# Patient Record
Sex: FEMALE | Race: BLACK OR AFRICAN AMERICAN | NOT HISPANIC OR LATINO | Employment: OTHER | URBAN - METROPOLITAN AREA
[De-identification: names, ages, dates, MRNs, and addresses within clinical notes are randomized per-mention and may not be internally consistent; named-entity substitution may affect disease eponyms.]

---

## 2017-06-19 ENCOUNTER — TELEPHONE (OUTPATIENT)
Dept: RHEUMATOLOGY | Facility: CLINIC | Age: 72
End: 2017-06-19

## 2017-06-19 NOTE — TELEPHONE ENCOUNTER
Left message for pt. To call clinic back to reschedule appointment because there was a scheduling error and the provider will be out of the office.

## 2017-06-20 ENCOUNTER — TELEPHONE (OUTPATIENT)
Dept: RHEUMATOLOGY | Facility: CLINIC | Age: 72
End: 2017-06-20

## 2017-06-20 NOTE — TELEPHONE ENCOUNTER
Left message with patient's daughter that the patient's appointment for next 6/29 needed to be rescheduled because the provider will no be in. New appointment 7/24 @ 2 pm given to daughter and I verbalized that I was sending an appointment reminder in the mail. The daughter verbalized understanding and reported that she would give her mother the message.

## 2017-11-05 ENCOUNTER — HOSPITAL ENCOUNTER (INPATIENT)
Facility: HOSPITAL | Age: 72
LOS: 11 days | Discharge: NURSING, REHAB OR LONG-TERM CARE | DRG: 854 | End: 2017-11-17
Attending: EMERGENCY MEDICINE | Admitting: SURGERY
Payer: MEDICARE

## 2017-11-05 DIAGNOSIS — I10 ESSENTIAL HYPERTENSION: ICD-10-CM

## 2017-11-05 DIAGNOSIS — M79.89 OTHER SPECIFIED SOFT TISSUE DISORDERS (CODE): ICD-10-CM

## 2017-11-05 DIAGNOSIS — K81.0 ACUTE CHOLECYSTITIS: ICD-10-CM

## 2017-11-05 DIAGNOSIS — E83.42 HYPOMAGNESEMIA: ICD-10-CM

## 2017-11-05 DIAGNOSIS — M19.90 ARTHRITIS: ICD-10-CM

## 2017-11-05 DIAGNOSIS — E66.01 MORBID OBESITY WITH BMI OF 45.0-49.9, ADULT: ICD-10-CM

## 2017-11-05 DIAGNOSIS — E87.6 HYPOKALEMIA: ICD-10-CM

## 2017-11-05 DIAGNOSIS — Z90.49 S/P CHOLECYSTECTOMY: Primary | ICD-10-CM

## 2017-11-05 LAB
ALBUMIN SERPL BCP-MCNC: 3 G/DL
ALP SERPL-CCNC: 101 U/L
ALT SERPL W/O P-5'-P-CCNC: 12 U/L
ANION GAP SERPL CALC-SCNC: 10 MMOL/L
AST SERPL-CCNC: 20 U/L
BASOPHILS # BLD AUTO: 0.06 K/UL
BASOPHILS NFR BLD: 0.3 %
BILIRUB SERPL-MCNC: 1.1 MG/DL
BUN SERPL-MCNC: 21 MG/DL
CALCIUM SERPL-MCNC: 10 MG/DL
CHLORIDE SERPL-SCNC: 101 MMOL/L
CO2 SERPL-SCNC: 28 MMOL/L
CREAT SERPL-MCNC: 0.8 MG/DL
DIFFERENTIAL METHOD: ABNORMAL
EOSINOPHIL # BLD AUTO: 0 K/UL
EOSINOPHIL NFR BLD: 0.1 %
ERYTHROCYTE [DISTWIDTH] IN BLOOD BY AUTOMATED COUNT: 13.7 %
EST. GFR  (AFRICAN AMERICAN): >60 ML/MIN/1.73 M^2
EST. GFR  (NON AFRICAN AMERICAN): >60 ML/MIN/1.73 M^2
GLUCOSE SERPL-MCNC: 104 MG/DL
HCT VFR BLD AUTO: 43.6 %
HGB BLD-MCNC: 14.1 G/DL
IMM GRANULOCYTES # BLD AUTO: 0.16 K/UL
IMM GRANULOCYTES NFR BLD AUTO: 0.8 %
LACTATE SERPL-SCNC: 1 MMOL/L
LIPASE SERPL-CCNC: 7 U/L
LYMPHOCYTES # BLD AUTO: 1.4 K/UL
LYMPHOCYTES NFR BLD: 7.1 %
MCH RBC QN AUTO: 28.8 PG
MCHC RBC AUTO-ENTMCNC: 32.3 G/DL
MCV RBC AUTO: 89 FL
MONOCYTES # BLD AUTO: 2.1 K/UL
MONOCYTES NFR BLD: 10.5 %
NEUTROPHILS # BLD AUTO: 15.9 K/UL
NEUTROPHILS NFR BLD: 81.2 %
NRBC BLD-RTO: 0 /100 WBC
PLATELET # BLD AUTO: 197 K/UL
PMV BLD AUTO: 11.1 FL
POTASSIUM SERPL-SCNC: 3.6 MMOL/L
PROT SERPL-MCNC: 7.2 G/DL
RBC # BLD AUTO: 4.89 M/UL
SODIUM SERPL-SCNC: 139 MMOL/L
WBC # BLD AUTO: 19.54 K/UL

## 2017-11-05 PROCEDURE — 85025 COMPLETE CBC W/AUTO DIFF WBC: CPT

## 2017-11-05 PROCEDURE — 83690 ASSAY OF LIPASE: CPT

## 2017-11-05 PROCEDURE — 25000003 PHARM REV CODE 250: Performed by: ANESTHESIOLOGY

## 2017-11-05 PROCEDURE — 80053 COMPREHEN METABOLIC PANEL: CPT

## 2017-11-05 PROCEDURE — 96361 HYDRATE IV INFUSION ADD-ON: CPT

## 2017-11-05 PROCEDURE — 83605 ASSAY OF LACTIC ACID: CPT

## 2017-11-05 PROCEDURE — 99285 EMERGENCY DEPT VISIT HI MDM: CPT | Mod: 25

## 2017-11-05 PROCEDURE — 99285 EMERGENCY DEPT VISIT HI MDM: CPT | Mod: ,,, | Performed by: EMERGENCY MEDICINE

## 2017-11-05 PROCEDURE — 20600001 HC STEP DOWN PRIVATE ROOM

## 2017-11-05 PROCEDURE — 25500020 PHARM REV CODE 255: Performed by: EMERGENCY MEDICINE

## 2017-11-05 RX ADMIN — SODIUM CHLORIDE 1000 ML: 0.9 INJECTION, SOLUTION INTRAVENOUS at 10:11

## 2017-11-05 RX ADMIN — IOHEXOL 100 ML: 350 INJECTION, SOLUTION INTRAVENOUS at 11:11

## 2017-11-06 ENCOUNTER — SURGERY (OUTPATIENT)
Age: 72
End: 2017-11-06

## 2017-11-06 ENCOUNTER — ANESTHESIA (OUTPATIENT)
Dept: SURGERY | Facility: HOSPITAL | Age: 72
DRG: 854 | End: 2017-11-06
Payer: MEDICARE

## 2017-11-06 ENCOUNTER — ANESTHESIA EVENT (OUTPATIENT)
Dept: SURGERY | Facility: HOSPITAL | Age: 72
DRG: 854 | End: 2017-11-06
Payer: MEDICARE

## 2017-11-06 PROBLEM — K81.0 ACUTE CHOLECYSTITIS: Status: ACTIVE | Noted: 2017-11-06

## 2017-11-06 LAB
ALBUMIN SERPL BCP-MCNC: 2.8 G/DL
ALP SERPL-CCNC: 110 U/L
ALT SERPL W/O P-5'-P-CCNC: 11 U/L
ANION GAP SERPL CALC-SCNC: 13 MMOL/L
AST SERPL-CCNC: 16 U/L
BASOPHILS # BLD AUTO: 0.06 K/UL
BASOPHILS NFR BLD: 0.3 %
BILIRUB SERPL-MCNC: 1 MG/DL
BUN SERPL-MCNC: 16 MG/DL
CALCIUM SERPL-MCNC: 9.6 MG/DL
CHLORIDE SERPL-SCNC: 104 MMOL/L
CO2 SERPL-SCNC: 23 MMOL/L
CREAT SERPL-MCNC: 0.6 MG/DL
DIFFERENTIAL METHOD: ABNORMAL
EOSINOPHIL # BLD AUTO: 0 K/UL
EOSINOPHIL NFR BLD: 0 %
ERYTHROCYTE [DISTWIDTH] IN BLOOD BY AUTOMATED COUNT: 13.9 %
EST. GFR  (AFRICAN AMERICAN): >60 ML/MIN/1.73 M^2
EST. GFR  (NON AFRICAN AMERICAN): >60 ML/MIN/1.73 M^2
GLUCOSE SERPL-MCNC: 105 MG/DL
HCT VFR BLD AUTO: 45.3 %
HGB BLD-MCNC: 14.4 G/DL
IMM GRANULOCYTES # BLD AUTO: 0.18 K/UL
IMM GRANULOCYTES NFR BLD AUTO: 0.9 %
LYMPHOCYTES # BLD AUTO: 1.1 K/UL
LYMPHOCYTES NFR BLD: 5.6 %
MAGNESIUM SERPL-MCNC: 1.5 MG/DL
MCH RBC QN AUTO: 29.3 PG
MCHC RBC AUTO-ENTMCNC: 31.8 G/DL
MCV RBC AUTO: 92 FL
MONOCYTES # BLD AUTO: 1.9 K/UL
MONOCYTES NFR BLD: 9.3 %
NEUTROPHILS # BLD AUTO: 17.2 K/UL
NEUTROPHILS NFR BLD: 83.9 %
NRBC BLD-RTO: 0 /100 WBC
PHOSPHATE SERPL-MCNC: 1.5 MG/DL
PLATELET # BLD AUTO: 187 K/UL
PMV BLD AUTO: 10.7 FL
POTASSIUM SERPL-SCNC: 3 MMOL/L
PROT SERPL-MCNC: 6.7 G/DL
RBC # BLD AUTO: 4.91 M/UL
SODIUM SERPL-SCNC: 140 MMOL/L
WBC # BLD AUTO: 20.45 K/UL

## 2017-11-06 PROCEDURE — 71000039 HC RECOVERY, EACH ADD'L HOUR: Performed by: SURGERY

## 2017-11-06 PROCEDURE — 94761 N-INVAS EAR/PLS OXIMETRY MLT: CPT

## 2017-11-06 PROCEDURE — 37000009 HC ANESTHESIA EA ADD 15 MINS: Performed by: SURGERY

## 2017-11-06 PROCEDURE — 47600 CHOLECYSTECTOMY: CPT | Mod: GC,,, | Performed by: SURGERY

## 2017-11-06 PROCEDURE — 63600175 PHARM REV CODE 636 W HCPCS: Performed by: ANESTHESIOLOGY

## 2017-11-06 PROCEDURE — 71000033 HC RECOVERY, INTIAL HOUR: Performed by: SURGERY

## 2017-11-06 PROCEDURE — 96375 TX/PRO/DX INJ NEW DRUG ADDON: CPT

## 2017-11-06 PROCEDURE — 99223 1ST HOSP IP/OBS HIGH 75: CPT | Mod: 57,AI,, | Performed by: SURGERY

## 2017-11-06 PROCEDURE — D9220A PRA ANESTHESIA: Mod: ,,, | Performed by: ANESTHESIOLOGY

## 2017-11-06 PROCEDURE — 25000003 PHARM REV CODE 250: Performed by: SURGERY

## 2017-11-06 PROCEDURE — 83735 ASSAY OF MAGNESIUM: CPT

## 2017-11-06 PROCEDURE — C9399 UNCLASSIFIED DRUGS OR BIOLOG: HCPCS | Performed by: ANESTHESIOLOGY

## 2017-11-06 PROCEDURE — S0030 INJECTION, METRONIDAZOLE: HCPCS | Performed by: ANESTHESIOLOGY

## 2017-11-06 PROCEDURE — 27000221 HC OXYGEN, UP TO 24 HOURS

## 2017-11-06 PROCEDURE — S0030 INJECTION, METRONIDAZOLE: HCPCS | Performed by: SURGERY

## 2017-11-06 PROCEDURE — 88304 TISSUE EXAM BY PATHOLOGIST: CPT | Mod: 26,,, | Performed by: PATHOLOGY

## 2017-11-06 PROCEDURE — 88304 TISSUE EXAM BY PATHOLOGIST: CPT | Performed by: PATHOLOGY

## 2017-11-06 PROCEDURE — 63600175 PHARM REV CODE 636 W HCPCS

## 2017-11-06 PROCEDURE — S0028 INJECTION, FAMOTIDINE, 20 MG: HCPCS | Performed by: SURGERY

## 2017-11-06 PROCEDURE — 84100 ASSAY OF PHOSPHORUS: CPT

## 2017-11-06 PROCEDURE — 25000003 PHARM REV CODE 250: Performed by: ANESTHESIOLOGY

## 2017-11-06 PROCEDURE — 37000008 HC ANESTHESIA 1ST 15 MINUTES: Performed by: SURGERY

## 2017-11-06 PROCEDURE — S0020 INJECTION, BUPIVICAINE HYDRO: HCPCS | Performed by: SURGERY

## 2017-11-06 PROCEDURE — 0FT40ZZ RESECTION OF GALLBLADDER, OPEN APPROACH: ICD-10-PCS | Performed by: SURGERY

## 2017-11-06 PROCEDURE — 36000707: Performed by: SURGERY

## 2017-11-06 PROCEDURE — 85025 COMPLETE CBC W/AUTO DIFF WBC: CPT

## 2017-11-06 PROCEDURE — 27201423 OPTIME MED/SURG SUP & DEVICES STERILE SUPPLY: Performed by: SURGERY

## 2017-11-06 PROCEDURE — 0FJ44ZZ INSPECTION OF GALLBLADDER, PERCUTANEOUS ENDOSCOPIC APPROACH: ICD-10-PCS | Performed by: SURGERY

## 2017-11-06 PROCEDURE — 20600001 HC STEP DOWN PRIVATE ROOM

## 2017-11-06 PROCEDURE — 80053 COMPREHEN METABOLIC PANEL: CPT

## 2017-11-06 PROCEDURE — 96361 HYDRATE IV INFUSION ADD-ON: CPT

## 2017-11-06 PROCEDURE — 96365 THER/PROPH/DIAG IV INF INIT: CPT | Mod: XS

## 2017-11-06 PROCEDURE — 96376 TX/PRO/DX INJ SAME DRUG ADON: CPT

## 2017-11-06 PROCEDURE — 63600175 PHARM REV CODE 636 W HCPCS: Performed by: SURGERY

## 2017-11-06 PROCEDURE — 36000706: Performed by: SURGERY

## 2017-11-06 PROCEDURE — 96367 TX/PROPH/DG ADDL SEQ IV INF: CPT

## 2017-11-06 PROCEDURE — 63600175 PHARM REV CODE 636 W HCPCS: Performed by: EMERGENCY MEDICINE

## 2017-11-06 RX ORDER — SODIUM CHLORIDE, SODIUM LACTATE, POTASSIUM CHLORIDE, CALCIUM CHLORIDE 600; 310; 30; 20 MG/100ML; MG/100ML; MG/100ML; MG/100ML
INJECTION, SOLUTION INTRAVENOUS CONTINUOUS
Status: DISCONTINUED | OUTPATIENT
Start: 2017-11-06 | End: 2017-11-08

## 2017-11-06 RX ORDER — AMLODIPINE BESYLATE 10 MG/1
10 TABLET ORAL EVERY MORNING
Status: DISCONTINUED | OUTPATIENT
Start: 2017-11-07 | End: 2017-11-10

## 2017-11-06 RX ORDER — AMLODIPINE BESYLATE 10 MG/1
10 TABLET ORAL EVERY MORNING
Status: DISCONTINUED | OUTPATIENT
Start: 2017-11-06 | End: 2017-11-06

## 2017-11-06 RX ORDER — ONDANSETRON 8 MG/1
8 TABLET, ORALLY DISINTEGRATING ORAL EVERY 8 HOURS PRN
Status: DISCONTINUED | OUTPATIENT
Start: 2017-11-06 | End: 2017-11-17

## 2017-11-06 RX ORDER — METRONIDAZOLE 500 MG/100ML
500 INJECTION, SOLUTION INTRAVENOUS
Status: DISCONTINUED | OUTPATIENT
Start: 2017-11-06 | End: 2017-11-07

## 2017-11-06 RX ORDER — CLONIDINE 0.3 MG/24H
1 PATCH, EXTENDED RELEASE TRANSDERMAL WEEKLY
Status: DISCONTINUED | OUTPATIENT
Start: 2017-11-06 | End: 2017-11-10

## 2017-11-06 RX ORDER — PHENYLEPHRINE HYDROCHLORIDE 10 MG/ML
INJECTION INTRAVENOUS
Status: DISCONTINUED | OUTPATIENT
Start: 2017-11-06 | End: 2017-11-06

## 2017-11-06 RX ORDER — LABETALOL HYDROCHLORIDE 5 MG/ML
20 INJECTION, SOLUTION INTRAVENOUS ONCE
Status: DISCONTINUED | OUTPATIENT
Start: 2017-11-06 | End: 2017-11-10

## 2017-11-06 RX ORDER — SODIUM CHLORIDE 9 MG/ML
INJECTION, SOLUTION INTRAVENOUS CONTINUOUS PRN
Status: DISCONTINUED | OUTPATIENT
Start: 2017-11-06 | End: 2017-11-06

## 2017-11-06 RX ORDER — HYDRALAZINE HYDROCHLORIDE 20 MG/ML
10 INJECTION INTRAMUSCULAR; INTRAVENOUS EVERY 6 HOURS PRN
Status: DISCONTINUED | OUTPATIENT
Start: 2017-11-06 | End: 2017-11-10

## 2017-11-06 RX ORDER — LOSARTAN POTASSIUM AND HYDROCHLOROTHIAZIDE 25; 100 MG/1; MG/1
1 TABLET ORAL EVERY MORNING
Status: DISCONTINUED | OUTPATIENT
Start: 2017-11-06 | End: 2017-11-06

## 2017-11-06 RX ORDER — BUPIVACAINE HYDROCHLORIDE 5 MG/ML
INJECTION, SOLUTION EPIDURAL; INTRACAUDAL
Status: DISCONTINUED | OUTPATIENT
Start: 2017-11-06 | End: 2017-11-06 | Stop reason: HOSPADM

## 2017-11-06 RX ORDER — CARVEDILOL 25 MG/1
25 TABLET ORAL 2 TIMES DAILY
Status: DISCONTINUED | OUTPATIENT
Start: 2017-11-06 | End: 2017-11-10

## 2017-11-06 RX ORDER — SODIUM CHLORIDE 0.9 % (FLUSH) 0.9 %
3 SYRINGE (ML) INJECTION
Status: DISCONTINUED | OUTPATIENT
Start: 2017-11-06 | End: 2017-11-10

## 2017-11-06 RX ORDER — LOSARTAN POTASSIUM AND HYDROCHLOROTHIAZIDE 25; 100 MG/1; MG/1
1 TABLET ORAL EVERY MORNING
Status: DISCONTINUED | OUTPATIENT
Start: 2017-11-08 | End: 2017-11-09

## 2017-11-06 RX ORDER — ROCURONIUM BROMIDE 10 MG/ML
INJECTION, SOLUTION INTRAVENOUS
Status: DISCONTINUED | OUTPATIENT
Start: 2017-11-06 | End: 2017-11-06

## 2017-11-06 RX ORDER — LIDOCAINE HCL/PF 100 MG/5ML
SYRINGE (ML) INTRAVENOUS
Status: DISCONTINUED | OUTPATIENT
Start: 2017-11-06 | End: 2017-11-06

## 2017-11-06 RX ORDER — HYDROMORPHONE HCL IN 0.9% NACL 6 MG/30 ML
PATIENT CONTROLLED ANALGESIA SYRINGE INTRAVENOUS CONTINUOUS
Status: DISCONTINUED | OUTPATIENT
Start: 2017-11-06 | End: 2017-11-08

## 2017-11-06 RX ORDER — HYDRALAZINE HYDROCHLORIDE 20 MG/ML
10 INJECTION INTRAMUSCULAR; INTRAVENOUS
Status: COMPLETED | OUTPATIENT
Start: 2017-11-06 | End: 2017-11-06

## 2017-11-06 RX ORDER — LABETALOL HYDROCHLORIDE 5 MG/ML
20 INJECTION, SOLUTION INTRAVENOUS EVERY 6 HOURS PRN
Status: DISCONTINUED | OUTPATIENT
Start: 2017-11-06 | End: 2017-11-10

## 2017-11-06 RX ORDER — HYDROMORPHONE HCL IN 0.9% NACL 6 MG/30 ML
PATIENT CONTROLLED ANALGESIA SYRINGE INTRAVENOUS
Status: COMPLETED
Start: 2017-11-06 | End: 2017-11-06

## 2017-11-06 RX ORDER — CARVEDILOL 12.5 MG/1
25 TABLET ORAL 2 TIMES DAILY
Status: DISCONTINUED | OUTPATIENT
Start: 2017-11-06 | End: 2017-11-06

## 2017-11-06 RX ORDER — PROPOFOL 10 MG/ML
VIAL (ML) INTRAVENOUS
Status: DISCONTINUED | OUTPATIENT
Start: 2017-11-06 | End: 2017-11-06

## 2017-11-06 RX ORDER — CLONIDINE HYDROCHLORIDE 0.1 MG/1
0.1 TABLET ORAL ONCE
Status: DISCONTINUED | OUTPATIENT
Start: 2017-11-06 | End: 2017-11-10

## 2017-11-06 RX ORDER — FENTANYL CITRATE 50 UG/ML
25 INJECTION, SOLUTION INTRAMUSCULAR; INTRAVENOUS ONCE
Status: COMPLETED | OUTPATIENT
Start: 2017-11-06 | End: 2017-11-06

## 2017-11-06 RX ORDER — FAMOTIDINE 10 MG/ML
20 INJECTION INTRAVENOUS 2 TIMES DAILY
Status: DISCONTINUED | OUTPATIENT
Start: 2017-11-06 | End: 2017-11-09

## 2017-11-06 RX ORDER — FENTANYL CITRATE 50 UG/ML
INJECTION, SOLUTION INTRAMUSCULAR; INTRAVENOUS
Status: DISCONTINUED | OUTPATIENT
Start: 2017-11-06 | End: 2017-11-06

## 2017-11-06 RX ORDER — HYDRALAZINE HYDROCHLORIDE 25 MG/1
100 TABLET, FILM COATED ORAL 3 TIMES DAILY
Status: DISCONTINUED | OUTPATIENT
Start: 2017-11-06 | End: 2017-11-06

## 2017-11-06 RX ORDER — ACETAMINOPHEN 10 MG/ML
INJECTION, SOLUTION INTRAVENOUS
Status: DISCONTINUED | OUTPATIENT
Start: 2017-11-06 | End: 2017-11-06

## 2017-11-06 RX ORDER — DIPHENHYDRAMINE HYDROCHLORIDE 50 MG/ML
25 INJECTION INTRAMUSCULAR; INTRAVENOUS EVERY 4 HOURS PRN
Status: DISCONTINUED | OUTPATIENT
Start: 2017-11-06 | End: 2017-11-10

## 2017-11-06 RX ORDER — HYDROMORPHONE HYDROCHLORIDE 1 MG/ML
1 INJECTION, SOLUTION INTRAMUSCULAR; INTRAVENOUS; SUBCUTANEOUS EVERY 4 HOURS PRN
Status: DISCONTINUED | OUTPATIENT
Start: 2017-11-06 | End: 2017-11-06

## 2017-11-06 RX ORDER — HYDRALAZINE HYDROCHLORIDE 50 MG/1
100 TABLET, FILM COATED ORAL 3 TIMES DAILY
Status: DISCONTINUED | OUTPATIENT
Start: 2017-11-06 | End: 2017-11-10

## 2017-11-06 RX ORDER — FENTANYL CITRATE 50 UG/ML
INJECTION, SOLUTION INTRAMUSCULAR; INTRAVENOUS
Status: DISPENSED
Start: 2017-11-06 | End: 2017-11-07

## 2017-11-06 RX ORDER — HYDROMORPHONE HYDROCHLORIDE 1 MG/ML
0.5 INJECTION, SOLUTION INTRAMUSCULAR; INTRAVENOUS; SUBCUTANEOUS EVERY 4 HOURS PRN
Status: DISCONTINUED | OUTPATIENT
Start: 2017-11-06 | End: 2017-11-06

## 2017-11-06 RX ORDER — SUCCINYLCHOLINE CHLORIDE 20 MG/ML
INJECTION INTRAMUSCULAR; INTRAVENOUS
Status: DISCONTINUED | OUTPATIENT
Start: 2017-11-06 | End: 2017-11-06

## 2017-11-06 RX ORDER — METRONIDAZOLE 500 MG/100ML
500 INJECTION, SOLUTION INTRAVENOUS
Status: COMPLETED | OUTPATIENT
Start: 2017-11-06 | End: 2017-11-06

## 2017-11-06 RX ORDER — CEFAZOLIN SODIUM 1 G/3ML
INJECTION, POWDER, FOR SOLUTION INTRAMUSCULAR; INTRAVENOUS
Status: DISCONTINUED | OUTPATIENT
Start: 2017-11-06 | End: 2017-11-06

## 2017-11-06 RX ORDER — SODIUM CHLORIDE 9 MG/ML
INJECTION, SOLUTION INTRAVENOUS CONTINUOUS
Status: DISCONTINUED | OUTPATIENT
Start: 2017-11-06 | End: 2017-11-06

## 2017-11-06 RX ORDER — IPRATROPIUM BROMIDE AND ALBUTEROL SULFATE 2.5; .5 MG/3ML; MG/3ML
3 SOLUTION RESPIRATORY (INHALATION) EVERY 4 HOURS PRN
Status: DISCONTINUED | OUTPATIENT
Start: 2017-11-06 | End: 2017-11-10

## 2017-11-06 RX ORDER — NALOXONE HCL 0.4 MG/ML
0.02 VIAL (ML) INJECTION
Status: DISCONTINUED | OUTPATIENT
Start: 2017-11-06 | End: 2017-11-08

## 2017-11-06 RX ADMIN — FAMOTIDINE 20 MG: 10 INJECTION, SOLUTION INTRAVENOUS at 09:11

## 2017-11-06 RX ADMIN — METRONIDAZOLE 500 MG: 500 INJECTION, SOLUTION INTRAVENOUS at 12:11

## 2017-11-06 RX ADMIN — CEFTRIAXONE 1 G: 1 INJECTION, SOLUTION INTRAVENOUS at 05:11

## 2017-11-06 RX ADMIN — SODIUM CHLORIDE: 0.9 INJECTION, SOLUTION INTRAVENOUS at 01:11

## 2017-11-06 RX ADMIN — EPHEDRINE SULFATE 5 MG: 50 INJECTION, SOLUTION INTRAMUSCULAR; INTRAVENOUS; SUBCUTANEOUS at 11:11

## 2017-11-06 RX ADMIN — HYDROMORPHONE HYDROCHLORIDE 0.5 MG: 1 INJECTION, SOLUTION INTRAMUSCULAR; INTRAVENOUS; SUBCUTANEOUS at 09:11

## 2017-11-06 RX ADMIN — SODIUM CHLORIDE, SODIUM LACTATE, POTASSIUM CHLORIDE, AND CALCIUM CHLORIDE: .6; .31; .03; .02 INJECTION, SOLUTION INTRAVENOUS at 01:11

## 2017-11-06 RX ADMIN — ACETAMINOPHEN 1000 MG: 10 INJECTION, SOLUTION INTRAVENOUS at 12:11

## 2017-11-06 RX ADMIN — PHENYLEPHRINE HYDROCHLORIDE 200 MCG: 10 INJECTION INTRAVENOUS at 11:11

## 2017-11-06 RX ADMIN — HYDRALAZINE HYDROCHLORIDE 100 MG: 50 TABLET ORAL at 09:11

## 2017-11-06 RX ADMIN — SODIUM CHLORIDE: 0.9 INJECTION, SOLUTION INTRAVENOUS at 10:11

## 2017-11-06 RX ADMIN — FENTANYL CITRATE 25 MCG: 50 INJECTION, SOLUTION INTRAMUSCULAR; INTRAVENOUS at 12:11

## 2017-11-06 RX ADMIN — LIDOCAINE HYDROCHLORIDE 100 MG: 20 INJECTION, SOLUTION INTRAVENOUS at 10:11

## 2017-11-06 RX ADMIN — Medication: at 01:11

## 2017-11-06 RX ADMIN — HYDRALAZINE HYDROCHLORIDE 10 MG: 20 INJECTION INTRAMUSCULAR; INTRAVENOUS at 02:11

## 2017-11-06 RX ADMIN — SUCCINYLCHOLINE CHLORIDE 140 MG: 20 INJECTION, SOLUTION INTRAMUSCULAR; INTRAVENOUS at 10:11

## 2017-11-06 RX ADMIN — FENTANYL CITRATE 50 MCG: 50 INJECTION, SOLUTION INTRAMUSCULAR; INTRAVENOUS at 11:11

## 2017-11-06 RX ADMIN — ROCURONIUM BROMIDE 5 MG: 10 INJECTION, SOLUTION INTRAVENOUS at 10:11

## 2017-11-06 RX ADMIN — SUGAMMADEX 500 MG: 100 INJECTION, SOLUTION INTRAVENOUS at 12:11

## 2017-11-06 RX ADMIN — POTASSIUM PHOSPHATE, MONOBASIC AND POTASSIUM PHOSPHATE, DIBASIC 40 MMOL: 224; 236 INJECTION, SOLUTION, CONCENTRATE INTRAVENOUS at 01:11

## 2017-11-06 RX ADMIN — HYDRALAZINE HYDROCHLORIDE 10 MG: 20 INJECTION INTRAMUSCULAR; INTRAVENOUS at 12:11

## 2017-11-06 RX ADMIN — PROPOFOL 200 MG: 10 INJECTION, EMULSION INTRAVENOUS at 10:11

## 2017-11-06 RX ADMIN — BUPIVACAINE HYDROCHLORIDE 20 ML: 5 INJECTION, SOLUTION EPIDURAL; INTRACAUDAL; PERINEURAL at 12:11

## 2017-11-06 RX ADMIN — PHENYLEPHRINE HYDROCHLORIDE 100 MCG: 10 INJECTION INTRAVENOUS at 11:11

## 2017-11-06 RX ADMIN — PHENYLEPHRINE HYDROCHLORIDE 0.2 MCG/KG/MIN: 10 INJECTION INTRAVENOUS at 11:11

## 2017-11-06 RX ADMIN — SODIUM CHLORIDE, SODIUM GLUCONATE, SODIUM ACETATE, POTASSIUM CHLORIDE, MAGNESIUM CHLORIDE, SODIUM PHOSPHATE, DIBASIC, AND POTASSIUM PHOSPHATE: .53; .5; .37; .037; .03; .012; .00082 INJECTION, SOLUTION INTRAVENOUS at 12:11

## 2017-11-06 RX ADMIN — FENTANYL CITRATE 150 MCG: 50 INJECTION, SOLUTION INTRAMUSCULAR; INTRAVENOUS at 10:11

## 2017-11-06 RX ADMIN — CEFAZOLIN 2 G: 1 INJECTION, POWDER, FOR SOLUTION INTRAVENOUS at 11:11

## 2017-11-06 RX ADMIN — METRONIDAZOLE 500 MG: 500 INJECTION, SOLUTION INTRAVENOUS at 08:11

## 2017-11-06 RX ADMIN — PHENYLEPHRINE HYDROCHLORIDE 100 MCG: 10 INJECTION INTRAVENOUS at 10:11

## 2017-11-06 RX ADMIN — ROCURONIUM BROMIDE 80 MG: 10 INJECTION, SOLUTION INTRAVENOUS at 11:11

## 2017-11-06 RX ADMIN — CARVEDILOL 25 MG: 12.5 TABLET, FILM COATED ORAL at 09:11

## 2017-11-06 RX ADMIN — SODIUM CHLORIDE 1000 ML: 0.9 INJECTION, SOLUTION INTRAVENOUS at 08:11

## 2017-11-06 RX ADMIN — CARVEDILOL 25 MG: 25 TABLET, FILM COATED ORAL at 09:11

## 2017-11-06 RX ADMIN — MIDAZOLAM HYDROCHLORIDE 1 MG: 1 INJECTION, SOLUTION INTRAMUSCULAR; INTRAVENOUS at 10:11

## 2017-11-06 RX ADMIN — MAGNESIUM SULFATE HEPTAHYDRATE 3 G: 500 INJECTION, SOLUTION INTRAMUSCULAR; INTRAVENOUS at 09:11

## 2017-11-06 RX ADMIN — FENTANYL CITRATE 25 MCG: 50 INJECTION INTRAMUSCULAR; INTRAVENOUS at 06:11

## 2017-11-06 RX ADMIN — LABETALOL HYDROCHLORIDE 20 MG: 5 INJECTION, SOLUTION INTRAVENOUS at 04:11

## 2017-11-06 RX ADMIN — AMLODIPINE BESYLATE 10 MG: 10 TABLET ORAL at 09:11

## 2017-11-06 RX ADMIN — METRONIDAZOLE 500 MG: 500 INJECTION, SOLUTION INTRAVENOUS at 11:11

## 2017-11-06 NOTE — ED NOTES
Received report from LISETTE Camacho RN - stated Dr's are aware of pt's SBP. Surg consent is w/ MD, not at bedside.  Pt remains NPO for possible OR today. Pt AAOx4, skin w/d, respirations even and unlabored. Offers no c/o's at this time. Pt placed on cardiac monitor, continuous pulse ox, cycling blood pressures. Side rails up x2, call bell in reach, bed in low position with brake engaged.

## 2017-11-06 NOTE — HPI
Rosetta Whyte is a 72 y.o. female with morbid obesity (s/p sleeve gastrectomy), HTN, and OA [place] [facility] with 5 days history of abdominal pain.  She states that the pain started Tuesday.  The pain was located on the right side of her abdomen.  The pain for the first day was tolerable, but for the last 4 days, she states the pain has been 10/10.  She has associated nausea and vomiting, fever/chills.  She has had no appetite and has not eaten a meal for 4 days.  She has never had anything like this happen before, even a mild episode.  She has maintained normal bowel function.  No pale stools, jaundice, itching, or dark urine.  Functionally, she can walk about 1 block before stopping, but she states that this is due to leg swelling and arthritis and not chest pain/shortness of breath.  She has lost 80 pounds since a sleeve gastrectomy 4 months ago.  She also has history of open appendectomy.

## 2017-11-06 NOTE — ED NOTES
Patient identifiers verified and correct for Rosetta Whyte.    LOC: The patient is awake, alert and aware of environment with an appropriate affect, the patient is oriented x 3 and speaking appropriately.  APPEARANCE: Patient appears obese and grimacing, patient is clean and well groomed, patient's clothing is properly fastened.  SKIN: The skin is warm and dry, color consistent with ethnicity, patient has normal skin turgor and moist mucus membranes, BLE edema  MUSCULOSKELETAL: Patient with limited ROM to right leg, no obvious deformities noted.  RESPIRATORY: Airway is open and patent, respirations are spontaneous, patient has a normal effort and rate, no accessory muscle use noted, bilateral breath sounds clear  CARDIAC: Patient has a tachycardic rate and regular rhythm, BLE edema noted, capillary refill < 3 seconds.  ABDOMEN: Soft and non tender to palpation, no distention noted, normoactive bowel sounds present in all four quadrants, scar to right lower quadrant  NEUROLOGIC: PERRL, 3mm bilaterally, eyes open spontaneously, behavior appropriate to situation, follows commands, facial expression symmetrical, bilateral hand grasp equal and even, purposeful motor response noted, normal sensation in all extremities when touched with a finger.

## 2017-11-06 NOTE — SUBJECTIVE & OBJECTIVE
No current facility-administered medications on file prior to encounter.      Current Outpatient Prescriptions on File Prior to Encounter   Medication Sig    amlodipine (NORVASC) 10 MG tablet Take 10 mg by mouth every morning.     hydrALAZINE (APRESOLINE) 100 MG tablet Take 100 mg by mouth 3 (three) times daily.     losartan-hydrochlorothiazide 100-25 mg (HYZAAR) 100-25 mg per tablet Take 1 tablet by mouth every morning.     potassium chloride 10% (KAYCIEL) 20 mEq/15 mL solution     potassium chloride SA (K-DUR,KLOR-CON) 20 MEQ tablet Take 20 mEq by mouth 2 (two) times daily.     bumetanide (BUMEX) 2 MG tablet Take 2 mg by mouth 2 (two) times daily.     CALCIUM CITRATE/VITAMIN D3 (CALCIUM CITRATE + D ORAL) Take 30 mLs by mouth 3 (three) times daily.    carvedilol (COREG) 25 MG tablet Take 25 mg by mouth 2 (two) times daily.     clobetasol (TEMOVATE) 0.05 % cream Apply topically 2 (two) times daily. Apply to legs    cloNIDine 0.3 mg/24 hr td ptwk (CATAPRES) 0.3 mg/24 hr     cyanocobalamin, vitamin B-12, 1,000 mcg Subl Place 1 drop under the tongue every other day.    ergocalciferol (ERGOCALCIFEROL) 50,000 unit Cap Take 1 capsule (50,000 Units total) by mouth twice a week.    esomeprazole (NEXIUM PACKET) 40 mg GrPS Take 40 mg by mouth before breakfast.    ondansetron (ZOFRAN-ODT) 4 MG TbDL Take 2 tablets (8 mg total) by mouth every 8 (eight) hours as needed (nausea).    pediatric multivit-iron-min (FLINTSTONES COMPLETE, IRON,) Chew Take 1 tablet by mouth 2 (two) times daily.    polyethylene glycol (GLYCOLAX) 17 gram PwPk Take 17 g by mouth once daily.    promethazine (PHENERGAN) 25 MG suppository Place 1 suppository (25 mg total) rectally every 6 (six) hours as needed for Nausea.    ursodiol (ALEK FORTE) 500 MG tablet Take 1 tablet (500 mg total) by mouth once daily. Crush tablet and take daily.  Okay to compound into liquid at patient's request.    VITAMIN B COMPLEX (B COMPLEX ORAL) Take 1 drop by  mouth 2 (two) times daily.       Review of patient's allergies indicates:  No Known Allergies    Past Medical History:   Diagnosis Date    Arthritis     BMI 50.0-59.9, adult     Hypertension     Morbid obesity     EMMANUEL (obstructive sleep apnea)      Past Surgical History:   Procedure Laterality Date    APPENDECTOMY  1960s    GASTRECTOMY      HIP FRACTURE SURGERY  1949     Family History     Problem Relation (Age of Onset)    Cancer Mother    Hypertension Mother, Sister, Brother, Brother        Social History Main Topics    Smoking status: Never Smoker    Smokeless tobacco: Never Used    Alcohol use No    Drug use: No    Sexual activity: Not on file     Review of Systems   Constitutional: Positive for activity change, appetite change, chills and fever. Negative for unexpected weight change.   HENT: Negative.    Respiratory: Negative for cough and shortness of breath.    Cardiovascular: Negative for chest pain.   Gastrointestinal: Positive for abdominal pain, nausea and vomiting. Negative for abdominal distention, constipation and diarrhea.   Genitourinary: Negative for difficulty urinating and dysuria.   Musculoskeletal: Positive for arthralgias.   Skin: Negative.    Neurological: Negative.    Hematological: Does not bruise/bleed easily.     Objective:     Vital Signs (Most Recent):  Temp: 98.8 °F (37.1 °C) (11/05/17 2201)  Pulse: (!) 119 (11/06/17 0124)  Resp: 18 (11/05/17 1921)  BP: (!) 224/101 (11/06/17 0124)  SpO2: 96 % (11/06/17 0124) Vital Signs (24h Range):  Temp:  [98.8 °F (37.1 °C)-100 °F (37.8 °C)] 98.8 °F (37.1 °C)  Pulse:  [100-119] 119  Resp:  [18] 18  SpO2:  [94 %-96 %] 96 %  BP: (189-237)/(100-112) 224/101     Weight: (!) 158.8 kg (350 lb)  Body mass index is 58.24 kg/m².    Physical Exam   Constitutional: She appears well-developed and well-nourished. She appears distressed (mild).   HENT:   Head: Normocephalic and atraumatic.   Eyes: EOM are normal. No scleral icterus.   Neck: Normal  range of motion.   Cardiovascular: Regular rhythm.  Tachycardia present.    Pulmonary/Chest: Effort normal. No respiratory distress.   Abdominal: Soft. She exhibits no distension. There is tenderness (RUQ moreso than epigastric and RLQ; +Fregoso's sign). There is guarding.   Scarring consistent with history of laparoscopic sleeve gastrectomy and open appendectomy   Musculoskeletal: Normal range of motion.   Neurological: She is alert.   Skin: She is not diaphoretic.   Nursing note and vitals reviewed.      Significant Labs:  CBC:   Recent Labs  Lab 11/05/17 2234   WBC 19.54*   RBC 4.89   HGB 14.1   HCT 43.6      MCV 89   MCH 28.8   MCHC 32.3     CMP:   Recent Labs  Lab 11/05/17 2234      CALCIUM 10.0   ALBUMIN 3.0*   PROT 7.2      K 3.6   CO2 28      BUN 21   CREATININE 0.8   ALKPHOS 101   ALT 12   AST 20   BILITOT 1.1*       Significant Diagnostics:  CT abd/pelv:   The liver is enlarged.  No focal hepatic abnormality is seen. The gallbladder is distended with surrounding inflammatory change, fat stranding and small amount of pericholecystic fluid. The gallbladder wall is thickened. Moderate amount of fat stranding is present inferior to the liver in the mesentery. No intrahepatic or extrahepatic biliary ductal dilatation is identified. The spleen is unremarkable.      The stomach, pancreas, and adrenal glands are unremarkable. There are postoperative changes of sleeve gastrectomy.    The kidneys are normal in size and location. No hydronephrosis is seen.  The ureters appear normal in course and caliber. The urinary bladder is unremarkable. There is a metallic density within the uterus.    The visualized loops of small and large bowel show no evidence of obstruction or inflammation.    No ascites, free fluid, or intraperitoneal free air is identified.    There is no evidence of lymph node enlargement in the abdomen or pelvis.    The abdominal aorta is normal in course and caliber with  advanced calcific atherosclerosis.    The osseous structures demonstrate levoscoliosis of the lumbar spine and age-appropriate degenerative changes. There are postoperative changes in the left hip including fusion of the proximal femur and acetabulum.      The extraperitoneal soft tissues are unremarkable.

## 2017-11-06 NOTE — ASSESSMENT & PLAN NOTE
-Admit to general surgery  -NPO, IVFs (receiving a bolus now; HR improved with first liter of fluid)  -Rocephin/Flagyl  -Patient's symptoms have been present and severe for at least 4 days, this makes her a more risky surgical patient.  Will treat with IV antibiotics for now; if any clinical deterioration, then could consider perc cholecystostomy tube.  -DVT and GI ppx  -Ambulate

## 2017-11-06 NOTE — OP NOTE
[facility]  Surgery Department  Operative Note    SUMMARY     Date of Procedure: 11/6/2017     Procedure: Procedure(s) (LRB):  CHOLECYSTECTOMY (N/A)  INCOMPLETE-PROCEDURE/ATTEMPTED Laparscopic cholecystectomy (N/A)     Surgeon(s) and Role:     * Sanjeev Smith MD - Primary     * Carlos Lopez MD - Resident - Assisting        Pre-Operative Diagnosis: Acute cholecystitis [K81.0]    Post-Operative Diagnosis: Post-Op Diagnosis Codes:     * Acute cholecystitis [K81.0]    Anesthesia: General    Technical Procedures Used: Open cholecystectomy    Description of the Findings of the Procedure:   After informed consent, the pt was brought to the OR placed supine on the table where GETA was induced.  The abdomen was prepped and draped in the usual sterile fashion.  A timeout was completed.  A supraumbilical incision was made, dissected down to the abdominal fascia which was grasped and elevated, and then cut with cautery.  A 10mm Mays trochar was inserted into the abdomen and insufflation to 15mmHG was achieved.  There was no injury noted during entry.  An inspection of the RUQ revealed a grossly inflamed and necrotic gallbladder.  A single 5mm port site was placed in the RUQ to facilitate mobilization of the gallbladder.  A decompression needle was used to laparoscopically drain the gallbladder.  Despite these measures, the gallbladder could not be mobilized safely to facilitate a laparoscopic procedure.  We elected to open at this point.      A right subcostal incision was made sharply.  Deep tissues of the abdominal wall were divided with electrocautery.  A Bookwalter retractor was used to facilitate exposure of the liver and gallbladder fossa.  The gallbladder was gradually freed from the bed in a top down fashion using electrocautery until the cystic artery was identified and ligated with silk ties.  The neck/ cystic duct was exposed, clamped with a right angle and the gallbladder was divided  with scissors noting a single opening into the gallbladder.  The stump was ligated with a silk tie.  The field was irrigated and hemostasis was achieved.  A 19F Neftaly noam was placed in the fossa and brought out through the RUQ. The abdomen as closed in layers with PDS for anterior and posterior fascia and staples for the skin.  Dressings were applied.  Pt tolerated the procedure without apparent complication and was transported to PACU in stable condition.      Significant Surgical Tasks Conducted by the Assistant(s), if Applicable: Assist    Complications: No    Estimated Blood Loss (EBL): 50cc           Implants: * No implants in log *    Specimens:   Specimen (12h ago through future)    Start     Ordered    11/06/17 1208  Specimen to Pathology - Surgery  Once     Comments:  1. Gallbladder - PERM      11/06/17 1207                  Condition: Good    Disposition: PACU - hemodynamically stable.    Attestation: I was present and scrubbed for the entire procedure.

## 2017-11-06 NOTE — ED PROVIDER NOTES
Encounter Date: 11/5/2017    SCRIBE #1 NOTE: I, Ryan Rasmussen, am scribing for, and in the presence of,  Dr. Hinds. I have scribed the following portions of the note - the Resident attestation.       History     Chief Complaint   Patient presents with    Fever     vomiting x 3 days. Weakness began today.     Patient is a 72-year-old female with a past medical history of hypertension obesity, sleep apnea, previous gastric sleeve surgery presents with right-sided abdominal pain nausea and vomiting for the last 3-4 days.  She describes vomiting several times per day and being unable to tolerate anything by mouth including medications.  She describes the vomitus green.  She reports pain on the right side of her abdomen which has been gradually worsening over time.  She reports having normal bowel movements.  She does report having some fevers at home subjectively.  She has not felt pain like this before. No sick contacts. She reports weakness now to the point that she can't get up out of bed.           Review of patient's allergies indicates:  No Known Allergies  Past Medical History:   Diagnosis Date    Arthritis     BMI 50.0-59.9, adult     Difficult intubation 11/06/2017    Poor neck extension, Grade 4 view with DL    Hypertension     Morbid obesity     EMMANUEL (obstructive sleep apnea)      Past Surgical History:   Procedure Laterality Date    APPENDECTOMY  1960s    GASTRECTOMY      HIP FRACTURE SURGERY  1949     Family History   Problem Relation Age of Onset    Hypertension Mother     Cancer Mother     Hypertension Sister     Hypertension Brother     Hypertension Brother      Social History   Substance Use Topics    Smoking status: Never Smoker    Smokeless tobacco: Never Used    Alcohol use No     Review of Systems   Constitutional: Positive for fever.   HENT: Negative for sore throat.    Respiratory: Negative for shortness of breath.    Cardiovascular: Negative for chest pain.    Gastrointestinal: Positive for abdominal pain, nausea and vomiting.   Genitourinary: Negative for dysuria.   Musculoskeletal: Negative for back pain.   Skin: Negative for rash.   Neurological: Negative for weakness.   Hematological: Does not bruise/bleed easily.       Physical Exam     Initial Vitals [11/05/17 1921]   BP Pulse Resp Temp SpO2   (!) 189/100 (!) 118 18 100 °F (37.8 °C) 96 %      MAP       129.67         Physical Exam    Constitutional: She appears well-developed and well-nourished. She is not diaphoretic. No distress.   HENT:   Head: Normocephalic and atraumatic.   Eyes: EOM are normal. Pupils are equal, round, and reactive to light.   Neck: Normal range of motion. Neck supple. No thyromegaly present. No tracheal deviation present.   Cardiovascular: Normal rate, regular rhythm, normal heart sounds and intact distal pulses. Exam reveals no gallop and no friction rub.    No murmur heard.  Pulmonary/Chest: Breath sounds normal. No stridor. No respiratory distress. She has no wheezes. She has no rales.   Abdominal: Soft. Bowel sounds are normal. She exhibits no distension. There is tenderness. There is guarding. There is no rebound.   Musculoskeletal: Normal range of motion. She exhibits no edema.   Neurological: She is alert and oriented to person, place, and time. No cranial nerve deficit.   Skin: Skin is warm and dry. No erythema.   Psychiatric: She has a normal mood and affect. Her behavior is normal. Thought content normal.         ED Course   Procedures  Labs Reviewed   CBC W/ AUTO DIFFERENTIAL - Abnormal; Notable for the following:        Result Value    WBC 19.54 (*)     Immature Granulocytes 0.8 (*)     Gran # 15.9 (*)     Immature Grans (Abs) 0.16 (*)     Mono # 2.1 (*)     Gran% 81.2 (*)     Lymph% 7.1 (*)     All other components within normal limits   COMPREHENSIVE METABOLIC PANEL - Abnormal; Notable for the following:     Albumin 3.0 (*)     Total Bilirubin 1.1 (*)     All other components  within normal limits   COMPREHENSIVE METABOLIC PANEL - Abnormal; Notable for the following:     Potassium 3.0 (*)     Albumin 2.8 (*)     All other components within normal limits   PHOSPHORUS - Abnormal; Notable for the following:     Phosphorus 1.5 (*)     All other components within normal limits   MAGNESIUM - Abnormal; Notable for the following:     Magnesium 1.5 (*)     All other components within normal limits   CBC W/ AUTO DIFFERENTIAL - Abnormal; Notable for the following:     WBC 20.45 (*)     MCHC 31.8 (*)     Immature Granulocytes 0.9 (*)     Gran # 17.2 (*)     Immature Grans (Abs) 0.18 (*)     Mono # 1.9 (*)     Gran% 83.9 (*)     Lymph% 5.6 (*)     All other components within normal limits   LIPASE   LACTIC ACID, PLASMA          X-Rays:   Independently Interpreted Readings:   Other Readings:  CT abdomen/pelvis: findings concerning for acute cholecystitis.      Medical Decision Making:   History:   Old Medical Records: I decided to obtain old medical records.  Initial Assessment:   71 yo F with PMH HTN, obesity, previous gastric sleeve who presents with abdominal pain, nausea, vomiting, fevers for 4 days  Differential Diagnosis:   Gastroenteritis, SBO, post surgical complication  Clinical Tests:   Lab Tests: Ordered  Radiological Study: Ordered       APC / Resident Notes:   10:28 PM: Labs and imaging    12:43 AM: Imaging shows acute cholecystitis. Consulting gen surg.        Scribe Attestation:   Elenaibe #1: I performed the above scribed service and the documentation accurately describes the services I performed. I attest to the accuracy of the note.    Attending Attestation:   Physician Attestation Statement for Resident:  As the supervising MD   Physician Attestation Statement: I have personally seen and examined this patient.   I agree with the above history. -: 71 yo female with hx of gastric sleeve now presenting with N/V, fever for the past 3-4 days. She is unable to take oral medication or oral  antihypertensives. She is diffusely tender on exam, febrile and tachycardic. Pt with WBC of 19,000. Pt treated with IV fluids. Will obtain CT abdomen/pelvis. Will continue to monitor.      As the supervising MD I agree with the above PE.   -: General: (+) painful distress, obese  Cards: tachycardia  Lungs: CTA  Abd: diffuse tenderness (> in RUQ) with guarding.    Ext: no edema   As the supervising MD I agree with the above treatment, course, plan, and disposition.   -: CT abd/pelvis significant for acute cholecystitis.  Pt treated with rocephin and flagyl.  Gen surgery consulted.  Pt will be admitted for further treatment and evaluation.     I have reviewed and agree with the residents interpretation of the following: lab data, CT scans and x-rays.          Physician Attestation for Scribe:      Comments: I, Dr. Sravani Hinds, personally performed the services described in this documentation. All medical record entries made by the scribe were at my direction and in my presence.  I have reviewed the chart and agree that the record reflects my personal performance and is accurate and complete. Sravani Hinds MD.  6:29 PM 11/06/2017            ED Course      Clinical Impression:   The encounter diagnosis was Acute cholecystitis.    Disposition:   Disposition: Admitted  Condition: Fair                        Sravani Hinds MD  11/06/17 8621

## 2017-11-06 NOTE — TRANSFER OF CARE
"Anesthesia Transfer of Care Note    Patient: Rosetta Whyte    Procedure(s) Performed: Procedure(s) (LRB):  CHOLECYSTECTOMY (N/A)  INCOMPLETE-PROCEDURE/ATTEMPTED Laparscopic cholecystectomy (N/A)    Patient location: PACU    Anesthesia Type: general    Transport from OR: Transported from OR on 100% O2 by closed face mask with adequate spontaneous ventilation    Post pain: adequate analgesia    Post assessment: no apparent anesthetic complications    Post vital signs: stable    Level of consciousness: awake and alert    Nausea/Vomiting: no nausea/vomiting    Complications: none    Transfer of care protocol was followed      Last vitals:   Visit Vitals  /60 (BP Location: Left arm, Patient Position: Lying)   Pulse 81   Temp 37.1 °C (98.7 °F) (Oral)   Resp 20   Ht 5' 5" (1.651 m)   Wt 127 kg (280 lb)   SpO2 (!) 94%   BMI 46.59 kg/m²     "

## 2017-11-06 NOTE — CONSULTS
[facility]  General Surgery  Consult Note    Patient Name: Rosetta Whyte  MRN: [id number]  Code Status: Full Code  Admission Date: 11/5/2017  Hospital Length of Stay: 0 days  Attending Physician: Sravani Hinds MD  Primary Care Provider: Georgie Ortega MD    Patient information was obtained from patient, past medical records and ER records.     Inpatient consult to General surgery  Consult performed by: KAYLI ARROYO JR.  Consult ordered by: KAYLI ARROYO JR.  Reason for consult: abdominal pain        Subjective:     Principal Problem: <principal problem not specified>    History of Present Illness: Rosetta Whyte is a 72 y.o. female with morbid obesity (s/p sleeve gastrectomy), HTN, and OA presents to [facility] ED with 5 days history of abdominal pain.  She states that the pain started Tuesday.  The pain was located on the right side of her abdomen.  The pain for the first day was tolerable, but for the last 4 days, she states the pain has been 10/10.  She has associated nausea and vomiting, fever/chills.  She has had no appetite and has not eaten a meal for 4 days.  She has never had anything like this happen before, even a mild episode.  She has maintained normal bowel function.  No pale stools, jaundice, itching, or dark urine.  Functionally, she can walk about 1 block before stopping, but she states that this is due to leg swelling and arthritis and not chest pain/shortness of breath.  She has lost 80 pounds since a sleeve gastrectomy 4 months ago.  She also has history of open appendectomy.    No current facility-administered medications on file prior to encounter.      Current Outpatient Prescriptions on File Prior to Encounter   Medication Sig    amlodipine (NORVASC) 10 MG tablet Take 10 mg by mouth every morning.     hydrALAZINE (APRESOLINE) 100 MG tablet Take 100 mg by mouth 3 (three) times daily.     losartan-hydrochlorothiazide 100-25 mg (HYZAAR) 100-25 mg per tablet Take 1 tablet  by mouth every morning.     potassium chloride 10% (KAYCIEL) 20 mEq/15 mL solution     potassium chloride SA (K-DUR,KLOR-CON) 20 MEQ tablet Take 20 mEq by mouth 2 (two) times daily.     bumetanide (BUMEX) 2 MG tablet Take 2 mg by mouth 2 (two) times daily.     CALCIUM CITRATE/VITAMIN D3 (CALCIUM CITRATE + D ORAL) Take 30 mLs by mouth 3 (three) times daily.    carvedilol (COREG) 25 MG tablet Take 25 mg by mouth 2 (two) times daily.     clobetasol (TEMOVATE) 0.05 % cream Apply topically 2 (two) times daily. Apply to legs    cloNIDine 0.3 mg/24 hr td ptwk (CATAPRES) 0.3 mg/24 hr     cyanocobalamin, vitamin B-12, 1,000 mcg Subl Place 1 drop under the tongue every other day.    ergocalciferol (ERGOCALCIFEROL) 50,000 unit Cap Take 1 capsule (50,000 Units total) by mouth twice a week.    esomeprazole (NEXIUM PACKET) 40 mg GrPS Take 40 mg by mouth before breakfast.    ondansetron (ZOFRAN-ODT) 4 MG TbDL Take 2 tablets (8 mg total) by mouth every 8 (eight) hours as needed (nausea).    pediatric multivit-iron-min (FLINTSTONES COMPLETE, IRON,) Chew Take 1 tablet by mouth 2 (two) times daily.    polyethylene glycol (GLYCOLAX) 17 gram PwPk Take 17 g by mouth once daily.    promethazine (PHENERGAN) 25 MG suppository Place 1 suppository (25 mg total) rectally every 6 (six) hours as needed for Nausea.    ursodiol (ALEK FORTE) 500 MG tablet Take 1 tablet (500 mg total) by mouth once daily. Crush tablet and take daily.  Okay to compound into liquid at patient's request.    VITAMIN B COMPLEX (B COMPLEX ORAL) Take 1 drop by mouth 2 (two) times daily.       Review of patient's allergies indicates:  No Known Allergies    Past Medical History:   Diagnosis Date    Arthritis     BMI 50.0-59.9, adult     Hypertension     Morbid obesity     EMMANUEL (obstructive sleep apnea)      Past Surgical History:   Procedure Laterality Date    APPENDECTOMY  1960s    GASTRECTOMY      HIP FRACTURE SURGERY  1949     Family History      Problem Relation (Age of Onset)    Cancer Mother    Hypertension Mother, Sister, Brother, Brother        Social History Main Topics    Smoking status: Never Smoker    Smokeless tobacco: Never Used    Alcohol use No    Drug use: No    Sexual activity: Not on file     Review of Systems   Constitutional: Positive for activity change, appetite change, chills and fever. Negative for unexpected weight change.   HENT: Negative.    Respiratory: Negative for cough and shortness of breath.    Cardiovascular: Negative for chest pain.   Gastrointestinal: Positive for abdominal pain, nausea and vomiting. Negative for abdominal distention, constipation and diarrhea.   Genitourinary: Negative for difficulty urinating and dysuria.   Musculoskeletal: Positive for arthralgias.   Skin: Negative.    Neurological: Negative.    Hematological: Does not bruise/bleed easily.     Objective:     Vital Signs (Most Recent):  Temp: 98.8 °F (37.1 °C) (11/05/17 2201)  Pulse: (!) 119 (11/06/17 0124)  Resp: 18 (11/05/17 1921)  BP: (!) 224/101 (11/06/17 0124)  SpO2: 96 % (11/06/17 0124) Vital Signs (24h Range):  Temp:  [98.8 °F (37.1 °C)-100 °F (37.8 °C)] 98.8 °F (37.1 °C)  Pulse:  [100-119] 119  Resp:  [18] 18  SpO2:  [94 %-96 %] 96 %  BP: (189-237)/(100-112) 224/101     Weight: (!) 158.8 kg (350 lb)  Body mass index is 58.24 kg/m².    Physical Exam   Constitutional: She appears well-developed and well-nourished. She appears distressed (mild).   HENT:   Head: Normocephalic and atraumatic.   Eyes: EOM are normal. No scleral icterus.   Neck: Normal range of motion.   Cardiovascular: Regular rhythm.  Tachycardia present.    Pulmonary/Chest: Effort normal. No respiratory distress.   Abdominal: Soft. She exhibits no distension. There is tenderness (RUQ moreso than epigastric and RLQ; +Fregoso's sign). There is guarding.   Scarring consistent with history of laparoscopic sleeve gastrectomy and open appendectomy   Musculoskeletal: Normal range of  motion.   Neurological: She is alert.   Skin: She is not diaphoretic.   Nursing note and vitals reviewed.      Significant Labs:  CBC:   Recent Labs  Lab 11/05/17 2234   WBC 19.54*   RBC 4.89   HGB 14.1   HCT 43.6      MCV 89   MCH 28.8   MCHC 32.3     CMP:   Recent Labs  Lab 11/05/17 2234      CALCIUM 10.0   ALBUMIN 3.0*   PROT 7.2      K 3.6   CO2 28      BUN 21   CREATININE 0.8   ALKPHOS 101   ALT 12   AST 20   BILITOT 1.1*       Significant Diagnostics:  CT abd/pelv:   The liver is enlarged.  No focal hepatic abnormality is seen. The gallbladder is distended with surrounding inflammatory change, fat stranding and small amount of pericholecystic fluid. The gallbladder wall is thickened. Moderate amount of fat stranding is present inferior to the liver in the mesentery. No intrahepatic or extrahepatic biliary ductal dilatation is identified. The spleen is unremarkable.      The stomach, pancreas, and adrenal glands are unremarkable. There are postoperative changes of sleeve gastrectomy.    The kidneys are normal in size and location. No hydronephrosis is seen.  The ureters appear normal in course and caliber. The urinary bladder is unremarkable. There is a metallic density within the uterus.    The visualized loops of small and large bowel show no evidence of obstruction or inflammation.    No ascites, free fluid, or intraperitoneal free air is identified.    There is no evidence of lymph node enlargement in the abdomen or pelvis.    The abdominal aorta is normal in course and caliber with advanced calcific atherosclerosis.    The osseous structures demonstrate levoscoliosis of the lumbar spine and age-appropriate degenerative changes. There are postoperative changes in the left hip including fusion of the proximal femur and acetabulum.      The extraperitoneal soft tissues are unremarkable.    Assessment/Plan:     * Acute cholecystitis    -Admit to general surgery  -NPO, IVFs  (receiving a bolus now; HR improved with first liter of fluid)  -Rocephin/Flagyl  -Patient's symptoms have been present and severe for at least 4 days, this makes her a more risky surgical patient.    -Discussed risks and benefits with patient and family member in detail, and have obtained written consent to proceed with laparoscopic vs open cholecystectomy today.   -DVT and GI ppx  -Ambulate        Hypertension    -Significantly elevated BP; patient on 4 blood pressure medications at home; however, she has been unable to tolerate oral intake with her nausea/vomiting.  -IV hydralazine and IV labetalol for BP control until able to take PO.          VTE Risk Mitigation         Ordered     Medium Risk of VTE  Once      11/06/17 0126     Place sequential compression device  Until discontinued      11/06/17 0126     Place LILA hose  Until discontinued      11/06/17 0126          Thank you for your consult. I will follow-up with patient. Please contact us if you have any additional questions.    Isrrael Henriquez Jr., MD  General Surgery  [facility]

## 2017-11-06 NOTE — TREATMENT PLAN
Reviewed pts chart to find multiple BPs taken while on the floor with systolic pressures in the 200s. Rechecked pts BP on her forearms with a large cuff to find 139/73 on the left foream  and 138/60 on the right forearm. Will need to recheck cuff size once patient returns back to her room.    Sanjeev Gomez MD  CA-3

## 2017-11-06 NOTE — ED NOTES
Patient reports feeling feverish at home and having right sided abdominal pain and vomiting for the past 4 days. Patient denies nausea currently, but reports 3 episodes of emesis today. Patient reports having gastric sleeve last year. Patient reports 2 BMs yesterday which was her normal soft stool.

## 2017-11-06 NOTE — BRIEF OP NOTE
[facility]  Brief Operative Note    SUMMARY     Surgery Date: 11/6/2017     Surgeon(s) and Role:     * Sanjeev Smith MD - Primary     * Carlos Lopez MD - Resident - Assisting        Pre-op Diagnosis:  Acute cholecystitis [K81.0]    Post-op Diagnosis:  Post-Op Diagnosis Codes:     * Acute cholecystitis [K81.0]    Procedure(s) (LRB):  CHOLECYSTECTOMY (N/A)  INCOMPLETE-PROCEDURE/ATTEMPTED Laparscopic cholecystectomy (N/A)    Anesthesia: General    Description of Procedure: Inflamed and necrotic gallbladder     Description of the findings of the procedure: As above    Estimated Blood Loss: 50cc         Specimens:   Specimen (12h ago through future)    Start     Ordered    11/06/17 1208  Specimen to Pathology - Surgery  Once     Comments:  1. Gallbladder - PERM      11/06/17 1207

## 2017-11-07 LAB
ALBUMIN SERPL BCP-MCNC: 2.3 G/DL
ALP SERPL-CCNC: 110 U/L
ALT SERPL W/O P-5'-P-CCNC: 16 U/L
ANION GAP SERPL CALC-SCNC: 11 MMOL/L
ANION GAP SERPL CALC-SCNC: 6 MMOL/L
AST SERPL-CCNC: 22 U/L
BASOPHILS # BLD AUTO: 0.02 K/UL
BASOPHILS NFR BLD: 0.2 %
BILIRUB SERPL-MCNC: 1 MG/DL
BUN SERPL-MCNC: 21 MG/DL
BUN SERPL-MCNC: 24 MG/DL
CALCIUM SERPL-MCNC: 8.8 MG/DL
CALCIUM SERPL-MCNC: 8.8 MG/DL
CHLORIDE SERPL-SCNC: 105 MMOL/L
CHLORIDE SERPL-SCNC: 107 MMOL/L
CO2 SERPL-SCNC: 23 MMOL/L
CO2 SERPL-SCNC: 27 MMOL/L
CREAT SERPL-MCNC: 0.8 MG/DL
CREAT SERPL-MCNC: 0.9 MG/DL
DIFFERENTIAL METHOD: ABNORMAL
EOSINOPHIL # BLD AUTO: 0 K/UL
EOSINOPHIL NFR BLD: 0.2 %
ERYTHROCYTE [DISTWIDTH] IN BLOOD BY AUTOMATED COUNT: 14.1 %
EST. GFR  (AFRICAN AMERICAN): >60 ML/MIN/1.73 M^2
EST. GFR  (AFRICAN AMERICAN): >60 ML/MIN/1.73 M^2
EST. GFR  (NON AFRICAN AMERICAN): >60 ML/MIN/1.73 M^2
EST. GFR  (NON AFRICAN AMERICAN): >60 ML/MIN/1.73 M^2
GLUCOSE SERPL-MCNC: 121 MG/DL
GLUCOSE SERPL-MCNC: 133 MG/DL
HCT VFR BLD AUTO: 38.6 %
HGB BLD-MCNC: 12.6 G/DL
IMM GRANULOCYTES # BLD AUTO: 0.11 K/UL
IMM GRANULOCYTES NFR BLD AUTO: 0.8 %
LYMPHOCYTES # BLD AUTO: 0.7 K/UL
LYMPHOCYTES NFR BLD: 5.5 %
MAGNESIUM SERPL-MCNC: 1.4 MG/DL
MCH RBC QN AUTO: 29.5 PG
MCHC RBC AUTO-ENTMCNC: 32.6 G/DL
MCV RBC AUTO: 90 FL
MONOCYTES # BLD AUTO: 1.4 K/UL
MONOCYTES NFR BLD: 10.6 %
NEUTROPHILS # BLD AUTO: 10.9 K/UL
NEUTROPHILS NFR BLD: 82.7 %
NRBC BLD-RTO: 0 /100 WBC
PHOSPHATE SERPL-MCNC: 4.1 MG/DL
PLATELET # BLD AUTO: 183 K/UL
PMV BLD AUTO: 10.5 FL
POTASSIUM SERPL-SCNC: 3.8 MMOL/L
POTASSIUM SERPL-SCNC: 3.8 MMOL/L
PROT SERPL-MCNC: 5.9 G/DL
RBC # BLD AUTO: 4.27 M/UL
SODIUM SERPL-SCNC: 138 MMOL/L
SODIUM SERPL-SCNC: 141 MMOL/L
WBC # BLD AUTO: 13.21 K/UL

## 2017-11-07 PROCEDURE — 27000221 HC OXYGEN, UP TO 24 HOURS

## 2017-11-07 PROCEDURE — S0030 INJECTION, METRONIDAZOLE: HCPCS | Performed by: SURGERY

## 2017-11-07 PROCEDURE — 85025 COMPLETE CBC W/AUTO DIFF WBC: CPT

## 2017-11-07 PROCEDURE — 97166 OT EVAL MOD COMPLEX 45 MIN: CPT

## 2017-11-07 PROCEDURE — 63600175 PHARM REV CODE 636 W HCPCS: Performed by: SURGERY

## 2017-11-07 PROCEDURE — 94640 AIRWAY INHALATION TREATMENT: CPT

## 2017-11-07 PROCEDURE — 97535 SELF CARE MNGMENT TRAINING: CPT

## 2017-11-07 PROCEDURE — 80053 COMPREHEN METABOLIC PANEL: CPT

## 2017-11-07 PROCEDURE — 63600175 PHARM REV CODE 636 W HCPCS: Performed by: STUDENT IN AN ORGANIZED HEALTH CARE EDUCATION/TRAINING PROGRAM

## 2017-11-07 PROCEDURE — 84100 ASSAY OF PHOSPHORUS: CPT

## 2017-11-07 PROCEDURE — 36415 COLL VENOUS BLD VENIPUNCTURE: CPT

## 2017-11-07 PROCEDURE — 80048 BASIC METABOLIC PNL TOTAL CA: CPT

## 2017-11-07 PROCEDURE — 20600001 HC STEP DOWN PRIVATE ROOM

## 2017-11-07 PROCEDURE — 83735 ASSAY OF MAGNESIUM: CPT

## 2017-11-07 PROCEDURE — 25000003 PHARM REV CODE 250: Performed by: SURGERY

## 2017-11-07 PROCEDURE — S0028 INJECTION, FAMOTIDINE, 20 MG: HCPCS | Performed by: SURGERY

## 2017-11-07 PROCEDURE — 25000242 PHARM REV CODE 250 ALT 637 W/ HCPCS: Performed by: SURGERY

## 2017-11-07 RX ORDER — MIDAZOLAM HYDROCHLORIDE 1 MG/ML
INJECTION, SOLUTION INTRAMUSCULAR; INTRAVENOUS
Status: DISCONTINUED | OUTPATIENT
Start: 2017-11-06 | End: 2017-11-07

## 2017-11-07 RX ADMIN — Medication: at 12:11

## 2017-11-07 RX ADMIN — IPRATROPIUM BROMIDE AND ALBUTEROL SULFATE 3 ML: .5; 3 SOLUTION RESPIRATORY (INHALATION) at 11:11

## 2017-11-07 RX ADMIN — FAMOTIDINE 20 MG: 10 INJECTION, SOLUTION INTRAVENOUS at 08:11

## 2017-11-07 RX ADMIN — CARVEDILOL 25 MG: 25 TABLET, FILM COATED ORAL at 08:11

## 2017-11-07 RX ADMIN — CEFTRIAXONE 1 G: 1 INJECTION, SOLUTION INTRAVENOUS at 05:11

## 2017-11-07 RX ADMIN — METRONIDAZOLE 500 MG: 500 INJECTION, SOLUTION INTRAVENOUS at 06:11

## 2017-11-07 RX ADMIN — HYDRALAZINE HYDROCHLORIDE 100 MG: 50 TABLET ORAL at 09:11

## 2017-11-07 RX ADMIN — AMLODIPINE BESYLATE 10 MG: 10 TABLET ORAL at 06:11

## 2017-11-07 RX ADMIN — HYDRALAZINE HYDROCHLORIDE 100 MG: 50 TABLET ORAL at 03:11

## 2017-11-07 RX ADMIN — HYDRALAZINE HYDROCHLORIDE 100 MG: 50 TABLET ORAL at 06:11

## 2017-11-07 RX ADMIN — SODIUM CHLORIDE, SODIUM LACTATE, POTASSIUM CHLORIDE, AND CALCIUM CHLORIDE: .6; .31; .03; .02 INJECTION, SOLUTION INTRAVENOUS at 01:11

## 2017-11-07 NOTE — SUBJECTIVE & OBJECTIVE
Interval History: No acute events. Tolerating sips of clears, no N/V. Pain control with PCA. Pt claims she is voiding without issue, but none recorded on I/Os.     Medications:  Continuous Infusions:   hydromorphone in 0.9 % NaCl 6 mg/30 ml      lactated ringers 125 mL/hr at 11/06/17 1331     Scheduled Meds:   amLODIPine  10 mg Oral QAM    carvedilol  25 mg Oral BID    cefTRIAXone (ROCEPHIN) IVPB  1 g Intravenous Q12H    cloNIDine 0.3 mg/24 hr td ptwk  1 patch Transdermal Weekly    cloNIDine  0.1 mg Oral Once    famotidine (PF)  20 mg Intravenous BID    hydrALAZINE  100 mg Oral TID    labetalol  20 mg Intravenous Once    lactated ringers  1,000 mL Intravenous Once    [START ON 11/8/2017] losartan-hydrochlorothiazide 100-25 mg  1 tablet Oral QAM    metronidazole  500 mg Intravenous Q8H     PRN Meds:albuterol-ipratropium 2.5mg-0.5mg/3mL, diphenhydrAMINE, hydrALAZINE, labetalol, naloxone, ondansetron, promethazine (PHENERGAN) IVPB, sodium chloride 0.9%     Review of patient's allergies indicates:  No Known Allergies  Objective:     Vital Signs (Most Recent):  Temp: 99.1 °F (37.3 °C) (11/07/17 0417)  Pulse: 84 (11/07/17 0417)  Resp: 20 (11/07/17 0417)  BP: 132/63 (11/07/17 0417)  SpO2: 95 % (11/07/17 0417) Vital Signs (24h Range):  Temp:  [97.2 °F (36.2 °C)-99.7 °F (37.6 °C)] 99.1 °F (37.3 °C)  Pulse:  [] 84  Resp:  [10-31] 20  SpO2:  [90 %-100 %] 95 %  BP: (128-216)/() 132/63     Weight: 127 kg (280 lb)  Body mass index is 46.59 kg/m².    Intake/Output - Last 3 Shifts       11/05 0700 - 11/06 0659 11/06 0700 - 11/07 0659    I.V. (mL/kg)  1600 (12.6)    IV Piggyback 1000     Total Intake(mL/kg) 1000 (6.3) 1600 (12.6)    Drains  30    Stool  0    Total Output   30    Net +1000 +1570          Urine Occurrence  2 x    Stool Occurrence  0 x          Physical Exam  AAO, NAD  RRR  Breathing unlabored  Abdomen soft, ATTP, ND  Incision with sterile dressing in place, c/d/i  XIOMY with minimal SS output.      Significant Labs:  CBC:   Recent Labs  Lab 11/07/17  0536   WBC 13.21*   RBC 4.27   HGB 12.6   HCT 38.6      MCV 90   MCH 29.5   MCHC 32.6     CMP:   Recent Labs  Lab 11/07/17  0536   *   CALCIUM 8.8   ALBUMIN 2.3*   PROT 5.9*      K 3.8   CO2 23      BUN 24*   CREATININE 0.9   ALKPHOS 110   ALT 16   AST 22   BILITOT 1.0       Significant Diagnostics:  No further.

## 2017-11-07 NOTE — PROGRESS NOTES
[facility]  General Surgery  Progress Note    Subjective:     History of Present Illness:  Rosetta Whyte is a 72 y.o. female with morbid obesity (s/p sleeve gastrectomy), HTN, and OA presents to [facility] ED with 5 days history of abdominal pain.  She states that the pain started Tuesday.  The pain was located on the right side of her abdomen.  The pain for the first day was tolerable, but for the last 4 days, she states the pain has been 10/10.  She has associated nausea and vomiting, fever/chills.  She has had no appetite and has not eaten a meal for 4 days.  She has never had anything like this happen before, even a mild episode.  She has maintained normal bowel function.  No pale stools, jaundice, itching, or dark urine.  Functionally, she can walk about 1 block before stopping, but she states that this is due to leg swelling and arthritis and not chest pain/shortness of breath.  She has lost 80 pounds since a sleeve gastrectomy 4 months ago.  She also has history of open appendectomy.    Post-Op Info:  Procedure(s) (LRB):  CHOLECYSTECTOMY (N/A)  INCOMPLETE-PROCEDURE/ATTEMPTED Laparscopic cholecystectomy (N/A)   1 Day Post-Op     Interval History: No events overnight.  Pain adequately controlled.  Minimal drainage     Medications:  Continuous Infusions:   hydromorphone in 0.9 % NaCl 6 mg/30 ml      lactated ringers 125 mL/hr at 11/06/17 1331     Scheduled Meds:   amLODIPine  10 mg Oral QAM    carvedilol  25 mg Oral BID    cefTRIAXone (ROCEPHIN) IVPB  1 g Intravenous Q12H    cloNIDine 0.3 mg/24 hr td ptwk  1 patch Transdermal Weekly    cloNIDine  0.1 mg Oral Once    famotidine (PF)  20 mg Intravenous BID    hydrALAZINE  100 mg Oral TID    labetalol  20 mg Intravenous Once    lactated ringers  1,000 mL Intravenous Once    [START ON 11/8/2017] losartan-hydrochlorothiazide 100-25 mg  1 tablet Oral QAM    metronidazole  500 mg Intravenous Q8H     PRN Meds:albuterol-ipratropium  2.5mg-0.5mg/3mL, diphenhydrAMINE, hydrALAZINE, labetalol, naloxone, ondansetron, promethazine (PHENERGAN) IVPB, sodium chloride 0.9%     Review of patient's allergies indicates:  No Known Allergies  Objective:     Vital Signs (Most Recent):  Temp: 97.1 °F (36.2 °C) (11/07/17 0726)  Pulse: 79 (11/07/17 0726)  Resp: 20 (11/07/17 0726)  BP: (!) 117/57 (11/07/17 0726)  SpO2: 95 % (11/07/17 0726) Vital Signs (24h Range):  Temp:  [97.1 °F (36.2 °C)-99.7 °F (37.6 °C)] 97.1 °F (36.2 °C)  Pulse:  [] 79  Resp:  [10-31] 20  SpO2:  [90 %-100 %] 95 %  BP: (117-216)/() 117/57     Weight: 127 kg (280 lb)  Body mass index is 46.59 kg/m².    Intake/Output - Last 3 Shifts       11/05 0700 - 11/06 0659 11/06 0700 - 11/07 0659 11/07 0700 - 11/08 0659    I.V. (mL/kg)  1600 (12.6)     IV Piggyback 1000 1800     Total Intake(mL/kg) 1000 (6.3) 3400 (26.8)     Urine (mL/kg/hr)  0 (0)     Drains  80 (0)     Stool  0 (0)     Total Output   80      Net +1000 +3320             Urine Occurrence  2 x     Stool Occurrence  0 x           Physical Exam   Gen: NAD  CV: RRR  Pulm: Diminished  Abd: Soft, ATTP.  Minimal s/s XIOMY output.  Dressing intact    Significant Labs:  CBC:   Recent Labs  Lab 11/07/17 0536   WBC 13.21*   RBC 4.27   HGB 12.6   HCT 38.6      MCV 90   MCH 29.5   MCHC 32.6     CMP:   Recent Labs  Lab 11/07/17 0536   *   CALCIUM 8.8   ALBUMIN 2.3*   PROT 5.9*      K 3.8   CO2 23      BUN 24*   CREATININE 0.9   ALKPHOS 110   ALT 16   AST 22   BILITOT 1.0       Significant Diagnostics:  n/a    Assessment/Plan:     * Acute cholecystitis    S/P lap converted to open edison, POD1  -CLD  -Continue PCA  -IS/aggressive pulm toilet  -PT/OT  -Ambulate, OOBTC  -DVT ppx        Hypertension    - Improved on home regimen   - IV hydralazine and labetalol PRN            Carlos Lopez MD  General Surgery  [facility]

## 2017-11-07 NOTE — PT/OT/SLP EVAL
Occupational Therapy  Evaluation    Rosetta Whyte   MRN: [id number]   Admitting Diagnosis: Acute cholecystitis    OT Date of Treatment: 11/07/17   OT Start Time: 1050  OT Stop Time: 1155  OT Total Time (min): 65 min    Billable Minutes:  Evaluation 10  Self Care/Home Management 55    Diagnosis: Acute cholecystitis   Pt is s/p cholecystectomy on 11/6/2017    Past Medical History:   Diagnosis Date    Arthritis     BMI 50.0-59.9, adult     Difficult intubation 11/06/2017    Poor neck extension, Grade 4 view with DL    Hypertension     Morbid obesity     EMMANUEL (obstructive sleep apnea)       Past Surgical History:   Procedure Laterality Date    APPENDECTOMY  1960s    GASTRECTOMY      HIP FRACTURE SURGERY  1949       Referring physician: Neil Cohen MD  Date referred to OT: 11/7/2017    General Precautions: Standard, fall  Orthopedic Precautions: N/A  Braces: N/A    Do you have any cultural, spiritual, Roman Catholic conflicts, given your current situation?: none     Patient History:  Living Environment  Lives With: grandchild(greg), spouse  Living Arrangements: house  Transportation Available: family or friend will provide  Living Environment Comment:  (Pt lives at home with  and grandson. They live in a [facility] with 0 LAYTON, WIS, standard height commode. DME: BSC, rollator (is broken and insurance did not pay for it))  Equipment Currently Used at Home:  (rollator (broken), CPAP )    Prior level of function:   Bed Mobility/Transfers: needs assist  Grooming: independent  Bathing: independent (stands)  Upper Body Dressing: independent  Lower Body Dressing: independent  Toileting: needs device (rollator, otherwise independent)  Driving License: No     Subjective:  Pt is a questionable historian at this time. Family present towards end of session to clarify history and PLOF.  Still inconsistent reports.  Grandson said we have to lift her together all the time, but  says she was independent even with standing to  shower.      Communicated with RN prior to session.  Pt agreeable to evaluation.  Chief Complaint: pain  Patient/Family stated goals: Pt agreeable to progressing towards independence with ADLs and functional mobility.     Pain/Comfort  Pain Rating 1: 5/10  Location 1: abdomen    Objective:  Patient found with: telemetry, PCA    Cognitive Exam:  Oriented to: Person and Situation  Follows Commands/attention: Follows one-step commands  Communication: hoarse  Memory:  Pt is on PCA and is confused at times. Seems distant and distracted  Safety awareness/insight to disability: needs supervision due to confusion  Coping skills/emotional control: Appropriate to situation    Visual/perceptual:  Intact    Physical Exam:  Postural examination/scapula alignment: Rounded shoulder and Head forward  Skin integrity: Visible skin intact/ Abdominal incision  Edema: Pitting B LE    Sensation:   Intact    Upper Extremity Range of Motion:  Right Upper Extremity: WFL  Left Upper Extremity: WFL    Upper Extremity Strength:  Right Upper Extremity: WFL  Left Upper Extremity: WFL   Strength: WFL    Fine motor coordination:   Intact    Gross motor coordination: impaired secondary to weakness    Functional Mobility:  Bed Mobility:  Rolling/Turning to Left: With assist of 2, Total assistance  Rolling/Turning Right: Total assistance, With assist of 2  Scooting/Bridging: Total Assistance, With assist of 2  Supine to Sit: Total Assistance, With assist of 2  Sit to Supine: Total Assistance, With assist of 2    Transfers:  Sit <> Stand Assistance: Total Assistance (assist of 2-3)  Sit <> Stand Assistive Device: No Assistive Device  Bed <> Chair Technique: Other (see comments) (total assist x 3 )  Bed <> Chair Transfer Assistance: Total Assistance (x3)    Functional Ambulation: total assist for weight shifting and moves un weighted foot about an inch.  Low standing tolerance.     Activities of Daily Living:  Feeding Level of Assistance: Activity  did not occur  Feeding adaptive equipment: n/a'  UE Dressing Level of Assistance: Maximum assistance  UE adaptive equipment: n/a  LE Dressing Level of Assistance: Total assistance  LE adaptive equipment: n/a  Toileting Where Assessed: Bed level  Toileting Level of Assistance: Total assistance  Bathing Where Assessed: Bed  Bathing Level of Assistance: Total assistance  Bathing adaptive equipment: n/a    Balance:   Static Sit: 0: Needs MAXIMAL assist to maintain sitting without back support ranges from max -min  Dynamic Sit: 0: N/A  Static Stand: 0: Needs MAXIMAL assist to maintain   Dynamic stand: 0: N/A    Therapeutic Activities and Exercises:  OT interview completed, ROM assessment through observation during moblity.     Attempted to sit pt up with total assist x 1 however unsuccessful after 3 attempts.  Called for rehab techGiselle.  She was able to assist right away.  Pt required total A x 2 for sitting up.  Once up she was able to maintain sitting balance for 30 seconds with no external support other than knee blocking.  Pt was starting to slide forward.  Educated on rocking to gain momentum for standing.  Her  and grandson entered room and insisted they do the transfer because they do it all the time and are used to it. OT edu them on current lines and managed them during the transfer. They were able to stand her but pt not able to unweight legs. OT came behind pt and placed and facilitated weight shifting off of leg she was trying to move.  Pt able to take about 5 controlled steps before it seemed like her legs were giving out.  The chair was brought close and pt sat down.  In a matter of a minute she began slowly sliding forward. Even with 4 people in the room we were unable to get her hips back far enough in the chair. In the reclined position she was still to low.      Transferred pt back to bed with assist of 3 men (nurse,  and grandson), OT in front of pt facilitating weight shift side to  "side. Pt returned to bed and reposition up higher.      Discussed recommendations with nursing, pt and family for using medichair only.     Discussed recs for SNF with pt, family and Kris .  Pt agreeable.     AM-PAC 6 CLICK ADL  How much help from another person does this patient currently need?  1 = Unable, Total/Dependent Assistance  2 = A lot, Maximum/Moderate Assistance  3 = A little, Minimum/Contact Guard/Supervision  4 = None, Modified [place]/Independent    Putting on and taking off regular lower body clothing? : 1  Bathing (including washing, rinsing, drying)?: 1  Toileting, which includes using toilet, bedpan, or urinal? : 1  Putting on and taking off regular upper body clothing?: 1  Taking care of personal grooming such as brushing teeth?: 3  Eating meals?: 3  Total Score: 10    AM-PAC Raw Score CMS "G-Code Modifier Level of Impairment Assistance   6 % Total / Unable   7 - 9 CM 80 - 100% Maximal Assist   10-14 CL 60 - 80% Moderate Assist   15 - 19 CK 40 - 60% Moderate Assist   20 - 22 CJ 20 - 40% Minimal Assist   23 CI 1-20% SBA / CGA   24 CH 0% Independent/ Mod I       Patient left HOB elevated with all lines intact and RN and family present    Assessment:  Rosetta Whyte is a 72 y.o. female with a medical diagnosis of Acute cholecystitis and presents with decline in ADLs and functional mobility.   Pt would benefit from skilled OT services in order to maximize independence with ADLs and facilitate safe discharge.    Rehab identified problem list/impairments:  weakness, decline in functional mobility, decline in balance, gait instability.     Rehab potential is good. Pt likely will improve when PCA is d/c, due to confusion present.    Activity tolerance: Fair    Discharge recommendations: Discharge Facility/Level Of Care Needs: nursing facility, skilled     Barriers to discharge:  pt needs to go to SNF before going home    Equipment recommendations:  (highly advise against going " home before SNF.  If she should go home for any reason based off of today's performance she would need stretcher transport, hospital bed, fernando lift, 24 care (need assist x 2 with ADLs))     GOALS:    Occupational Therapy Goals        Problem: Occupational Therapy Goal    Goal Priority Disciplines Outcome Interventions   Occupational Therapy Goal     OT, PT/OT     Description:  Goals to be met by: 11/17/2017    Patient will increase functional independence with ADLs by performing:    Feeding with Set-up Assistance.  UE Dressing with Minimal Assistance.  LE Dressing with Minimal Assistance.  Grooming while seated EOB with Stand-by Assistance.  Toileting from bedside commode with Moderate Assistance for hygiene and clothing management.   Sitting at edge of bed x20 minutes with Stand-by Assistance.  Supine to sit with Minimal Assistance.  Stand pivot transfers with Minimal Assistance x 1 person.                      PLAN:  Patient to be seen 5 x/week to address the above listed problems via self-care/home management, therapeutic activities, therapeutic exercises, neuromuscular re-education  Plan of Care expires: 12/07/17  Plan of Care reviewed with: patient, spouse, grandchild(greg)         REBEKAH Jacob  11/07/2017

## 2017-11-07 NOTE — PROGRESS NOTES
[facility]  General Surgery  Progress Note    Subjective:     History of Present Illness:  Rosetta Whyte is a 72 y.o. female with morbid obesity (s/p sleeve gastrectomy), HTN, and OA presents to [facility] with 5 days history of abdominal pain.  She states that the pain started Tuesday.  The pain was located on the right side of her abdomen.  The pain for the first day was tolerable, but for the last 4 days, she states the pain has been 10/10.  She has associated nausea and vomiting, fever/chills.  She has had no appetite and has not eaten a meal for 4 days.  She has never had anything like this happen before, even a mild episode.  She has maintained normal bowel function.  No pale stools, jaundice, itching, or dark urine.  Functionally, she can walk about 1 block before stopping, but she states that this is due to leg swelling and arthritis and not chest pain/shortness of breath.  She has lost 80 pounds since a sleeve gastrectomy 4 months ago.  She also has history of open appendectomy.    Post-Op Info:  Procedure(s) (LRB):  CHOLECYSTECTOMY (N/A)  INCOMPLETE-PROCEDURE/ATTEMPTED Laparscopic cholecystectomy (N/A)   1 Day Post-Op     Interval History: No acute events. Tolerating sips of clears, no N/V. Pain control with PCA. Pt claims she is voiding without issue, but none recorded on I/Os.     Medications:  Continuous Infusions:   hydromorphone in 0.9 % NaCl 6 mg/30 ml      lactated ringers 125 mL/hr at 11/06/17 1331     Scheduled Meds:   amLODIPine  10 mg Oral QAM    carvedilol  25 mg Oral BID    cefTRIAXone (ROCEPHIN) IVPB  1 g Intravenous Q12H    cloNIDine 0.3 mg/24 hr td ptwk  1 patch Transdermal Weekly    cloNIDine  0.1 mg Oral Once    famotidine (PF)  20 mg Intravenous BID    hydrALAZINE  100 mg Oral TID    labetalol  20 mg Intravenous Once    lactated ringers  1,000 mL Intravenous Once    [START ON 11/8/2017] losartan-hydrochlorothiazide 100-25 mg  1 tablet Oral QAM    metronidazole   500 mg Intravenous Q8H     PRN Meds:albuterol-ipratropium 2.5mg-0.5mg/3mL, diphenhydrAMINE, hydrALAZINE, labetalol, naloxone, ondansetron, promethazine (PHENERGAN) IVPB, sodium chloride 0.9%     Review of patient's allergies indicates:  No Known Allergies  Objective:     Vital Signs (Most Recent):  Temp: 99.1 °F (37.3 °C) (11/07/17 0417)  Pulse: 84 (11/07/17 0417)  Resp: 20 (11/07/17 0417)  BP: 132/63 (11/07/17 0417)  SpO2: 95 % (11/07/17 0417) Vital Signs (24h Range):  Temp:  [97.2 °F (36.2 °C)-99.7 °F (37.6 °C)] 99.1 °F (37.3 °C)  Pulse:  [] 84  Resp:  [10-31] 20  SpO2:  [90 %-100 %] 95 %  BP: (128-216)/() 132/63     Weight: 127 kg (280 lb)  Body mass index is 46.59 kg/m².    Intake/Output - Last 3 Shifts       11/05 0700 - 11/06 0659 11/06 0700 - 11/07 0659    I.V. (mL/kg)  1600 (12.6)    IV Piggyback 1000     Total Intake(mL/kg) 1000 (6.3) 1600 (12.6)    Drains  30    Stool  0    Total Output   30    Net +1000 +1570          Urine Occurrence  2 x    Stool Occurrence  0 x          Physical Exam  AAO, NAD  RRR  Breathing unlabored  Abdomen soft, ATTP, ND  Incision with sterile dressing in place, c/d/i  XIOMY with minimal SS output.     Significant Labs:  CBC:   Recent Labs  Lab 11/07/17 0536   WBC 13.21*   RBC 4.27   HGB 12.6   HCT 38.6      MCV 90   MCH 29.5   MCHC 32.6     CMP:   Recent Labs  Lab 11/07/17 0536   *   CALCIUM 8.8   ALBUMIN 2.3*   PROT 5.9*      K 3.8   CO2 23      BUN 24*   CREATININE 0.9   ALKPHOS 110   ALT 16   AST 22   BILITOT 1.0       Significant Diagnostics:  No further.     Assessment/Plan:     * Acute cholecystitis    S/P lap converted to open edison, POD1  -CLD  -Continue PCA  -IS/aggressive pulm toilet  -PT/OT  -Ambulate, OOBTC  -Rocephin/Flagyl  -DVT ppx        Hypertension    - Improved on home regimen   - IV hydralazine and labetalol GAURANG Cohen MD  General Surgery  [facility]

## 2017-11-07 NOTE — SUBJECTIVE & OBJECTIVE
Interval History: No events overnight.  Pain adequately controlled.  Minimal drainage     Medications:  Continuous Infusions:   hydromorphone in 0.9 % NaCl 6 mg/30 ml      lactated ringers 125 mL/hr at 11/06/17 1331     Scheduled Meds:   amLODIPine  10 mg Oral QAM    carvedilol  25 mg Oral BID    cefTRIAXone (ROCEPHIN) IVPB  1 g Intravenous Q12H    cloNIDine 0.3 mg/24 hr td ptwk  1 patch Transdermal Weekly    cloNIDine  0.1 mg Oral Once    famotidine (PF)  20 mg Intravenous BID    hydrALAZINE  100 mg Oral TID    labetalol  20 mg Intravenous Once    lactated ringers  1,000 mL Intravenous Once    [START ON 11/8/2017] losartan-hydrochlorothiazide 100-25 mg  1 tablet Oral QAM    metronidazole  500 mg Intravenous Q8H     PRN Meds:albuterol-ipratropium 2.5mg-0.5mg/3mL, diphenhydrAMINE, hydrALAZINE, labetalol, naloxone, ondansetron, promethazine (PHENERGAN) IVPB, sodium chloride 0.9%     Review of patient's allergies indicates:  No Known Allergies  Objective:     Vital Signs (Most Recent):  Temp: 97.1 °F (36.2 °C) (11/07/17 0726)  Pulse: 79 (11/07/17 0726)  Resp: 20 (11/07/17 0726)  BP: (!) 117/57 (11/07/17 0726)  SpO2: 95 % (11/07/17 0726) Vital Signs (24h Range):  Temp:  [97.1 °F (36.2 °C)-99.7 °F (37.6 °C)] 97.1 °F (36.2 °C)  Pulse:  [] 79  Resp:  [10-31] 20  SpO2:  [90 %-100 %] 95 %  BP: (117-216)/() 117/57     Weight: 127 kg (280 lb)  Body mass index is 46.59 kg/m².    Intake/Output - Last 3 Shifts       11/05 0700 - 11/06 0659 11/06 0700 - 11/07 0659 11/07 0700 - 11/08 0659    I.V. (mL/kg)  1600 (12.6)     IV Piggyback 1000 1800     Total Intake(mL/kg) 1000 (6.3) 3400 (26.8)     Urine (mL/kg/hr)  0 (0)     Drains  80 (0)     Stool  0 (0)     Total Output   80      Net +1000 +3320             Urine Occurrence  2 x     Stool Occurrence  0 x           Physical Exam   Gen: NAD  CV: RRR  Pulm: Diminished  Abd: Soft, ATTP.  Minimal s/s XIOMY output.  Dressing intact    Significant Labs:  CBC:   Recent  Labs  Lab 11/07/17  0536   WBC 13.21*   RBC 4.27   HGB 12.6   HCT 38.6      MCV 90   MCH 29.5   MCHC 32.6     CMP:   Recent Labs  Lab 11/07/17  0536   *   CALCIUM 8.8   ALBUMIN 2.3*   PROT 5.9*      K 3.8   CO2 23      BUN 24*   CREATININE 0.9   ALKPHOS 110   ALT 16   AST 22   BILITOT 1.0       Significant Diagnostics:  n/a

## 2017-11-07 NOTE — PLAN OF CARE
Problem: Patient Care Overview  Goal: Plan of Care Review  Outcome: Ongoing (interventions implemented as appropriate)  Plan of care reviewed with patient and sister. Verbal understanding received. Patient alert, AOX4 on 2 L NC. SBP high 170's. Scheduled hydralazine and coreg given as per MAR. SBP returned WNL. Patient complaints of pain, educated patient on proper use of PCA pump to manage pain. Urinating assisted with bedpan. Abdomen tender rounded. XIOMY drain SS drainage. Incision Clean dry and intact. Bed in low locked position. Call light in hand. Frequent rounding made to ensure patient safety and comfort

## 2017-11-07 NOTE — PLAN OF CARE
[facility] - [place], [place] - [address]  [address] [address]  Phone: [phone] Fax: [phone]    No future appointments.       11/07/17 1045   Discharge Assessment   Assessment Type Discharge Planning Assessment   Confirmed/corrected address and phone number on facesheet? Yes   Assessment information obtained from? Patient   Expected Length of Stay (days) 2   Communicated expected length of stay with patient/caregiver yes   Prior to hospitilization cognitive status: Alert/Oriented   Prior to hospitalization functional status: Independent   Current cognitive status: Alert/Oriented   Current Functional Status: Independent   Lives With alone   Able to Return to Prior Arrangements yes   Is patient able to care for self after discharge? Yes   Who are your caregiver(s) and their phone number(s)? Nita Bobby  sister  [phone]   Patient's perception of discharge disposition home or selfcare   Readmission Within The Last 30 Days no previous admission in last 30 days   Patient currently being followed by outpatient case management? No   Patient currently receives any other outside agency services? No   Equipment Currently Used at Home rollator;CPAP   Do you have any problems affording any of your prescribed medications? No   Is the patient taking medications as prescribed? yes   Does the patient have transportation home? Yes   Transportation Available family or friend will provide   Does the patient receive services at the [facility]? No   Discharge Plan A Home   Discharge Plan B Home with family   Patient/Family In Agreement With Plan yes

## 2017-11-07 NOTE — ASSESSMENT & PLAN NOTE
S/P lap converted to open edison, POD1  -CLD  -Continue PCA  -IS/aggressive pulm toilet  -PT/OT  -Ambulate, OOBTC  -DVT ppx

## 2017-11-07 NOTE — PLAN OF CARE
Problem: Occupational Therapy Goal  Goal: Occupational Therapy Goal  Goals to be met by: 11/17/2017    Patient will increase functional independence with ADLs by performing:    Feeding with Set-up Assistance.  UE Dressing with Minimal Assistance.  LE Dressing with Minimal Assistance.  Grooming while seated EOB with Stand-by Assistance.  Toileting from bedside commode with Moderate Assistance for hygiene and clothing management.   Sitting at edge of bed x20 minutes with Stand-by Assistance.  Supine to sit with Minimal Assistance.  Stand pivot transfers with Minimal Assistance x 1 person.    Evaluation completed.  OT plan of care developed and reviewed with patient.     Highly recommend SNF. Pt and family agreeable.   High fall risk. Recommend drawsheet transfers to medi chair only.    REBEKAH Watters  11/7/2017  Rehab Services

## 2017-11-07 NOTE — ASSESSMENT & PLAN NOTE
S/P lap converted to open edison, POD1  -CLD  -Continue PCA  -IS/aggressive pulm toilet  -PT/OT  -Ambulate, OOBTC  -Rocephin/Flagyl  -DVT ppx

## 2017-11-08 LAB
ALBUMIN SERPL BCP-MCNC: 2 G/DL
ALP SERPL-CCNC: 94 U/L
ALT SERPL W/O P-5'-P-CCNC: 9 U/L
ANION GAP SERPL CALC-SCNC: 12 MMOL/L
AST SERPL-CCNC: 26 U/L
BASOPHILS # BLD AUTO: 0.03 K/UL
BASOPHILS NFR BLD: 0.3 %
BILIRUB SERPL-MCNC: 0.6 MG/DL
BUN SERPL-MCNC: 35 MG/DL
CALCIUM SERPL-MCNC: 8.7 MG/DL
CHLORIDE SERPL-SCNC: 107 MMOL/L
CO2 SERPL-SCNC: 19 MMOL/L
CREAT SERPL-MCNC: 1.6 MG/DL
DIFFERENTIAL METHOD: ABNORMAL
EOSINOPHIL # BLD AUTO: 0.1 K/UL
EOSINOPHIL NFR BLD: 0.5 %
ERYTHROCYTE [DISTWIDTH] IN BLOOD BY AUTOMATED COUNT: 14.4 %
EST. GFR  (AFRICAN AMERICAN): 37.1 ML/MIN/1.73 M^2
EST. GFR  (NON AFRICAN AMERICAN): 32.2 ML/MIN/1.73 M^2
GLUCOSE SERPL-MCNC: 97 MG/DL
HCT VFR BLD AUTO: 37.6 %
HGB BLD-MCNC: 12.2 G/DL
IMM GRANULOCYTES # BLD AUTO: 0.06 K/UL
IMM GRANULOCYTES NFR BLD AUTO: 0.5 %
LYMPHOCYTES # BLD AUTO: 0.9 K/UL
LYMPHOCYTES NFR BLD: 8.5 %
MAGNESIUM SERPL-MCNC: 2 MG/DL
MCH RBC QN AUTO: 29.7 PG
MCHC RBC AUTO-ENTMCNC: 32.4 G/DL
MCV RBC AUTO: 92 FL
MONOCYTES # BLD AUTO: 1.4 K/UL
MONOCYTES NFR BLD: 12.4 %
NEUTROPHILS # BLD AUTO: 8.6 K/UL
NEUTROPHILS NFR BLD: 77.8 %
NRBC BLD-RTO: 0 /100 WBC
PHOSPHATE SERPL-MCNC: 4.4 MG/DL
PLATELET # BLD AUTO: 188 K/UL
PMV BLD AUTO: 11.2 FL
POTASSIUM SERPL-SCNC: 5.2 MMOL/L
PROT SERPL-MCNC: 6.4 G/DL
RBC # BLD AUTO: 4.11 M/UL
SODIUM SERPL-SCNC: 138 MMOL/L
WBC # BLD AUTO: 11.1 K/UL

## 2017-11-08 PROCEDURE — 94640 AIRWAY INHALATION TREATMENT: CPT

## 2017-11-08 PROCEDURE — 25000003 PHARM REV CODE 250: Performed by: STUDENT IN AN ORGANIZED HEALTH CARE EDUCATION/TRAINING PROGRAM

## 2017-11-08 PROCEDURE — 83735 ASSAY OF MAGNESIUM: CPT

## 2017-11-08 PROCEDURE — G8978 MOBILITY CURRENT STATUS: HCPCS | Mod: CL

## 2017-11-08 PROCEDURE — 20600001 HC STEP DOWN PRIVATE ROOM

## 2017-11-08 PROCEDURE — 25000003 PHARM REV CODE 250: Performed by: SURGERY

## 2017-11-08 PROCEDURE — 94799 UNLISTED PULMONARY SVC/PX: CPT

## 2017-11-08 PROCEDURE — 97162 PT EVAL MOD COMPLEX 30 MIN: CPT

## 2017-11-08 PROCEDURE — S0028 INJECTION, FAMOTIDINE, 20 MG: HCPCS | Performed by: SURGERY

## 2017-11-08 PROCEDURE — 84100 ASSAY OF PHOSPHORUS: CPT

## 2017-11-08 PROCEDURE — G8979 MOBILITY GOAL STATUS: HCPCS | Mod: CK

## 2017-11-08 PROCEDURE — 97530 THERAPEUTIC ACTIVITIES: CPT

## 2017-11-08 PROCEDURE — 85025 COMPLETE CBC W/AUTO DIFF WBC: CPT

## 2017-11-08 PROCEDURE — 36415 COLL VENOUS BLD VENIPUNCTURE: CPT

## 2017-11-08 PROCEDURE — 80053 COMPREHEN METABOLIC PANEL: CPT

## 2017-11-08 PROCEDURE — 94761 N-INVAS EAR/PLS OXIMETRY MLT: CPT

## 2017-11-08 PROCEDURE — 25000242 PHARM REV CODE 250 ALT 637 W/ HCPCS: Performed by: SURGERY

## 2017-11-08 PROCEDURE — 27000221 HC OXYGEN, UP TO 24 HOURS

## 2017-11-08 RX ORDER — OXYCODONE AND ACETAMINOPHEN 5; 325 MG/1; MG/1
1 TABLET ORAL EVERY 4 HOURS PRN
Status: DISCONTINUED | OUTPATIENT
Start: 2017-11-08 | End: 2017-11-08

## 2017-11-08 RX ORDER — OXYCODONE AND ACETAMINOPHEN 5; 325 MG/1; MG/1
1 TABLET ORAL EVERY 4 HOURS PRN
Status: DISCONTINUED | OUTPATIENT
Start: 2017-11-08 | End: 2017-11-13

## 2017-11-08 RX ORDER — HYDROMORPHONE HYDROCHLORIDE 1 MG/ML
0.5 INJECTION, SOLUTION INTRAMUSCULAR; INTRAVENOUS; SUBCUTANEOUS ONCE
Status: COMPLETED | OUTPATIENT
Start: 2017-11-08 | End: 2017-11-08

## 2017-11-08 RX ORDER — OXYCODONE AND ACETAMINOPHEN 5; 325 MG/1; MG/1
2 TABLET ORAL EVERY 4 HOURS PRN
Status: DISCONTINUED | OUTPATIENT
Start: 2017-11-08 | End: 2017-11-10

## 2017-11-08 RX ADMIN — LOSARTAN POTASSIUM AND HYDROCHLOROTHIAZIDE 1 TABLET: 25; 100 TABLET ORAL at 06:11

## 2017-11-08 RX ADMIN — HYDRALAZINE HYDROCHLORIDE 100 MG: 50 TABLET ORAL at 06:11

## 2017-11-08 RX ADMIN — AMLODIPINE BESYLATE 10 MG: 10 TABLET ORAL at 06:11

## 2017-11-08 RX ADMIN — HYDRALAZINE HYDROCHLORIDE 100 MG: 50 TABLET ORAL at 02:11

## 2017-11-08 RX ADMIN — FAMOTIDINE 20 MG: 10 INJECTION, SOLUTION INTRAVENOUS at 09:11

## 2017-11-08 RX ADMIN — HYDRALAZINE HYDROCHLORIDE 100 MG: 50 TABLET ORAL at 09:11

## 2017-11-08 RX ADMIN — OXYCODONE HYDROCHLORIDE AND ACETAMINOPHEN 2 TABLET: 5; 325 TABLET ORAL at 09:11

## 2017-11-08 RX ADMIN — FAMOTIDINE 20 MG: 10 INJECTION, SOLUTION INTRAVENOUS at 08:11

## 2017-11-08 RX ADMIN — OXYCODONE HYDROCHLORIDE AND ACETAMINOPHEN 2 TABLET: 5; 325 TABLET ORAL at 12:11

## 2017-11-08 RX ADMIN — CARVEDILOL 25 MG: 25 TABLET, FILM COATED ORAL at 09:11

## 2017-11-08 RX ADMIN — IPRATROPIUM BROMIDE AND ALBUTEROL SULFATE 3 ML: .5; 3 SOLUTION RESPIRATORY (INHALATION) at 06:11

## 2017-11-08 RX ADMIN — OXYCODONE HYDROCHLORIDE AND ACETAMINOPHEN 2 TABLET: 5; 325 TABLET ORAL at 08:11

## 2017-11-08 RX ADMIN — CARVEDILOL 25 MG: 25 TABLET, FILM COATED ORAL at 08:11

## 2017-11-08 RX ADMIN — HYDROMORPHONE HYDROCHLORIDE 0.5 MG: 1 INJECTION, SOLUTION INTRAMUSCULAR; INTRAVENOUS; SUBCUTANEOUS at 12:11

## 2017-11-08 NOTE — ASSESSMENT & PLAN NOTE
S/P lap converted to open edison, POD2  -CLD, ADAT  -PO pain control  -IS/aggressive pulm toilet  -PT/OT  -Ambulate TID, OOBTC  -DVT ppx

## 2017-11-08 NOTE — PLAN OF CARE
SW spoke w/sisters Nita/Edie who stated that pt is  and grandson is unable to make medical decisions.  Edie reports that pt's r/leg is severely swollen and pt is experiencing numbness.  SW suggested that pt speak w/GS resident about concerns.      SW informed Nita that therapy is recommending SNF placement to increase strength, endurance and improve functional mobility.  Family reports before they agree to SNF placement, they need to speak w/GS team for answers about leg.  SW advisor family to come early to meet w/the physician to get medical insight on why pt's r/leg is swollen.     WILMER has also left a PHN list in the room for family to select  3 SNF choices.  Family notified of location of list.    Vinicius Torres LMSW  Ext. [phone]

## 2017-11-08 NOTE — PLAN OF CARE
Problem: Physical Therapy Goal  Goal: Physical Therapy Goal  Goals to be met by: 2017     Patient will increase functional independence with mobility by performin. Supine to sit with Moderate Assistance  2. Sit to supine with Moderate Assistance  3. Sit to stand transfer with Moderate Assistance  4. Bed to chair transfer with Moderate Assistance using Rolling Walker  6. Gait  x 15 feet with Moderate Assistance using Rolling Walker.   7. Lower extremity exercise program x15 reps per handout, with assistance as needed    Outcome: Ongoing (interventions implemented as appropriate)  Goals set

## 2017-11-08 NOTE — PROGRESS NOTES
[facility]  General Surgery  Progress Note    Subjective:     History of Present Illness:  Rosetta Whyte is a 72 y.o. female with morbid obesity (s/p sleeve gastrectomy), HTN, and OA presents to [facility] ED with 5 days history of abdominal pain.  She states that the pain started Tuesday.  The pain was located on the right side of her abdomen.  The pain for the first day was tolerable, but for the last 4 days, she states the pain has been 10/10.  She has associated nausea and vomiting, fever/chills.  She has had no appetite and has not eaten a meal for 4 days.  She has never had anything like this happen before, even a mild episode.  She has maintained normal bowel function.  No pale stools, jaundice, itching, or dark urine.  Functionally, she can walk about 1 block before stopping, but she states that this is due to leg swelling and arthritis and not chest pain/shortness of breath.  She has lost 80 pounds since a sleeve gastrectomy 4 months ago.  She also has history of open appendectomy.    Post-Op Info:  Procedure(s) (LRB):  CHOLECYSTECTOMY (N/A)  INCOMPLETE-PROCEDURE/ATTEMPTED Laparscopic cholecystectomy (N/A)   2 Days Post-Op     Interval History: No events.  Pain improving slowly.  Pt is thirsty.  No BM.  Minimal to no nausea.  Minimal ambulation    Medications:  Continuous Infusions:   Scheduled Meds:   amLODIPine  10 mg Oral QAM    carvedilol  25 mg Oral BID    cloNIDine 0.3 mg/24 hr td ptwk  1 patch Transdermal Weekly    cloNIDine  0.1 mg Oral Once    famotidine (PF)  20 mg Intravenous BID    hydrALAZINE  100 mg Oral TID    labetalol  20 mg Intravenous Once    lactated ringers  1,000 mL Intravenous Once    losartan-hydrochlorothiazide 100-25 mg  1 tablet Oral QAM     PRN Meds:albuterol-ipratropium 2.5mg-0.5mg/3mL, diphenhydrAMINE, hydrALAZINE, labetalol, ondansetron, oxyCODONE-acetaminophen, oxyCODONE-acetaminophen, promethazine (PHENERGAN) IVPB, sodium chloride 0.9%     Review of  patient's allergies indicates:  No Known Allergies  Objective:     Vital Signs (Most Recent):  Temp: 97.7 °F (36.5 °C) (11/08/17 0730)  Pulse: 86 (11/08/17 0730)  Resp: 20 (11/08/17 0730)  BP: (!) 121/58 (11/08/17 0730)  SpO2: (!) 94 % (11/08/17 0756) Vital Signs (24h Range):  Temp:  [96.7 °F (35.9 °C)-98.5 °F (36.9 °C)] 97.7 °F (36.5 °C)  Pulse:  [76-98] 86  Resp:  [17-26] 20  SpO2:  [92 %-96 %] 94 %  BP: (109-138)/(54-75) 121/58     Weight: 127 kg (280 lb)  Body mass index is 46.59 kg/m².    Intake/Output - Last 3 Shifts       11/06 0700 - 11/07 0659 11/07 0700 - 11/08 0659 11/08 0700 - 11/09 0659    I.V. (mL/kg) 1600 (12.6) 1075 (8.5)     IV Piggyback 1800      Total Intake(mL/kg) 3400 (26.8) 1075 (8.5)     Urine (mL/kg/hr) 0 (0) 150 (0)     Drains 80 (0) 40 (0)     Stool 0 (0) 0 (0)     Total Output 80 190      Net +3320 +885             Urine Occurrence 2 x 2 x     Stool Occurrence 0 x 0 x           Physical Exam   Gen: NAD  CV: RRR  Pulm: Coarse  GI: Soft, ATTP.  Staples CDI, minimal drain output    Significant Labs:  CBC:   Recent Labs  Lab 11/08/17  0542   WBC 11.10   RBC 4.11   HGB 12.2   HCT 37.6      MCV 92   MCH 29.7   MCHC 32.4     CMP:   Recent Labs  Lab 11/07/17  0536   *   CALCIUM 8.8   ALBUMIN 2.3*   PROT 5.9*      K 3.8   CO2 23      BUN 24*   CREATININE 0.9   ALKPHOS 110   ALT 16   AST 22   BILITOT 1.0       Significant Diagnostics:  n/a    Assessment/Plan:     * Acute cholecystitis    S/P lap converted to open edison, POD2  -CLD, ADAT  -PO pain control  -IS/aggressive pulm toilet  -PT/OT  -Ambulate TID, OOBTC  -DVT ppx        Hypertension    - Improved on home regimen   - IV hydralazine and labetalol PRN            Carlos Lopez MD  General Surgery  [facility]

## 2017-11-08 NOTE — PLAN OF CARE
Problem: Patient Care Overview  Goal: Plan of Care Review  Outcome: Ongoing (interventions implemented as appropriate)  POC reviewed with pt, verbalized understanding. Pt AAOx4,VSS. Pt up with PT and OOB to chair with max assist. IVFs and PCA d/c'd. Advancing pt's diet as tolerated, eating small amounts. Pt c/o intermittent abdominal pain, PO percocet given q4h PRN. Pt remains incontinent, purwick in place. Pt remains free of any falls/injury,call light within reach. Will continue to monitor.

## 2017-11-08 NOTE — PROGRESS NOTES
Patient has shallow breaths, expiratory wheezes, tachypenic 26-30. . Reports pain a 10. Re-educated patient on how to use pain pump button, safety mechanisms and benefits. MD notified, orders received for 0.5 dilaudid once. Patient was able to rest after with deeper breathes at 16 BPM. RN will continue to monitor

## 2017-11-08 NOTE — ANESTHESIA PREPROCEDURE EVALUATION
11/08/2017  Pre-operative evaluation for Procedure(s) (LRB):  CHOLECYSTECTOMY (N/A)  INCOMPLETE-PROCEDURE/ATTEMPTED Laparscopic cholecystectomy (N/A)    Rosetta Whyte is a 72 y.o. female  with morbid obesity (s/p sleeve gastrectomy), HTN, and OA presents to [facility] ED with 5 days history of abdominal pain.  She states that the pain started Tuesday.  The pain was located on the right side of her abdomen.  The pain for the first day was tolerable, but for the last 4 days, she states the pain has been 10/10. She is s/p Gastrectomy sleeve and has lost 80 lbs since the procedure prior. She is now scheduled for the above procedure.       Patient Active Problem List   Diagnosis    Arthritis    Hypertension    Vitamin D deficiency    Status post bariatric surgery    Leg swelling    S/P laparoscopic sleeve gastrectomy    Morbid obesity with BMI of 50.0-59.9, adult    Acute cholecystitis       Review of patient's allergies indicates:  No Known Allergies     No current facility-administered medications on file prior to encounter.      Current Outpatient Prescriptions on File Prior to Encounter   Medication Sig Dispense Refill    amlodipine (NORVASC) 10 MG tablet Take 10 mg by mouth every morning.       hydrALAZINE (APRESOLINE) 100 MG tablet Take 100 mg by mouth 3 (three) times daily.       losartan-hydrochlorothiazide 100-25 mg (HYZAAR) 100-25 mg per tablet Take 1 tablet by mouth every morning.       potassium chloride 10% (KAYCIEL) 20 mEq/15 mL solution       potassium chloride SA (K-DUR,KLOR-CON) 20 MEQ tablet Take 20 mEq by mouth 2 (two) times daily.       bumetanide (BUMEX) 2 MG tablet Take 2 mg by mouth 2 (two) times daily.       CALCIUM CITRATE/VITAMIN D3 (CALCIUM CITRATE + D ORAL) Take 30 mLs by mouth 3 (three) times daily.      carvedilol (COREG) 25 MG tablet Take 25 mg by mouth 2 (two) times daily.        clobetasol (TEMOVATE) 0.05 % cream Apply topically 2 (two) times daily. Apply to legs      cloNIDine 0.3 mg/24 hr td ptwk (CATAPRES) 0.3 mg/24 hr       cyanocobalamin, vitamin B-12, 1,000 mcg Subl Place 1 drop under the tongue every other day.      ergocalciferol (ERGOCALCIFEROL) 50,000 unit Cap Take 1 capsule (50,000 Units total) by mouth twice a week. 24 capsule 0    esomeprazole (NEXIUM PACKET) 40 mg GrPS Take 40 mg by mouth before breakfast. 30 each 2    ondansetron (ZOFRAN-ODT) 4 MG TbDL Take 2 tablets (8 mg total) by mouth every 8 (eight) hours as needed (nausea). 20 tablet 0    pediatric multivit-iron-min (FLINTSTONES COMPLETE, IRON,) Chew Take 1 tablet by mouth 2 (two) times daily.      polyethylene glycol (GLYCOLAX) 17 gram PwPk Take 17 g by mouth once daily. 30 each 0    promethazine (PHENERGAN) 25 MG suppository Place 1 suppository (25 mg total) rectally every 6 (six) hours as needed for Nausea. 20 suppository 0    ursodiol (ALEK FORTE) 500 MG tablet Take 1 tablet (500 mg total) by mouth once daily. Crush tablet and take daily.  Okay to compound into liquid at patient's request. 30 tablet 5    VITAMIN B COMPLEX (B COMPLEX ORAL) Take 1 drop by mouth 2 (two) times daily.         Past Surgical History:   Procedure Laterality Date    APPENDECTOMY  1960s    GASTRECTOMY      HIP FRACTURE SURGERY  1949       Social History     Social History    Marital status:      Spouse name: N/A    Number of children: N/A    Years of education: N/A     Occupational History    Not on file.     Social History Main Topics    Smoking status: Never Smoker    Smokeless tobacco: Never Used    Alcohol use No    Drug use: No    Sexual activity: Not on file     Other Topics Concern    Not on file     Social History Narrative    No narrative on file         Vital Signs Range (Last 24H):  Temp:  [35.9 °C (96.7 °F)-36.9 °C (98.5 °F)]   Pulse:  [76-98]   Resp:  [17-26]   BP: (109-138)/(54-75)   SpO2:   [92 %-96 %]       CBC:   Recent Labs      17   0536  17   0542   WBC  13.21*  11.10   RBC  4.27  4.11   HGB  12.6  12.2   HCT  38.6  37.6   PLT  183  188   MCV  90  92   MCH  29.5  29.7   MCHC  32.6  32.4       CMP:   Recent Labs      17   0536  17   0542   NA  141  138   K  3.8  5.2*   CL  107  107   CO2  23  19*   BUN  24*  35*   CREATININE  0.9  1.6*   GLU  121*  97   MG  1.4*  2.0   PHOS  4.1  4.4   CALCIUM  8.8  8.7   ALBUMIN  2.3*  2.0*   PROT  5.9*  6.4   ALKPHOS  110  94   ALT  16  9*   AST  22  26   BILITOT  1.0  0.6       INR  No results for input(s): INR, PROTIME, APTT in the last 72 hours.    Invalid input(s): PT        Diagnostic Studies:      EKD Echo:          Anesthesia Evaluation    I have reviewed the Patient Summary Reports.    I have reviewed the Nursing Notes.   I have reviewed the Medications.     Review of Systems  Anesthesia Hx:  No problems with previous Anesthesia  History of prior surgery of interest to airway management or planning: Denies Family Hx of Anesthesia complications.   Denies Personal Hx of Anesthesia complications.   Social:  Non-Smoker, No Alcohol Use    Hematology/Oncology:  Hematology Normal        EENT/Dental:EENT/Dental Normal   Cardiovascular:   Hypertension    Pulmonary:   Sleep Apnea    Renal/:  Renal/ Normal     Neurological:  Neurology Normal    Endocrine:  Endocrine Normal    Dermatological:  Skin Normal    Psych:  Psychiatric Normal           Physical Exam  General:  Obesity    Airway/Jaw/Neck:  Airway Findings: Mouth Opening: Normal Tongue: Normal  General Airway Assessment: Good  Mallampati: II  Improves to II with phonation.  TM Distance: Normal, at least 6 cm  Jaw/Neck Findings:  Neck ROM: Normal ROM      Dental:  Dental Findings: Periodontal disease, Mild    Chest/Lungs:  Chest/Lungs Findings: Clear to auscultation, Normal Respiratory Rate     Heart/Vascular:  Heart Findings: Rate: Normal  Rhythm: Regular Rhythm  Sounds:  Normal        Mental Status:  Mental Status Findings:  Cooperative, Alert and Oriented         Anesthesia Plan  Type of Anesthesia, risks & benefits discussed:  Anesthesia Type:  general  Patient's Preference:   Intra-op Monitoring Plan: standard ASA monitors  Intra-op Monitoring Plan Comments:   Post Op Pain Control Plan:   Post Op Pain Control Plan Comments:   Induction:   IV  Beta Blocker:  Patient is on a Beta-Blocker and has received one dose within the past 24 hours (No further documentation required).       Informed Consent: Patient understands risks and agrees with Anesthesia plan.  Questions answered. Anesthesia consent signed with patient.  ASA Score: 3     Day of Surgery Review of History & Physical:            Ready For Surgery From Anesthesia Perspective.

## 2017-11-08 NOTE — ANESTHESIA POSTPROCEDURE EVALUATION
"Anesthesia Post Evaluation    Patient: Rosetta Whyte    Procedure(s) Performed: Procedure(s) (LRB):  CHOLECYSTECTOMY (N/A)  INCOMPLETE-PROCEDURE/ATTEMPTED Laparscopic cholecystectomy (N/A)    Final Anesthesia Type: general  Patient location during evaluation: PACU  Patient participation: Yes- Able to Participate  Level of consciousness: awake and alert  Post-procedure vital signs: reviewed and stable  Pain management: adequate  Airway patency: patent  PONV status at discharge: No PONV  Anesthetic complications: no      Cardiovascular status: stable  Respiratory status: unassisted and spontaneous ventilation  Hydration status: euvolemic  Follow-up not needed.        Visit Vitals  BP (!) 106/54 (BP Location: Left arm, Patient Position: Sitting)   Pulse 71   Temp 35.7 °C (96.3 °F) (Axillary)   Resp 20   Ht 5' 5" (1.651 m)   Wt 127 kg (280 lb)   SpO2 96%   BMI 46.59 kg/m²       Pain/Shaina Score: Pain Assessment Performed: Yes (11/8/2017  2:00 PM)  Presence of Pain: denies (11/8/2017  2:00 PM)  Pain Rating Prior to Med Admin: 7 (11/8/2017 12:31 PM)      "

## 2017-11-08 NOTE — PLAN OF CARE
Problem: Patient Care Overview  Goal: Plan of Care Review  Plan of care reviewed with patient who verbalized understanding. Pain controlled with PCA pump. Pt slightly drowsy. Pt was up in the chair with assist x4. Will need medichair. Pt incontinent due to obesity. No urine or bowl output during day shift. Bladder scan done, with read 33ml. MD called. Waiting for orders. Clear liquid diet. LR continuu @125cc/h.  Bed locked and in lowest position, call bell is within reach, frequent rounds made for patient safety.  VSS, will continue to monitor.

## 2017-11-08 NOTE — PT/OT/SLP EVAL
Physical Therapy  Evaluation    Rosetta Whyte   MRN: [id number]   Admitting Diagnosis: Acute cholecystitis    PT Received On: 11/08/17  PT Start Time: 1030     PT Stop Time: 1104    PT Total Time (min): 34 min       Billable Minutes:  Evaluation 26 and Therapeutic Activity 8    Diagnosis: Acute cholecystitis  S/p CHOLECYSTECTOMY    Past Medical History:   Diagnosis Date    Arthritis     BMI 50.0-59.9, adult     Difficult intubation 11/06/2017    Poor neck extension, Grade 4 view with DL    Hypertension     Morbid obesity     EMMANUEL (obstructive sleep apnea)       Past Surgical History:   Procedure Laterality Date    APPENDECTOMY  1960s    GASTRECTOMY      HIP FRACTURE SURGERY  1949       Referring physician: Neil Cohen MD  Date referred to PT: 11/7/2017    General Precautions: Standard, fall  Orthopedic Precautions: N/A   Braces:              Patient History:  Lives With: grandchild(greg) (grandson)  Living Arrangements: house  Living Environment Comment: pt. has good family support  Equipment Currently Used at Home: cane, straight, rollator      Previous Level of Function:  Ambulation Skills: needs device (cane or rollator)  Transfer Skills: needs device (cane or rollator)    Subjective:  Communicated with nursing prior to session.    Chief Complaint: fatigue  Patient goals: to go home    Pain/Comfort  Pain Rating 1:  (pt. did not rate)  Location 1: abdomen  Pain Addressed 1: Pre-medicate for activity, Reposition, Cessation of Activity      Objective:   Patient found with: XIOMY drain, peripheral IV (Pure-Wick), oxygen @ 4L     Cognitive Exam:  Oriented to: Person, Place and Time    Follows Commands/attention: Follows one-step commands  Communication: clear/fluent  Safety awareness/insight to disability: intact    Physical Exam:  Postural examination/scapula alignment: flexed posture in standing    Skin integrity: Visible skin intact  Edema: None noted     Sensation:   Intact    Upper Extremity Range of  Motion:  Right Upper Extremity: WFL  Left Upper Extremity: WFL    Upper Extremity Strength:  Right Upper Extremity: WFL  Left Upper Extremity: WFL    Lower Extremity Range of Motion:  Right Lower Extremity: limited knee flexion  Left Lower Extremity: limited knee flexion    Lower Extremity Strength:  Right Lower Extremity: 3-/5  Left Lower Extremity: 3-/5     Fine motor coordination:  Intact    Gross motor coordination: WFL    Functional Mobility:  Bed Mobility:  Rolling/Turning to Left: Total assistance, With assist of 2  Scooting/Bridging: Total Assistance, With assist of 2  Supine to Sit: Total Assistance, With assist of 2    Transfers:  Sit <> Stand Assistance: Maximum Assistance (x2 person assist)  Sit <> Stand Assistive Device: Rolling Walker  Bed <> Chair Technique: Stand Pivot  Bed <> Chair Assistance: Maximum Assistance (x2 person assist)  Bed <> Chair Assistive Device: Rolling Walker    Gait:   Gait Distance: 8 small steps at bedside to get to chair  Assistance 1: Maximum assistance, With assist of two  Gait Assistive Device: Rolling walker  Gait Pattern: swing-to gait  Gait Deviation(s): decreased qamar, decreased step length, decreased toe-to-floor clearance, decreased weight-shifting ability (pt. required assist with weight shifting and RW management)    Stairs:      Balance:   Static Sit: FAIR+: Able to take MINIMAL challenges from all directions  Dynamic Sit: FAIR+: Maintains balance through MINIMAL excursions of active trunk motion  Static Stand: POOR+: Needs MINIMAL assist to maintain  Dynamic stand: POOR: N/A    Therapeutic Activities and Exercises:  Pt. Educated on LE therex, discussed therapy needs, DME needs, and POC    AM-PAC 6 CLICK MOBILITY  How much help from another person does this patient currently need?   1 = Unable, Total/Dependent Assistance  2 = A lot, Maximum/Moderate Assistance  3 = A little, Minimum/Contact Guard/Supervision  4 = None, Modified [place]/Independent    Turning  over in bed (including adjusting bedclothes, sheets and blankets)?: 2  Sitting down on and standing up from a chair with arms (e.g., wheelchair, bedside commode, etc.): 2  Moving from lying on back to sitting on the side of the bed?: 2  Moving to and from a bed to a chair (including a wheelchair)?: 2  Need to walk in hospital room?: 2  Climbing 3-5 steps with a railing?: 1  Total Score: 11     AM-PAC Raw Score CMS G-Code Modifier Level of Impairment Assistance   6 % Total / Unable   7 - 9 CM 80 - 100% Maximal Assist   10 - 14 CL 60 - 80% Moderate Assist   15 - 19 CK 40 - 60% Moderate Assist   20 - 22 CJ 20 - 40% Minimal Assist   23 CI 1-20% SBA / CGA   24 CH 0% Independent/ Mod I     Patient left up in chair with all lines intact, call button in reach and nursing notified.    Assessment:   Rosetta Whyte is a 72 y.o. female with a medical diagnosis of Acute cholecystitis and presents with deconditioning/debility. Pt. cooperative and tolerated treatment well. Pt. would benefit from continued PT to increase strength/endurance and improve functional mobility.    Rehab identified problem list/impairments: Rehab identified problem list/impairments: weakness, impaired endurance, impaired self care skills, impaired functional mobilty, gait instability, impaired balance, pain    Rehab potential is fair.    Activity tolerance: Fair    Discharge recommendations: Discharge Facility/Level Of Care Needs: nursing facility, skilled     Barriers to discharge: Barriers to Discharge: None    Equipment recommendations:       GOALS:    Physical Therapy Goals        Problem: Physical Therapy Goal    Goal Priority Disciplines Outcome Goal Variances Interventions   Physical Therapy Goal     PT/OT, PT Ongoing (interventions implemented as appropriate)     Description:  Goals to be met by: 2017     Patient will increase functional independence with mobility by performin. Supine to sit with Moderate Assistance  2. Sit to  supine with Moderate Assistance  3. Sit to stand transfer with Moderate Assistance  4. Bed to chair transfer with Moderate Assistance using Rolling Walker  6. Gait  x 15 feet with Moderate Assistance using Rolling Walker.   7. Lower extremity exercise program x15 reps per handout, with assistance as needed                      PLAN:    Patient to be seen 4 x/week to address the above listed problems via gait training, therapeutic activities, therapeutic exercises  Plan of Care expires: 12/08/17  Plan of Care reviewed with: patient, family    Functional Assessment Tool Used: AM-PAC  Score: 11  Functional Limitation: Mobility: Walking and moving around  Mobility: Walking and Moving Around Current Status (): CL  Mobility: Walking and Moving Around Goal Status (): CK     Catracho Kent, PT  11/08/2017

## 2017-11-09 LAB
ALBUMIN SERPL BCP-MCNC: 2.2 G/DL
ALP SERPL-CCNC: 101 U/L
ALT SERPL W/O P-5'-P-CCNC: 10 U/L
ANION GAP SERPL CALC-SCNC: 10 MMOL/L
AST SERPL-CCNC: 16 U/L
BASOPHILS # BLD AUTO: 0.06 K/UL
BASOPHILS NFR BLD: 0.7 %
BILIRUB SERPL-MCNC: 0.6 MG/DL
BUN SERPL-MCNC: 46 MG/DL
CALCIUM SERPL-MCNC: 9 MG/DL
CHLORIDE SERPL-SCNC: 106 MMOL/L
CO2 SERPL-SCNC: 24 MMOL/L
CREAT SERPL-MCNC: 2.3 MG/DL
DIFFERENTIAL METHOD: ABNORMAL
EOSINOPHIL # BLD AUTO: 0.2 K/UL
EOSINOPHIL NFR BLD: 2.5 %
ERYTHROCYTE [DISTWIDTH] IN BLOOD BY AUTOMATED COUNT: 14.5 %
EST. GFR  (AFRICAN AMERICAN): 23.9 ML/MIN/1.73 M^2
EST. GFR  (NON AFRICAN AMERICAN): 20.8 ML/MIN/1.73 M^2
GLUCOSE SERPL-MCNC: 105 MG/DL
HCT VFR BLD AUTO: 38.5 %
HGB BLD-MCNC: 12.3 G/DL
IMM GRANULOCYTES # BLD AUTO: 0.09 K/UL
IMM GRANULOCYTES NFR BLD AUTO: 1 %
LYMPHOCYTES # BLD AUTO: 1.1 K/UL
LYMPHOCYTES NFR BLD: 12.2 %
MAGNESIUM SERPL-MCNC: 1.7 MG/DL
MCH RBC QN AUTO: 29.1 PG
MCHC RBC AUTO-ENTMCNC: 31.9 G/DL
MCV RBC AUTO: 91 FL
MONOCYTES # BLD AUTO: 1.2 K/UL
MONOCYTES NFR BLD: 13 %
NEUTROPHILS # BLD AUTO: 6.5 K/UL
NEUTROPHILS NFR BLD: 70.6 %
NRBC BLD-RTO: 0 /100 WBC
PHOSPHATE SERPL-MCNC: 4.9 MG/DL
PLATELET # BLD AUTO: 210 K/UL
PMV BLD AUTO: 10.4 FL
POTASSIUM SERPL-SCNC: 3.8 MMOL/L
PROT SERPL-MCNC: 5.8 G/DL
RBC # BLD AUTO: 4.23 M/UL
SODIUM SERPL-SCNC: 140 MMOL/L
WBC # BLD AUTO: 9.2 K/UL

## 2017-11-09 PROCEDURE — 97535 SELF CARE MNGMENT TRAINING: CPT

## 2017-11-09 PROCEDURE — 20600001 HC STEP DOWN PRIVATE ROOM

## 2017-11-09 PROCEDURE — S0028 INJECTION, FAMOTIDINE, 20 MG: HCPCS | Performed by: SURGERY

## 2017-11-09 PROCEDURE — 84100 ASSAY OF PHOSPHORUS: CPT

## 2017-11-09 PROCEDURE — 97530 THERAPEUTIC ACTIVITIES: CPT

## 2017-11-09 PROCEDURE — 25000003 PHARM REV CODE 250: Performed by: SURGERY

## 2017-11-09 PROCEDURE — 80053 COMPREHEN METABOLIC PANEL: CPT

## 2017-11-09 PROCEDURE — 97112 NEUROMUSCULAR REEDUCATION: CPT

## 2017-11-09 PROCEDURE — 85025 COMPLETE CBC W/AUTO DIFF WBC: CPT

## 2017-11-09 PROCEDURE — 83735 ASSAY OF MAGNESIUM: CPT

## 2017-11-09 PROCEDURE — S5010 5% DEXTROSE AND 0.45% SALINE: HCPCS | Performed by: STUDENT IN AN ORGANIZED HEALTH CARE EDUCATION/TRAINING PROGRAM

## 2017-11-09 PROCEDURE — 97110 THERAPEUTIC EXERCISES: CPT

## 2017-11-09 PROCEDURE — 93970 EXTREMITY STUDY: CPT | Mod: 26,,, | Performed by: INTERNAL MEDICINE

## 2017-11-09 PROCEDURE — 51798 US URINE CAPACITY MEASURE: CPT

## 2017-11-09 PROCEDURE — 63600175 PHARM REV CODE 636 W HCPCS: Performed by: PHYSICIAN ASSISTANT

## 2017-11-09 PROCEDURE — 25000003 PHARM REV CODE 250: Performed by: STUDENT IN AN ORGANIZED HEALTH CARE EDUCATION/TRAINING PROGRAM

## 2017-11-09 PROCEDURE — 86580 TB INTRADERMAL TEST: CPT | Performed by: PHYSICIAN ASSISTANT

## 2017-11-09 PROCEDURE — 36415 COLL VENOUS BLD VENIPUNCTURE: CPT

## 2017-11-09 PROCEDURE — 93970 EXTREMITY STUDY: CPT

## 2017-11-09 RX ORDER — FAMOTIDINE 10 MG/ML
20 INJECTION INTRAVENOUS DAILY
Status: DISCONTINUED | OUTPATIENT
Start: 2017-11-10 | End: 2017-11-10

## 2017-11-09 RX ORDER — DEXTROSE MONOHYDRATE AND SODIUM CHLORIDE 5; .45 G/100ML; G/100ML
INJECTION, SOLUTION INTRAVENOUS CONTINUOUS
Status: DISCONTINUED | OUTPATIENT
Start: 2017-11-09 | End: 2017-11-10

## 2017-11-09 RX ADMIN — SODIUM CHLORIDE 500 ML: 0.9 INJECTION, SOLUTION INTRAVENOUS at 01:11

## 2017-11-09 RX ADMIN — AMLODIPINE BESYLATE 10 MG: 10 TABLET ORAL at 06:11

## 2017-11-09 RX ADMIN — FAMOTIDINE 20 MG: 10 INJECTION, SOLUTION INTRAVENOUS at 10:11

## 2017-11-09 RX ADMIN — OXYCODONE HYDROCHLORIDE AND ACETAMINOPHEN 2 TABLET: 5; 325 TABLET ORAL at 01:11

## 2017-11-09 RX ADMIN — CARVEDILOL 25 MG: 25 TABLET, FILM COATED ORAL at 09:11

## 2017-11-09 RX ADMIN — HYDRALAZINE HYDROCHLORIDE 100 MG: 50 TABLET ORAL at 09:11

## 2017-11-09 RX ADMIN — LOSARTAN POTASSIUM AND HYDROCHLOROTHIAZIDE 1 TABLET: 25; 100 TABLET ORAL at 06:11

## 2017-11-09 RX ADMIN — SODIUM CHLORIDE 1000 ML: 0.9 INJECTION, SOLUTION INTRAVENOUS at 04:11

## 2017-11-09 RX ADMIN — CARVEDILOL 25 MG: 25 TABLET, FILM COATED ORAL at 10:11

## 2017-11-09 RX ADMIN — OXYCODONE HYDROCHLORIDE AND ACETAMINOPHEN 2 TABLET: 5; 325 TABLET ORAL at 05:11

## 2017-11-09 RX ADMIN — TUBERCULIN PURIFIED PROTEIN DERIVATIVE 5 UNITS: 5 INJECTION, SOLUTION INTRADERMAL at 01:11

## 2017-11-09 RX ADMIN — DEXTROSE AND SODIUM CHLORIDE: 5; .45 INJECTION, SOLUTION INTRAVENOUS at 04:11

## 2017-11-09 RX ADMIN — HYDRALAZINE HYDROCHLORIDE 100 MG: 50 TABLET ORAL at 06:11

## 2017-11-09 NOTE — PLAN OF CARE
[facility]     Department of Hospital Medicine     [address] [place] [address]     [phone] after hours  [phone] fax       NURSING HOME ORDERS    11/17/2017    Admit to Nursing Home: Skilled Bed                                                  Diagnoses:  Active Hospital Problems    Diagnosis  POA    Incontinence of urine in female [R32]  No    Morbid obesity with BMI of 45.0-49.9, adult [E66.01, Z68.42]  Not Applicable    Hypertension [I10]  Yes      Resolved Hospital Problems    Diagnosis Date Resolved POA    *Acute cholecystitis [K81.0] 11/17/2017 Yes    Hypokalemia [E87.6] 11/17/2017 Yes    Hypomagnesemia [E83.42] 11/17/2017 Yes       Patient is homebound due to:  Acute cholecystitis    Allergies:Review of patient's allergies indicates:  No Known Allergies    Vitals:      Every shift (Skilled Nursing patients)    Diet: adult regular   Supplement:  1 can every three times a day with meals                         Type: Ensure    Acitivities:      - Up in a chair each morning as tolerated   - Ambulate with assistance to bathroom   - Scheduled walks once each shift (every 8 hours)   - May ambulate independently   - May use walker or cane     LABS:  Per facility protocol    Nursing Precautions:     - Aspiration precautions:             - Total assistance with meals            -  Upright 90 degrees befor during and after meals             -  Suction at bedside          - Fall precautions per nursing home protocol   - Seizure precaution per [facility] protocol   - Decubitus precautions:        -  for positioning   - Pressure reducing foam mattress   - Turn patient every two hours. Use wedge pillows to anchor patient    CONSULTS:      Physical Therapy to evaluate and treat     Occupational Therapy to evaluate and treat     Speech Therapy  to evaluate and treat     Nutrition to evaluate and recommend diet     Psychiatry to evaluate and follow  patients for delirium    MISCELLANEOUS CARE:       Routine Skin for Bedridden Patients:  Apply moisture barrier cream to all    skin folds and wet areas in perineal area daily and after baths and                           all bowel movements.        Medications: Review discharge medications with patient and family and provide education.      Current Discharge Medication List      START taking these medications    Details   docusate sodium (COLACE) 100 MG capsule Take 1 capsule (100 mg total) by mouth 2 (two) times daily.  Refills: 0      HEPARIN SODIUM,PORCINE (HEPARIN, PORCINE,) 5,000 unit/mL injection Inject 1 mL (5,000 Units total) into the skin every 8 (eight) hours. Would recommend continuing chemical DVT prophylaxis until patient is increasing ambulation and reliably active after surgery.      magnesium oxide (MAG-OX) 400 mg tablet Take 2 tablets (800 mg total) by mouth 3 (three) times daily.  Refills: 0      oxyCODONE-acetaminophen (PERCOCET) 5-325 mg per tablet Take 1 tablet by mouth every 6 (six) hours as needed (pain).  Qty: 30 tablet, Refills: 0         CONTINUE these medications which have CHANGED    Details   bumetanide (BUMEX) 2 MG tablet Take 1 tablet (2 mg total) by mouth once daily. Increase back up to 2 mg BID as necessary if patient begins to have signs of volume overload.         CONTINUE these medications which have NOT CHANGED    Details   amlodipine (NORVASC) 10 MG tablet Take 10 mg by mouth every morning.       hydrALAZINE (APRESOLINE) 100 MG tablet Take 100 mg by mouth 3 (three) times daily.       potassium chloride SA (K-DUR,KLOR-CON) 20 MEQ tablet Take 20 mEq by mouth 2 (two) times daily.       CALCIUM CITRATE/VITAMIN D3 (CALCIUM CITRATE + D ORAL) Take 30 mLs by mouth 3 (three) times daily.      carvedilol (COREG) 25 MG tablet Take 25 mg by mouth 2 (two) times daily.       clobetasol (TEMOVATE) 0.05 % cream Apply topically 2 (two) times daily. Apply to legs      cloNIDine 0.3 mg/24 hr td  ptwk (CATAPRES) 0.3 mg/24 hr       cyanocobalamin, vitamin B-12, 1,000 mcg Subl Place 1 drop under the tongue every other day.      ergocalciferol (ERGOCALCIFEROL) 50,000 unit Cap Take 1 capsule (50,000 Units total) by mouth twice a week.  Qty: 24 capsule, Refills: 0      esomeprazole (NEXIUM PACKET) 40 mg GrPS Take 40 mg by mouth before breakfast.  Qty: 30 each, Refills: 2      ondansetron (ZOFRAN-ODT) 4 MG TbDL Take 2 tablets (8 mg total) by mouth every 8 (eight) hours as needed (nausea).  Qty: 20 tablet, Refills: 0      pediatric multivit-iron-min (FLINTSTONES COMPLETE, IRON,) Chew Take 1 tablet by mouth 2 (two) times daily.      polyethylene glycol (GLYCOLAX) 17 gram PwPk Take 17 g by mouth once daily.  Qty: 30 each, Refills: 0      VITAMIN B COMPLEX (B COMPLEX ORAL) Take 1 drop by mouth 2 (two) times daily.         STOP taking these medications       losartan-hydrochlorothiazide 100-25 mg (HYZAAR) 100-25 mg per tablet Comments:   Reason for Stopping:         potassium chloride 10% (KAYCIEL) 20 mEq/15 mL solution Comments:   Reason for Stopping:         promethazine (PHENERGAN) 25 MG suppository Comments:   Reason for Stopping:         ursodiol (ALEK FORTE) 500 MG tablet Comments:   Reason for Stopping:                        _________________________________  Ginna Lemon MD  11/17/2017

## 2017-11-09 NOTE — SUBJECTIVE & OBJECTIVE
Interval History:   Patient seen and examined, no acute events overnight  Pain well controlled with pain meds  Tolerating diet with no nausea/vomiting  No BM  Afebrile/VSS    Medications:  Continuous Infusions:   Scheduled Meds:   amLODIPine  10 mg Oral QAM    carvedilol  25 mg Oral BID    cloNIDine 0.3 mg/24 hr td ptwk  1 patch Transdermal Weekly    cloNIDine  0.1 mg Oral Once    famotidine (PF)  20 mg Intravenous BID    hydrALAZINE  100 mg Oral TID    labetalol  20 mg Intravenous Once    lactated ringers  1,000 mL Intravenous Once    losartan-hydrochlorothiazide 100-25 mg  1 tablet Oral QAM     PRN Meds:albuterol-ipratropium 2.5mg-0.5mg/3mL, diphenhydrAMINE, hydrALAZINE, labetalol, ondansetron, oxyCODONE-acetaminophen, oxyCODONE-acetaminophen, promethazine (PHENERGAN) IVPB, sodium chloride 0.9%     Review of patient's allergies indicates:  No Known Allergies  Objective:     Vital Signs (Most Recent):  Temp: 98.2 °F (36.8 °C) (11/09/17 0818)  Pulse: 78 (11/09/17 0818)  Resp: 18 (11/09/17 0818)  BP: (!) 107/50 (11/09/17 0818)  SpO2: 95 % (11/09/17 0818) Vital Signs (24h Range):  Temp:  [96.1 °F (35.6 °C)-98.2 °F (36.8 °C)] 98.2 °F (36.8 °C)  Pulse:  [54-88] 78  Resp:  [18-24] 18  SpO2:  [94 %-97 %] 95 %  BP: (106-141)/(50-74) 107/50     Weight: 127 kg (280 lb)  Body mass index is 46.59 kg/m².    Intake/Output - Last 3 Shifts       11/07 0700 - 11/08 0659 11/08 0700 - 11/09 0659 11/09 0700 - 11/10 0659    P.O.  380     I.V. (mL/kg) 1075 (8.5)      IV Piggyback  500     Total Intake(mL/kg) 1075 (8.5) 880 (6.9)     Urine (mL/kg/hr) 150 (0) 250 (0.1)     Emesis/NG output  0 (0)     Drains 40 (0) 10 (0)     Other  0 (0)     Stool 0 (0) 0 (0)     Blood  0 (0)     Total Output 190 260      Net +885 +620             Urine Occurrence 2 x      Stool Occurrence 0 x 0 x     Emesis Occurrence  0 x           Physical Exam   Constitutional: She is oriented to person, place, and time. She appears well-developed and  well-nourished. No distress.   HENT:   Head: Normocephalic and atraumatic.   Eyes: No scleral icterus.   Neck: Neck supple.   Cardiovascular: Normal rate and regular rhythm.    Pulmonary/Chest: Effort normal. No respiratory distress.   Abdominal:   Soft, appropriate TTP, non-distended  Incision - clean, dry and intact  XIOMY with minimal serosang output   Neurological: She is alert and oriented to person, place, and time.   Skin: Skin is warm and dry.   Psychiatric: She has a normal mood and affect.       Significant Labs:  CBC:   Recent Labs  Lab 11/09/17 0450   WBC 9.20   RBC 4.23   HGB 12.3   HCT 38.5      MCV 91   MCH 29.1   MCHC 31.9*     BMP:   Recent Labs  Lab 11/09/17 0450         K 3.8      CO2 24   BUN 46*   CREATININE 2.3*   CALCIUM 9.0   MG 1.7     CMP:   Recent Labs  Lab 11/09/17 0450      CALCIUM 9.0   ALBUMIN 2.2*   PROT 5.8*      K 3.8   CO2 24      BUN 46*   CREATININE 2.3*   ALKPHOS 101   ALT 10   AST 16   BILITOT 0.6     LFTs:   Recent Labs  Lab 11/09/17 0450   ALT 10   AST 16   ALKPHOS 101   BILITOT 0.6   PROT 5.8*   ALBUMIN 2.2*

## 2017-11-09 NOTE — PLAN OF CARE
Per Edie, sister, they have received the SNF list; family have not decided on a SNF; They probably will take pt home w/h/h. Waiting to talk w/physician.    Vinicius Torres LMSW  Ext. [phone]

## 2017-11-09 NOTE — PT/OT/SLP PROGRESS
"Physical Therapy  Treatment    Rosetta Whyte   MRN: [id number]   Admitting Diagnosis: Acute cholecystitis    PT Received On: 11/09/17  PT Start Time: 0859     PT Stop Time: 0949    PT Total Time (min): 60 min       Billable Minutes:  Therapeutic Activity 55 and Therapeutic Exercise 10  Additional time for (A) for t/f pt Back to bed  Treatment Type: Treatment (partial co-tx with OT)  PT/PTA: PTA     PTA Visit Number: 1       General Precautions: Standard, fall  Orthopedic Precautions: N/A   Braces:           Subjective:  Communicated with RN prior to session.  I will try and get up  Family reports  " she has not been able to move her R leg in about a year, I need the doctors to help find out why she can't move her leg"    Pain/Comfort  Pain Rating 1: 0/10  Pain Rating Post-Intervention 1: 0/10    Objective:   Patient found with: XIOMY drain, peripheral IV (pure wick)    Functional Mobility:  Bed Mobility:   Rolling/Turning to Left: Total assistance, With assist of 2  Scooting/Bridging: Total Assistance, With assist of 2  Supine to Sit: Total Assistance, With assist of 2    Transfers:  Sit <> Stand Assistance: Total Assistance (maxA x 2 from bed and medichair)  Sit <> Stand Assistive Device: Rolling Walker  Bed <> Chair Technique: Stand Pivot  Bed <> Chair Assistance: Maximum Assistance  Bed <> Chair Assistive Device: Rolling Walker  Once pt transferred to [facility]-chair, pt demo  Increased difficulty scooting back into chair, Therapist reclined medi chair and draw sheet pt to head of chair and returned to sitting position.   drawsheet from medi-chair to bed with totalA x 4    Gait:   Gait Distance:  (10 small steps from EOB to bedside chair with max A with RW for balance and clearance of feet. increased difficulty advancing R LE)  Assistance 1: Maximum assistance, With assist of two ( for safety)  Gait Assistive Device: Rolling walker  Gait Pattern: swing-to gait  Gait Deviation(s): decreased qamar, decreased step length, " decreased stride length, decreased weight-shifting ability, decreased toe-to-floor clearance    Therapeutic Activities and Exercises:   Discussed/educated patient on progress, safety,PT POC   Patient performed therex X 10 reps supine B LE AA/PROM AP,HS, Hip Flexion, Hip Abd/Add   White board updated   Pt tolerated dynamic standing 8-10  minutes using RW for support with  Min/Mod A for balance.        AM-PAC 6 CLICK MOBILITY  How much help from another person does this patient currently need?   1 = Unable, Total/Dependent Assistance  2 = A lot, Maximum/Moderate Assistance  3 = A little, Minimum/Contact Guard/Supervision  4 = None, Modified [place]/Independent    Turning over in bed (including adjusting bedclothes, sheets and blankets)?: 2  Sitting down on and standing up from a chair with arms (e.g., wheelchair, bedside commode, etc.): 2  Moving from lying on back to sitting on the side of the bed?: 2  Moving to and from a bed to a chair (including a wheelchair)?: 2  Need to walk in hospital room?: 2  Climbing 3-5 steps with a railing?: 1  Total Score: 11    AM-PAC Raw Score CMS G-Code Modifier Level of Impairment Assistance   6 % Total / Unable   7 - 9 CM 80 - 100% Maximal Assist   10 - 14 CL 60 - 80% Moderate Assist   15 - 19 CK 40 - 60% Moderate Assist   20 - 22 CJ 20 - 40% Minimal Assist   23 CI 1-20% SBA / CGA   24 CH 0% Independent/ Mod I     Patient left supine with all lines intact, call button in reach, nsg notified and family present.    Assessment:  Rosetta Whyte is a 72 y.o. female with a medical diagnosis of Acute cholecystitis and presents with continued deficits as listed below. Pt tolerated treatment fairly well and is progressing slowly with mobility. Pt still requires Assistance x 2 for mobility for safety. Pt would continue to benefit from skilled PT to address overall functional mobility and goals. Goals remain appropriate      Rehab identified problem list/impairments: Rehab  identified problem list/impairments: weakness, impaired endurance, impaired functional mobilty, gait instability, impaired self care skills, decreased lower extremity function, decreased upper extremity function, decreased ROM, impaired balance    Rehab potential is good.    Activity tolerance: Fair    Discharge recommendations: Discharge Facility/Level Of Care Needs: nursing facility, skilled     Barriers to discharge: Barriers to Discharge:  (increased level of (A))    Equipment recommendations: Equipment Needed After Discharge:  (TBD)     GOALS:    Physical Therapy Goals        Problem: Physical Therapy Goal    Goal Priority Disciplines Outcome Goal Variances Interventions   Physical Therapy Goal     PT/OT, PT Ongoing (interventions implemented as appropriate)     Description:  Goals to be met by: 2017     Patient will increase functional independence with mobility by performin. Supine to sit with Moderate Assistance  2. Sit to supine with Moderate Assistance  3. Sit to stand transfer with Moderate Assistance  4. Bed to chair transfer with Moderate Assistance using Rolling Walker  6. Gait  x 15 feet with Moderate Assistance using Rolling Walker.   7. Lower extremity exercise program x15 reps per handout, with assistance as needed                      PLAN:    Patient to be seen 4 x/week  to address the above listed problems via gait training, therapeutic activities, therapeutic exercises  Plan of Care expires: 17  Plan of Care reviewed with: patient, family         Wes Trent, PTA  2017

## 2017-11-09 NOTE — PROGRESS NOTES
[facility]  General Surgery  Progress Note    Subjective:     History of Present Illness:  Rosetta Whyte is a 72 y.o. female with morbid obesity (s/p sleeve gastrectomy), HTN, and OA presents to [facility] ED with 5 days history of abdominal pain.  She states that the pain started Tuesday.  The pain was located on the right side of her abdomen.  The pain for the first day was tolerable, but for the last 4 days, she states the pain has been 10/10.  She has associated nausea and vomiting, fever/chills.  She has had no appetite and has not eaten a meal for 4 days.  She has never had anything like this happen before, even a mild episode.  She has maintained normal bowel function.  No pale stools, jaundice, itching, or dark urine.  Functionally, she can walk about 1 block before stopping, but she states that this is due to leg swelling and arthritis and not chest pain/shortness of breath.  She has lost 80 pounds since a sleeve gastrectomy 4 months ago.  She also has history of open appendectomy.    Post-Op Info:  Procedure(s) (LRB):  CHOLECYSTECTOMY (N/A)  INCOMPLETE-PROCEDURE/ATTEMPTED Laparscopic cholecystectomy (N/A)   3 Days Post-Op     Interval History:   Patient seen and examined, no acute events overnight  Pain well controlled with pain meds  Tolerating diet with no nausea/vomiting  No BM  Afebrile/VSS    Medications:  Continuous Infusions:   Scheduled Meds:   amLODIPine  10 mg Oral QAM    carvedilol  25 mg Oral BID    cloNIDine 0.3 mg/24 hr td ptwk  1 patch Transdermal Weekly    cloNIDine  0.1 mg Oral Once    famotidine (PF)  20 mg Intravenous BID    hydrALAZINE  100 mg Oral TID    labetalol  20 mg Intravenous Once    lactated ringers  1,000 mL Intravenous Once    losartan-hydrochlorothiazide 100-25 mg  1 tablet Oral QAM     PRN Meds:albuterol-ipratropium 2.5mg-0.5mg/3mL, diphenhydrAMINE, hydrALAZINE, labetalol, ondansetron, oxyCODONE-acetaminophen, oxyCODONE-acetaminophen, promethazine  (PHENERGAN) IVPB, sodium chloride 0.9%     Review of patient's allergies indicates:  No Known Allergies  Objective:     Vital Signs (Most Recent):  Temp: 98.2 °F (36.8 °C) (11/09/17 0818)  Pulse: 78 (11/09/17 0818)  Resp: 18 (11/09/17 0818)  BP: (!) 107/50 (11/09/17 0818)  SpO2: 95 % (11/09/17 0818) Vital Signs (24h Range):  Temp:  [96.1 °F (35.6 °C)-98.2 °F (36.8 °C)] 98.2 °F (36.8 °C)  Pulse:  [54-88] 78  Resp:  [18-24] 18  SpO2:  [94 %-97 %] 95 %  BP: (106-141)/(50-74) 107/50     Weight: 127 kg (280 lb)  Body mass index is 46.59 kg/m².    Intake/Output - Last 3 Shifts       11/07 0700 - 11/08 0659 11/08 0700 - 11/09 0659 11/09 0700 - 11/10 0659    P.O.  380     I.V. (mL/kg) 1075 (8.5)      IV Piggyback  500     Total Intake(mL/kg) 1075 (8.5) 880 (6.9)     Urine (mL/kg/hr) 150 (0) 250 (0.1)     Emesis/NG output  0 (0)     Drains 40 (0) 10 (0)     Other  0 (0)     Stool 0 (0) 0 (0)     Blood  0 (0)     Total Output 190 260      Net +885 +620             Urine Occurrence 2 x      Stool Occurrence 0 x 0 x     Emesis Occurrence  0 x           Physical Exam   Constitutional: She is oriented to person, place, and time. She appears well-developed and well-nourished. No distress.   HENT:   Head: Normocephalic and atraumatic.   Eyes: No scleral icterus.   Neck: Neck supple.   Cardiovascular: Normal rate and regular rhythm.    Pulmonary/Chest: Effort normal. No respiratory distress.   Abdominal:   Soft, appropriate TTP, non-distended  Incision - clean, dry and intact  XIOMY with minimal serosang output   Neurological: She is alert and oriented to person, place, and time.   Skin: Skin is warm and dry.   Psychiatric: She has a normal mood and affect.       Significant Labs:  CBC:   Recent Labs  Lab 11/09/17  0450   WBC 9.20   RBC 4.23   HGB 12.3   HCT 38.5      MCV 91   MCH 29.1   MCHC 31.9*     BMP:   Recent Labs  Lab 11/09/17  0450         K 3.8      CO2 24   BUN 46*   CREATININE 2.3*   CALCIUM 9.0    MG 1.7     CMP:   Recent Labs  Lab 11/09/17  0450      CALCIUM 9.0   ALBUMIN 2.2*   PROT 5.8*      K 3.8   CO2 24      BUN 46*   CREATININE 2.3*   ALKPHOS 101   ALT 10   AST 16   BILITOT 0.6     LFTs:   Recent Labs  Lab 11/09/17  0450   ALT 10   AST 16   ALKPHOS 101   BILITOT 0.6   PROT 5.8*   ALBUMIN 2.2*     Assessment/Plan:     * Acute cholecystitis    S/P lap converted to open [place]ison 11/6/17  -continue regular diet   -pain/nausea meds PRN  -IS/aggressive pulm toilet  -PT/OT  -Ambulate TID, OOBTC  -DVT ppx  -stable for dc to SNF, orders placed 11/9/17        Hypertension    - Improved on home regimen   - IV hydralazine and labetalol PRN            Kary Emanuel PA-C   [id number]  General Surgery  [facility]

## 2017-11-09 NOTE — PLAN OF CARE
SW has faxed h/h orders along w/clinicals to [facility] through [facility] faxing system for arrangement of h/h.    Vinicius Torres, SW  Ext. [phone]

## 2017-11-09 NOTE — PLAN OF CARE
Problem: Physical Therapy Goal  Goal: Physical Therapy Goal  Goals to be met by: 2017     Patient will increase functional independence with mobility by performin. Supine to sit with Moderate Assistance  2. Sit to supine with Moderate Assistance  3. Sit to stand transfer with Moderate Assistance  4. Bed to chair transfer with Moderate Assistance using Rolling Walker  6. Gait  x 15 feet with Moderate Assistance using Rolling Walker.   7. Lower extremity exercise program x15 reps per handout, with assistance as needed     Pt progressing towards goals. continue with PT POC.Goals remain appropriate.    Wes Trent PTA    2017

## 2017-11-09 NOTE — PLAN OF CARE
[facility]    HOME HEALTH ORDERS  FACE TO FACE ENCOUNTER    Patient Name: Rosetta Whyte  YOB: 1945    PCP: Georgie Ortega MD   [address] [place] [address]  PCP Phone Number: [phone]  PCP Fax: [phone]    Encounter Date: 11/17/2017    Admit to Home Health    Diagnoses:  Active Hospital Problems    Diagnosis  POA    Incontinence of urine in female [R32]  No    Morbid obesity with BMI of 45.0-49.9, adult [E66.01, Z68.42]  Not Applicable    Hypertension [I10]  Yes      Resolved Hospital Problems    Diagnosis Date Resolved POA    *Acute cholecystitis [K81.0] 11/17/2017 Yes    Hypokalemia [E87.6] 11/17/2017 Yes    Hypomagnesemia [E83.42] 11/17/2017 Yes       No future appointments.        I have seen and examined this patient face to face today. My clinical findings that support the need for the home health skilled services and home bound status are the following:  Medical restrictions requiring assistance of another human to leave home due to  Post surgery monitoring.    Allergies:Review of patient's allergies indicates:  No Known Allergies    Diet: regular diet    Activities: no heavy lifting, nothing heavier than 10lbs    Nursing:   SN to complete comprehensive assessment including routine vital signs. Instruct on disease process and s/s of complications to report to MD. Review/verify medication list sent home with the patient at time of discharge  and instruct patient/caregiver as needed. Frequency may be adjusted depending on start of care date.    Notify MD if SBP > 200 or < 90; HR > 120 or < 50; Temp > 101.4      CONSULTS:    Physical Therapy to evaluate and treat. Evaluate for home safety and equipment needs; Establish/upgrade home exercise program. Perform / instruct on therapeutic exercises, gait training, transfer training, and Range of Motion.  Occupational Therapy to evaluate and treat. Evaluate home environment for safety  and equipment needs. Perform/Instruct on transfers, ADL training, ROM, and therapeutic exercises.  Aide to provide assistance with personal care, ADLs, and vital signs.    Medications: Review discharge medications with patient and family and provide education.      Current Discharge Medication List      START taking these medications    Details   docusate sodium (COLACE) 100 MG capsule Take 1 capsule (100 mg total) by mouth 2 (two) times daily.  Refills: 0      HEPARIN SODIUM,PORCINE (HEPARIN, PORCINE,) 5,000 unit/mL injection Inject 1 mL (5,000 Units total) into the skin every 8 (eight) hours. Would recommend continuing chemical DVT prophylaxis until patient is increasing ambulation and reliably active after surgery.      magnesium oxide (MAG-OX) 400 mg tablet Take 2 tablets (800 mg total) by mouth 3 (three) times daily.  Refills: 0      oxyCODONE-acetaminophen (PERCOCET) 5-325 mg per tablet Take 1 tablet by mouth every 6 (six) hours as needed (pain).  Qty: 30 tablet, Refills: 0         CONTINUE these medications which have CHANGED    Details   bumetanide (BUMEX) 2 MG tablet Take 1 tablet (2 mg total) by mouth once daily. Increase back up to 2 mg BID as necessary if patient begins to have signs of volume overload.         CONTINUE these medications which have NOT CHANGED    Details   amlodipine (NORVASC) 10 MG tablet Take 10 mg by mouth every morning.       hydrALAZINE (APRESOLINE) 100 MG tablet Take 100 mg by mouth 3 (three) times daily.       potassium chloride SA (K-DUR,KLOR-CON) 20 MEQ tablet Take 20 mEq by mouth 2 (two) times daily.       CALCIUM CITRATE/VITAMIN D3 (CALCIUM CITRATE + D ORAL) Take 30 mLs by mouth 3 (three) times daily.      carvedilol (COREG) 25 MG tablet Take 25 mg by mouth 2 (two) times daily.       clobetasol (TEMOVATE) 0.05 % cream Apply topically 2 (two) times daily. Apply to legs      cloNIDine 0.3 mg/24 hr td ptwk (CATAPRES) 0.3 mg/24 hr       cyanocobalamin, vitamin B-12, 1,000 mcg Subl  Place 1 drop under the tongue every other day.      ergocalciferol (ERGOCALCIFEROL) 50,000 unit Cap Take 1 capsule (50,000 Units total) by mouth twice a week.  Qty: 24 capsule, Refills: 0      esomeprazole (NEXIUM PACKET) 40 mg GrPS Take 40 mg by mouth before breakfast.  Qty: 30 each, Refills: 2      ondansetron (ZOFRAN-ODT) 4 MG TbDL Take 2 tablets (8 mg total) by mouth every 8 (eight) hours as needed (nausea).  Qty: 20 tablet, Refills: 0      pediatric multivit-iron-min (FLINTSTONES COMPLETE, IRON,) Chew Take 1 tablet by mouth 2 (two) times daily.      polyethylene glycol (GLYCOLAX) 17 gram PwPk Take 17 g by mouth once daily.  Qty: 30 each, Refills: 0      VITAMIN B COMPLEX (B COMPLEX ORAL) Take 1 drop by mouth 2 (two) times daily.         STOP taking these medications       losartan-hydrochlorothiazide 100-25 mg (HYZAAR) 100-25 mg per tablet Comments:   Reason for Stopping:         potassium chloride 10% (KAYCIEL) 20 mEq/15 mL solution Comments:   Reason for Stopping:         promethazine (PHENERGAN) 25 MG suppository Comments:   Reason for Stopping:         ursodiol (ALEK FORTE) 500 MG tablet Comments:   Reason for Stopping:             Of note, patient's losartan was discontinued and her incoming home dose of Bumex 2 mg BID was decreased to just once a day given her episode of pre-renal creatinine elevation.  Otherwise, she continues on all other home medications.  Her major ongoing limitation at this point in the postoperative setting is continued lack of ability to tolerate an adequate physical activity level with further deconditioning in an already morbidly obese patient.     Abdominal incision with skin staples still in place which may be removed while the patient is at SNF on or after 11/22/2017.      I certify that this patient is confined to her home and needs intermittent skilled nursing care, physical therapy and occupational therapy.

## 2017-11-09 NOTE — PT/OT/SLP PROGRESS
Occupational Therapy  Treatment    Rosetta Whyte   MRN: [id number]   Admitting Diagnosis: Acute cholecystitis    OT Date of Treatment: 11/09/17   OT Start Time: 0858  OT Stop Time: 0948  OT Total Time (min): 50 min  Co- Treat with PT= 25 min    Billable Minutes:  Self Care/Home Management 10 and Neuromuscular Re-education 15    General Precautions: Standard, fall  Orthopedic Precautions: N/A  Braces: N/A    Do you have any cultural, spiritual, Mormonism conflicts, given your current situation?: none    Subjective:  Communicated with RN prior to session.  Pt agreeable to participate with therapy this date.   Pain/Comfort  Pain Rating 1: 0/10  Pain Rating Post-Intervention 1: 0/10    Objective:  Patient found with: XIOMY drain, peripheral IV (Pure wick )     Functional Mobility:  Bed Mobility:  Rolling/Turning to Left: Total assistance, With assist of 2  Scooting/Bridging: Total Assistance, With assist of 2  Supine to Sit: Total Assistance, With assist of 2  Sit to Supine:  (Pt left seated UIC)    Transfers:   Sit <> Stand Assistance: Total Assistance  Sit <> Stand Assistive Device: Standard Walker  Bed <> Chair Technique: Stand Pivot  Bed <> Chair Transfer Assistance: Maximum Assistance (x2)  Bed <> Chair Assistive Device: Standard Walker    Functional Ambulation: Pt took multiple steps (~10 small steps) from EOB to bedside chair with mod- max A with RW for balance and clearance of feet.  ** Pt demo forward trunk flexion and required max verbal and tactile cues for upright position.     Activities of Daily Living:  Feeding Level of Assistance: Activity did not occur    LE Dressing Level of Assistance: Total assistance (to aretha B socks while supine with HOB raised)    Grooming Position: Seated, EOB (to wash face )  Grooming Level of Assistance: Contact guard assistance     Balance:   Static Sit: FAIR+: Able to take MINIMAL challenges from all directions  Dynamic Sit: FAIR+: Maintains balance through MINIMAL excursions of  "active trunk motion  Static Stand: FAIR: Maintains without assist but unable to take challenges using Rw for support  Dynamic stand: POOR: N/A    Therapeutic Activities:  -Pt edu on OT role/POC  -Importance of OOB activity with therapy assistance and sitting UIC throughout the day  -Safety during functional t/f and mobility   -Communication board updated  - Multiple ADL performed-- assistance level noted above  - Pt tolerated dynamic standing ~8-10  minutes using RW for support with  Min-Mod A for balance.     * Once pt transferred to [facility]-chair, pt demo difficulty pushing back into chair -- Therapist reclined medi chair and draw sheet pt to head of chair and returned to sitting position.     AM-PAC 6 CLICK ADL   How much help from another person does this patient currently need?   1 = Unable, Total/Dependent Assistance  2 = A lot, Maximum/Moderate Assistance  3 = A little, Minimum/Contact Guard/Supervision  4 = None, Modified [place]/Independent    Putting on and taking off regular lower body clothing? : 1  Bathing (including washing, rinsing, drying)?: 1  Toileting, which includes using toilet, bedpan, or urinal? : 1  Putting on and taking off regular upper body clothing?: 2  Taking care of personal grooming such as brushing teeth?: 3  Eating meals?: 3  Total Score: 11     AM-PAC Raw Score CMS "G-Code Modifier Level of Impairment Assistance   6 % Total / Unable   7 - 8 CM 80 - 100% Maximal Assist   9-13 CL 60 - 80% Moderate Assist   14 - 19 CK 40 - 60% Moderate Assist   20 - 22 CJ 20 - 40% Minimal Assist   23 CI 1-20% SBA / CGA   24 CH 0% Independent/ Mod I       Patient left up in chair with all lines intact, call button in reach and RN notified    ASSESSMENT:  Rosetta Whyte is a 72 y.o. female with a medical diagnosis of Acute cholecystitis and motivated to participate with therapy this date. Pt would benefit from continued skilled OT to address deficits listed below and maximize independence with " ADL. OT recommending SNF following d/c to increase overall independence and safety with ADL.     Rehab identified problem list/impairments: Rehab identified problem list/impairments: weakness, impaired endurance, impaired self care skills, impaired functional mobilty, decreased upper extremity function, decreased lower extremity function, impaired balance, decreased ROM    Rehab potential is good.    Activity tolerance: Good    Discharge recommendations: Discharge Facility/Level Of Care Needs: nursing facility, skilled     Barriers to discharge: Barriers to Discharge:  (Increase level of assistance)    Equipment recommendations:       GOALS:    Occupational Therapy Goals        Problem: Occupational Therapy Goal    Goal Priority Disciplines Outcome Interventions   Occupational Therapy Goal     OT, PT/OT Ongoing (interventions implemented as appropriate)    Description:  Goals to be met by: 11/17/2017    Patient will increase functional independence with ADLs by performing:    Feeding with Set-up Assistance.  UE Dressing with Minimal Assistance.  LE Dressing with Minimal Assistance.  Grooming while seated EOB with Stand-by Assistance.  Toileting from bedside commode with Moderate Assistance for hygiene and clothing management.   Sitting at edge of bed x20 minutes with Stand-by Assistance.  Supine to sit with Minimal Assistance.  Stand pivot transfers with Minimal Assistance x 1 person.                      Plan:  Patient to be seen 5 x/week to address the above listed problems via self-care/home management, therapeutic activities, therapeutic exercises, neuromuscular re-education  Plan of Care expires: 12/07/17  Plan of Care reviewed with: patient, family         Delia gutierrez, OT  11/09/2017

## 2017-11-09 NOTE — PLAN OF CARE
Problem: Occupational Therapy Goal  Goal: Occupational Therapy Goal  Goals to be met by: 11/17/2017    Patient will increase functional independence with ADLs by performing:    Feeding with Set-up Assistance.  UE Dressing with Minimal Assistance.  LE Dressing with Minimal Assistance.  Grooming while seated EOB with Stand-by Assistance.  Toileting from bedside commode with Moderate Assistance for hygiene and clothing management.   Sitting at edge of bed x20 minutes with Stand-by Assistance.  Supine to sit with Minimal Assistance.  Stand pivot transfers with Minimal Assistance x 1 person.     Outcome: Ongoing (interventions implemented as appropriate)  Continue with POC.   Delia gutierrez OT  11/9/2017

## 2017-11-09 NOTE — ASSESSMENT & PLAN NOTE
S/P lap converted to open edison 11/6/17  -continue regular diet   -pain/nausea meds PRN  -IS/aggressive pulm toilet  -PT/OT  -Ambulate TID, OOBTC  -DVT ppx  -stable for dc to SNF, orders placed 11/9/17

## 2017-11-09 NOTE — PLAN OF CARE
Problem: Patient Care Overview  Goal: Plan of Care Review  Outcome: Ongoing (interventions implemented as appropriate)  Plan of care reviewed with patient and spouse. Patient and spouse verbalized understanding. Patient is oriented x4. Patient tolerated regular diet. Patient abdominal midline and transverse abdominal incision remained dry and intact. Patient received 500 bolus of NS for low urine output. Patient was using Puriwick urinary evac system. MD was notified on this issue. Patient remained on 4LNC. Patient XIOMY drain remained to suction with no output. Patient remained free of any falls or acute events VSS, Will continue to monitor.

## 2017-11-10 LAB
ALBUMIN SERPL BCP-MCNC: 2.2 G/DL
ALBUMIN SERPL BCP-MCNC: 2.2 G/DL
ALP SERPL-CCNC: 76 U/L
ALP SERPL-CCNC: 80 U/L
ALT SERPL W/O P-5'-P-CCNC: 10 U/L
ALT SERPL W/O P-5'-P-CCNC: 8 U/L
ANION GAP SERPL CALC-SCNC: 8 MMOL/L
ANION GAP SERPL CALC-SCNC: 8 MMOL/L
AST SERPL-CCNC: 15 U/L
AST SERPL-CCNC: 17 U/L
BASOPHILS # BLD AUTO: 0.07 K/UL
BASOPHILS NFR BLD: 0.8 %
BILIRUB SERPL-MCNC: 0.4 MG/DL
BILIRUB SERPL-MCNC: 0.5 MG/DL
BUN SERPL-MCNC: 54 MG/DL
BUN SERPL-MCNC: 54 MG/DL
CALCIUM SERPL-MCNC: 8.9 MG/DL
CALCIUM SERPL-MCNC: 8.9 MG/DL
CHLORIDE SERPL-SCNC: 106 MMOL/L
CHLORIDE SERPL-SCNC: 108 MMOL/L
CHLORIDE UR-SCNC: 30 MMOL/L
CO2 SERPL-SCNC: 23 MMOL/L
CO2 SERPL-SCNC: 25 MMOL/L
CREAT SERPL-MCNC: 2.1 MG/DL
CREAT SERPL-MCNC: 2.4 MG/DL
CREAT UR-MCNC: 203 MG/DL
DIFFERENTIAL METHOD: ABNORMAL
EOSINOPHIL # BLD AUTO: 0.3 K/UL
EOSINOPHIL NFR BLD: 3.4 %
ERYTHROCYTE [DISTWIDTH] IN BLOOD BY AUTOMATED COUNT: 14.5 %
EST. GFR  (AFRICAN AMERICAN): 22.7 ML/MIN/1.73 M^2
EST. GFR  (AFRICAN AMERICAN): 26.7 ML/MIN/1.73 M^2
EST. GFR  (NON AFRICAN AMERICAN): 19.7 ML/MIN/1.73 M^2
EST. GFR  (NON AFRICAN AMERICAN): 23.2 ML/MIN/1.73 M^2
GLUCOSE SERPL-MCNC: 118 MG/DL
GLUCOSE SERPL-MCNC: 120 MG/DL
GRAM STN SPEC: NORMAL
GRAM STN SPEC: NORMAL
HCT VFR BLD AUTO: 37.1 %
HGB BLD-MCNC: 11.7 G/DL
IMM GRANULOCYTES # BLD AUTO: 0.17 K/UL
IMM GRANULOCYTES NFR BLD AUTO: 1.9 %
LYMPHOCYTES # BLD AUTO: 1.2 K/UL
LYMPHOCYTES NFR BLD: 13.8 %
MAGNESIUM SERPL-MCNC: 1.8 MG/DL
MCH RBC QN AUTO: 28.7 PG
MCHC RBC AUTO-ENTMCNC: 31.5 G/DL
MCV RBC AUTO: 91 FL
MONOCYTES # BLD AUTO: 1.2 K/UL
MONOCYTES NFR BLD: 13.4 %
NEUTROPHILS # BLD AUTO: 6 K/UL
NEUTROPHILS NFR BLD: 66.7 %
NRBC BLD-RTO: 0 /100 WBC
PHOSPHATE SERPL-MCNC: 4.5 MG/DL
PLATELET # BLD AUTO: 222 K/UL
PMV BLD AUTO: 10.3 FL
POTASSIUM SERPL-SCNC: 4 MMOL/L
POTASSIUM SERPL-SCNC: 4.2 MMOL/L
PROT SERPL-MCNC: 5.8 G/DL
PROT SERPL-MCNC: 5.8 G/DL
PROT UR-MCNC: 22 MG/DL
RBC # BLD AUTO: 4.07 M/UL
SODIUM SERPL-SCNC: 139 MMOL/L
SODIUM SERPL-SCNC: 139 MMOL/L
SODIUM UR-SCNC: 31 MMOL/L
UUN UR-MCNC: 408 MG/DL
WBC # BLD AUTO: 9 K/UL

## 2017-11-10 PROCEDURE — 97110 THERAPEUTIC EXERCISES: CPT

## 2017-11-10 PROCEDURE — 87086 URINE CULTURE/COLONY COUNT: CPT

## 2017-11-10 PROCEDURE — 25000003 PHARM REV CODE 250: Performed by: STUDENT IN AN ORGANIZED HEALTH CARE EDUCATION/TRAINING PROGRAM

## 2017-11-10 PROCEDURE — 51798 US URINE CAPACITY MEASURE: CPT

## 2017-11-10 PROCEDURE — 85025 COMPLETE CBC W/AUTO DIFF WBC: CPT

## 2017-11-10 PROCEDURE — 94664 DEMO&/EVAL PT USE INHALER: CPT

## 2017-11-10 PROCEDURE — 94799 UNLISTED PULMONARY SVC/PX: CPT

## 2017-11-10 PROCEDURE — 80053 COMPREHEN METABOLIC PANEL: CPT | Mod: 91

## 2017-11-10 PROCEDURE — 87205 SMEAR GRAM STAIN: CPT

## 2017-11-10 PROCEDURE — 84300 ASSAY OF URINE SODIUM: CPT

## 2017-11-10 PROCEDURE — 82436 ASSAY OF URINE CHLORIDE: CPT

## 2017-11-10 PROCEDURE — 36415 COLL VENOUS BLD VENIPUNCTURE: CPT

## 2017-11-10 PROCEDURE — P9047 ALBUMIN (HUMAN), 25%, 50ML: HCPCS | Performed by: SURGERY

## 2017-11-10 PROCEDURE — 84540 ASSAY OF URINE/UREA-N: CPT

## 2017-11-10 PROCEDURE — S5010 5% DEXTROSE AND 0.45% SALINE: HCPCS | Performed by: STUDENT IN AN ORGANIZED HEALTH CARE EDUCATION/TRAINING PROGRAM

## 2017-11-10 PROCEDURE — 97535 SELF CARE MNGMENT TRAINING: CPT

## 2017-11-10 PROCEDURE — 27000221 HC OXYGEN, UP TO 24 HOURS

## 2017-11-10 PROCEDURE — 84156 ASSAY OF PROTEIN URINE: CPT

## 2017-11-10 PROCEDURE — 27000173 HC ACAPELLA DEVICE DH OR DM

## 2017-11-10 PROCEDURE — 80053 COMPREHEN METABOLIC PANEL: CPT

## 2017-11-10 PROCEDURE — 25000003 PHARM REV CODE 250: Performed by: SURGERY

## 2017-11-10 PROCEDURE — 20600001 HC STEP DOWN PRIVATE ROOM

## 2017-11-10 PROCEDURE — 94640 AIRWAY INHALATION TREATMENT: CPT

## 2017-11-10 PROCEDURE — 99900035 HC TECH TIME PER 15 MIN (STAT)

## 2017-11-10 PROCEDURE — 84100 ASSAY OF PHOSPHORUS: CPT

## 2017-11-10 PROCEDURE — 63600175 PHARM REV CODE 636 W HCPCS: Performed by: SURGERY

## 2017-11-10 PROCEDURE — 83735 ASSAY OF MAGNESIUM: CPT

## 2017-11-10 PROCEDURE — 94761 N-INVAS EAR/PLS OXIMETRY MLT: CPT

## 2017-11-10 PROCEDURE — 25000242 PHARM REV CODE 250 ALT 637 W/ HCPCS: Performed by: SURGERY

## 2017-11-10 PROCEDURE — 82570 ASSAY OF URINE CREATININE: CPT

## 2017-11-10 PROCEDURE — 97530 THERAPEUTIC ACTIVITIES: CPT

## 2017-11-10 RX ORDER — LABETALOL HYDROCHLORIDE 5 MG/ML
20 INJECTION, SOLUTION INTRAVENOUS EVERY 6 HOURS PRN
Status: DISCONTINUED | OUTPATIENT
Start: 2017-11-10 | End: 2017-11-17

## 2017-11-10 RX ORDER — SODIUM CHLORIDE 9 MG/ML
INJECTION, SOLUTION INTRAVENOUS CONTINUOUS
Status: DISCONTINUED | OUTPATIENT
Start: 2017-11-10 | End: 2017-11-12

## 2017-11-10 RX ORDER — ALBUMIN HUMAN 250 G/1000ML
25 SOLUTION INTRAVENOUS ONCE
Status: COMPLETED | OUTPATIENT
Start: 2017-11-10 | End: 2017-11-10

## 2017-11-10 RX ORDER — CARVEDILOL 12.5 MG/1
12.5 TABLET ORAL 2 TIMES DAILY
Status: DISCONTINUED | OUTPATIENT
Start: 2017-11-10 | End: 2017-11-14

## 2017-11-10 RX ORDER — HEPARIN SODIUM 5000 [USP'U]/ML
5000 INJECTION, SOLUTION INTRAVENOUS; SUBCUTANEOUS EVERY 8 HOURS
Status: DISCONTINUED | OUTPATIENT
Start: 2017-11-10 | End: 2017-11-10

## 2017-11-10 RX ORDER — ACETYLCYSTEINE 200 MG/ML
4 SOLUTION ORAL; RESPIRATORY (INHALATION) EVERY 8 HOURS
Status: DISPENSED | OUTPATIENT
Start: 2017-11-10 | End: 2017-11-12

## 2017-11-10 RX ORDER — OXYCODONE AND ACETAMINOPHEN 10; 325 MG/1; MG/1
1 TABLET ORAL EVERY 4 HOURS PRN
Status: DISCONTINUED | OUTPATIENT
Start: 2017-11-10 | End: 2017-11-13

## 2017-11-10 RX ORDER — DIPHENHYDRAMINE HYDROCHLORIDE 50 MG/ML
25 INJECTION INTRAMUSCULAR; INTRAVENOUS EVERY 12 HOURS PRN
Status: DISCONTINUED | OUTPATIENT
Start: 2017-11-10 | End: 2017-11-10

## 2017-11-10 RX ORDER — IPRATROPIUM BROMIDE AND ALBUTEROL SULFATE 2.5; .5 MG/3ML; MG/3ML
3 SOLUTION RESPIRATORY (INHALATION) EVERY 4 HOURS
Status: DISCONTINUED | OUTPATIENT
Start: 2017-11-10 | End: 2017-11-15

## 2017-11-10 RX ORDER — PANTOPRAZOLE SODIUM 40 MG/1
40 TABLET, DELAYED RELEASE ORAL
Status: DISCONTINUED | OUTPATIENT
Start: 2017-11-10 | End: 2017-11-17

## 2017-11-10 RX ORDER — DIPHENHYDRAMINE HYDROCHLORIDE 50 MG/ML
12.5 INJECTION INTRAMUSCULAR; INTRAVENOUS EVERY 6 HOURS PRN
Status: DISCONTINUED | OUTPATIENT
Start: 2017-11-10 | End: 2017-11-17

## 2017-11-10 RX ORDER — HEPARIN SODIUM 5000 [USP'U]/ML
5000 INJECTION, SOLUTION INTRAVENOUS; SUBCUTANEOUS EVERY 8 HOURS
Status: DISCONTINUED | OUTPATIENT
Start: 2017-11-10 | End: 2017-11-17 | Stop reason: HOSPADM

## 2017-11-10 RX ORDER — ALBUMIN HUMAN 50 G/1000ML
25 SOLUTION INTRAVENOUS ONCE
Status: DISCONTINUED | OUTPATIENT
Start: 2017-11-10 | End: 2017-11-10

## 2017-11-10 RX ADMIN — HEPARIN SODIUM 5000 UNITS: 5000 INJECTION, SOLUTION INTRAVENOUS; SUBCUTANEOUS at 09:11

## 2017-11-10 RX ADMIN — HEPARIN SODIUM 5000 UNITS: 5000 INJECTION, SOLUTION INTRAVENOUS; SUBCUTANEOUS at 10:11

## 2017-11-10 RX ADMIN — PANTOPRAZOLE SODIUM 40 MG: 40 TABLET, DELAYED RELEASE ORAL at 10:11

## 2017-11-10 RX ADMIN — SODIUM CHLORIDE 1000 ML: 0.9 INJECTION, SOLUTION INTRAVENOUS at 05:11

## 2017-11-10 RX ADMIN — IPRATROPIUM BROMIDE AND ALBUTEROL SULFATE 3 ML: .5; 3 SOLUTION RESPIRATORY (INHALATION) at 07:11

## 2017-11-10 RX ADMIN — DEXTROSE AND SODIUM CHLORIDE: 5; .45 INJECTION, SOLUTION INTRAVENOUS at 05:11

## 2017-11-10 RX ADMIN — ALBUMIN (HUMAN) 25 G: 12.5 SOLUTION INTRAVENOUS at 06:11

## 2017-11-10 RX ADMIN — CARVEDILOL 25 MG: 25 TABLET, FILM COATED ORAL at 10:11

## 2017-11-10 RX ADMIN — HEPARIN SODIUM 5000 UNITS: 5000 INJECTION, SOLUTION INTRAVENOUS; SUBCUTANEOUS at 02:11

## 2017-11-10 RX ADMIN — OXYCODONE AND ACETAMINOPHEN 1 TABLET: 10; 325 TABLET ORAL at 09:11

## 2017-11-10 RX ADMIN — OXYCODONE HYDROCHLORIDE AND ACETAMINOPHEN 1 TABLET: 5; 325 TABLET ORAL at 03:11

## 2017-11-10 RX ADMIN — IPRATROPIUM BROMIDE AND ALBUTEROL SULFATE 3 ML: .5; 3 SOLUTION RESPIRATORY (INHALATION) at 09:11

## 2017-11-10 RX ADMIN — SODIUM CHLORIDE: 0.9 INJECTION, SOLUTION INTRAVENOUS at 07:11

## 2017-11-10 RX ADMIN — SODIUM CHLORIDE 1000 ML: 0.9 INJECTION, SOLUTION INTRAVENOUS at 06:11

## 2017-11-10 RX ADMIN — OXYCODONE AND ACETAMINOPHEN 1 TABLET: 10; 325 TABLET ORAL at 10:11

## 2017-11-10 RX ADMIN — AMLODIPINE BESYLATE 10 MG: 10 TABLET ORAL at 06:11

## 2017-11-10 RX ADMIN — HYDRALAZINE HYDROCHLORIDE 100 MG: 50 TABLET ORAL at 05:11

## 2017-11-10 RX ADMIN — CARVEDILOL 12.5 MG: 12.5 TABLET, FILM COATED ORAL at 09:11

## 2017-11-10 RX ADMIN — IPRATROPIUM BROMIDE AND ALBUTEROL SULFATE 3 ML: .5; 3 SOLUTION RESPIRATORY (INHALATION) at 03:11

## 2017-11-10 NOTE — SUBJECTIVE & OBJECTIVE
Interval History: Minimal UOP overnight again.  Start on IVF.  Otherwise pt continues to feel improved    Medications:  Continuous Infusions:   sodium chloride 0.9%       Scheduled Meds:   albuterol-ipratropium 2.5mg-0.5mg/3mL  3 mL Nebulization Q4H    carvedilol  25 mg Oral BID    cloNIDine 0.3 mg/24 hr td ptwk  1 patch Transdermal Weekly    heparin (porcine)  5,000 Units Subcutaneous Q8H    pantoprazole  40 mg Oral Before breakfast     PRN Meds:diphenhydrAMINE, labetalol, ondansetron, oxyCODONE-acetaminophen, oxyCODONE-acetaminophen, promethazine (PHENERGAN) IVPB     Review of patient's allergies indicates:  No Known Allergies  Objective:     Vital Signs (Most Recent):  Temp: 98.4 °F (36.9 °C) (11/10/17 0747)  Pulse: 71 (11/10/17 0747)  Resp: 16 (11/10/17 0747)  BP: 137/64 (11/10/17 0747)  SpO2: 98 % (11/10/17 0747) Vital Signs (24h Range):  Temp:  [96.5 °F (35.8 °C)-98.6 °F (37 °C)] 98.4 °F (36.9 °C)  Pulse:  [56-77] 71  Resp:  [16-20] 16  SpO2:  [92 %-98 %] 98 %  BP: (101-160)/(53-84) 137/64     Weight: 127 kg (280 lb)  Body mass index is 46.59 kg/m².    Intake/Output - Last 3 Shifts       11/08 0700 - 11/09 0659 11/09 0700 - 11/10 0659 11/10 0700 - 11/11 0659    P.O. 380 1080     I.V. (mL/kg)  980 (7.7)     IV Piggyback 500 2000     Total Intake(mL/kg) 880 (6.9) 4060 (32)     Urine (mL/kg/hr) 250 (0.1) 150 (0) 100 (0.4)    Emesis/NG output 0 (0)      Drains 10 (0) 0 (0)     Other 0 (0) 0 (0)     Stool 0 (0)      Blood 0 (0)      Total Output 260 150 100    Net +620 +3910 -100           Stool Occurrence 0 x      Emesis Occurrence 0 x            Physical Exam   Gen: NAD  CV: RRR  Pulm: unlabored  GI: soft, NT, ND.  Staple lines intact    Significant Labs:  CBC:   Recent Labs  Lab 11/10/17  0417   WBC 9.00   RBC 4.07   HGB 11.7*   HCT 37.1      MCV 91   MCH 28.7   MCHC 31.5*     CMP:   Recent Labs  Lab 11/10/17  0417   *   CALCIUM 8.9   ALBUMIN 2.2*   PROT 5.8*      K 4.0   CO2 25   CL  106   BUN 54*   CREATININE 2.4*   ALKPHOS 80   ALT 10   AST 15   BILITOT 0.5

## 2017-11-10 NOTE — PLAN OF CARE
Problem: Occupational Therapy Goal  Goal: Occupational Therapy Goal  Goals to be met by: 11/17/2017    Patient will increase functional independence with ADLs by performing:    Feeding with Set-up Assistance.  UE Dressing with Minimal Assistance.  LE Dressing with Minimal Assistance.  Grooming while seated EOB with Stand-by Assistance.  Toileting from bedside commode with Moderate Assistance for hygiene and clothing management.   Sitting at edge of bed x20 minutes with Stand-by Assistance.  Supine to sit with Minimal Assistance.  Stand pivot transfers with Minimal Assistance x 1 person.     Outcome: Ongoing (interventions implemented as appropriate)  Continue with POC.   Delia gutierrez OT  11/10/2017

## 2017-11-10 NOTE — PROGRESS NOTES
[facility]  General Surgery  Progress Note    Subjective:     History of Present Illness:  Rosetta Whyte is a 72 y.o. female with morbid obesity (s/p sleeve gastrectomy), HTN, and OA presents to [facility] ED with 5 days history of abdominal pain.  She states that the pain started Tuesday.  The pain was located on the right side of her abdomen.  The pain for the first day was tolerable, but for the last 4 days, she states the pain has been 10/10.  She has associated nausea and vomiting, fever/chills.  She has had no appetite and has not eaten a meal for 4 days.  She has never had anything like this happen before, even a mild episode.  She has maintained normal bowel function.  No pale stools, jaundice, itching, or dark urine.  Functionally, she can walk about 1 block before stopping, but she states that this is due to leg swelling and arthritis and not chest pain/shortness of breath.  She has lost 80 pounds since a sleeve gastrectomy 4 months ago.  She also has history of open appendectomy.    Post-Op Info:  Procedure(s) (LRB):  CHOLECYSTECTOMY (N/A)  INCOMPLETE-PROCEDURE/ATTEMPTED Laparscopic cholecystectomy (N/A)   4 Days Post-Op     Interval History: Minimal UOP overnight again.  Start on IVF.  Otherwise pt continues to feel improved    Medications:  Continuous Infusions:   sodium chloride 0.9%       Scheduled Meds:   albuterol-ipratropium 2.5mg-0.5mg/3mL  3 mL Nebulization Q4H    carvedilol  25 mg Oral BID    cloNIDine 0.3 mg/24 hr td ptwk  1 patch Transdermal Weekly    heparin (porcine)  5,000 Units Subcutaneous Q8H    pantoprazole  40 mg Oral Before breakfast     PRN Meds:diphenhydrAMINE, labetalol, ondansetron, oxyCODONE-acetaminophen, oxyCODONE-acetaminophen, promethazine (PHENERGAN) IVPB     Review of patient's allergies indicates:  No Known Allergies  Objective:     Vital Signs (Most Recent):  Temp: 98.4 °F (36.9 °C) (11/10/17 0747)  Pulse: 71 (11/10/17 0747)  Resp: 16 (11/10/17  0747)  BP: 137/64 (11/10/17 0747)  SpO2: 98 % (11/10/17 0747) Vital Signs (24h Range):  Temp:  [96.5 °F (35.8 °C)-98.6 °F (37 °C)] 98.4 °F (36.9 °C)  Pulse:  [56-77] 71  Resp:  [16-20] 16  SpO2:  [92 %-98 %] 98 %  BP: (101-160)/(53-84) 137/64     Weight: 127 kg (280 lb)  Body mass index is 46.59 kg/m².    Intake/Output - Last 3 Shifts       11/08 0700 - 11/09 0659 11/09 0700 - 11/10 0659 11/10 0700 - 11/11 0659    P.O. 380 1080     I.V. (mL/kg)  980 (7.7)     IV Piggyback 500 2000     Total Intake(mL/kg) 880 (6.9) 4060 (32)     Urine (mL/kg/hr) 250 (0.1) 150 (0) 100 (0.4)    Emesis/NG output 0 (0)      Drains 10 (0) 0 (0)     Other 0 (0) 0 (0)     Stool 0 (0)      Blood 0 (0)      Total Output 260 150 100    Net +620 +3910 -100           Stool Occurrence 0 x      Emesis Occurrence 0 x            Physical Exam   Gen: NAD  CV: RRR  Pulm: unlabored  GI: soft, NT, ND.  Staple lines intact    Significant Labs:  CBC:   Recent Labs  Lab 11/10/17  0417   WBC 9.00   RBC 4.07   HGB 11.7*   HCT 37.1      MCV 91   MCH 28.7   MCHC 31.5*     CMP:   Recent Labs  Lab 11/10/17  0417   *   CALCIUM 8.9   ALBUMIN 2.2*   PROT 5.8*      K 4.0   CO2 25      BUN 54*   CREATININE 2.4*   ALKPHOS 80   ALT 10   AST 15   BILITOT 0.5         Assessment/Plan:     * Acute cholecystitis    S/P lap converted to open edison 11/6/17  -continue regular diet   -pain/nausea meds PRN  -IS/aggressive pulm toilet  -PT/OT  -Ambulate TID, OOBTC  -DVT ppx  -Pt has ongoing KELSEY, trending Cr.  Start IVF and bolused overnight        Hypertension    - Improved on home regimen   - IV hydralazine and labetalol PRN            Carlos Lopez MD  General Surgery  [facility]

## 2017-11-10 NOTE — ASSESSMENT & PLAN NOTE
S/P lap converted to open edison 11/6/17  -continue regular diet   -pain/nausea meds PRN  -IS/aggressive pulm toilet  -PT/OT  -Ambulate TID, OOBTC  -DVT ppx  -Pt has ongoing KELSEY, trending Cr.  Start IVF and bolused overnight

## 2017-11-10 NOTE — PLAN OF CARE
Problem: Patient Care Overview  Goal: Plan of Care Review  Outcome: Ongoing (interventions implemented as appropriate)  POC reviewed with patient who verbalized understanding. AAOX4. Pt O2 sats stable on 4L Nc. XIOMY intact and patent. Left hand Iv intact and patent. External cath intact and patent. Pt has voided on my shift. MD notified and verbalized to give Pt 2 more hours recheck with bladder scanner. Pt then voided 150cc. Pt on tele running NSR/ Rolando. Pt has TB skin test on Right FA to be read on 11/11 by 1400. Pt tolerating diet well. Pt worked well with therapy today. Pain being controlled with PRN pain medication. Pt remained free from falls throughout the shift. VSS. Will continue to monitor.

## 2017-11-10 NOTE — PT/OT/SLP PROGRESS
Occupational Therapy  Treatment    Rosetta Whyte   MRN: [id number]   Admitting Diagnosis: Acute cholecystitis    OT Date of Treatment: 11/10/17   OT Start Time: 0945  OT Stop Time: 1032  OT Total Time (min): 47 min    Billable Minutes:  Self Care/Home Management 8, Therapeutic Activity 15 and Therapeutic Exercise 24    General Precautions: Standard, fall  Orthopedic Precautions: N/A  Braces: N/A    Do you have any cultural, spiritual, Hinduism conflicts, given your current situation?: none    Subjective:  Communicated with RN prior to session.  Pt agreeable to participate with therapy this date.   Pain/Comfort  Pain Rating 1: 8/10  Location - Side 1: Right  Location 1: leg  Pain Addressed 1: Pre-medicate for activity, Reposition, Nurse notified  Pain Rating Post-Intervention 1:  (Did not rate)    Objective:  Patient found with: peripheral IV, XIOMY drain (pure wick)     Functional Mobility:  Bed Mobility:  (Draw sheet pt from bed <> medi chair)    Transfers:   Sit <> Stand Assistance: Activity did not occur (refused 2/2 pain in R leg)    Activities of Daily Living:  Grooming Position: Seated, bedside chair (to wash face)  Grooming Level of Assistance: Stand by assistance      Therapeutic Activities and Exercises:  -Pt edu on OT role/POC  -Importance of OOB activity with staff assistance  -Safety during functional t/f and mobility   -Communication board updated  - Family edu on environmental modifications -- open blinds during the day to improve sleep schedule;; edu on benefits of continuing to re-orient patient  - Edu pt/family on AROM BUE exercises to complete 3x daily -- needs reinforcement     Pt tolerated 1x10 reps A/AAROM BUE : Shoulder flexion, elbow flexion, chest press, finger flexion/extension, scap squeezes, and shoulder shruggs. She tolerated well requiring min rest break between each exercise.     ** Pt tolerated sitting UIC ~2 hours however required t/f back to bed 2/2 placing lopes catheter    AM-PAC 6 CLICK  "ADL   How much help from another person does this patient currently need?   1 = Unable, Total/Dependent Assistance  2 = A lot, Maximum/Moderate Assistance  3 = A little, Minimum/Contact Guard/Supervision  4 = None, Modified [place]/Independent    Putting on and taking off regular lower body clothing? : 1  Bathing (including washing, rinsing, drying)?: 1  Toileting, which includes using toilet, bedpan, or urinal? : 1  Putting on and taking off regular upper body clothing?: 2  Taking care of personal grooming such as brushing teeth?: 3  Eating meals?: 3  Total Score: 11     AM-PAC Raw Score CMS "G-Code Modifier Level of Impairment Assistance   6 % Total / Unable   7 - 8 CM 80 - 100% Maximal Assist   9-13 CL 60 - 80% Moderate Assist   14 - 19 CK 40 - 60% Moderate Assist   20 - 22 CJ 20 - 40% Minimal Assist   23 CI 1-20% SBA / CGA   24 CH 0% Independent/ Mod I       Patient left up in chair with all lines intact, call button in reach and RN notified   ** Returned at 12:00 to assist pt back to bed with TAx4 draw sheet transfer     ASSESSMENT:  Rosetta Whyte is a 72 y.o. female with a medical diagnosis of Acute cholecystitis and presents with pain in RUE. Pt would benefit from continued skilled OT to address deficits listed below and maximize return to PLOF. OT recommending SNF following d/c to increase overall independence and safety with ADL.     Rehab identified problem list/impairments: Rehab identified problem list/impairments: weakness, impaired endurance, impaired functional mobilty, impaired cognition, impaired balance, impaired self care skills, pain, decreased ROM, impaired joint extensibility, decreased lower extremity function    Rehab potential is good.    Activity tolerance: Good    Discharge recommendations: Discharge Facility/Level Of Care Needs: nursing facility, skilled     Barriers to discharge: Barriers to Discharge:  (Increased level of Assistance)    Equipment recommendations:  (TBD) "     GOALS:    Occupational Therapy Goals        Problem: Occupational Therapy Goal    Goal Priority Disciplines Outcome Interventions   Occupational Therapy Goal     OT, PT/OT Ongoing (interventions implemented as appropriate)    Description:  Goals to be met by: 11/17/2017    Patient will increase functional independence with ADLs by performing:    Feeding with Set-up Assistance.  UE Dressing with Minimal Assistance.  LE Dressing with Minimal Assistance.  Grooming while seated EOB with Stand-by Assistance.  Toileting from bedside commode with Moderate Assistance for hygiene and clothing management.   Sitting at edge of bed x20 minutes with Stand-by Assistance.  Supine to sit with Minimal Assistance.  Stand pivot transfers with Minimal Assistance x 1 person.                      Plan:  Patient to be seen 5 x/week to address the above listed problems via self-care/home management, therapeutic activities, therapeutic exercises, neuromuscular re-education  Plan of Care expires: 12/07/17  Plan of Care reviewed with: patient, family         Delia gutierrez OT  11/10/2017

## 2017-11-10 NOTE — PLAN OF CARE
Problem: Patient Care Overview  Goal: Plan of Care Review  POC reviewed with patient who verbalized understanding. AAOX4. Pt O2 sats stable on 4L Nc. XIOMY intact and patent. Left hand Iv intact and patent. External cath intact and patent. Pt did not void on my shift. Pt on tele running NSR/ Rolando. Pt has TB skin test on Right FA to be read on 11/11 by 1400. Pt tolerating diet well. Pt denied pain this shift. Pt remained free from falls throughout the shift. VSS. Will continue to monitor.

## 2017-11-11 LAB
ALBUMIN SERPL BCP-MCNC: 2.3 G/DL
ALP SERPL-CCNC: 87 U/L
ALT SERPL W/O P-5'-P-CCNC: 10 U/L
ANION GAP SERPL CALC-SCNC: 6 MMOL/L
AST SERPL-CCNC: 21 U/L
BACTERIA #/AREA URNS AUTO: ABNORMAL /HPF
BACTERIA UR CULT: NORMAL
BASOPHILS # BLD AUTO: 0.06 K/UL
BASOPHILS NFR BLD: 0.7 %
BILIRUB SERPL-MCNC: 0.5 MG/DL
BILIRUB UR QL STRIP: NEGATIVE
BUN SERPL-MCNC: 53 MG/DL
CALCIUM SERPL-MCNC: 9.2 MG/DL
CHLORIDE SERPL-SCNC: 109 MMOL/L
CLARITY UR REFRACT.AUTO: ABNORMAL
CO2 SERPL-SCNC: 26 MMOL/L
COLOR UR AUTO: YELLOW
CREAT SERPL-MCNC: 1.8 MG/DL
DIFFERENTIAL METHOD: ABNORMAL
EOSINOPHIL # BLD AUTO: 0.3 K/UL
EOSINOPHIL NFR BLD: 4.2 %
ERYTHROCYTE [DISTWIDTH] IN BLOOD BY AUTOMATED COUNT: 14.3 %
EST. GFR  (AFRICAN AMERICAN): 32.2 ML/MIN/1.73 M^2
EST. GFR  (NON AFRICAN AMERICAN): 27.9 ML/MIN/1.73 M^2
GLUCOSE SERPL-MCNC: 127 MG/DL
GLUCOSE UR QL STRIP: NEGATIVE
HCT VFR BLD AUTO: 37.3 %
HGB BLD-MCNC: 11.8 G/DL
HGB UR QL STRIP: ABNORMAL
IMM GRANULOCYTES # BLD AUTO: 0.28 K/UL
IMM GRANULOCYTES NFR BLD AUTO: 3.5 %
KETONES UR QL STRIP: NEGATIVE
LEUKOCYTE ESTERASE UR QL STRIP: ABNORMAL
LYMPHOCYTES # BLD AUTO: 1.1 K/UL
LYMPHOCYTES NFR BLD: 13.6 %
MAGNESIUM SERPL-MCNC: 2 MG/DL
MCH RBC QN AUTO: 28.9 PG
MCHC RBC AUTO-ENTMCNC: 31.6 G/DL
MCV RBC AUTO: 91 FL
MICROSCOPIC COMMENT: ABNORMAL
MONOCYTES # BLD AUTO: 1.1 K/UL
MONOCYTES NFR BLD: 14 %
NEUTROPHILS # BLD AUTO: 5.1 K/UL
NEUTROPHILS NFR BLD: 64 %
NITRITE UR QL STRIP: NEGATIVE
NRBC BLD-RTO: 0 /100 WBC
PH UR STRIP: 5 [PH] (ref 5–8)
PHOSPHATE SERPL-MCNC: 3.6 MG/DL
PLATELET # BLD AUTO: 251 K/UL
PMV BLD AUTO: 9.9 FL
POTASSIUM SERPL-SCNC: 4.1 MMOL/L
PROT SERPL-MCNC: 6 G/DL
PROT UR QL STRIP: NEGATIVE
RBC # BLD AUTO: 4.08 M/UL
RBC #/AREA URNS AUTO: 14 /HPF (ref 0–4)
SODIUM SERPL-SCNC: 141 MMOL/L
SP GR UR STRIP: 1.01 (ref 1–1.03)
SQUAMOUS #/AREA URNS AUTO: 1 /HPF
TB INDURATION 48 - 72 HR READ: 0 MM
URN SPEC COLLECT METH UR: ABNORMAL
UROBILINOGEN UR STRIP-ACNC: NEGATIVE EU/DL
WBC # BLD AUTO: 8.01 K/UL
WBC #/AREA URNS AUTO: 9 /HPF (ref 0–5)
YEAST UR QL AUTO: ABNORMAL

## 2017-11-11 PROCEDURE — 81001 URINALYSIS AUTO W/SCOPE: CPT

## 2017-11-11 PROCEDURE — 94640 AIRWAY INHALATION TREATMENT: CPT

## 2017-11-11 PROCEDURE — 25000003 PHARM REV CODE 250: Performed by: SURGERY

## 2017-11-11 PROCEDURE — 94799 UNLISTED PULMONARY SVC/PX: CPT

## 2017-11-11 PROCEDURE — 36415 COLL VENOUS BLD VENIPUNCTURE: CPT

## 2017-11-11 PROCEDURE — 27000221 HC OXYGEN, UP TO 24 HOURS

## 2017-11-11 PROCEDURE — 85025 COMPLETE CBC W/AUTO DIFF WBC: CPT

## 2017-11-11 PROCEDURE — 25000242 PHARM REV CODE 250 ALT 637 W/ HCPCS: Performed by: SURGERY

## 2017-11-11 PROCEDURE — 83735 ASSAY OF MAGNESIUM: CPT

## 2017-11-11 PROCEDURE — 25000003 PHARM REV CODE 250: Performed by: STUDENT IN AN ORGANIZED HEALTH CARE EDUCATION/TRAINING PROGRAM

## 2017-11-11 PROCEDURE — 94761 N-INVAS EAR/PLS OXIMETRY MLT: CPT

## 2017-11-11 PROCEDURE — 99900035 HC TECH TIME PER 15 MIN (STAT)

## 2017-11-11 PROCEDURE — 94664 DEMO&/EVAL PT USE INHALER: CPT

## 2017-11-11 PROCEDURE — 63600175 PHARM REV CODE 636 W HCPCS: Performed by: SURGERY

## 2017-11-11 PROCEDURE — 84100 ASSAY OF PHOSPHORUS: CPT

## 2017-11-11 PROCEDURE — 80053 COMPREHEN METABOLIC PANEL: CPT

## 2017-11-11 PROCEDURE — 20600001 HC STEP DOWN PRIVATE ROOM

## 2017-11-11 RX ORDER — BISACODYL 10 MG
10 SUPPOSITORY, RECTAL RECTAL ONCE
Status: COMPLETED | OUTPATIENT
Start: 2017-11-11 | End: 2017-11-11

## 2017-11-11 RX ORDER — BUMETANIDE 1 MG/1
2 TABLET ORAL 2 TIMES DAILY
Status: DISCONTINUED | OUTPATIENT
Start: 2017-11-12 | End: 2017-11-14

## 2017-11-11 RX ORDER — POTASSIUM CHLORIDE 20 MEQ/1
20 TABLET, EXTENDED RELEASE ORAL 2 TIMES DAILY
Status: DISCONTINUED | OUTPATIENT
Start: 2017-11-12 | End: 2017-11-13

## 2017-11-11 RX ORDER — BISACODYL 10 MG
10 SUPPOSITORY, RECTAL RECTAL DAILY PRN
Status: DISCONTINUED | OUTPATIENT
Start: 2017-11-11 | End: 2017-11-17 | Stop reason: HOSPADM

## 2017-11-11 RX ORDER — POLYETHYLENE GLYCOL 3350 17 G/17G
17 POWDER, FOR SOLUTION ORAL DAILY
Status: DISCONTINUED | OUTPATIENT
Start: 2017-11-11 | End: 2017-11-17

## 2017-11-11 RX ORDER — DOCUSATE SODIUM 100 MG/1
100 CAPSULE, LIQUID FILLED ORAL 2 TIMES DAILY
Status: DISCONTINUED | OUTPATIENT
Start: 2017-11-11 | End: 2017-11-17 | Stop reason: HOSPADM

## 2017-11-11 RX ADMIN — PANTOPRAZOLE SODIUM 40 MG: 40 TABLET, DELAYED RELEASE ORAL at 06:11

## 2017-11-11 RX ADMIN — OXYCODONE AND ACETAMINOPHEN 1 TABLET: 10; 325 TABLET ORAL at 07:11

## 2017-11-11 RX ADMIN — ACETYLCYSTEINE 4 ML: 200 INHALANT RESPIRATORY (INHALATION) at 07:11

## 2017-11-11 RX ADMIN — ACETYLCYSTEINE 4 ML: 200 INHALANT RESPIRATORY (INHALATION) at 03:11

## 2017-11-11 RX ADMIN — CARVEDILOL 12.5 MG: 12.5 TABLET, FILM COATED ORAL at 10:11

## 2017-11-11 RX ADMIN — CARVEDILOL 12.5 MG: 12.5 TABLET, FILM COATED ORAL at 09:11

## 2017-11-11 RX ADMIN — HEPARIN SODIUM 5000 UNITS: 5000 INJECTION, SOLUTION INTRAVENOUS; SUBCUTANEOUS at 06:11

## 2017-11-11 RX ADMIN — POLYETHYLENE GLYCOL 3350 17 G: 17 POWDER, FOR SOLUTION ORAL at 01:11

## 2017-11-11 RX ADMIN — ACETYLCYSTEINE 4 ML: 200 INHALANT RESPIRATORY (INHALATION) at 12:11

## 2017-11-11 RX ADMIN — HEPARIN SODIUM 5000 UNITS: 5000 INJECTION, SOLUTION INTRAVENOUS; SUBCUTANEOUS at 09:11

## 2017-11-11 RX ADMIN — IPRATROPIUM BROMIDE AND ALBUTEROL SULFATE 3 ML: .5; 3 SOLUTION RESPIRATORY (INHALATION) at 12:11

## 2017-11-11 RX ADMIN — IPRATROPIUM BROMIDE AND ALBUTEROL SULFATE 3 ML: .5; 3 SOLUTION RESPIRATORY (INHALATION) at 04:11

## 2017-11-11 RX ADMIN — SODIUM CHLORIDE: 0.9 INJECTION, SOLUTION INTRAVENOUS at 09:11

## 2017-11-11 RX ADMIN — IPRATROPIUM BROMIDE AND ALBUTEROL SULFATE 3 ML: .5; 3 SOLUTION RESPIRATORY (INHALATION) at 08:11

## 2017-11-11 RX ADMIN — IPRATROPIUM BROMIDE AND ALBUTEROL SULFATE 3 ML: .5; 3 SOLUTION RESPIRATORY (INHALATION) at 03:11

## 2017-11-11 RX ADMIN — ACETYLCYSTEINE 4 ML: 200 INHALANT RESPIRATORY (INHALATION) at 11:11

## 2017-11-11 RX ADMIN — IPRATROPIUM BROMIDE AND ALBUTEROL SULFATE 3 ML: .5; 3 SOLUTION RESPIRATORY (INHALATION) at 11:11

## 2017-11-11 RX ADMIN — OXYCODONE HYDROCHLORIDE AND ACETAMINOPHEN 1 TABLET: 5; 325 TABLET ORAL at 01:11

## 2017-11-11 RX ADMIN — BISACODYL 10 MG: 10 SUPPOSITORY RECTAL at 01:11

## 2017-11-11 RX ADMIN — OXYCODONE HYDROCHLORIDE AND ACETAMINOPHEN 1 TABLET: 5; 325 TABLET ORAL at 06:11

## 2017-11-11 RX ADMIN — DOCUSATE SODIUM 100 MG: 100 CAPSULE, LIQUID FILLED ORAL at 01:11

## 2017-11-11 RX ADMIN — HEPARIN SODIUM 5000 UNITS: 5000 INJECTION, SOLUTION INTRAVENOUS; SUBCUTANEOUS at 01:11

## 2017-11-11 RX ADMIN — IPRATROPIUM BROMIDE AND ALBUTEROL SULFATE 3 ML: .5; 3 SOLUTION RESPIRATORY (INHALATION) at 07:11

## 2017-11-11 NOTE — PLAN OF CARE
Problem: Patient Care Overview  Goal: Plan of Care Review  Outcome: Ongoing (interventions implemented as appropriate)  Plan of care reviewed, patient verbalizes understanding. AAOx4. VSS at this time. Pain managed with PRN pain meds throughout shift. Patient on regular diet, denies N/V throughout shift. Urban intact and patent. Remains free of injuries/falls. No acute distress at this time. Will continue to monitor.

## 2017-11-11 NOTE — PROGRESS NOTES
[facility]  General Surgery  Progress Note    Subjective:     History of Present Illness:  Rosetta Whyte is a 72 y.o. female with morbid obesity (s/p sleeve gastrectomy), HTN, and OA presents to [facility] ED with 5 days history of abdominal pain.  She states that the pain started Tuesday.  The pain was located on the right side of her abdomen.  The pain for the first day was tolerable, but for the last 4 days, she states the pain has been 10/10.  She has associated nausea and vomiting, fever/chills.  She has had no appetite and has not eaten a meal for 4 days.  She has never had anything like this happen before, even a mild episode.  She has maintained normal bowel function.  No pale stools, jaundice, itching, or dark urine.  Functionally, she can walk about 1 block before stopping, but she states that this is due to leg swelling and arthritis and not chest pain/shortness of breath.  She has lost 80 pounds since a sleeve gastrectomy 4 months ago.  She also has history of open appendectomy.    Post-Op Info:  Procedure(s) (LRB):  CHOLECYSTECTOMY (N/A)  INCOMPLETE-PROCEDURE/ATTEMPTED Laparscopic cholecystectomy (N/A)   5 Days Post-Op     Interval History: No issues overnight. UOP is much improved. Pain controlled. Tolerating regular diet. Having flatus    Medications:  Continuous Infusions:   sodium chloride 0.9% 80 mL/hr at 11/10/17 0730     Scheduled Meds:   acetylcysteine 200 mg/ml (20%)  4 mL Nebulization Q8H    albuterol-ipratropium 2.5mg-0.5mg/3mL  3 mL Nebulization Q4H    carvedilol  12.5 mg Oral BID    heparin (porcine)  5,000 Units Subcutaneous Q8H    pantoprazole  40 mg Oral Before breakfast     PRN Meds:diphenhydrAMINE, labetalol, ondansetron, oxyCODONE-acetaminophen, oxyCODONE-acetaminophen, promethazine (PHENERGAN) IVPB     Review of patient's allergies indicates:  No Known Allergies  Objective:     Vital Signs (Most Recent):  Temp: 98 °F (36.7 °C) (11/11/17 0411)  Pulse: 72 (11/11/17  0411)  Resp: 20 (11/11/17 0411)  BP: (!) 167/77 (11/11/17 0411)  SpO2: (!) 94 % (11/11/17 0411) Vital Signs (24h Range):  Temp:  [97 °F (36.1 °C)-98.4 °F (36.9 °C)] 98 °F (36.7 °C)  Pulse:  [54-72] 72  Resp:  [16-20] 20  SpO2:  [93 %-99 %] 94 %  BP: (106-167)/(56-77) 167/77     Weight: 127 kg (280 lb)  Body mass index is 46.59 kg/m².    Intake/Output - Last 3 Shifts       11/09 0700 - 11/10 0659 11/10 0700 - 11/11 0659 11/11 0700 - 11/12 0659    P.O. 1080 720     I.V. (mL/kg) 980 (7.7) 1640 (12.9)     IV Piggyback 2000      Total Intake(mL/kg) 4060 (32) 2360 (18.6)     Urine (mL/kg/hr) 150 (0) 1000 (0.3)     Emesis/NG output       Drains 0 (0) 30 (0)     Other 0 (0) 0 (0)     Stool       Blood       Total Output 150 1030      Net +3910 +1330                   Physical Exam   Constitutional: She is oriented to person, place, and time. She appears well-developed and well-nourished. No distress.   HENT:   Head: Normocephalic and atraumatic.   Eyes: No scleral icterus.   Neck: Neck supple.   Cardiovascular: Normal rate and regular rhythm.    Pulmonary/Chest: Effort normal. No respiratory distress.   Abdominal:   Soft, appropriate TTP, non-distended  Incision - clean, dry and intact  XIOMY with minimal serosang output   Neurological: She is alert and oriented to person, place, and time.   Skin: Skin is warm and dry.   Psychiatric: She has a normal mood and affect.       Significant Labs:  CBC:   Recent Labs  Lab 11/11/17 0440   WBC 8.01   RBC 4.08   HGB 11.8*   HCT 37.3      MCV 91   MCH 28.9   MCHC 31.6*     BMP:   Recent Labs  Lab 11/11/17 0440   *      K 4.1      CO2 26   BUN 53*   CREATININE 1.8*   CALCIUM 9.2   MG 2.0     LFTs:   Recent Labs  Lab 11/11/17  0440   ALT 10   AST 21   ALKPHOS 87   BILITOT 0.5   PROT 6.0   ALBUMIN 2.3*       Significant Diagnostics:  I have reviewed all pertinent imaging results/findings within the past 24 hours.    Assessment/Plan:     * Acute cholecystitis     S/P lap converted to open edison 11/6/17    - KELSEY improved. Cont IVF  -continue regular diet   -Dulcolax supp today  -pain/nausea meds PRN  -IS/aggressive pulm toilet  -PT/OT  -Ambulate TID, OOBTC  -DVT ppx  -D/C XIOMY drain           Hypertension    - Improved on home regimen   - IV hydralazine and labetalol PRN            Terrence Ozuna MD  General Surgery  [facility]

## 2017-11-11 NOTE — ASSESSMENT & PLAN NOTE
S/P lap converted to open edison 11/6/17    - KELSEY improved. Cont IVF  -continue regular diet   -Dulcolax supp today  -pain/nausea meds PRN  -IS/aggressive pulm toilet  -PT/OT  -Ambulate TID, OOBTC  -DVT ppx

## 2017-11-11 NOTE — SUBJECTIVE & OBJECTIVE
Interval History: No issues overnight. UOP is much improved. Pain controlled. Tolerating regular diet. Having flatus    Medications:  Continuous Infusions:   sodium chloride 0.9% 80 mL/hr at 11/10/17 0730     Scheduled Meds:   acetylcysteine 200 mg/ml (20%)  4 mL Nebulization Q8H    albuterol-ipratropium 2.5mg-0.5mg/3mL  3 mL Nebulization Q4H    carvedilol  12.5 mg Oral BID    heparin (porcine)  5,000 Units Subcutaneous Q8H    pantoprazole  40 mg Oral Before breakfast     PRN Meds:diphenhydrAMINE, labetalol, ondansetron, oxyCODONE-acetaminophen, oxyCODONE-acetaminophen, promethazine (PHENERGAN) IVPB     Review of patient's allergies indicates:  No Known Allergies  Objective:     Vital Signs (Most Recent):  Temp: 98 °F (36.7 °C) (11/11/17 0411)  Pulse: 72 (11/11/17 0411)  Resp: 20 (11/11/17 0411)  BP: (!) 167/77 (11/11/17 0411)  SpO2: (!) 94 % (11/11/17 0411) Vital Signs (24h Range):  Temp:  [97 °F (36.1 °C)-98.4 °F (36.9 °C)] 98 °F (36.7 °C)  Pulse:  [54-72] 72  Resp:  [16-20] 20  SpO2:  [93 %-99 %] 94 %  BP: (106-167)/(56-77) 167/77     Weight: 127 kg (280 lb)  Body mass index is 46.59 kg/m².    Intake/Output - Last 3 Shifts       11/09 0700 - 11/10 0659 11/10 0700 - 11/11 0659 11/11 0700 - 11/12 0659    P.O. 1080 720     I.V. (mL/kg) 980 (7.7) 1640 (12.9)     IV Piggyback 2000      Total Intake(mL/kg) 4060 (32) 2360 (18.6)     Urine (mL/kg/hr) 150 (0) 1000 (0.3)     Emesis/NG output       Drains 0 (0) 30 (0)     Other 0 (0) 0 (0)     Stool       Blood       Total Output 150 1030      Net +3910 +1330                   Physical Exam   Constitutional: She is oriented to person, place, and time. She appears well-developed and well-nourished. No distress.   HENT:   Head: Normocephalic and atraumatic.   Eyes: No scleral icterus.   Neck: Neck supple.   Cardiovascular: Normal rate and regular rhythm.    Pulmonary/Chest: Effort normal. No respiratory distress.   Abdominal:   Soft, appropriate TTP,  non-distended  Incision - clean, dry and intact  XIOMY with minimal serosang output   Neurological: She is alert and oriented to person, place, and time.   Skin: Skin is warm and dry.   Psychiatric: She has a normal mood and affect.       Significant Labs:  CBC:   Recent Labs  Lab 11/11/17 0440   WBC 8.01   RBC 4.08   HGB 11.8*   HCT 37.3      MCV 91   MCH 28.9   MCHC 31.6*     BMP:   Recent Labs  Lab 11/11/17 0440   *      K 4.1      CO2 26   BUN 53*   CREATININE 1.8*   CALCIUM 9.2   MG 2.0     LFTs:   Recent Labs  Lab 11/11/17 0440   ALT 10   AST 21   ALKPHOS 87   BILITOT 0.5   PROT 6.0   ALBUMIN 2.3*       Significant Diagnostics:  I have reviewed all pertinent imaging results/findings within the past 24 hours.

## 2017-11-11 NOTE — PLAN OF CARE
Problem: Patient Care Overview  Goal: Plan of Care Review  Outcome: Ongoing (interventions implemented as appropriate)  POC reviewed with patient who verbalized understanding. AAOX4. Pt O2 sats stable on 2L NC. XIOMY drain intact and patent with minimal drainage. Left hand Iv intact and patent. Lopes intact and patent. Pt voided 300cc since lopes placement. Pt received albumin and 2 NS bolus. TB test on Right FA to be read on 11/11 by 1400. Pt tolerating diet well. Pt worked well with therapy today and sat in chair. Pain being controlled with PRN pain medication. Pt remained free from falls throughout the shift. VSS. Will continue to monitor.

## 2017-11-12 LAB
ALBUMIN SERPL BCP-MCNC: 2.1 G/DL
ALP SERPL-CCNC: 71 U/L
ALT SERPL W/O P-5'-P-CCNC: 13 U/L
ANION GAP SERPL CALC-SCNC: 4 MMOL/L
AST SERPL-CCNC: 22 U/L
BASOPHILS # BLD AUTO: 0.07 K/UL
BASOPHILS NFR BLD: 0.8 %
BILIRUB SERPL-MCNC: 0.5 MG/DL
BUN SERPL-MCNC: 44 MG/DL
CALCIUM SERPL-MCNC: 9.1 MG/DL
CHLORIDE SERPL-SCNC: 111 MMOL/L
CO2 SERPL-SCNC: 25 MMOL/L
CREAT SERPL-MCNC: 1 MG/DL
DIFFERENTIAL METHOD: ABNORMAL
EOSINOPHIL # BLD AUTO: 0.3 K/UL
EOSINOPHIL NFR BLD: 3.9 %
ERYTHROCYTE [DISTWIDTH] IN BLOOD BY AUTOMATED COUNT: 14 %
EST. GFR  (AFRICAN AMERICAN): >60 ML/MIN/1.73 M^2
EST. GFR  (NON AFRICAN AMERICAN): 56.8 ML/MIN/1.73 M^2
GLUCOSE SERPL-MCNC: 103 MG/DL
HCT VFR BLD AUTO: 36.7 %
HGB BLD-MCNC: 11.9 G/DL
IMM GRANULOCYTES # BLD AUTO: 0.32 K/UL
IMM GRANULOCYTES NFR BLD AUTO: 3.8 %
LYMPHOCYTES # BLD AUTO: 1.4 K/UL
LYMPHOCYTES NFR BLD: 16.3 %
MAGNESIUM SERPL-MCNC: 1.6 MG/DL
MCH RBC QN AUTO: 29.6 PG
MCHC RBC AUTO-ENTMCNC: 32.4 G/DL
MCV RBC AUTO: 91 FL
MONOCYTES # BLD AUTO: 1.2 K/UL
MONOCYTES NFR BLD: 14.1 %
NEUTROPHILS # BLD AUTO: 5.2 K/UL
NEUTROPHILS NFR BLD: 61.1 %
NRBC BLD-RTO: 0 /100 WBC
PHOSPHATE SERPL-MCNC: 2.6 MG/DL
PLATELET # BLD AUTO: 256 K/UL
PMV BLD AUTO: 10.3 FL
POTASSIUM SERPL-SCNC: 4.2 MMOL/L
PROT SERPL-MCNC: 5.4 G/DL
RBC # BLD AUTO: 4.02 M/UL
SODIUM SERPL-SCNC: 140 MMOL/L
WBC # BLD AUTO: 8.45 K/UL

## 2017-11-12 PROCEDURE — 20600001 HC STEP DOWN PRIVATE ROOM

## 2017-11-12 PROCEDURE — 25000003 PHARM REV CODE 250: Performed by: SURGERY

## 2017-11-12 PROCEDURE — 94761 N-INVAS EAR/PLS OXIMETRY MLT: CPT

## 2017-11-12 PROCEDURE — 27000221 HC OXYGEN, UP TO 24 HOURS

## 2017-11-12 PROCEDURE — 97116 GAIT TRAINING THERAPY: CPT

## 2017-11-12 PROCEDURE — 25000003 PHARM REV CODE 250: Performed by: STUDENT IN AN ORGANIZED HEALTH CARE EDUCATION/TRAINING PROGRAM

## 2017-11-12 PROCEDURE — 97530 THERAPEUTIC ACTIVITIES: CPT

## 2017-11-12 PROCEDURE — 36415 COLL VENOUS BLD VENIPUNCTURE: CPT

## 2017-11-12 PROCEDURE — 99900035 HC TECH TIME PER 15 MIN (STAT)

## 2017-11-12 PROCEDURE — 97110 THERAPEUTIC EXERCISES: CPT

## 2017-11-12 PROCEDURE — 25000242 PHARM REV CODE 250 ALT 637 W/ HCPCS: Performed by: SURGERY

## 2017-11-12 PROCEDURE — 84100 ASSAY OF PHOSPHORUS: CPT

## 2017-11-12 PROCEDURE — 94664 DEMO&/EVAL PT USE INHALER: CPT

## 2017-11-12 PROCEDURE — 83735 ASSAY OF MAGNESIUM: CPT

## 2017-11-12 PROCEDURE — 63600175 PHARM REV CODE 636 W HCPCS: Performed by: SURGERY

## 2017-11-12 PROCEDURE — 97535 SELF CARE MNGMENT TRAINING: CPT

## 2017-11-12 PROCEDURE — 94640 AIRWAY INHALATION TREATMENT: CPT

## 2017-11-12 PROCEDURE — 85025 COMPLETE CBC W/AUTO DIFF WBC: CPT

## 2017-11-12 PROCEDURE — 80053 COMPREHEN METABOLIC PANEL: CPT

## 2017-11-12 RX ADMIN — SODIUM CHLORIDE: 0.9 INJECTION, SOLUTION INTRAVENOUS at 04:11

## 2017-11-12 RX ADMIN — HEPARIN SODIUM 5000 UNITS: 5000 INJECTION, SOLUTION INTRAVENOUS; SUBCUTANEOUS at 01:11

## 2017-11-12 RX ADMIN — BUMETANIDE 2 MG: 1 TABLET ORAL at 06:11

## 2017-11-12 RX ADMIN — POTASSIUM CHLORIDE 20 MEQ: 1500 TABLET, EXTENDED RELEASE ORAL at 06:11

## 2017-11-12 RX ADMIN — POTASSIUM CHLORIDE 20 MEQ: 1500 TABLET, EXTENDED RELEASE ORAL at 09:11

## 2017-11-12 RX ADMIN — PANTOPRAZOLE SODIUM 40 MG: 40 TABLET, DELAYED RELEASE ORAL at 06:11

## 2017-11-12 RX ADMIN — OXYCODONE AND ACETAMINOPHEN 1 TABLET: 10; 325 TABLET ORAL at 04:11

## 2017-11-12 RX ADMIN — IPRATROPIUM BROMIDE AND ALBUTEROL SULFATE 3 ML: .5; 3 SOLUTION RESPIRATORY (INHALATION) at 03:11

## 2017-11-12 RX ADMIN — IPRATROPIUM BROMIDE AND ALBUTEROL SULFATE 3 ML: .5; 3 SOLUTION RESPIRATORY (INHALATION) at 09:11

## 2017-11-12 RX ADMIN — OXYCODONE HYDROCHLORIDE AND ACETAMINOPHEN 1 TABLET: 5; 325 TABLET ORAL at 06:11

## 2017-11-12 RX ADMIN — ACETYLCYSTEINE 4 ML: 200 INHALANT RESPIRATORY (INHALATION) at 09:11

## 2017-11-12 RX ADMIN — CARVEDILOL 12.5 MG: 12.5 TABLET, FILM COATED ORAL at 07:11

## 2017-11-12 RX ADMIN — IPRATROPIUM BROMIDE AND ALBUTEROL SULFATE 3 ML: .5; 3 SOLUTION RESPIRATORY (INHALATION) at 08:11

## 2017-11-12 RX ADMIN — CARVEDILOL 12.5 MG: 12.5 TABLET, FILM COATED ORAL at 09:11

## 2017-11-12 RX ADMIN — OXYCODONE AND ACETAMINOPHEN 1 TABLET: 10; 325 TABLET ORAL at 12:11

## 2017-11-12 RX ADMIN — BUMETANIDE 2 MG: 1 TABLET ORAL at 09:11

## 2017-11-12 RX ADMIN — HEPARIN SODIUM 5000 UNITS: 5000 INJECTION, SOLUTION INTRAVENOUS; SUBCUTANEOUS at 06:11

## 2017-11-12 RX ADMIN — HEPARIN SODIUM 5000 UNITS: 5000 INJECTION, SOLUTION INTRAVENOUS; SUBCUTANEOUS at 09:11

## 2017-11-12 NOTE — PLAN OF CARE
Problem: Physical Therapy Goal  Goal: Physical Therapy Goal  Goals to be met by: 2017     Patient will increase functional independence with mobility by performin. Supine to sit with Moderate Assistance-not met  2. Sit to supine with Moderate Assistance-not met  3. Sit to stand transfer with Moderate Assistance-not met  4. Bed to chair transfer with Moderate Assistance using Rolling Walker  6. Gait  x 15 feet with Moderate Assistance using Rolling Walker. -not met  7. Lower extremity exercise program x15 reps per handout, with assistance as needed     Outcome: Ongoing (interventions implemented as appropriate)  Pt's goals remain appropriate and pt will continue to benefit from skilled PT services to work towards improved functional mobility including: bed mobility, transfers, and gait.   Vikki Benton, PT  2017

## 2017-11-12 NOTE — PLAN OF CARE
Problem: Patient Care Overview  Goal: Plan of Care Review  Outcome: Outcome(s) achieved Date Met: 11/12/17  Plan of care discussed with pt. Pt verbalizes understanding. Pt tolerating regular diet  with no complaints of nausea. Pain managed with PRN pain medications. Pt ambulated in velasquez once with PT/OT and sat up in chair. Tolerated well. Pt had 2 loose BM's this shift. VS stable. Pt remains free of falls and injury. No acute events this shift. Will continue to monitor.

## 2017-11-12 NOTE — PT/OT/SLP PROGRESS
Physical Therapy  Treatment    Rosetta Whyte   MRN: [id number]   Admitting Diagnosis: Acute cholecystitis    PT Received On: 11/12/17  PT Start Time: 0800     PT Stop Time: 0912 (time added due to PT returned to assist pt back to bed)    PT Total Time (min): 72 min       Billable Minutes:  Gait [id number] and Therapeutic Activity 42    Treatment Type: Treatment  PT/PTA: PT     PTA Visit Number: 1       General Precautions: Standard, fall  Orthopedic Precautions: N/A   Braces: N/A    Subjective:  Communicated with nurse prior to session.  Pt states she has to move slow because of pain  Pain/Comfort  Pain Rating 1: 9/10  Location - Side 1: Right  Location - Orientation 1: lower  Location 1: abdomen  Pain Addressed 1: Pre-medicate for activity, Reposition, Nurse notified  Pain Rating Post-Intervention 1: 9/10    Objective:   Patient found with: peripheral IV, lopes catheter    Functional Mobility:  Bed Mobility:   Supine to Sit: Total Assistance, With assist of 2  Sit to Supine: Total Assistance, With assist of  2, With leg lift (pt unable to lift or lower her LEs without assist)    Transfers:  Sit <> Stand Assistance: Maximum Assistance, Total Assistance (1 trial with moderate assist of 2 from bed; 2 trials with max assist of 1 with PT in front of pt; 4 trials from medichair (2 with RW in front and 2 with PT in front) with max assist of 1)  Sit <> Stand Assistive Device: Rolling Walker, No Assistive Device  Bed <> Chair Technique: Stand Pivot (bed to/from medichair)  Bed <> Chair Assistance: Maximum Assistance  Bed <> Chair Assistive Device: No Assistive Device (with PT in front of pt with HHA)    Gait:   Gait Distance: 4 steps x 2 trials to/from medichair with HHA with PT in front of pt; 5ft x 2 trials with RW in front with manual cues to advance her R foot during swing phase, flexed trunk, decreased step length, decreased clearance of R>L foot, and at slow pace  Assistance 1: Total assistance (max assist of 1 person  with 1 person to follow with medichair)  Gait Assistive Device: Rolling walker (bariatric)  Gait Deviation(s): decreased qamar, decreased step length, decreased weight-shifting ability, decreased toe-to-floor clearance, forward lean    Balance:   Static Sit: POOR+: Needs MINIMAL assist to maintain  Dynamic Sit: FAIR: Cannot move trunk without losing balance  Static Stand: POOR: Needs MODERATE assist to maintain    Therapeutic Activities and Exercises:  Pt sat on the EOB ~ 15 min with minimal assist between bed transfers. Pt transferred as above from bed x 2 trials to be cleaned due to BM noted and for linen to be changed. Pt stood ~ 1 1/2 min then 2 min with moderate assist    AM-PAC 6 CLICK MOBILITY  How much help from another person does this patient currently need?   1 = Unable, Total/Dependent Assistance  2 = A lot, Maximum/Moderate Assistance  3 = A little, Minimum/Contact Guard/Supervision  4 = None, Modified [place]/Independent    Turning over in bed (including adjusting bedclothes, sheets and blankets)?: 2  Sitting down on and standing up from a chair with arms (e.g., wheelchair, bedside commode, etc.): 2  Moving from lying on back to sitting on the side of the bed?: 2  Moving to and from a bed to a chair (including a wheelchair)?: 2  Need to walk in hospital room?: 2  Climbing 3-5 steps with a railing?: 1  Total Score: 11    AM-PAC Raw Score CMS G-Code Modifier Level of Impairment Assistance   6 % Total / Unable   7 - 9 CM 80 - 100% Maximal Assist   10 - 14 CL 60 - 80% Moderate Assist   15 - 19 CK 40 - 60% Moderate Assist   20 - 22 CJ 20 - 40% Minimal Assist   23 CI 1-20% SBA / CGA   24 CH 0% Independent/ Mod I     Patient left supine (pt remained up in medichair ~ 2 hours prior to return to bed) with all lines intact, call button in reach, nurse notified and family present.    Assessment:  Rosetta Whyte is a 72 y.o. female with a medical diagnosis of Acute cholecystitis and presents with  difficulty with functional mobility due to weakness, decreased ROM, instability, pain, and fatigue. Pt is motivated to progress with mobility.    Rehab identified problem list/impairments: Rehab identified problem list/impairments: weakness, impaired endurance, impaired functional mobilty, gait instability, impaired balance, pain, decreased lower extremity function, edema, decreased ROM, impaired joint extensibility    Rehab potential is good.    Activity tolerance: Good    Discharge recommendations: Discharge Facility/Level Of Care Needs: nursing facility, skilled     Barriers to discharge: Barriers to Discharge: Decreased caregiver support (pt requires significant assist for mobility upon D/C)    Equipment recommendations: Equipment Needed After Discharge: walker, rolling, wheelchair (bariatric)     GOALS:    Physical Therapy Goals        Problem: Physical Therapy Goal    Goal Priority Disciplines Outcome Goal Variances Interventions   Physical Therapy Goal     PT/OT, PT Ongoing (interventions implemented as appropriate)     Description:  Goals to be met by: 2017     Patient will increase functional independence with mobility by performin. Supine to sit with Moderate Assistance-not met  2. Sit to supine with Moderate Assistance-not met  3. Sit to stand transfer with Moderate Assistance-not met  4. Bed to chair transfer with Moderate Assistance using Rolling Walker  6. Gait  x 15 feet with Moderate Assistance using Rolling Walker. -not met  7. Lower extremity exercise program x15 reps per handout, with assistance as needed                   PLAN:    Patient to be seen 4 x/week  to address the above listed problems via gait training, therapeutic activities, therapeutic exercises, neuromuscular re-education  Plan of Care expires: 17  Plan of Care reviewed with: patient, caregiver    Vikki Benton, PT  2017

## 2017-11-12 NOTE — PROGRESS NOTES
[facility]  General Surgery  Progress Note    Subjective:     History of Present Illness:  Rosetta Whyte is a 72 y.o. female with morbid obesity (s/p sleeve gastrectomy), HTN, and OA presents to [facility] with 5 days history of abdominal pain.  She states that the pain started Tuesday.  The pain was located on the right side of her abdomen.  The pain for the first day was tolerable, but for the last 4 days, she states the pain has been 10/10.  She has associated nausea and vomiting, fever/chills.  She has had no appetite and has not eaten a meal for 4 days.  She has never had anything like this happen before, even a mild episode.  She has maintained normal bowel function.  No pale stools, jaundice, itching, or dark urine.  Functionally, she can walk about 1 block before stopping, but she states that this is due to leg swelling and arthritis and not chest pain/shortness of breath.  She has lost 80 pounds since a sleeve gastrectomy 4 months ago.  She also has history of open appendectomy.    Post-Op Info:  Procedure(s) (LRB):  CHOLECYSTECTOMY (N/A)  INCOMPLETE-PROCEDURE/ATTEMPTED Laparscopic cholecystectomy (N/A)   6 Days Post-Op     Interval History: No issues overnight. Making plenty of urine. Pain controlled. Tolerating regular diet. Not getting out of bed    Medications:  Continuous Infusions:   Scheduled Meds:   acetylcysteine 200 mg/ml (20%)  4 mL Nebulization Q8H    albuterol-ipratropium 2.5mg-0.5mg/3mL  3 mL Nebulization Q4H    bumetanide  2 mg Oral BID    carvedilol  12.5 mg Oral BID    docusate sodium  100 mg Oral BID    heparin (porcine)  5,000 Units Subcutaneous Q8H    pantoprazole  40 mg Oral Before breakfast    polyethylene glycol  17 g Oral Daily    potassium chloride SA  20 mEq Oral BID     PRN Meds:bisacodyl, diphenhydrAMINE, labetalol, ondansetron, oxyCODONE-acetaminophen, oxyCODONE-acetaminophen, promethazine (PHENERGAN) IVPB     Review of patient's allergies indicates:  No  Known Allergies  Objective:     Vital Signs (Most Recent):  Temp: 98.2 °F (36.8 °C) (11/12/17 0332)  Pulse: 67 (11/12/17 0740)  Resp: 16 (11/12/17 0740)  BP: (!) 173/83 (11/12/17 0740)  SpO2: (!) 93 % (11/12/17 0740) Vital Signs (24h Range):  Temp:  [97.9 °F (36.6 °C)-98.8 °F (37.1 °C)] 98.2 °F (36.8 °C)  Pulse:  [63-92] 67  Resp:  [15-18] 16  SpO2:  [89 %-100 %] 93 %  BP: (129-180)/(61-85) 173/83     Weight: 127 kg (280 lb)  Body mass index is 46.59 kg/m².    Intake/Output - Last 3 Shifts       11/10 0700 - 11/11 0659 11/11 0700 - 11/12 0659 11/12 0700 - 11/13 0659    P.O. 720 1450     I.V. (mL/kg) 1640 (12.9) 2000 (15.7)     IV Piggyback  0     Total Intake(mL/kg) 2360 (18.6) 3450 (27.2)     Urine (mL/kg/hr) 1000 (0.3) 2225 (0.7)     Drains 30 (0)      Other 0 (0)      Stool  0 (0)     Total Output 1030 2225      Net +1330 +1225             Stool Occurrence  1 x           Physical Exam   Constitutional: She is oriented to person, place, and time. She appears well-developed and well-nourished. No distress.   HENT:   Head: Normocephalic and atraumatic.   Eyes: No scleral icterus.   Neck: Neck supple.   Cardiovascular: Normal rate and regular rhythm.    Pulmonary/Chest: Effort normal. No respiratory distress.   Abdominal:   Soft, appropriate TTP, non-distended  Incision - clean, dry and intact   Neurological: She is alert and oriented to person, place, and time.   Skin: Skin is warm and dry.   Psychiatric: She has a normal mood and affect.       Significant Labs:  CBC:   Recent Labs  Lab 11/12/17  0525   WBC 8.45   RBC 4.02   HGB 11.9*   HCT 36.7*      MCV 91   MCH 29.6   MCHC 32.4     BMP:   Recent Labs  Lab 11/12/17  0525         K 4.2   *   CO2 25   BUN 44*   CREATININE 1.0   CALCIUM 9.1   MG 1.6     LFTs:   Recent Labs  Lab 11/12/17  0525   ALT 13   AST 22   ALKPHOS 71   BILITOT 0.5   PROT 5.4*   ALBUMIN 2.1*       Significant Diagnostics:  I have reviewed all pertinent imaging  results/findings within the past 24 hours.    Assessment/Plan:     * Acute cholecystitis    S/P lap converted to open edison 11/6/17    - KELSEY resolved.   - D/C IVF  - D/C lopes  - Continue regular diet   - Pain/nausea meds PRN  - IS/aggressive pulm toilet  - PT/OT  - Ambulate TID, OOBTC  - DVT ppx          Hypertension    - Improved on home regimen   - IV hydralazine and labetalol PRN            Terrence Ozuna MD  General Surgery  [facility]

## 2017-11-12 NOTE — PT/OT/SLP PROGRESS
Occupational Therapy  Treatment    Rosetta Whyte   MRN: [id number]   Admitting Diagnosis: Acute cholecystitis    OT Date of Treatment: 11/12/17   OT Start Time: 1031  OT Stop Time: 1122  OT Total Time (min): 51 min    Billable Minutes:  Self Care/Home Management 15, Therapeutic Activity 15 and Therapeutic Exercise 21    General Precautions: Standard, fall  Orthopedic Precautions: N/A  Braces: N/A    Do you have any cultural, spiritual, Episcopal conflicts, given your current situation?: none    Subjective:  Communicated with nursing prior to session. As per nursing, pt ok to be seen for therapy at this time. Pt agreeable to OT treatment session.   Pain/Comfort  Pain Rating 1: 0/10  Pain Rating Post-Intervention 1: 6/10 (pt reporting 6/10 abdominal pain at the end of session )    Objective:  Patient found with: lopes catheter     Functional Mobility: completed with Physical therapy as additional skilled hand required for safe transfer back to bed   Bed Mobility:  Sit to Supine: Total Assistance (with assist of 3)    -assist for BLE's and positioning of trunk/shoulders     Transfers:   Sit <> Stand Assistance: Maximum Assistance (([facility]  Bed <> Chair Technique:  (few steps with PT to attain standing position at EOB)  Bed <> Chair Transfer Assistance: Maximum Assistance (without any AD)    Activities of Daily Living:  Grooming Position: Seated, bedside chair (to brush teeth and wash face; pt with difficulty opening containers and with twisting cap off toothpaste; pt requiring increased time for task completion)  Grooming Level of Assistance: Independent (set-up assistance)    Therapeutic Exercise:  Pt in seated position to complete b/l UE and hand exercises.   -wringing towel 1x10 reps in each direction to increase  strength   -1x10 reps each intrinsics hand exercises to increase hand function to maximize independence with [facility] functional activities (tabletop, hook fist,  "partial fist, full fist)   -shoulder flexion and extension 1x10 reps   -horizontal abduction/adduction 1x10 reps   -chest press 1x10 reps     Additional:  Communicated OT POC  Updated communication board  Educated on importance of engagement with therapy and importance of OOB mobility   Pt found seated upright in medichair with family member in the room at start of session  Pt left supine in bed with family member in the room and call button in reach at end of session   RN notified post-OT session    AM-PAC 6 CLICK ADL   How much help from another person does this patient currently need?   1 = Unable, Total/Dependent Assistance  2 = A lot, Maximum/Moderate Assistance  3 = A little, Minimum/Contact Guard/Supervision  4 = None, Modified [place]/Independent    Putting on and taking off regular lower body clothing? : 1  Bathing (including washing, rinsing, drying)?: 1  Toileting, which includes using toilet, bedpan, or urinal? : 1  Putting on and taking off regular upper body clothing?: 1  Taking care of personal grooming such as brushing teeth?: 3  Eating meals?: 3  Total Score: 10     AM-PAC Raw Score CMS "G-Code Modifier Level of Impairment Assistance   6 % Total / Unable   7 - 8 CM 80 - 100% Maximal Assist   9-13 CL 60 - 80% Moderate Assist   14 - 19 CK 40 - 60% Moderate Assist   20 - 22 CJ 20 - 40% Minimal Assist   23 CI 1-20% SBA / CGA   24 CH 0% Independent/ Mod I     ASSESSMENT:  Rosetta Whyte is a 72 y.o. female with a medical diagnosis of Acute cholecystitis . Pt with increased need for assistance during functional mobility and self-care. Pt requiring max A for mobility during this session. Pt with impaired fine motor control as evidenced by difficulty manipulating self-care items and additional time required to open containers. Pt with decreased strength and functional activity tolerance. Pt would continue to benefit from skilled OT services to maximize engagement with self-care and functional " mobility.     Rehab identified problem list/impairments: Rehab identified problem list/impairments: weakness, impaired endurance, impaired self care skills, impaired functional mobilty, gait instability, impaired balance, decreased upper extremity function, decreased lower extremity function, decreased safety awareness, pain    Rehab potential is fair.    Activity tolerance: Fair    Discharge recommendations: Discharge Facility/Level Of Care Needs: nursing facility, skilled     Barriers to discharge: Barriers to Discharge: Decreased caregiver support (pt requiring increased asssitance at this time. )    Equipment recommendations:  (TBD)     GOALS:    Occupational Therapy Goals        Problem: Occupational Therapy Goal    Goal Priority Disciplines Outcome Interventions   Occupational Therapy Goal     OT, PT/OT Ongoing (interventions implemented as appropriate)    Description:  Goals to be met by: 11/17/2017    Patient will increase functional independence with ADLs by performing:    Feeding with Set-up Assistance.  UE Dressing with Minimal Assistance.  LE Dressing with Minimal Assistance.  Grooming while seated EOB with Stand-by Assistance. Progressing   Toileting from bedside commode with Moderate Assistance for hygiene and clothing management.   Sitting at edge of bed x20 minutes with Stand-by Assistance.  Supine to sit with Minimal Assistance.  Stand pivot transfers with Minimal Assistance x 1 person.                       Plan:  Patient to be seen 5 x/week to address the above listed problems via self-care/home management, therapeutic exercises, therapeutic activities  Plan of Care expires: 12/07/17  Plan of Care reviewed with: patient       Alycia Koko, OT  11/12/2017

## 2017-11-12 NOTE — PLAN OF CARE
Problem: Patient Care Overview  Goal: Plan of Care Review  Outcome: Ongoing (interventions implemented as appropriate)  Plan of care discussed with pt. Pt verbalizes understanding. Pt tolerating regular diet. Pain managed with PRN pain medications. Pt had 1 BM this shift. VS stable. Pt remains free of falls and injury. No acute events this shift. Will continue to monitor.

## 2017-11-12 NOTE — PLAN OF CARE
Problem: Occupational Therapy Goal  Goal: Occupational Therapy Goal  Goals to be met by: 11/17/2017    Patient will increase functional independence with ADLs by performing:    Feeding with Set-up Assistance.  UE Dressing with Minimal Assistance.  LE Dressing with Minimal Assistance.  Grooming while seated EOB with Stand-by Assistance. Progressing   Toileting from bedside commode with Moderate Assistance for hygiene and clothing management.   Sitting at edge of bed x20 minutes with Stand-by Assistance.  Supine to sit with Minimal Assistance.  Stand pivot transfers with Minimal Assistance x 1 person.     Outcome: Ongoing (interventions implemented as appropriate)  Goals reviewed and remain appropriate.

## 2017-11-12 NOTE — SUBJECTIVE & OBJECTIVE
Interval History: No issues overnight. Making plenty of urine. Pain controlled. Tolerating regular diet. Not getting out of bed    Medications:  Continuous Infusions:   Scheduled Meds:   acetylcysteine 200 mg/ml (20%)  4 mL Nebulization Q8H    albuterol-ipratropium 2.5mg-0.5mg/3mL  3 mL Nebulization Q4H    bumetanide  2 mg Oral BID    carvedilol  12.5 mg Oral BID    docusate sodium  100 mg Oral BID    heparin (porcine)  5,000 Units Subcutaneous Q8H    pantoprazole  40 mg Oral Before breakfast    polyethylene glycol  17 g Oral Daily    potassium chloride SA  20 mEq Oral BID     PRN Meds:bisacodyl, diphenhydrAMINE, labetalol, ondansetron, oxyCODONE-acetaminophen, oxyCODONE-acetaminophen, promethazine (PHENERGAN) IVPB     Review of patient's allergies indicates:  No Known Allergies  Objective:     Vital Signs (Most Recent):  Temp: 98.2 °F (36.8 °C) (11/12/17 0332)  Pulse: 67 (11/12/17 0740)  Resp: 16 (11/12/17 0740)  BP: (!) 173/83 (11/12/17 0740)  SpO2: (!) 93 % (11/12/17 0740) Vital Signs (24h Range):  Temp:  [97.9 °F (36.6 °C)-98.8 °F (37.1 °C)] 98.2 °F (36.8 °C)  Pulse:  [63-92] 67  Resp:  [15-18] 16  SpO2:  [89 %-100 %] 93 %  BP: (129-180)/(61-85) 173/83     Weight: 127 kg (280 lb)  Body mass index is 46.59 kg/m².    Intake/Output - Last 3 Shifts       11/10 0700 - 11/11 0659 11/11 0700 - 11/12 0659 11/12 0700 - 11/13 0659    P.O. 720 1450     I.V. (mL/kg) 1640 (12.9) 2000 (15.7)     IV Piggyback  0     Total Intake(mL/kg) 2360 (18.6) 3450 (27.2)     Urine (mL/kg/hr) 1000 (0.3) 2225 (0.7)     Drains 30 (0)      Other 0 (0)      Stool  0 (0)     Total Output 1030 2225      Net +1330 +1225             Stool Occurrence  1 x           Physical Exam   Constitutional: She is oriented to person, place, and time. She appears well-developed and well-nourished. No distress.   HENT:   Head: Normocephalic and atraumatic.   Eyes: No scleral icterus.   Neck: Neck supple.   Cardiovascular: Normal rate and regular  rhythm.    Pulmonary/Chest: Effort normal. No respiratory distress.   Abdominal:   Soft, appropriate TTP, non-distended  Incision - clean, dry and intact   Neurological: She is alert and oriented to person, place, and time.   Skin: Skin is warm and dry.   Psychiatric: She has a normal mood and affect.       Significant Labs:  CBC:   Recent Labs  Lab 11/12/17  0525   WBC 8.45   RBC 4.02   HGB 11.9*   HCT 36.7*      MCV 91   MCH 29.6   MCHC 32.4     BMP:   Recent Labs  Lab 11/12/17  0525         K 4.2   *   CO2 25   BUN 44*   CREATININE 1.0   CALCIUM 9.1   MG 1.6     LFTs:   Recent Labs  Lab 11/12/17  0525   ALT 13   AST 22   ALKPHOS 71   BILITOT 0.5   PROT 5.4*   ALBUMIN 2.1*       Significant Diagnostics:  I have reviewed all pertinent imaging results/findings within the past 24 hours.

## 2017-11-12 NOTE — ASSESSMENT & PLAN NOTE
S/P lap converted to open edison 11/6/17    - KELSEY resolved.   - D/C IVF  - D/C lopes  - Continue regular diet   - Pain/nausea meds PRN  - IS/aggressive pulm toilet  - PT/OT  - Ambulate TID, OOBTC  - DVT ppx

## 2017-11-13 PROBLEM — E83.42 HYPOMAGNESEMIA: Status: ACTIVE | Noted: 2017-11-13

## 2017-11-13 LAB
ALBUMIN SERPL BCP-MCNC: 2.1 G/DL
ALP SERPL-CCNC: 75 U/L
ALT SERPL W/O P-5'-P-CCNC: 15 U/L
ANION GAP SERPL CALC-SCNC: 7 MMOL/L
AST SERPL-CCNC: 26 U/L
BASOPHILS # BLD AUTO: 0.08 K/UL
BASOPHILS NFR BLD: 0.9 %
BILIRUB SERPL-MCNC: 0.4 MG/DL
BUN SERPL-MCNC: 37 MG/DL
CALCIUM SERPL-MCNC: 9.5 MG/DL
CHLORIDE SERPL-SCNC: 109 MMOL/L
CO2 SERPL-SCNC: 27 MMOL/L
CREAT SERPL-MCNC: 0.8 MG/DL
DIFFERENTIAL METHOD: ABNORMAL
EOSINOPHIL # BLD AUTO: 0.3 K/UL
EOSINOPHIL NFR BLD: 3.5 %
ERYTHROCYTE [DISTWIDTH] IN BLOOD BY AUTOMATED COUNT: 14 %
EST. GFR  (AFRICAN AMERICAN): >60 ML/MIN/1.73 M^2
EST. GFR  (NON AFRICAN AMERICAN): >60 ML/MIN/1.73 M^2
GLUCOSE SERPL-MCNC: 101 MG/DL
HCT VFR BLD AUTO: 37.1 %
HGB BLD-MCNC: 11.8 G/DL
IMM GRANULOCYTES # BLD AUTO: 0.33 K/UL
IMM GRANULOCYTES NFR BLD AUTO: 3.9 %
LYMPHOCYTES # BLD AUTO: 1.4 K/UL
LYMPHOCYTES NFR BLD: 16 %
MAGNESIUM SERPL-MCNC: 1.2 MG/DL
MCH RBC QN AUTO: 28.7 PG
MCHC RBC AUTO-ENTMCNC: 31.8 G/DL
MCV RBC AUTO: 90 FL
MONOCYTES # BLD AUTO: 1 K/UL
MONOCYTES NFR BLD: 11.9 %
NEUTROPHILS # BLD AUTO: 5.5 K/UL
NEUTROPHILS NFR BLD: 63.8 %
NRBC BLD-RTO: 0 /100 WBC
PHOSPHATE SERPL-MCNC: 2.8 MG/DL
PLATELET # BLD AUTO: 290 K/UL
PMV BLD AUTO: 10.1 FL
POTASSIUM SERPL-SCNC: 3.9 MMOL/L
PROT SERPL-MCNC: 5.3 G/DL
RBC # BLD AUTO: 4.11 M/UL
SODIUM SERPL-SCNC: 143 MMOL/L
WBC # BLD AUTO: 8.55 K/UL

## 2017-11-13 PROCEDURE — 25000003 PHARM REV CODE 250: Performed by: STUDENT IN AN ORGANIZED HEALTH CARE EDUCATION/TRAINING PROGRAM

## 2017-11-13 PROCEDURE — 97535 SELF CARE MNGMENT TRAINING: CPT

## 2017-11-13 PROCEDURE — 97530 THERAPEUTIC ACTIVITIES: CPT

## 2017-11-13 PROCEDURE — 94640 AIRWAY INHALATION TREATMENT: CPT

## 2017-11-13 PROCEDURE — 25000003 PHARM REV CODE 250: Performed by: SURGERY

## 2017-11-13 PROCEDURE — 20600001 HC STEP DOWN PRIVATE ROOM

## 2017-11-13 PROCEDURE — 84100 ASSAY OF PHOSPHORUS: CPT

## 2017-11-13 PROCEDURE — 25000003 PHARM REV CODE 250: Performed by: PHYSICIAN ASSISTANT

## 2017-11-13 PROCEDURE — 63600175 PHARM REV CODE 636 W HCPCS: Performed by: SURGERY

## 2017-11-13 PROCEDURE — 80053 COMPREHEN METABOLIC PANEL: CPT

## 2017-11-13 PROCEDURE — 85025 COMPLETE CBC W/AUTO DIFF WBC: CPT

## 2017-11-13 PROCEDURE — 25000242 PHARM REV CODE 250 ALT 637 W/ HCPCS: Performed by: SURGERY

## 2017-11-13 PROCEDURE — 36415 COLL VENOUS BLD VENIPUNCTURE: CPT

## 2017-11-13 PROCEDURE — 83735 ASSAY OF MAGNESIUM: CPT

## 2017-11-13 PROCEDURE — 94761 N-INVAS EAR/PLS OXIMETRY MLT: CPT

## 2017-11-13 RX ORDER — LANOLIN ALCOHOL/MO/W.PET/CERES
400 CREAM (GRAM) TOPICAL ONCE
Status: COMPLETED | OUTPATIENT
Start: 2017-11-13 | End: 2017-11-13

## 2017-11-13 RX ORDER — POTASSIUM CHLORIDE 20 MEQ/15ML
20 SOLUTION ORAL 2 TIMES DAILY
Status: DISCONTINUED | OUTPATIENT
Start: 2017-11-13 | End: 2017-11-17

## 2017-11-13 RX ORDER — OXYCODONE AND ACETAMINOPHEN 5; 325 MG/1; MG/1
1 TABLET ORAL EVERY 4 HOURS PRN
Status: DISCONTINUED | OUTPATIENT
Start: 2017-11-13 | End: 2017-11-17

## 2017-11-13 RX ADMIN — CARVEDILOL 12.5 MG: 12.5 TABLET, FILM COATED ORAL at 08:11

## 2017-11-13 RX ADMIN — HEPARIN SODIUM 5000 UNITS: 5000 INJECTION, SOLUTION INTRAVENOUS; SUBCUTANEOUS at 10:11

## 2017-11-13 RX ADMIN — HEPARIN SODIUM 5000 UNITS: 5000 INJECTION, SOLUTION INTRAVENOUS; SUBCUTANEOUS at 03:11

## 2017-11-13 RX ADMIN — MAGNESIUM OXIDE TAB 400 MG (241.3 MG ELEMENTAL MG) 400 MG: 400 (241.3 MG) TAB at 09:11

## 2017-11-13 RX ADMIN — IPRATROPIUM BROMIDE AND ALBUTEROL SULFATE 3 ML: .5; 3 SOLUTION RESPIRATORY (INHALATION) at 11:11

## 2017-11-13 RX ADMIN — IPRATROPIUM BROMIDE AND ALBUTEROL SULFATE 3 ML: .5; 3 SOLUTION RESPIRATORY (INHALATION) at 07:11

## 2017-11-13 RX ADMIN — IPRATROPIUM BROMIDE AND ALBUTEROL SULFATE 3 ML: .5; 3 SOLUTION RESPIRATORY (INHALATION) at 04:11

## 2017-11-13 RX ADMIN — POTASSIUM CHLORIDE 20 MEQ: 20 SOLUTION ORAL at 08:11

## 2017-11-13 RX ADMIN — POTASSIUM CHLORIDE 20 MEQ: 20 SOLUTION ORAL at 12:11

## 2017-11-13 RX ADMIN — CARVEDILOL 12.5 MG: 12.5 TABLET, FILM COATED ORAL at 09:11

## 2017-11-13 RX ADMIN — DOCUSATE SODIUM 100 MG: 100 CAPSULE, LIQUID FILLED ORAL at 08:11

## 2017-11-13 RX ADMIN — IPRATROPIUM BROMIDE AND ALBUTEROL SULFATE 3 ML: .5; 3 SOLUTION RESPIRATORY (INHALATION) at 01:11

## 2017-11-13 RX ADMIN — HEPARIN SODIUM 5000 UNITS: 5000 INJECTION, SOLUTION INTRAVENOUS; SUBCUTANEOUS at 05:11

## 2017-11-13 RX ADMIN — BUMETANIDE 2 MG: 1 TABLET ORAL at 08:11

## 2017-11-13 RX ADMIN — BUMETANIDE 2 MG: 1 TABLET ORAL at 09:11

## 2017-11-13 RX ADMIN — OXYCODONE HYDROCHLORIDE AND ACETAMINOPHEN 1 TABLET: 5; 325 TABLET ORAL at 05:11

## 2017-11-13 RX ADMIN — PANTOPRAZOLE SODIUM 40 MG: 40 TABLET, DELAYED RELEASE ORAL at 05:11

## 2017-11-13 NOTE — PLAN OF CARE
Problem: Patient Care Overview  Goal: Plan of Care Review  Outcome: Ongoing (interventions implemented as appropriate)  Plan of care reviewed, patient verbalizes understanding. AAOx4. VSS at this time. Pain managed with PRN pain meds throughout shift. Patient on regular diet, denies N/V throughout shift. Patient uses purewick. Denies to be turned. Remains free of injuries/falls. No acute distress at this time. Will continue to monitor

## 2017-11-13 NOTE — PLAN OF CARE
0920- Pt discussed in IDTR, per FRANK Emanuel the pt is ready for d/c and the team wants pt to adhere to PT/OT recs for SNF placement. Advised pt and her family have maintained refusal for SNF placement. Advised SW spoke with them on Friday and they were still insisting on taking the pt home. Advised I will speak to them again, to see if they have changed their minds regarding SNF. SW supervisor and myself advised pt and her family have the right to refuse PT/OT recs.     0955- Spoke to the pt at the bedside. Confirmed address and contact number on the face sheet are correct, D/W pt the PT/OT recs for SNF placement for therapy and she stated she is going home with her family. Verbal consent obtained from pt to call her sister Nita to discuss.     3064- Spoke to pt's sister Nita ([phone]), advised her of the reccs from PT/OT as well as the teams request for her sister to go to SNF. She stated her sister is coming home with family. She stated she lives 2 doors down from the pt, their other sister Edie Eng will be assisting as well as the pt's dgt Lyle Larios and her grandson Art Larios. She stated the pt has a Rollator at home, but they would like a w/c and a shower bench ordered at d/c. Advised the w/c would be no problem, but the shower bench is not covered by any insurance, she stated they would pick one up at [facility]. Advised will have SW arrange HH through pt's PHN at time of d/c. Pt's sister is requesting w/c van transport for the pt at d/c to home.     1000- Spoke to FRANK Emanuel, advised her of the above, she stated she will let the team know.     1320- Spoke to FRANK Preston, she stated she has spoken with the pt and her family and they are all in agreement the pt needs [facility] SNF for therapies. Advised her I will have the SW given them a PHN list and get choices from them. Advised it will likely take 1-3 days to get acceptance and auth.     1449- Left a  MSG for WILMER Hawthorne advising her  this pt/family needs a PHN SNF list and to give choices for referrals for [facility] SNF placement, awaiting a return call.     1514- Spoke to WILMER Hawthorne, she stated she did recv my msg and will provide a PHN SNF list.

## 2017-11-13 NOTE — PT/OT/SLP PROGRESS
Occupational Therapy  Treatment    Rosetta Whyte   MRN: [id number]   Admitting Diagnosis: Acute cholecystitis    OT Date of Treatment: 11/13/17   OT Start Time: 1103  OT Stop Time: 1148  OT Total Time (min): 45 min    Billable Minutes:  Self Care/Home Management 10 and Therapeutic Activity 35    General Precautions: Standard, fall  Orthopedic Precautions: N/A  Braces: N/A    Do you have any cultural, spiritual, Evangelical conflicts, given your current situation?: none    Subjective:  Communicated with nursing prior to session. As per nursing, pt ok to be seen for therapy at this time. Pt agreeable to OT treatment session.   Pain/Comfort  Pain Rating 1: 0/10    Objective:  Patient found with:  (purewick in place )     Functional Mobility:  Bed Mobility:  Rolling/Turning to Left: Maximum assistance (pt unable to cross midline or flex knee)  Rolling/Turning Right: Maximum assistance (pt unable to cross midline or flex knee)  Scooting/Bridging: Moderate Assistance (pt able to utilize side rails to assist in scooting anteriorly to EOB, however also requiring manual assist posteriorly to shift hips forward ); total assist to scoot superiorly in Medi Chair  Supine to Sit: Total Assistance (assistance of management of BLE's to clear off the bed; pt able to intitate motion with LLE to assist; assit for attaining upright trunk positioning; with assist x2)  Sit to Supine:  (pt left up in Medi Chair)    Transfers:   Sit <> Stand Assistance: Total Assistance (from EOB with bed slightly raised as pt p.w decreased b/l knee flexion; with 2 person assist )  Bed <> Chair Technique:  (functional mobility; pt taking 4-5 steps; pt requriing max encouragement, vc's for positioing and shifting weight onto opposite foot, and manual assist to slide LE's to complete with assist of 2)  Bed <> Chair Transfer Assistance: Total Assistance    Activities of Daily Living:   Toileting Where Assessed: Bed level  Toileting Level of Assistance: Maximum  assistance (pt incontinent; pt able to assist with hygiene )    Balance:   Static Sit: FAIR+: Able to take MINIMAL challenges from all directions ; while seated EOB without any UE support      Therapeutic Activities:  Pt found in supine upon entry to the room.  Pt requiring total assist with assist of 2 to transfer from supine with HOB slightly elevated to seated EOB. Therapist assisting with management of BLE's to clear off the EOB and tech assisting with attaining upright trunk position. Pt able to assist with movement in the LLE and by holding onto side rail to assist with trunk positioning. Once in upright position, pt requiring min A to scoot anteriorly to attain EOB position with b/l feet on the ground. Pt noted utilizing side rail for assistance during scooting. Pt with good sitting balance unsupported at EOB. Pt requiring total assist with assist of 2 to stand from EOB with bed slightly raised. Therapist on one side of patient and tech on the other side providing assistance and tactile cues on hips to assist. Once in standing pt able to take 4-5 steps to attain standing position in front of Medi Chair.  Pt requiring total assist for bed>chair transfer. Assist required for shifting weight on each LE and for manual movement on BLE's as pt unable to shift weight and move LE independently. Pt requiring mod A for stand>sit for safety. Pt urinating while seated in chair and cleaned with total assist. Pt reclined into supine position in Medichair to scoot superiorly with total assist x2. Pt rolling x2 times to the R and L to apply blue pads with max A.      Additional:  Communicated role of OT/POC  Updated communication board  Educated on importance of OOB mobility and safety with functional mobility   Pt found supine in bed at start of session  Pt left seated up in Medi chair, call bell in reach, and with family in the room at end of session   RN notified post-OT session about safe drawsheet transfer from  "Medichair to bed once pt ready to get back to bed     AM-PAC 6 CLICK ADL   How much help from another person does this patient currently need?   1 = Unable, Total/Dependent Assistance  2 = A lot, Maximum/Moderate Assistance  3 = A little, Minimum/Contact Guard/Supervision  4 = None, Modified [place]/Independent    Putting on and taking off regular lower body clothing? : 1  Bathing (including washing, rinsing, drying)?: 1  Toileting, which includes using toilet, bedpan, or urinal? : 2  Putting on and taking off regular upper body clothing?: 1  Taking care of personal grooming such as brushing teeth?: 3  Eating meals?: 4  Total Score: 12     AM-PAC Raw Score CMS "G-Code Modifier Level of Impairment Assistance   6 % Total / Unable   7 - 8 CM 80 - 100% Maximal Assist   9-13 CL 60 - 80% Moderate Assist   14 - 19 CK 40 - 60% Moderate Assist   20 - 22 CJ 20 - 40% Minimal Assist   23 CI 1-20% SBA / CGA   24 CH 0% Independent/ Mod I       ASSESSMENT:  Rosetta Whyte is a 72 y.o. female with a medical diagnosis of Acute cholecystitis and presents with good motivation to engage in therapy session and with increased function during this session. Pt with increased knee flexion and ability to assist with functional transfers this session. Pt with increased standing tolerance and functional activity tolerance. Pt continuing to require total assist for functional transfers and bed mobility, limiting engagement and independence with self-care and functional mobility.  Pt would continue to benefit from skilled OT services to increase independence and safety.     Rehab identified problem list/impairments: Rehab identified problem list/impairments: weakness, impaired endurance, impaired self care skills, impaired functional mobilty, decreased safety awareness, impaired balance, gait instability, decreased lower extremity function, decreased ROM    Rehab potential is good.    Activity tolerance: Fair    Discharge " recommendations: Discharge Facility/Level Of Care Needs: nursing facility, skilled     Barriers to discharge: Barriers to Discharge: Decreased caregiver support    Equipment recommendations:  (TBD pending further mobility)     GOALS:    Occupational Therapy Goals        Problem: Occupational Therapy Goal    Goal Priority Disciplines Outcome Interventions   Occupational Therapy Goal     OT, PT/OT Ongoing (interventions implemented as appropriate)    Description:  Goals to be met by: 11/17/2017    Patient will increase functional independence with ADLs by performing:    Feeding with Set-up Assistance.  UE Dressing with Minimal Assistance.  LE Dressing with Minimal Assistance.  Grooming while seated EOB with Stand-by Assistance. Progressing   Toileting from bedside commode with Moderate Assistance for hygiene and clothing management.   Sitting at edge of bed x20 minutes with Stand-by Assistance.  Supine to sit with Minimal Assistance.  Stand pivot transfers with Minimal Assistance x 1 person.                       Plan:  Patient to be seen 5 x/week to address the above listed problems via self-care/home management, therapeutic exercises, therapeutic groups  Plan of Care expires: 12/07/17  Plan of Care reviewed with: patient         Alycia Koko OT  11/13/2017

## 2017-11-13 NOTE — PROGRESS NOTES
[facility]  General Surgery  Progress Note    Subjective:     History of Present Illness:  Rosetta Whyte is a 72 y.o. female with morbid obesity (s/p sleeve gastrectomy), HTN, and OA presents to [facility] ED with 5 days history of abdominal pain.  She states that the pain started Tuesday.  The pain was located on the right side of her abdomen.  The pain for the first day was tolerable, but for the last 4 days, she states the pain has been 10/10.  She has associated nausea and vomiting, fever/chills.  She has had no appetite and has not eaten a meal for 4 days.  She has never had anything like this happen before, even a mild episode.  She has maintained normal bowel function.  No pale stools, jaundice, itching, or dark urine.  Functionally, she can walk about 1 block before stopping, but she states that this is due to leg swelling and arthritis and not chest pain/shortness of breath.  She has lost 80 pounds since a sleeve gastrectomy 4 months ago.  She also has history of open appendectomy.    Post-Op Info:  Procedure(s) (LRB):  CHOLECYSTECTOMY (N/A)  INCOMPLETE-PROCEDURE/ATTEMPTED Laparscopic cholecystectomy (N/A)   7 Days Post-Op     Interval History:   Patient seen and examined, no acute events overnight  Tolerating diet with no N/V  +F, no BM recorded  Has been working with PT/OT  Afebrile/VSS    Medications:  Continuous Infusions:   Scheduled Meds:   albuterol-ipratropium 2.5mg-0.5mg/3mL  3 mL Nebulization Q4H    bumetanide  2 mg Oral BID    carvedilol  12.5 mg Oral BID    docusate sodium  100 mg Oral BID    heparin (porcine)  5,000 Units Subcutaneous Q8H    pantoprazole  40 mg Oral Before breakfast    polyethylene glycol  17 g Oral Daily    potassium chloride SA  20 mEq Oral BID     PRN Meds:bisacodyl, diphenhydrAMINE, labetalol, ondansetron, oxyCODONE-acetaminophen, oxyCODONE-acetaminophen     Review of patient's allergies indicates:  No Known Allergies  Objective:     Vital Signs (Most  Recent):  Temp: 97.6 °F (36.4 °C) (11/13/17 0355)  Pulse: (!) 55 (11/13/17 0717)  Resp: 18 (11/13/17 0717)  BP: (!) 174/80 (11/13/17 0355)  SpO2: 99 % (11/13/17 0717) Vital Signs (24h Range):  Temp:  [96.8 °F (36 °C)-98.2 °F (36.8 °C)] 97.6 °F (36.4 °C)  Pulse:  [55-94] 55  Resp:  [16-20] 18  SpO2:  [64 %-99 %] 99 %  BP: (165-187)/(62-85) 174/80     Weight: 127 kg (280 lb)  Body mass index is 46.59 kg/m².    Intake/Output - Last 3 Shifts       11/11 0700 - 11/12 0659 11/12 0700 - 11/13 0659 11/13 0700 - 11/14 0659    P.O. 1450      I.V. (mL/kg) 2000 (15.7)      IV Piggyback 0      Total Intake(mL/kg) 3450 (27.2)      Urine (mL/kg/hr) 2225 (0.7) 3150 (1)     Drains       Other       Stool 0 (0)      Total Output 2225 3150      Net +1225 -3150             Stool Occurrence 1 x            Physical Exam   Constitutional: She is oriented to person, place, and time. She appears well-developed and well-nourished. No distress.   HENT:   Head: Normocephalic and atraumatic.   Eyes: No scleral icterus.   Neck: Neck supple.   Cardiovascular: Normal rate and regular rhythm.    Pulmonary/Chest: Effort normal. No respiratory distress.   Abdominal:   Soft, appropriate TTP, non-distended  Incision - clean, dry and intact   Neurological: She is alert and oriented to person, place, and time.   Skin: Skin is warm and dry.   Psychiatric: She has a normal mood and affect.       Significant Labs:  CBC:   Recent Labs  Lab 11/13/17 0420   WBC 8.55   RBC 4.11   HGB 11.8*   HCT 37.1      MCV 90   MCH 28.7   MCHC 31.8*     BMP:   Recent Labs  Lab 11/13/17  0420         K 3.9      CO2 27   BUN 37*   CREATININE 0.8   CALCIUM 9.5   MG 1.2*     CMP:   Recent Labs  Lab 11/13/17 0420      CALCIUM 9.5   ALBUMIN 2.1*   PROT 5.3*      K 3.9   CO2 27      BUN 37*   CREATININE 0.8   ALKPHOS 75   ALT 15   AST 26   BILITOT 0.4     LFTs:   Recent Labs  Lab 11/13/17 0420   ALT 15   AST 26   ALKPHOS 75   BILITOT  0.4   PROT 5.3*   ALBUMIN 2.1*     Assessment/Plan:     * Acute cholecystitis    S/P lap converted to open edison 11/6/17    - Continue regular diet   - Pain/nausea meds PRN  - bowel regimen  - IS/aggressive pulm toilet  - aggressive PT/OT  - Ambulate TID, OOBTC  - DVT ppx  - stable for dc to SNF; orders placed 11/9/17          Hypomagnesemia    Replace         Morbid obesity with BMI of 45.0-49.9, adult    Body mass index is 46.59 kg/m².  PT/OT        Hypertension    - Improved on home regimen   - IV hydralazine and labetalol PRN            Kary Emanuel PA-C   [id number]  General Surgery  [facility]

## 2017-11-13 NOTE — SUBJECTIVE & OBJECTIVE
Interval History:   Patient seen and examined, no acute events overnight  Tolerating diet with no N/V  +F, no BM recorded  Has been working with PT/OT  Afebrile/VSS    Medications:  Continuous Infusions:   Scheduled Meds:   albuterol-ipratropium 2.5mg-0.5mg/3mL  3 mL Nebulization Q4H    bumetanide  2 mg Oral BID    carvedilol  12.5 mg Oral BID    docusate sodium  100 mg Oral BID    heparin (porcine)  5,000 Units Subcutaneous Q8H    pantoprazole  40 mg Oral Before breakfast    polyethylene glycol  17 g Oral Daily    potassium chloride SA  20 mEq Oral BID     PRN Meds:bisacodyl, diphenhydrAMINE, labetalol, ondansetron, oxyCODONE-acetaminophen, oxyCODONE-acetaminophen     Review of patient's allergies indicates:  No Known Allergies  Objective:     Vital Signs (Most Recent):  Temp: 97.6 °F (36.4 °C) (11/13/17 0355)  Pulse: (!) 55 (11/13/17 0717)  Resp: 18 (11/13/17 0717)  BP: (!) 174/80 (11/13/17 0355)  SpO2: 99 % (11/13/17 0717) Vital Signs (24h Range):  Temp:  [96.8 °F (36 °C)-98.2 °F (36.8 °C)] 97.6 °F (36.4 °C)  Pulse:  [55-94] 55  Resp:  [16-20] 18  SpO2:  [64 %-99 %] 99 %  BP: (165-187)/(62-85) 174/80     Weight: 127 kg (280 lb)  Body mass index is 46.59 kg/m².    Intake/Output - Last 3 Shifts       11/11 0700 - 11/12 0659 11/12 0700 - 11/13 0659 11/13 0700 - 11/14 0659    P.O. 1450      I.V. (mL/kg) 2000 (15.7)      IV Piggyback 0      Total Intake(mL/kg) 3450 (27.2)      Urine (mL/kg/hr) 2225 (0.7) 3150 (1)     Drains       Other       Stool 0 (0)      Total Output 2225 3150      Net +1225 -3150             Stool Occurrence 1 x            Physical Exam   Constitutional: She is oriented to person, place, and time. She appears well-developed and well-nourished. No distress.   HENT:   Head: Normocephalic and atraumatic.   Eyes: No scleral icterus.   Neck: Neck supple.   Cardiovascular: Normal rate and regular rhythm.    Pulmonary/Chest: Effort normal. No respiratory distress.   Abdominal:   Soft,  appropriate TTP, non-distended  Incision - clean, dry and intact   Neurological: She is alert and oriented to person, place, and time.   Skin: Skin is warm and dry.   Psychiatric: She has a normal mood and affect.       Significant Labs:  CBC:   Recent Labs  Lab 11/13/17 0420   WBC 8.55   RBC 4.11   HGB 11.8*   HCT 37.1      MCV 90   MCH 28.7   MCHC 31.8*     BMP:   Recent Labs  Lab 11/13/17 0420         K 3.9      CO2 27   BUN 37*   CREATININE 0.8   CALCIUM 9.5   MG 1.2*     CMP:   Recent Labs  Lab 11/13/17 0420      CALCIUM 9.5   ALBUMIN 2.1*   PROT 5.3*      K 3.9   CO2 27      BUN 37*   CREATININE 0.8   ALKPHOS 75   ALT 15   AST 26   BILITOT 0.4     LFTs:   Recent Labs  Lab 11/13/17 0420   ALT 15   AST 26   ALKPHOS 75   BILITOT 0.4   PROT 5.3*   ALBUMIN 2.1*

## 2017-11-13 NOTE — ASSESSMENT & PLAN NOTE
S/P lap converted to open edison 11/6/17    - Continue regular diet   - Pain/nausea meds PRN  - bowel regimen  - IS/aggressive pulm toilet  - aggressive PT/OT  - Ambulate TID, OOBTC  - DVT ppx  - stable for dc to SNF; orders placed 11/9/17

## 2017-11-13 NOTE — PHYSICIAN QUERY
PT Name: Rosetta Whyte  MR #: [id number]  Physician Query Form - Renal Clarification     CDS/: Elsy Fox RN, CCDS               Contact information:  [email or web address]    This form is a permanent document in the medical record.     QueryDate: November 13, 2017    By submitting this query, we are merely seeking further clarification of documentation. Please utilize your independent clinical judgment when addressing the question(s) below.    The Medical record contains the following:   Indicator Supporting Clinical Findings Location in Medical Record    Kidney (Renal) Insufficiency      Kidney (Renal) Failure / Injury      Nephrotoxic Agents     X BUN/Creatinine GFR BUN      Cr      GFR  21         0.8     >60.0  16         0.6     >60.0   21         0.8     >60.0  24         0.9     >60.0    35         1.6       37.1     46         2.3       23.9    54         2.4       22.7    37         0.8     >60.0   Lab  11/05  11/06  11/07  11/07  11/08  11/09  11/10  11/13    Urine: Casts         Eosinophils      Dehydration       Nausea/Vomiting      Dialysis/CRRT     X Treatment: Start IVF and bolused overnight     Cont IVF General Surgery progress note 11/10    General Surgery progress note 11/13 6:29 AM   X Other:  Minimal UOP overnight again.  Start on IVF.    Pt has ongoing KELSEY, trending Cr.    Hypertension  - Improved on home regimen   - IV hydralazine and labetalol PRN    - KELSEY improved.     - KELSEY resolved.   - D/C IVF  - D/C moses General Surgery progress note 11/10    General Surgery progress note 11/10    General Surgery progress note 11/10        General Surgery progress note 11/13 6:29 AM    General Surgery progress note 11/13 6:29 AM       Provider, please specify the diagnosis or diagnoses associated with above clinical findings.    [ ] Other Acute Kidney Failure/Injury (please specify): ____________  [ ] Unspecified Acute Kidney Failure/Injury  [x ] Acute Renal Insufficiency  [ ] Other (please specify):  _______________________________  [ ] Clinically Undetermined    Please document in your progress notes daily for the duration of treatment, until resolved, and include in your discharge summary.

## 2017-11-13 NOTE — CONSULTS
Patient seen for wound care consult. Patient with redness and irritation to her inner thighs. It is difficult to assess further up because it is difficult for her to move and spread her legs further. She and the family member at the bedside state that it is red and irritated from moisture and urine. Barrier cream to bedside. Recommend to apply barrier cream with each cleaning and after bathing to protect from friction and moisture. Discussed with patient and family. NUrsing to continue care.      Statement Selected

## 2017-11-14 LAB
ALBUMIN SERPL BCP-MCNC: 2.3 G/DL
ALP SERPL-CCNC: 76 U/L
ALT SERPL W/O P-5'-P-CCNC: 20 U/L
ANION GAP SERPL CALC-SCNC: 9 MMOL/L
AST SERPL-CCNC: 31 U/L
BASOPHILS # BLD AUTO: 0.09 K/UL
BASOPHILS NFR BLD: 0.9 %
BILIRUB SERPL-MCNC: 0.6 MG/DL
BUN SERPL-MCNC: 29 MG/DL
CALCIUM SERPL-MCNC: 9.6 MG/DL
CHLORIDE SERPL-SCNC: 104 MMOL/L
CO2 SERPL-SCNC: 31 MMOL/L
CREAT SERPL-MCNC: 0.8 MG/DL
DIFFERENTIAL METHOD: ABNORMAL
EOSINOPHIL # BLD AUTO: 0.3 K/UL
EOSINOPHIL NFR BLD: 2.6 %
ERYTHROCYTE [DISTWIDTH] IN BLOOD BY AUTOMATED COUNT: 13.7 %
EST. GFR  (AFRICAN AMERICAN): >60 ML/MIN/1.73 M^2
EST. GFR  (NON AFRICAN AMERICAN): >60 ML/MIN/1.73 M^2
GLUCOSE SERPL-MCNC: 109 MG/DL
HCT VFR BLD AUTO: 36.8 %
HGB BLD-MCNC: 12 G/DL
IMM GRANULOCYTES # BLD AUTO: 0.28 K/UL
IMM GRANULOCYTES NFR BLD AUTO: 2.9 %
LYMPHOCYTES # BLD AUTO: 1.8 K/UL
LYMPHOCYTES NFR BLD: 18.9 %
MAGNESIUM SERPL-MCNC: 1.2 MG/DL
MCH RBC QN AUTO: 29.3 PG
MCHC RBC AUTO-ENTMCNC: 32.6 G/DL
MCV RBC AUTO: 90 FL
MONOCYTES # BLD AUTO: 1.1 K/UL
MONOCYTES NFR BLD: 11.6 %
NEUTROPHILS # BLD AUTO: 6.2 K/UL
NEUTROPHILS NFR BLD: 63.1 %
NRBC BLD-RTO: 0 /100 WBC
PHOSPHATE SERPL-MCNC: 3 MG/DL
PLATELET # BLD AUTO: 316 K/UL
PMV BLD AUTO: 10 FL
POTASSIUM SERPL-SCNC: 3.8 MMOL/L
PROT SERPL-MCNC: 5.8 G/DL
RBC # BLD AUTO: 4.09 M/UL
SODIUM SERPL-SCNC: 144 MMOL/L
WBC # BLD AUTO: 9.76 K/UL

## 2017-11-14 PROCEDURE — 84100 ASSAY OF PHOSPHORUS: CPT

## 2017-11-14 PROCEDURE — 36415 COLL VENOUS BLD VENIPUNCTURE: CPT

## 2017-11-14 PROCEDURE — 94664 DEMO&/EVAL PT USE INHALER: CPT

## 2017-11-14 PROCEDURE — 63600175 PHARM REV CODE 636 W HCPCS: Performed by: SURGERY

## 2017-11-14 PROCEDURE — 97530 THERAPEUTIC ACTIVITIES: CPT

## 2017-11-14 PROCEDURE — 25000003 PHARM REV CODE 250: Performed by: SURGERY

## 2017-11-14 PROCEDURE — 99900035 HC TECH TIME PER 15 MIN (STAT)

## 2017-11-14 PROCEDURE — 83735 ASSAY OF MAGNESIUM: CPT

## 2017-11-14 PROCEDURE — 94640 AIRWAY INHALATION TREATMENT: CPT

## 2017-11-14 PROCEDURE — 94799 UNLISTED PULMONARY SVC/PX: CPT

## 2017-11-14 PROCEDURE — 97116 GAIT TRAINING THERAPY: CPT

## 2017-11-14 PROCEDURE — 94761 N-INVAS EAR/PLS OXIMETRY MLT: CPT

## 2017-11-14 PROCEDURE — 80053 COMPREHEN METABOLIC PANEL: CPT

## 2017-11-14 PROCEDURE — 25000003 PHARM REV CODE 250: Performed by: PHYSICIAN ASSISTANT

## 2017-11-14 PROCEDURE — 85025 COMPLETE CBC W/AUTO DIFF WBC: CPT

## 2017-11-14 PROCEDURE — 27000173 HC ACAPELLA DEVICE DH OR DM

## 2017-11-14 PROCEDURE — 20600001 HC STEP DOWN PRIVATE ROOM

## 2017-11-14 PROCEDURE — 25000242 PHARM REV CODE 250 ALT 637 W/ HCPCS: Performed by: SURGERY

## 2017-11-14 RX ORDER — CARVEDILOL 25 MG/1
25 TABLET ORAL 2 TIMES DAILY
Status: DISCONTINUED | OUTPATIENT
Start: 2017-11-14 | End: 2017-11-17 | Stop reason: HOSPADM

## 2017-11-14 RX ORDER — LANOLIN ALCOHOL/MO/W.PET/CERES
400 CREAM (GRAM) TOPICAL DAILY
Status: DISCONTINUED | OUTPATIENT
Start: 2017-11-14 | End: 2017-11-14

## 2017-11-14 RX ORDER — LANOLIN ALCOHOL/MO/W.PET/CERES
800 CREAM (GRAM) TOPICAL 2 TIMES DAILY
Status: DISCONTINUED | OUTPATIENT
Start: 2017-11-14 | End: 2017-11-16

## 2017-11-14 RX ORDER — BUMETANIDE 1 MG/1
2 TABLET ORAL DAILY
Status: DISCONTINUED | OUTPATIENT
Start: 2017-11-14 | End: 2017-11-17 | Stop reason: HOSPADM

## 2017-11-14 RX ORDER — AMLODIPINE BESYLATE 10 MG/1
10 TABLET ORAL EVERY MORNING
Status: DISCONTINUED | OUTPATIENT
Start: 2017-11-14 | End: 2017-11-17 | Stop reason: HOSPADM

## 2017-11-14 RX ADMIN — IPRATROPIUM BROMIDE AND ALBUTEROL SULFATE 3 ML: .5; 3 SOLUTION RESPIRATORY (INHALATION) at 04:11

## 2017-11-14 RX ADMIN — HEPARIN SODIUM 5000 UNITS: 5000 INJECTION, SOLUTION INTRAVENOUS; SUBCUTANEOUS at 11:11

## 2017-11-14 RX ADMIN — IPRATROPIUM BROMIDE AND ALBUTEROL SULFATE 3 ML: .5; 3 SOLUTION RESPIRATORY (INHALATION) at 08:11

## 2017-11-14 RX ADMIN — BUMETANIDE 2 MG: 1 TABLET ORAL at 09:11

## 2017-11-14 RX ADMIN — PANTOPRAZOLE SODIUM 40 MG: 40 TABLET, DELAYED RELEASE ORAL at 05:11

## 2017-11-14 RX ADMIN — MAGNESIUM OXIDE TAB 400 MG (241.3 MG ELEMENTAL MG) 800 MG: 400 (241.3 MG) TAB at 09:11

## 2017-11-14 RX ADMIN — MAGNESIUM OXIDE TAB 400 MG (241.3 MG ELEMENTAL MG) 800 MG: 400 (241.3 MG) TAB at 08:11

## 2017-11-14 RX ADMIN — CARVEDILOL 25 MG: 25 TABLET, FILM COATED ORAL at 08:11

## 2017-11-14 RX ADMIN — HEPARIN SODIUM 5000 UNITS: 5000 INJECTION, SOLUTION INTRAVENOUS; SUBCUTANEOUS at 05:11

## 2017-11-14 RX ADMIN — CARVEDILOL 25 MG: 25 TABLET, FILM COATED ORAL at 09:11

## 2017-11-14 RX ADMIN — POTASSIUM CHLORIDE 20 MEQ: 20 SOLUTION ORAL at 09:11

## 2017-11-14 RX ADMIN — POTASSIUM CHLORIDE 20 MEQ: 20 SOLUTION ORAL at 08:11

## 2017-11-14 RX ADMIN — HEPARIN SODIUM 5000 UNITS: 5000 INJECTION, SOLUTION INTRAVENOUS; SUBCUTANEOUS at 01:11

## 2017-11-14 RX ADMIN — IPRATROPIUM BROMIDE AND ALBUTEROL SULFATE 3 ML: .5; 3 SOLUTION RESPIRATORY (INHALATION) at 07:11

## 2017-11-14 RX ADMIN — IPRATROPIUM BROMIDE AND ALBUTEROL SULFATE 3 ML: .5; 3 SOLUTION RESPIRATORY (INHALATION) at 11:11

## 2017-11-14 RX ADMIN — AMLODIPINE BESYLATE 10 MG: 10 TABLET ORAL at 09:11

## 2017-11-14 NOTE — PLAN OF CARE
Problem: Patient Care Overview  Goal: Plan of Care Review  Outcome: Ongoing (interventions implemented as appropriate)  Plan of care discussed with pt. Pt verbalizes understanding. Pt tolerating regular diet with no complaints of discomfort or nausea. Pt denies pain throughout shift. SBP remained <200. All other VSS on RA. Pt up to chair for 4 hours this shift. Pure wick in place, adequate urine output. Pt had one large BM this shift. Pt remains free of falls and injury. Will continue to monitor.

## 2017-11-14 NOTE — ASSESSMENT & PLAN NOTE
S/P lap converted to open edison 11/6/17    - Continue regular diet   - Pain/nausea meds PRN  - bowel regimen  - IS/aggressive pulm toilet  - aggressive PT/OT  - Ambulate TID, OOBTC  - DVT ppx  - stable for dc to SNF; orders placed 11/9/17, family agreeable to placement

## 2017-11-14 NOTE — PT/OT/SLP PROGRESS
Physical Therapy  Treatment    Rosetta Whyte   MRN: [id number]   Admitting Diagnosis: Acute cholecystitis    PT Received On: 11/14/17  PT Start Time: 1117     PT Stop Time: 1140    PT Total Time (min): 23 min       Billable Minutes:  Gait Training8 and Therapeutic Activity 15    Treatment Type: Treatment  PT/PTA: PT     PTA Visit Number: 1       General Precautions: Standard, fall  Orthopedic Precautions: N/A   Braces:           Subjective:  Communicated with nursing prior to session.      Pain/Comfort  Pain Rating 1: 0/10  Pain Rating Post-Intervention 1: 0/10    Objective:   Patient found with: peripheral IV    Functional Mobility:  Bed Mobility:   Rolling/Turning to Left: Maximum assistance  Scooting/Bridging: Maximum Assistance  Supine to Sit: Maximum Assistance    Transfers:  Sit <> Stand Assistance: Minimum Assistance  Sit <> Stand Assistive Device: No Assistive Device (support of PT)  Bed <> Chair Technique: Stand Pivot (bed to medi-chair)  Bed <> Chair Assistance: Minimum Assistance, Moderate Assistance  Bed <> Chair Assistive Device: No Assistive Device (support of PT)    Gait:   Gait Distance: 8 small steps from bed to medi-chair  Assistance 1: Minimum assistance, Moderate assistance  Gait Assistive Device: No device (support of PT)  Gait Pattern: swing-to gait  Gait Deviation(s): decreased qamar, decreased step length, decreased toe-to-floor clearance, decreased weight-shifting ability    Stairs:      Balance:   Static Sit: FAIR+: Able to take MINIMAL challenges from all directions  Dynamic Sit: FAIR+: Maintains balance through MINIMAL excursions of active trunk motion  Static Stand: POOR+: Needs MINIMAL assist to maintain  Dynamic stand: POOR: N/A     Therapeutic Activities and Exercises:  Pt. performed sitting balance/tolerance along EOB. Pt. educated on LE therex while seated in medi-chair. Discussed pt.'s progress.    AM-PAC 6 CLICK MOBILITY  How much help from another person does this patient currently  need?   1 = Unable, Total/Dependent Assistance  2 = A lot, Maximum/Moderate Assistance  3 = A little, Minimum/Contact Guard/Supervision  4 = None, Modified [place]/Independent    Turning over in bed (including adjusting bedclothes, sheets and blankets)?: 2  Sitting down on and standing up from a chair with arms (e.g., wheelchair, bedside commode, etc.): 2  Moving from lying on back to sitting on the side of the bed?: 2  Moving to and from a bed to a chair (including a wheelchair)?: 2  Climbing 3-5 steps with a railing?: 1    AM-PAC Raw Score CMS G-Code Modifier Level of Impairment Assistance   6 % Total / Unable   7 - 9 CM 80 - 100% Maximal Assist   10 - 14 CL 60 - 80% Moderate Assist   15 - 19 CK 40 - 60% Moderate Assist   20 - 22 CJ 20 - 40% Minimal Assist   23 CI 1-20% SBA / CGA   24 CH 0% Independent/ Mod I     Patient left up in chair with all lines intact and call button in reach.    Assessment:  Rosetta Whyte is a 72 y.o. female with a medical diagnosis of Acute cholecystitis and presents with increased alertness and participation. Pt. cooperative and tolerated treatment well. Pt. progressing with mobility. Pt. would benefit from continued PT to address functional deficits.    Rehab identified problem list/impairments: Rehab identified problem list/impairments: weakness, impaired endurance, impaired self care skills, impaired functional mobilty, gait instability, impaired balance, edema, decreased ROM, decreased lower extremity function    Rehab potential is fair.    Activity tolerance: Fair    Discharge recommendations: Discharge Facility/Level Of Care Needs: nursing facility, skilled     Barriers to discharge: Barriers to Discharge: Decreased caregiver support    Equipment recommendations:       GOALS:    Physical Therapy Goals        Problem: Physical Therapy Goal    Goal Priority Disciplines Outcome Goal Variances Interventions   Physical Therapy Goal     PT/OT, PT Ongoing (interventions  implemented as appropriate)     Description:  Goals to be met by: 2017     Patient will increase functional independence with mobility by performin. Supine to sit with Moderate Assistance-not met  2. Sit to supine with Moderate Assistance-not met  3. Sit to stand transfer with Moderate Assistance-met       Revised: Sit to stand transfer with Contact Guard Assistance - Not met  4. Bed to chair transfer with Moderate Assistance using Rolling Walker - Met       Revised: Bed to chair transfer with Contact Guard Assistance using Rolling Walker - Not Met  6. Gait  x 15 feet with Moderate Assistance using Rolling Walker. -not met  7. Lower extremity exercise program x15 reps per handout, with assistance as needed                        PLAN:    Patient to be seen 4 x/week  to address the above listed problems via gait training, therapeutic activities, therapeutic exercises  Plan of Care expires: 17  Plan of Care reviewed with: patient         Catracho S Donte, PT  2017     PT returned from 14:35-14:45 to assist pt. BTB. Pt. performed sit<->stand with Min A x2 from medi-chair and performed static stand while nursing performed pt.'s mercedes-care. Pt. returned to supine in bed with Max A x2. Pt. positioned for comfort with nursing present.    Catracho Kent, PT   2017  Therapeutic Activity: 10 min.

## 2017-11-14 NOTE — PLAN OF CARE
SW spoke with pt's sister and she said she wants a good SNF where they will be consistent with therapy.  She wants either [facility] or [facility] by [facility]. She gave SW these choices......1.) [facility]  4.) [facility] 5.) [facility].  WILMER emailed [facility].     WILMER called [facility] to complete LOCET after waiting 10 minutes while ringing no one answered. SW will call back later.      Roxanna Key, LEXW  [id number]

## 2017-11-14 NOTE — PROGRESS NOTES
[facility]  General Surgery  Progress Note    Subjective:     History of Present Illness:  Rosetta Whyte is a 72 y.o. female with morbid obesity (s/p sleeve gastrectomy), HTN, and OA [place] [facility] with 5 days history of abdominal pain.  She states that the pain started Tuesday.  The pain was located on the right side of her abdomen.  The pain for the first day was tolerable, but for the last 4 days, she states the pain has been 10/10.  She has associated nausea and vomiting, fever/chills.  She has had no appetite and has not eaten a meal for 4 days.  She has never had anything like this happen before, even a mild episode.  She has maintained normal bowel function.  No pale stools, jaundice, itching, or dark urine.  Functionally, she can walk about 1 block before stopping, but she states that this is due to leg swelling and arthritis and not chest pain/shortness of breath.  She has lost 80 pounds since a sleeve gastrectomy 4 months ago.  She also has history of open appendectomy.    Post-Op Info:  Procedure(s) (LRB):  CHOLECYSTECTOMY (N/A)  INCOMPLETE-PROCEDURE/ATTEMPTED Laparscopic cholecystectomy (N/A)   8 Days Post-Op     Interval History:   Patient seen and examined, no acute events overnight  Tolerating diet with no N/V  +F, BMx1  Has been working with PT/OT; sat in chair yesterday  Baseline hypertensive, otherwise afebrile/VSS    Medications:  Continuous Infusions:   Scheduled Meds:   albuterol-ipratropium 2.5mg-0.5mg/3mL  3 mL Nebulization Q4H    bumetanide  2 mg Oral BID    carvedilol  12.5 mg Oral BID    docusate sodium  100 mg Oral BID    heparin (porcine)  5,000 Units Subcutaneous Q8H    pantoprazole  40 mg Oral Before breakfast    polyethylene glycol  17 g Oral Daily    potassium chloride 10%  20 mEq Oral BID     PRN Meds:bisacodyl, diphenhydrAMINE, labetalol, ondansetron, oxyCODONE-acetaminophen     Review of patient's allergies indicates:  No Known Allergies  Objective:      Vital Signs (Most Recent):  Temp: 98.1 °F (36.7 °C) (11/14/17 0358)  Pulse: 79 (11/14/17 0636)  Resp: 16 (11/14/17 0636)  BP: (!) 175/84 (11/14/17 0636)  SpO2: 95 % (11/14/17 0636) Vital Signs (24h Range):  Temp:  [96.8 °F (36 °C)-98.4 °F (36.9 °C)] 98.1 °F (36.7 °C)  Pulse:  [55-94] 79  Resp:  [16-20] 16  SpO2:  [92 %-99 %] 95 %  BP: (133-215)/() 175/84     Weight: 127 kg (280 lb)  Body mass index is 46.59 kg/m².    Intake/Output - Last 3 Shifts       11/12 0700 - 11/13 0659 11/13 0700 - 11/14 0659 11/14 0700 - 11/15 0659    P.O.  1380     I.V. (mL/kg)       IV Piggyback       Total Intake(mL/kg)  1380 (10.9)     Urine (mL/kg/hr) 3150 (1) 2075 (0.7)     Emesis/NG output  0 (0)     Other  0 (0)     Stool  0 (0)     Blood  0 (0)     Total Output 3150 2075      Net -3150 -695             Urine Occurrence  2 x     Stool Occurrence  1 x     Emesis Occurrence  0 x           Physical Exam   Constitutional: She is oriented to person, place, and time. She appears well-developed and well-nourished. No distress.   HENT:   Head: Normocephalic and atraumatic.   Eyes: No scleral icterus.   Neck: Neck supple.   Cardiovascular: Normal rate and regular rhythm.    Pulmonary/Chest: Effort normal. No respiratory distress.   Abdominal:   Soft, appropriate TTP, non-distended  Incision - clean, dry and intact  Previous XIOMY drain site covered with gauze   Neurological: She is alert and oriented to person, place, and time.   Skin: Skin is warm and dry.   Psychiatric: She has a normal mood and affect.       Significant Labs:  CBC:   Recent Labs  Lab 11/14/17  0444   WBC 9.76   RBC 4.09   HGB 12.0   HCT 36.8*      MCV 90   MCH 29.3   MCHC 32.6     BMP:   Recent Labs  Lab 11/14/17 0444         K 3.8      CO2 31*   BUN 29*   CREATININE 0.8   CALCIUM 9.6   MG 1.2*     CMP:   Recent Labs  Lab 11/14/17 0444      CALCIUM 9.6   ALBUMIN 2.3*   PROT 5.8*      K 3.8   CO2 31*      BUN 29*    CREATININE 0.8   ALKPHOS 76   ALT 20   AST 31   BILITOT 0.6     LFTs:   Recent Labs  Lab 11/14/17  0444   ALT 20   AST 31   ALKPHOS 76   BILITOT 0.6   PROT 5.8*   ALBUMIN 2.3*     Assessment/Plan:     * Acute cholecystitis    S/P lap converted to open edison 11/6/17    - Continue regular diet   - Pain/nausea meds PRN  - bowel regimen  - IS/aggressive pulm toilet  - aggressive PT/OT  - Ambulate TID, OOBTC  - DVT ppx  - stable for dc to SNF; orders placed 11/9/17, family agreeable to placement          Hypomagnesemia    Replace         Morbid obesity with BMI of 45.0-49.9, adult    Body mass index is 46.59 kg/m².  PT/OT        Hypertension    - Improved on home regimen   - IV hydralazine and labetalol PRN            Kary Emanuel PA-C   [id number]  General Surgery  [facility]

## 2017-11-14 NOTE — PLAN OF CARE
WILMER heard back from [facility] that they are not in network with [facility].    Roxanna Key, [facility]  [id number]

## 2017-11-14 NOTE — PLAN OF CARE
Problem: Patient Care Overview  Goal: Plan of Care Review  Outcome: Ongoing (interventions implemented as appropriate)  Plan of \care reviewed with patient, DX of accute Cholecystitis  Lap choly on the 5th of Nov. HX of hptn, obese, Right upper abdomen incision,with staples, small midline incision with staples all JACQUELINE, no leakage, no drains, L ac 22 gauge saline locked, regular diet, incontinent with a pure wik lopes, assistance to move, room air, up in chair with assistance, position change q 2hours  Room air, no telemetry no checks. Bed locked and low call light in reach.

## 2017-11-14 NOTE — PLAN OF CARE
3:11 PM  SW called in LOCET to [facility]. Assigned SW to fax [facility] to obtain 142.    Colleen Sherwood LMSW  [facility]

## 2017-11-14 NOTE — PLAN OF CARE
Discharge planning: Plan for snf per CM notes yesterday. Communicated with Roxanna hsieh today and she will follow up on snf choices.

## 2017-11-14 NOTE — PLAN OF CARE
SW received a phone call from Cleopatra at [facility] denying pt due to her certain cath. They cannot care for this type of catheter.      Roxanna Key, \[facility] [place] [facility]\""W  [id number]

## 2017-11-14 NOTE — PLAN OF CARE
Referral sent to  1.) [facility]     1. 2.)[facility]    2. 3.)[facility]    3. 4.) [facility] [phone]    4. 5.) [facility] is f/u.

## 2017-11-15 PROBLEM — E87.6 HYPOKALEMIA: Status: ACTIVE | Noted: 2017-11-15

## 2017-11-15 LAB
ALBUMIN SERPL BCP-MCNC: 2.4 G/DL
ALP SERPL-CCNC: 71 U/L
ALT SERPL W/O P-5'-P-CCNC: 20 U/L
ANION GAP SERPL CALC-SCNC: 9 MMOL/L
AST SERPL-CCNC: 26 U/L
BASOPHILS # BLD AUTO: 0.08 K/UL
BASOPHILS NFR BLD: 1 %
BILIRUB SERPL-MCNC: 0.6 MG/DL
BUN SERPL-MCNC: 27 MG/DL
CALCIUM SERPL-MCNC: 9.4 MG/DL
CHLORIDE SERPL-SCNC: 102 MMOL/L
CO2 SERPL-SCNC: 33 MMOL/L
CREAT SERPL-MCNC: 0.8 MG/DL
DIFFERENTIAL METHOD: ABNORMAL
EOSINOPHIL # BLD AUTO: 0.3 K/UL
EOSINOPHIL NFR BLD: 3.2 %
ERYTHROCYTE [DISTWIDTH] IN BLOOD BY AUTOMATED COUNT: 13.7 %
EST. GFR  (AFRICAN AMERICAN): >60 ML/MIN/1.73 M^2
EST. GFR  (NON AFRICAN AMERICAN): >60 ML/MIN/1.73 M^2
GLUCOSE SERPL-MCNC: 107 MG/DL
HCT VFR BLD AUTO: 35.6 %
HGB BLD-MCNC: 11.5 G/DL
IMM GRANULOCYTES # BLD AUTO: 0.32 K/UL
IMM GRANULOCYTES NFR BLD AUTO: 3.8 %
LYMPHOCYTES # BLD AUTO: 2.1 K/UL
LYMPHOCYTES NFR BLD: 25 %
MAGNESIUM SERPL-MCNC: 1.2 MG/DL
MCH RBC QN AUTO: 29.2 PG
MCHC RBC AUTO-ENTMCNC: 32.3 G/DL
MCV RBC AUTO: 90 FL
MONOCYTES # BLD AUTO: 0.9 K/UL
MONOCYTES NFR BLD: 11 %
NEUTROPHILS # BLD AUTO: 4.7 K/UL
NEUTROPHILS NFR BLD: 56 %
NRBC BLD-RTO: 0 /100 WBC
PHOSPHATE SERPL-MCNC: 3.2 MG/DL
PLATELET # BLD AUTO: 315 K/UL
PMV BLD AUTO: 10.4 FL
POTASSIUM SERPL-SCNC: 3.7 MMOL/L
PROT SERPL-MCNC: 5.7 G/DL
RBC # BLD AUTO: 3.94 M/UL
SODIUM SERPL-SCNC: 144 MMOL/L
WBC # BLD AUTO: 8.33 K/UL

## 2017-11-15 PROCEDURE — 20600001 HC STEP DOWN PRIVATE ROOM

## 2017-11-15 PROCEDURE — 84100 ASSAY OF PHOSPHORUS: CPT

## 2017-11-15 PROCEDURE — 97116 GAIT TRAINING THERAPY: CPT

## 2017-11-15 PROCEDURE — 83735 ASSAY OF MAGNESIUM: CPT

## 2017-11-15 PROCEDURE — 25000242 PHARM REV CODE 250 ALT 637 W/ HCPCS: Performed by: SURGERY

## 2017-11-15 PROCEDURE — 97530 THERAPEUTIC ACTIVITIES: CPT

## 2017-11-15 PROCEDURE — 25000003 PHARM REV CODE 250: Performed by: SURGERY

## 2017-11-15 PROCEDURE — 63600175 PHARM REV CODE 636 W HCPCS: Performed by: SURGERY

## 2017-11-15 PROCEDURE — 85025 COMPLETE CBC W/AUTO DIFF WBC: CPT

## 2017-11-15 PROCEDURE — 94799 UNLISTED PULMONARY SVC/PX: CPT

## 2017-11-15 PROCEDURE — 25000003 PHARM REV CODE 250: Performed by: PHYSICIAN ASSISTANT

## 2017-11-15 PROCEDURE — 25000003 PHARM REV CODE 250: Performed by: STUDENT IN AN ORGANIZED HEALTH CARE EDUCATION/TRAINING PROGRAM

## 2017-11-15 PROCEDURE — 80053 COMPREHEN METABOLIC PANEL: CPT

## 2017-11-15 PROCEDURE — 94761 N-INVAS EAR/PLS OXIMETRY MLT: CPT

## 2017-11-15 PROCEDURE — 99900035 HC TECH TIME PER 15 MIN (STAT)

## 2017-11-15 PROCEDURE — 36415 COLL VENOUS BLD VENIPUNCTURE: CPT

## 2017-11-15 PROCEDURE — 94640 AIRWAY INHALATION TREATMENT: CPT

## 2017-11-15 PROCEDURE — 94664 DEMO&/EVAL PT USE INHALER: CPT

## 2017-11-15 RX ORDER — IPRATROPIUM BROMIDE AND ALBUTEROL SULFATE 2.5; .5 MG/3ML; MG/3ML
3 SOLUTION RESPIRATORY (INHALATION) EVERY 4 HOURS PRN
Status: DISCONTINUED | OUTPATIENT
Start: 2017-11-15 | End: 2017-11-17 | Stop reason: HOSPADM

## 2017-11-15 RX ADMIN — HEPARIN SODIUM 5000 UNITS: 5000 INJECTION, SOLUTION INTRAVENOUS; SUBCUTANEOUS at 05:11

## 2017-11-15 RX ADMIN — AMLODIPINE BESYLATE 10 MG: 10 TABLET ORAL at 08:11

## 2017-11-15 RX ADMIN — CARVEDILOL 25 MG: 25 TABLET, FILM COATED ORAL at 09:11

## 2017-11-15 RX ADMIN — POTASSIUM CHLORIDE 20 MEQ: 20 SOLUTION ORAL at 09:11

## 2017-11-15 RX ADMIN — IPRATROPIUM BROMIDE AND ALBUTEROL SULFATE 3 ML: .5; 3 SOLUTION RESPIRATORY (INHALATION) at 07:11

## 2017-11-15 RX ADMIN — HEPARIN SODIUM 5000 UNITS: 5000 INJECTION, SOLUTION INTRAVENOUS; SUBCUTANEOUS at 02:11

## 2017-11-15 RX ADMIN — MAGNESIUM OXIDE TAB 400 MG (241.3 MG ELEMENTAL MG) 800 MG: 400 (241.3 MG) TAB at 09:11

## 2017-11-15 RX ADMIN — PANTOPRAZOLE SODIUM 40 MG: 40 TABLET, DELAYED RELEASE ORAL at 05:11

## 2017-11-15 RX ADMIN — BUMETANIDE 2 MG: 1 TABLET ORAL at 09:11

## 2017-11-15 RX ADMIN — IPRATROPIUM BROMIDE AND ALBUTEROL SULFATE 3 ML: .5; 3 SOLUTION RESPIRATORY (INHALATION) at 03:11

## 2017-11-15 RX ADMIN — OXYCODONE HYDROCHLORIDE AND ACETAMINOPHEN 1 TABLET: 5; 325 TABLET ORAL at 11:11

## 2017-11-15 RX ADMIN — HEPARIN SODIUM 5000 UNITS: 5000 INJECTION, SOLUTION INTRAVENOUS; SUBCUTANEOUS at 09:11

## 2017-11-15 NOTE — PT/OT/SLP PROGRESS
Occupational Therapy  Treatment    Rosetta Whyte   MRN: [id number]   Admitting Diagnosis: Acute cholecystitis    OT Date of Treatment: 11/15/17   OT Start Time: 1524  OT Stop Time: 1547  OT Total Time (min): 23 min    Billable Minutes:  Therapeutic Activity 23    General Precautions: Standard, fall  Orthopedic Precautions: N/A  Braces: N/A    Do you have any cultural, spiritual, Scientologist conflicts, given your current situation?: none    Subjective:  Communicated with RN prior to session.    Pain/Comfort  Pain Rating 1: 0/10  Pain Rating Post-Intervention 1: 0/10    Objective:  Pt presented supine in bed; agreeable to participate with max encouragement      Functional Mobility:  Bed Mobility:  Supine to Sit: Maximum Assistance  Sit to Supine: Maximum Assistance    Transfers:   Sit <> Stand Assistance: Contact Guard Assistance to stand from EOB raised, Minimum Assistance to stand from EOB in lowest position  Sit <> Stand Assistive Device: Rolling Walker (bariatric)    Functional Ambulation: completed x 24 feet x 2 trials with cues for decreased trunk flexion and visual scanning of environment for safety     Activities of Daily Living:  Toileting Where Assessed: Bed level  Toileting Level of Assistance: Total assistance; pt with Pure Wick in place; RN notified that pt will require bariatric bedside commode to be ordered for future sessions as she is becoming more appropriate for BSC transfers; RN verbalized that she will order the BSC     AM-PAC 6 CLICK ADL   How much help from another person does this patient currently need?   1 = Unable, Total/Dependent Assistance  2 = A lot, Maximum/Moderate Assistance  3 = A little, Minimum/Contact Guard/Supervision  4 = None, Modified [place]/Independent    Putting on and taking off regular lower body clothing? : 1  Bathing (including washing, rinsing, drying)?: 2  Toileting, which includes using toilet, bedpan, or urinal? : 1  Putting on and taking off regular upper body  "clothing?: 3  Taking care of personal grooming such as brushing teeth?: 3  Eating meals?: 3  Total Score: 13     AM-PAC Raw Score CMS "G-Code Modifier Level of Impairment Assistance   6 % Total / Unable   7 - 8 CM 80 - 100% Maximal Assist   9-13 CL 60 - 80% Moderate Assist   14 - 19 CK 40 - 60% Moderate Assist   20 - 22 CJ 20 - 40% Minimal Assist   23 CI 1-20% SBA / CGA   24 CH 0% Independent/ Mod I       Patient left supine with all lines intact, call button in reach and RN notified    ASSESSMENT:  Rosetta Whyte is a 72 y.o. female with a medical diagnosis of Acute cholecystitis and presents with improving activity tolerance and performance of functional transfers.  Pt requires total A for toileting at this time but is willing to practice BSC transfers in future sessions to progress with this task.  Pt will benefit from continued skilled OT services to maximize functional independence.    Rehab identified problem list/impairments: Rehab identified problem list/impairments: weakness, impaired endurance, impaired self care skills, impaired functional mobilty, impaired balance, impaired joint extensibility, decreased ROM    Rehab potential is good.    Activity tolerance: Fair    Discharge recommendations: Discharge Facility/Level Of Care Needs: nursing facility, skilled     Barriers to discharge: Barriers to Discharge: Decreased caregiver support    Equipment recommendations: bedside commode     GOALS:    Occupational Therapy Goals        Problem: Occupational Therapy Goal    Goal Priority Disciplines Outcome Interventions   Occupational Therapy Goal     OT, PT/OT Ongoing (interventions implemented as appropriate)    Description:  Goals to be met by: 11/17/2017    Patient will increase functional independence with ADLs by performing:    Feeding with Set-up Assistance.  UE Dressing with Minimal Assistance.  LE Dressing with Minimal Assistance.  Grooming while seated EOB with Stand-by Assistance. Progressing "   Toileting from bedside commode with Moderate Assistance for hygiene and clothing management.   Sitting at edge of bed x20 minutes with Stand-by Assistance.  Supine to sit with Minimal Assistance.  Stand pivot transfers with Minimal Assistance x 1 person.                       Plan:  Patient to be seen 5 x/week to address the above listed problems via self-care/home management, therapeutic activities, therapeutic exercises  Plan of Care expires: 12/07/17  Plan of Care reviewed with: patient         REBEKAH Moran  11/15/2017

## 2017-11-15 NOTE — ASSESSMENT & PLAN NOTE
S/P lap converted to open edison 11/6/17    - Regular diet   - Pain/nausea meds PRN  - bowel regimen  - IS/aggressive pulm toilet  - aggressive PT/OT  - Ambulate TID, OOBTC  - DVT ppx  - stable for dc to SNF; orders placed 11/9/17, family agreeable to placement

## 2017-11-15 NOTE — PLAN OF CARE
Problem: Occupational Therapy Goal  Goal: Occupational Therapy Goal  Goals to be met by: 11/17/2017    Patient will increase functional independence with ADLs by performing:    Feeding with Set-up Assistance.  UE Dressing with Minimal Assistance.  LE Dressing with Minimal Assistance.  Grooming while seated EOB with Stand-by Assistance. Progressing   Toileting from bedside commode with Moderate Assistance for hygiene and clothing management.   Sitting at edge of bed x20 minutes with Stand-by Assistance.  Supine to sit with Minimal Assistance.  Stand pivot transfers with Minimal Assistance x 1 person.      Outcome: Ongoing (interventions implemented as appropriate)  OT goals remain appropriate.  REBEKAH Moran  11/15/2017

## 2017-11-15 NOTE — PLAN OF CARE
3:35pm - SW unable to contact admission coordinators for [facility] and [facility] to discuss denials; SW will attempted again in the am.    Vinicius Torres LMSW  Ext. [phone]

## 2017-11-15 NOTE — PROGRESS NOTES
[facility]  General Surgery  Progress Note    Subjective:     History of Present Illness:  Rosetta Whyte is a 72 y.o. female with morbid obesity (s/p sleeve gastrectomy), HTN, and OA presents to [facility] ED with 5 days history of abdominal pain.  She states that the pain started Tuesday.  The pain was located on the right side of her abdomen.  The pain for the first day was tolerable, but for the last 4 days, she states the pain has been 10/10.  She has associated nausea and vomiting, fever/chills.  She has had no appetite and has not eaten a meal for 4 days.  She has never had anything like this happen before, even a mild episode.  She has maintained normal bowel function.  No pale stools, jaundice, itching, or dark urine.  Functionally, she can walk about 1 block before stopping, but she states that this is due to leg swelling and arthritis and not chest pain/shortness of breath.  She has lost 80 pounds since a sleeve gastrectomy 4 months ago.  She also has history of open appendectomy.    Post-Op Info:  Procedure(s) (LRB):  CHOLECYSTECTOMY (N/A)  INCOMPLETE-PROCEDURE/ATTEMPTED Laparscopic cholecystectomy (N/A)   9 Days Post-Op     Interval History:   Patient seen and examined, no acute events overnight  Tolerating diet with no N/V  +F, BMx2  Has been working with PT/OT; sat in chair yesterday  Baseline hypertensive, otherwise afebrile/VSS    Medications:  Continuous Infusions:   Scheduled Meds:   albuterol-ipratropium 2.5mg-0.5mg/3mL  3 mL Nebulization Q4H    amLODIPine  10 mg Oral QAM    bumetanide  2 mg Oral Daily    carvedilol  25 mg Oral BID    docusate sodium  100 mg Oral BID    heparin (porcine)  5,000 Units Subcutaneous Q8H    magnesium oxide  800 mg Oral BID    pantoprazole  40 mg Oral Before breakfast    polyethylene glycol  17 g Oral Daily    potassium chloride 10%  20 mEq Oral BID     PRN Meds:bisacodyl, diphenhydrAMINE, labetalol, ondansetron, oxyCODONE-acetaminophen     Review  of patient's allergies indicates:  No Known Allergies  Objective:     Vital Signs (Most Recent):  Temp: 97.8 °F (36.6 °C) (11/15/17 0501)  Pulse: 61 (11/15/17 0501)  Resp: 16 (11/15/17 0501)  BP: (!) 175/77 (11/15/17 0501)  SpO2: (!) 90 % (11/15/17 0501) Vital Signs (24h Range):  Temp:  [97.1 °F (36.2 °C)-98.8 °F (37.1 °C)] 97.8 °F (36.6 °C)  Pulse:  [61-87] 61  Resp:  [16-20] 16  SpO2:  [90 %-97 %] 90 %  BP: (169-194)/(74-88) 175/77     Weight: 127 kg (280 lb)  Body mass index is 46.59 kg/m².    Intake/Output - Last 3 Shifts       11/13 0700 - 11/14 0659 11/14 0700 - 11/15 0659 11/15 0700 - 11/16 0659    P.O. 1380 1540     Total Intake(mL/kg) 1380 (10.9) 1540 (12.1)     Urine (mL/kg/hr) 2075 (0.7) 2600 (0.9)     Emesis/NG output 0 (0) 0 (0)     Other 0 (0) 0 (0)     Stool 0 (0) 0 (0)     Blood 0 (0) 0 (0)     Total Output 2075 2600      Net -695 -1060             Urine Occurrence 2 x 3 x     Stool Occurrence 1 x 2 x     Emesis Occurrence 0 x 0 x           Physical Exam   Constitutional: She is oriented to person, place, and time. She appears well-developed and well-nourished. No distress.   HENT:   Head: Normocephalic and atraumatic.   Eyes: No scleral icterus.   Neck: Neck supple.   Cardiovascular: Normal rate and regular rhythm.    Pulmonary/Chest: Effort normal. No respiratory distress.   Abdominal:   Soft, appropriate TTP, non-distended  Incision - clean, dry and intact  Previous XIOMY drain site covered with gauze   Neurological: She is alert and oriented to person, place, and time.   Skin: Skin is warm and dry.   Psychiatric: She has a normal mood and affect.       Significant Labs:  CBC:   Recent Labs  Lab 11/15/17  0445   WBC 8.33   RBC 3.94*   HGB 11.5*   HCT 35.6*      MCV 90   MCH 29.2   MCHC 32.3     BMP:   Recent Labs  Lab 11/15/17  0445         K 3.7      CO2 33*   BUN 27*   CREATININE 0.8   CALCIUM 9.4   MG 1.2*     CMP:   Recent Labs  Lab 11/15/17  0445      CALCIUM 9.4    ALBUMIN 2.4*   PROT 5.7*      K 3.7   CO2 33*      BUN 27*   CREATININE 0.8   ALKPHOS 71   ALT 20   AST 26   BILITOT 0.6     LFTs:   Recent Labs  Lab 11/15/17  0445   ALT 20   AST 26   ALKPHOS 71   BILITOT 0.6   PROT 5.7*   ALBUMIN 2.4*     Assessment/Plan:     * Acute cholecystitis    S/P lap converted to open edison 11/6/17    - Regular diet   - Pain/nausea meds PRN  - bowel regimen  - IS/aggressive pulm toilet  - aggressive PT/OT  - Ambulate TID, OOBTC  - DVT ppx  - stable for dc to SNF; orders placed 11/9/17, family agreeable to placement          Hypokalemia    Replace         Hypomagnesemia    Replace         Morbid obesity with BMI of 45.0-49.9, adult    Body mass index is 46.59 kg/m².  PT/OT        Hypertension    - Improved on home regimen   - labetalol PRN            Kary Emanuel PA-C   [id number]  General Surgery  [facility]

## 2017-11-15 NOTE — SUBJECTIVE & OBJECTIVE
Interval History:   Patient seen and examined, no acute events overnight  Tolerating diet with no N/V  +F, BMx2  Has been working with PT/OT; sat in chair yesterday  Baseline hypertensive, otherwise afebrile/VSS    Medications:  Continuous Infusions:   Scheduled Meds:   albuterol-ipratropium 2.5mg-0.5mg/3mL  3 mL Nebulization Q4H    amLODIPine  10 mg Oral QAM    bumetanide  2 mg Oral Daily    carvedilol  25 mg Oral BID    docusate sodium  100 mg Oral BID    heparin (porcine)  5,000 Units Subcutaneous Q8H    magnesium oxide  800 mg Oral BID    pantoprazole  40 mg Oral Before breakfast    polyethylene glycol  17 g Oral Daily    potassium chloride 10%  20 mEq Oral BID     PRN Meds:bisacodyl, diphenhydrAMINE, labetalol, ondansetron, oxyCODONE-acetaminophen     Review of patient's allergies indicates:  No Known Allergies  Objective:     Vital Signs (Most Recent):  Temp: 97.8 °F (36.6 °C) (11/15/17 0501)  Pulse: 61 (11/15/17 0501)  Resp: 16 (11/15/17 0501)  BP: (!) 175/77 (11/15/17 0501)  SpO2: (!) 90 % (11/15/17 0501) Vital Signs (24h Range):  Temp:  [97.1 °F (36.2 °C)-98.8 °F (37.1 °C)] 97.8 °F (36.6 °C)  Pulse:  [61-87] 61  Resp:  [16-20] 16  SpO2:  [90 %-97 %] 90 %  BP: (169-194)/(74-88) 175/77     Weight: 127 kg (280 lb)  Body mass index is 46.59 kg/m².    Intake/Output - Last 3 Shifts       11/13 0700 - 11/14 0659 11/14 0700 - 11/15 0659 11/15 0700 - 11/16 0659    P.O. 1380 1540     Total Intake(mL/kg) 1380 (10.9) 1540 (12.1)     Urine (mL/kg/hr) 2075 (0.7) 2600 (0.9)     Emesis/NG output 0 (0) 0 (0)     Other 0 (0) 0 (0)     Stool 0 (0) 0 (0)     Blood 0 (0) 0 (0)     Total Output 2076 2600      Net -695 -1060             Urine Occurrence 2 x 3 x     Stool Occurrence 1 x 2 x     Emesis Occurrence 0 x 0 x           Physical Exam   Constitutional: She is oriented to person, place, and time. She appears well-developed and well-nourished. No distress.   HENT:   Head: Normocephalic and atraumatic.   Eyes: No  scleral icterus.   Neck: Neck supple.   Cardiovascular: Normal rate and regular rhythm.    Pulmonary/Chest: Effort normal. No respiratory distress.   Abdominal:   Soft, appropriate TTP, non-distended  Incision - clean, dry and intact  Previous XIOMY drain site covered with gauze   Neurological: She is alert and oriented to person, place, and time.   Skin: Skin is warm and dry.   Psychiatric: She has a normal mood and affect.       Significant Labs:  CBC:   Recent Labs  Lab 11/15/17  0445   WBC 8.33   RBC 3.94*   HGB 11.5*   HCT 35.6*      MCV 90   MCH 29.2   MCHC 32.3     BMP:   Recent Labs  Lab 11/15/17  0445         K 3.7      CO2 33*   BUN 27*   CREATININE 0.8   CALCIUM 9.4   MG 1.2*     CMP:   Recent Labs  Lab 11/15/17  0445      CALCIUM 9.4   ALBUMIN 2.4*   PROT 5.7*      K 3.7   CO2 33*      BUN 27*   CREATININE 0.8   ALKPHOS 71   ALT 20   AST 26   BILITOT 0.6     LFTs:   Recent Labs  Lab 11/15/17  0445   ALT 20   AST 26   ALKPHOS 71   BILITOT 0.6   PROT 5.7*   ALBUMIN 2.4*

## 2017-11-15 NOTE — PLAN OF CARE
Problem: Patient Care Overview  Goal: Plan of Care Review  Outcome: Ongoing (interventions implemented as appropriate)  Plan of care reviewed with memo, patient verbalizes understanding, DX of Accute Cholecystitis , Lap Choly on 11-5- 17, Right upper abdominal wound, small ML incision with staples, JACQUELINE no leakage, No drains, Left AC 22g saline locked, patient is incontinent with a pure wick lopes, needs 2 to 3 for assistance to ambulate, room air, Safe transfer technique discussed, along with infection control and the importance of no skin breakdown, Diet also discussed along with up add artie as much as possible to regain strength., Bed locked and low call light in reach.

## 2017-11-15 NOTE — PT/OT/SLP PROGRESS
"Physical Therapy  Treatment    Rosetta Whyte   MRN: [id number]   Admitting Diagnosis: Acute cholecystitis    PT Received On: 11/15/17  PT Start Time: 0935     PT Stop Time: 1014    PT Total Time (min): 39 min       Billable Minutes:  Gait [id number]    Treatment Type: Treatment  PT/PTA: PT     PTA Visit Number: 1       General Precautions: Standard, fall  Orthopedic Precautions: N/A   Braces: N/A         Subjective:  Communicated with nurse prior to session.  "I am picking up my feet"    Pain/Comfort  Pain Rating 1: 0/10 (pt did not c/o pain during treatment)  Pain Rating Post-Intervention 1: 0/10    Objective:   Patient found with: peripheral IV    Functional Mobility:  Bed Mobility:   Supine to Sit: Maximum Assistance, With leg lift  Sit to Supine:  (pt remained up in medi chair after gait)    Transfers:  Sit <> Stand Assistance: Minimum Assistance (2 trials from bed and 3 trials from medichair)  Sit <> Stand Assistive Device: Rolling Walker (bariatric)  Bed <> Chair Technique: Stand Pivot (bed to medichair)  Bed <> Chair Assistance: Minimum Assistance  Bed <> Chair Assistive Device: No Assistive Device (HHA from PT)    Gait:   Gait Distance: 15ft then 20ft then 40ft with bariatric RW with flexed trunk, decreased step length, step to gait, and at slow pace  Assistance 1: Minimum assistance  Gait Assistive Device: Rolling walker (bariatric)  Gait Deviation(s): decreased qamar, decreased step length, forward lean, foot flat    Balance:   Static Sit: FAIR: Maintains without assist, but unable to take any challenges   Dynamic Sit: FAIR: Cannot move trunk without losing balance  Static Stand: FAIR: Maintains without assist but unable to take challenges  Therapeutic Activities and Exercises:  Pt sat on the EOB ~ 10 min with SBA prior to transfer to [facility] 6 CLICK MOBILITY  How much help from another person does this patient currently need?   1 = Unable, Total/Dependent Assistance  2 = A lot, Maximum/Moderate " Assistance  3 = A little, Minimum/Contact Guard/Supervision  4 = None, Modified [place]/Independent    Turning over in bed (including adjusting bedclothes, sheets and blankets)?: 2  Sitting down on and standing up from a chair with arms (e.g., wheelchair, bedside commode, etc.): 3  Moving from lying on back to sitting on the side of the bed?: 2  Moving to and from a bed to a chair (including a wheelchair)?: 3  Need to walk in hospital room?: 3  Climbing 3-5 steps with a railing?: 1  Total Score: 14    AM-PAC Raw Score CMS G-Code Modifier Level of Impairment Assistance   6 % Total / Unable   7 - 9 CM 80 - 100% Maximal Assist   10 - 14 CL 60 - 80% Moderate Assist   15 - 19 CK 40 - 60% Moderate Assist   20 - 22 CJ 20 - 40% Minimal Assist   23 CI 1-20% SBA / CGA   24 CH 0% Independent/ Mod I     Patient left up in chair with all lines intact, call button in reach and nurse notified.    Assessment:  Rosetta Whyte is a 72 y.o. female with a medical diagnosis of Acute cholecystitis and presents with difficulty with functional mobility due to weakness, instability, decreased ROM, and fatigue. Pt is motivated to progress with mobility. Pt will benefit from transfers from lower surfaces.    Rehab identified problem list/impairments: Rehab identified problem list/impairments: weakness, impaired endurance, gait instability, impaired functional mobilty, pain    Rehab potential is good.    Activity tolerance: Good    Discharge recommendations: Discharge Facility/Level Of Care Needs: nursing facility, skilled     Barriers to discharge: Barriers to Discharge: Decreased caregiver support (requires assist for mobility)    Equipment recommendations: Equipment Needed After Discharge: bedside commode (may require hospital bed)     GOALS:    Physical Therapy Goals        Problem: Physical Therapy Goal    Goal Priority Disciplines Outcome Goal Variances Interventions   Physical Therapy Goal     PT/OT, PT Ongoing (interventions  implemented as appropriate)     Description:  Goals to be met by: 2017     Patient will increase functional independence with mobility by performin. Supine to sit with Moderate Assistance-not met  2. Sit to supine with Moderate Assistance-not met  3. Sit to stand transfer with Moderate Assistance-met       Revised: Sit to stand transfer with Contact Guard Assistance - Not met  4. Bed to chair transfer with Moderate Assistance using Rolling Walker - Met       Revised: Bed to chair transfer with Contact Guard Assistance using Rolling Walker - Not Met  6. Gait  x 15 feet with Moderate Assistance using Rolling Walker. -not met  Revised goal: gait 80ft with RW with SBA-not met  7. Lower extremity exercise program x15 reps per handout, with assistance as needed                     PLAN:    Patient to be seen 5 x/week  to address the above listed problems via gait training, therapeutic activities, therapeutic exercises, neuromuscular re-education  Plan of Care expires: 17  Plan of Care reviewed with: patient  Vikki JOSE Benton, PT  11/15/2017

## 2017-11-15 NOTE — PLAN OF CARE
Problem: Patient Care Overview  Goal: Plan of Care Review  Outcome: Ongoing (interventions implemented as appropriate)  POC reviewed with pt, pt verbalized understanding. AAOx4. BP remains elevated, SBP <200. Abdominal incision with staples, C/D/I. Sat up in chair for a few hours. Was able to stand for a few minutes as well to get to and from the chair. Denies pain. Tolerating regular diet, no N/V. Purwick in place, adequate UOP. x1 bowel movement this shift. Moisture dermatitis between legs, barrier cream applied. No falls or injuries at this time, call bell within reach, bed low. WCTM. Waiting on SNF placement.

## 2017-11-15 NOTE — PLAN OF CARE
Referral send O-SNF through [facility] for short stay SNF.     Vinicius Torres WILMER  Ext. [phone]

## 2017-11-15 NOTE — PLAN OF CARE
Problem: Physical Therapy Goal  Goal: Physical Therapy Goal  Goals to be met by: 2017     Patient will increase functional independence with mobility by performin. Supine to sit with Moderate Assistance-not met  2. Sit to supine with Moderate Assistance-not met  3. Sit to stand transfer with Moderate Assistance-met       Revised: Sit to stand transfer with Contact Guard Assistance - Not met  4. Bed to chair transfer with Moderate Assistance using Rolling Walker - Met       Revised: Bed to chair transfer with Contact Guard Assistance using Rolling Walker - Not Met  6. Gait  x 15 feet with Moderate Assistance using Rolling Walker. -not met  Revised goal: gait 80ft with RW with SBA-not met  7. Lower extremity exercise program x15 reps per handout, with assistance as needed       Outcome: Ongoing (interventions implemented as appropriate)  Pt's goals revised as needed and pt will continue to benefit from skilled PT services to work towards improved functional mobility including: bed mobility, transfers, and gait.   Vikki Benton, PT  11/15/2017

## 2017-11-16 PROBLEM — R32 INCONTINENCE OF URINE IN FEMALE: Status: ACTIVE | Noted: 2017-11-16

## 2017-11-16 LAB
ALBUMIN SERPL BCP-MCNC: 2.3 G/DL
ALP SERPL-CCNC: 69 U/L
ALT SERPL W/O P-5'-P-CCNC: 19 U/L
ANION GAP SERPL CALC-SCNC: 10 MMOL/L
AST SERPL-CCNC: 22 U/L
BASOPHILS # BLD AUTO: 0.09 K/UL
BASOPHILS NFR BLD: 1.1 %
BILIRUB SERPL-MCNC: 0.5 MG/DL
BUN SERPL-MCNC: 25 MG/DL
CALCIUM SERPL-MCNC: 9.5 MG/DL
CHLORIDE SERPL-SCNC: 103 MMOL/L
CO2 SERPL-SCNC: 32 MMOL/L
CREAT SERPL-MCNC: 0.8 MG/DL
DIFFERENTIAL METHOD: ABNORMAL
EOSINOPHIL # BLD AUTO: 0.2 K/UL
EOSINOPHIL NFR BLD: 2.9 %
ERYTHROCYTE [DISTWIDTH] IN BLOOD BY AUTOMATED COUNT: 13.8 %
EST. GFR  (AFRICAN AMERICAN): >60 ML/MIN/1.73 M^2
EST. GFR  (NON AFRICAN AMERICAN): >60 ML/MIN/1.73 M^2
GLUCOSE SERPL-MCNC: 101 MG/DL
HCT VFR BLD AUTO: 35.1 %
HGB BLD-MCNC: 11.1 G/DL
IMM GRANULOCYTES # BLD AUTO: 0.22 K/UL
IMM GRANULOCYTES NFR BLD AUTO: 2.8 %
LYMPHOCYTES # BLD AUTO: 2.6 K/UL
LYMPHOCYTES NFR BLD: 32.3 %
MAGNESIUM SERPL-MCNC: 1.2 MG/DL
MCH RBC QN AUTO: 28.4 PG
MCHC RBC AUTO-ENTMCNC: 31.6 G/DL
MCV RBC AUTO: 90 FL
MONOCYTES # BLD AUTO: 0.9 K/UL
MONOCYTES NFR BLD: 11.1 %
NEUTROPHILS # BLD AUTO: 4 K/UL
NEUTROPHILS NFR BLD: 49.8 %
NRBC BLD-RTO: 0 /100 WBC
PHOSPHATE SERPL-MCNC: 3.4 MG/DL
PLATELET # BLD AUTO: 321 K/UL
PMV BLD AUTO: 10.4 FL
POTASSIUM SERPL-SCNC: 3.6 MMOL/L
PROT SERPL-MCNC: 5.5 G/DL
RBC # BLD AUTO: 3.91 M/UL
SODIUM SERPL-SCNC: 145 MMOL/L
WBC # BLD AUTO: 7.99 K/UL

## 2017-11-16 PROCEDURE — 63600175 PHARM REV CODE 636 W HCPCS: Performed by: SURGERY

## 2017-11-16 PROCEDURE — 25000003 PHARM REV CODE 250: Performed by: PHYSICIAN ASSISTANT

## 2017-11-16 PROCEDURE — 85025 COMPLETE CBC W/AUTO DIFF WBC: CPT

## 2017-11-16 PROCEDURE — 97110 THERAPEUTIC EXERCISES: CPT

## 2017-11-16 PROCEDURE — 80053 COMPREHEN METABOLIC PANEL: CPT

## 2017-11-16 PROCEDURE — 83735 ASSAY OF MAGNESIUM: CPT

## 2017-11-16 PROCEDURE — 97530 THERAPEUTIC ACTIVITIES: CPT

## 2017-11-16 PROCEDURE — 36415 COLL VENOUS BLD VENIPUNCTURE: CPT

## 2017-11-16 PROCEDURE — 20600001 HC STEP DOWN PRIVATE ROOM

## 2017-11-16 PROCEDURE — 97116 GAIT TRAINING THERAPY: CPT

## 2017-11-16 PROCEDURE — 84100 ASSAY OF PHOSPHORUS: CPT

## 2017-11-16 PROCEDURE — 25000003 PHARM REV CODE 250: Performed by: SURGERY

## 2017-11-16 RX ORDER — POTASSIUM CHLORIDE 20 MEQ/1
20 TABLET, EXTENDED RELEASE ORAL 2 TIMES DAILY
Status: DISCONTINUED | OUTPATIENT
Start: 2017-11-16 | End: 2017-11-16

## 2017-11-16 RX ORDER — LANOLIN ALCOHOL/MO/W.PET/CERES
800 CREAM (GRAM) TOPICAL 3 TIMES DAILY
Status: DISCONTINUED | OUTPATIENT
Start: 2017-11-16 | End: 2017-11-17 | Stop reason: HOSPADM

## 2017-11-16 RX ADMIN — POTASSIUM CHLORIDE 20 MEQ: 20 SOLUTION ORAL at 11:11

## 2017-11-16 RX ADMIN — PANTOPRAZOLE SODIUM 40 MG: 40 TABLET, DELAYED RELEASE ORAL at 06:11

## 2017-11-16 RX ADMIN — BUMETANIDE 2 MG: 1 TABLET ORAL at 11:11

## 2017-11-16 RX ADMIN — MAGNESIUM OXIDE TAB 400 MG (241.3 MG ELEMENTAL MG) 800 MG: 400 (241.3 MG) TAB at 11:11

## 2017-11-16 RX ADMIN — MAGNESIUM SULFATE HEPTAHYDRATE 3 G: 500 INJECTION, SOLUTION INTRAMUSCULAR; INTRAVENOUS at 11:11

## 2017-11-16 RX ADMIN — AMLODIPINE BESYLATE 10 MG: 10 TABLET ORAL at 06:11

## 2017-11-16 RX ADMIN — HEPARIN SODIUM 5000 UNITS: 5000 INJECTION, SOLUTION INTRAVENOUS; SUBCUTANEOUS at 02:11

## 2017-11-16 RX ADMIN — HEPARIN SODIUM 5000 UNITS: 5000 INJECTION, SOLUTION INTRAVENOUS; SUBCUTANEOUS at 06:11

## 2017-11-16 RX ADMIN — HEPARIN SODIUM 5000 UNITS: 5000 INJECTION, SOLUTION INTRAVENOUS; SUBCUTANEOUS at 09:11

## 2017-11-16 RX ADMIN — CARVEDILOL 25 MG: 25 TABLET, FILM COATED ORAL at 11:11

## 2017-11-16 RX ADMIN — MAGNESIUM OXIDE TAB 400 MG (241.3 MG ELEMENTAL MG) 800 MG: 400 (241.3 MG) TAB at 02:11

## 2017-11-16 RX ADMIN — CARVEDILOL 25 MG: 25 TABLET, FILM COATED ORAL at 09:11

## 2017-11-16 NOTE — PLAN OF CARE
WILMER spoke w/Cleopatra, admission's nurse for [facility] (ph# [phone]) who has requested updated information and will re-submit to medical team for review but termination of catheter has to be noted.      Cleopatra states that nursing informed her that pt had a catheter and they declined admission due to catheter care needs.    WILMER spoke w/Roxanna, admission's for [facility] (ph#[phone]) who states that they will re-consider SNF placement.  WILMER will submit updated paperwork.  Roxanna states they don't topically accept pt over 250lbs for SNF but will review updated paperwork for progress.    WILMER will send updated paperwork through [facility].    Vinicius Torres WILMER  Ext. [phone].

## 2017-11-16 NOTE — PLAN OF CARE
Problem: Patient Care Overview  Goal: Plan of Care Review  Outcome: Ongoing (interventions implemented as appropriate)  Pt awake, alert, oriented X4. Transverse right upper side abdominal incision with staples- JACQUELINE. Small incision with staples in middle of abdomen- JACQUELINE. Pt complained of pain last night, treated with PRN medications. BM last night. Pure wick in place. Pt incontinent. Voiding large amounts of concentrated urine. Tolerating regular diet, denies nausea/ vomiting. Vitals stable on room air. Plan of care reviewed with pt, who verbalizes understanding. Pt remained free of falls. No acute events. No distress noted at this time, will continue to monitor pt.

## 2017-11-16 NOTE — PT/OT/SLP PROGRESS
Occupational Therapy      Rosetta Whyte  MRN: [id number]    Attempted to see pt, however pt returned back to bed with PCT and reporting increased fatigue. Pt refusing OT treatment at this time. Will re-attempt to see patient tomorrow as time allows and pt is willing.     Alycia Sutherland, OT  11/16/2017

## 2017-11-16 NOTE — PROGRESS NOTES
[facility]  General Surgery  Progress Note    Subjective:     History of Present Illness:  Rosetta Whyte is a 72 y.o. female with morbid obesity (s/p sleeve gastrectomy), HTN, and OA presents to [facility] ED with 5 days history of abdominal pain.  She states that the pain started Tuesday.  The pain was located on the right side of her abdomen.  The pain for the first day was tolerable, but for the last 4 days, she states the pain has been 10/10.  She has associated nausea and vomiting, fever/chills.  She has had no appetite and has not eaten a meal for 4 days.  She has never had anything like this happen before, even a mild episode.  She has maintained normal bowel function.  No pale stools, jaundice, itching, or dark urine.  Functionally, she can walk about 1 block before stopping, but she states that this is due to leg swelling and arthritis and not chest pain/shortness of breath.  She has lost 80 pounds since a sleeve gastrectomy 4 months ago.  She also has history of open appendectomy.    Post-Op Info:  Procedure(s) (LRB):  CHOLECYSTECTOMY (N/A)  INCOMPLETE-PROCEDURE/ATTEMPTED Laparscopic cholecystectomy (N/A)   10 Days Post-Op     Interval History:   Patient seen and examined, no acute events overnight  Tolerating diet with no N/V  +F, BMx1  Has been working with PT/OT; sitting in chair daily  Baseline hypertensive, otherwise afebrile/VSS    Medications:  Continuous Infusions:   Scheduled Meds:   amLODIPine  10 mg Oral QAM    bumetanide  2 mg Oral Daily    carvedilol  25 mg Oral BID    docusate sodium  100 mg Oral BID    heparin (porcine)  5,000 Units Subcutaneous Q8H    magnesium oxide  800 mg Oral TID    magnesium sulfate IVPB  3 g Intravenous Once    pantoprazole  40 mg Oral Before breakfast    polyethylene glycol  17 g Oral Daily    potassium chloride 10%  20 mEq Oral BID     PRN Meds:albuterol-ipratropium 2.5mg-0.5mg/3mL, bisacodyl, diphenhydrAMINE, labetalol, ondansetron,  oxyCODONE-acetaminophen     Review of patient's allergies indicates:  No Known Allergies  Objective:     Vital Signs (Most Recent):  Temp: 97.4 °F (36.3 °C) (11/16/17 0745)  Pulse: (!) 57 (11/16/17 0745)  Resp: 18 (11/16/17 0745)  BP: (!) 156/74 (11/16/17 0745)  SpO2: 95 % (11/16/17 0745) Vital Signs (24h Range):  Temp:  [97.2 °F (36.2 °C)-98.2 °F (36.8 °C)] 97.4 °F (36.3 °C)  Pulse:  [57-77] 57  Resp:  [18-20] 18  SpO2:  [91 %-96 %] 95 %  BP: (134-183)/(65-84) 156/74     Weight: 127 kg (280 lb)  Body mass index is 46.59 kg/m².    Intake/Output - Last 3 Shifts       11/14 0700 - 11/15 0659 11/15 0700 - 11/16 0659 11/16 0700 - 11/17 0659    P.O. 1540 120     Total Intake(mL/kg) 1540 (12.1) 120 (0.9)     Urine (mL/kg/hr) 2600 (0.9) 800 (0.3)     Emesis/NG output 0 (0) 0 (0)     Other 0 (0) 0 (0)     Stool 0 (0) 0 (0)     Blood 0 (0)      Total Output 2600 800      Net -1060 -680             Urine Occurrence 3 x 4 x     Stool Occurrence 2 x 1 x     Emesis Occurrence 0 x 0 x           Physical Exam   Constitutional: She is oriented to person, place, and time. She appears well-developed and well-nourished. No distress.   HENT:   Head: Normocephalic and atraumatic.   Eyes: No scleral icterus.   Neck: Neck supple.   Cardiovascular: Normal rate and regular rhythm.    Pulmonary/Chest: Effort normal. No respiratory distress.   Abdominal:   Soft, appropriate TTP, non-distended  Incision - clean, dry and intact  Previous XIOMY drain site covered with gauze   Neurological: She is alert and oriented to person, place, and time.   Skin: Skin is warm and dry.   Psychiatric: She has a normal mood and affect.       Significant Labs:  CBC:   Recent Labs  Lab 11/16/17 0428   WBC 7.99   RBC 3.91*   HGB 11.1*   HCT 35.1*      MCV 90   MCH 28.4   MCHC 31.6*     BMP:   Recent Labs  Lab 11/16/17 0428         K 3.6      CO2 32*   BUN 25*   CREATININE 0.8   CALCIUM 9.5   MG 1.2*     CMP:   Recent Labs  Lab  11/16/17  0428      CALCIUM 9.5   ALBUMIN 2.3*   PROT 5.5*      K 3.6   CO2 32*      BUN 25*   CREATININE 0.8   ALKPHOS 69   ALT 19   AST 22   BILITOT 0.5     LFTs:   Recent Labs  Lab 11/16/17  0428   ALT 19   AST 22   ALKPHOS 69   BILITOT 0.5   PROT 5.5*   ALBUMIN 2.3*     Assessment/Plan:     * Acute cholecystitis    S/P lap converted to open edison 11/6/17    - Regular diet   - Pain/nausea meds PRN  - bowel regimen  - IS/aggressive pulm toilet  - aggressive PT/OT  - Ambulate TID, OOBTC  - DVT ppx  - stable for dc to SNF; orders placed 11/9/17, family agreeable to placement          Incontinence of urine in female    Bladder scan today        Hypokalemia    Replace         Hypomagnesemia    Replace         Morbid obesity with BMI of 45.0-49.9, adult    Body mass index is 46.59 kg/m².  PT/OT        Hypertension    -Improved on home regimen   - labetalol PRN            Kary Emanuel PA-C   [id number]  General Surgery  [facility]

## 2017-11-16 NOTE — PLAN OF CARE
Problem: Physical Therapy Goal  Goal: Physical Therapy Goal  Goals to be met by: 2017     Patient will increase functional independence with mobility by performin. Supine to sit with Moderate Assistance-not met  2. Sit to supine with Moderate Assistance-not met  3. Sit to stand transfer with Moderate Assistance-met       Revised: Sit to stand transfer with Contact Guard Assistance - Not met  4. Bed to chair transfer with Moderate Assistance using Rolling Walker - Met       Revised: Bed to chair transfer with Contact Guard Assistance using Rolling Walker - Not Met  6. Gait  x 15 feet with Moderate Assistance using Rolling Walker. - met  Revised goal: gait 80ft with RW with SBA-not met  7. Lower extremity exercise program x15 reps per handout, with assistance as needed        Pt progressing towards goals. continue with PT POC.Goals remain appropriate.    Wes Trent PTA  2017

## 2017-11-16 NOTE — PLAN OF CARE
Problem: Patient Care Overview  Goal: Plan of Care Review  Plan of care reviewed with patient who verbalized understanding. Denies pain. Ambulated twice in hallway with PT/OT. Wick in place for urinary incontinence. Pt had a very large BM today. Fecal incontinence. Improved mobility. Low appetite.  Bed locked and in lowest position, call bell is within reach, frequent rounds made for patient safety.  VSS, will continue to monitor.

## 2017-11-16 NOTE — SUBJECTIVE & OBJECTIVE
Interval History:   Patient seen and examined, no acute events overnight  Tolerating diet with no N/V  +F, BMx1  Has been working with PT/OT; sitting in chair daily  Baseline hypertensive, otherwise afebrile/VSS    Medications:  Continuous Infusions:   Scheduled Meds:   amLODIPine  10 mg Oral QAM    bumetanide  2 mg Oral Daily    carvedilol  25 mg Oral BID    docusate sodium  100 mg Oral BID    heparin (porcine)  5,000 Units Subcutaneous Q8H    magnesium oxide  800 mg Oral TID    magnesium sulfate IVPB  3 g Intravenous Once    pantoprazole  40 mg Oral Before breakfast    polyethylene glycol  17 g Oral Daily    potassium chloride 10%  20 mEq Oral BID     PRN Meds:albuterol-ipratropium 2.5mg-0.5mg/3mL, bisacodyl, diphenhydrAMINE, labetalol, ondansetron, oxyCODONE-acetaminophen     Review of patient's allergies indicates:  No Known Allergies  Objective:     Vital Signs (Most Recent):  Temp: 97.4 °F (36.3 °C) (11/16/17 0745)  Pulse: (!) 57 (11/16/17 0745)  Resp: 18 (11/16/17 0745)  BP: (!) 156/74 (11/16/17 0745)  SpO2: 95 % (11/16/17 0745) Vital Signs (24h Range):  Temp:  [97.2 °F (36.2 °C)-98.2 °F (36.8 °C)] 97.4 °F (36.3 °C)  Pulse:  [57-77] 57  Resp:  [18-20] 18  SpO2:  [91 %-96 %] 95 %  BP: (134-183)/(65-84) 156/74     Weight: 127 kg (280 lb)  Body mass index is 46.59 kg/m².    Intake/Output - Last 3 Shifts       11/14 0700 - 11/15 0659 11/15 0700 - 11/16 0659 11/16 0700 - 11/17 0659    P.O. 1540 120     Total Intake(mL/kg) 1540 (12.1) 120 (0.9)     Urine (mL/kg/hr) 2600 (0.9) 800 (0.3)     Emesis/NG output 0 (0) 0 (0)     Other 0 (0) 0 (0)     Stool 0 (0) 0 (0)     Blood 0 (0)      Total Output 2600 800      Net -1060 -680             Urine Occurrence 3 x 4 x     Stool Occurrence 2 x 1 x     Emesis Occurrence 0 x 0 x           Physical Exam   Constitutional: She is oriented to person, place, and time. She appears well-developed and well-nourished. No distress.   HENT:   Head: Normocephalic and atraumatic.    Eyes: No scleral icterus.   Neck: Neck supple.   Cardiovascular: Normal rate and regular rhythm.    Pulmonary/Chest: Effort normal. No respiratory distress.   Abdominal:   Soft, appropriate TTP, non-distended  Incision - clean, dry and intact  Previous XIOMY drain site covered with gauze   Neurological: She is alert and oriented to person, place, and time.   Skin: Skin is warm and dry.   Psychiatric: She has a normal mood and affect.       Significant Labs:  CBC:   Recent Labs  Lab 11/16/17 0428   WBC 7.99   RBC 3.91*   HGB 11.1*   HCT 35.1*      MCV 90   MCH 28.4   MCHC 31.6*     BMP:   Recent Labs  Lab 11/16/17 0428         K 3.6      CO2 32*   BUN 25*   CREATININE 0.8   CALCIUM 9.5   MG 1.2*     CMP:   Recent Labs  Lab 11/16/17 0428      CALCIUM 9.5   ALBUMIN 2.3*   PROT 5.5*      K 3.6   CO2 32*      BUN 25*   CREATININE 0.8   ALKPHOS 69   ALT 19   AST 22   BILITOT 0.5     LFTs:   Recent Labs  Lab 11/16/17 0428   ALT 19   AST 22   ALKPHOS 69   BILITOT 0.5   PROT 5.5*   ALBUMIN 2.3*

## 2017-11-16 NOTE — PT/OT/SLP PROGRESS
Physical Therapy  Treatment    Rosetta Whyte   MRN: [id number]   Admitting Diagnosis: Acute cholecystitis    PT Received On: 11/16/17  PT Start Time: 1015     PT Stop Time: 1053    PT Total Time (min): 38 min       Billable Minutes:  Gait [id number], Therapeutic Activity 13 and Therapeutic Exercise 12    Treatment Type: Treatment  PT/PTA: PTA     PTA Visit Number: 1       General Precautions: Standard, fall  Orthopedic Precautions: N/A   Braces:           Subjective:  Communicated with RN prior to session.  I don't know why I am doing this , it started about an hour before you came    Pain/Comfort  Pain Rating 1: 0/10  Pain Rating Post-Intervention 1: 0/10    Objective:        Functional Mobility:  Bed Mobility:   Rolling/Turning to Left: Maximum assistance  Scooting/Bridging: Maximum Assistance  Supine to Sit: Maximum Assistance, With leg lift    Transfers:  Sit <> Stand Assistance: Minimum Assistance  Sit <> Stand Assistive Device: Rolling Walker  Bed <> Chair Technique: Stand Pivot  Bed <> Chair Assistance: Minimum Assistance, Moderate Assistance  Bed <> Chair Assistive Device: Rolling Walker    Gait:   Gait Distance: 18 ft with bariatric RW with w/c in tow with vcs for upright posture,safety. pt with flexed posture,decreased steps length and demo episodes  Muscle Spasms/unsteadiness during gait.  Assistance 1: Minimum assistance, Moderate assistance  Gait Assistive Device: Rolling walker  Gait Pattern: swing-to gait  Gait Deviation(s): decreased qamar, decreased step length, decreased stride length, forward lean    Therapeutic Activities and Exercises:   Discussed/educated patient on progress, safety,  PT POC   Patient performed therex X 15 reps seated  B LE A/AAROM AP, LAQ, Hip Flexion, Hip Abd/Add   White board updated   Donned an extra gown       AM-PAC 6 CLICK MOBILITY  How much help from another person does this patient currently need?   1 = Unable, Total/Dependent Assistance  2 = A lot, Maximum/Moderate  Assistance  3 = A little, Minimum/Contact Guard/Supervision  4 = None, Modified [place]/Independent    Turning over in bed (including adjusting bedclothes, sheets and blankets)?: 2  Sitting down on and standing up from a chair with arms (e.g., wheelchair, bedside commode, etc.): 3  Moving from lying on back to sitting on the side of the bed?: 2  Moving to and from a bed to a chair (including a wheelchair)?: 3  Need to walk in hospital room?: 3  Climbing 3-5 steps with a railing?: 1  Total Score: 14    AM-PAC Raw Score CMS G-Code Modifier Level of Impairment Assistance   6 % Total / Unable   7 - 9 CM 80 - 100% Maximal Assist   10 - 14 CL 60 - 80% Moderate Assist   15 - 19 CK 40 - 60% Moderate Assist   20 - 22 CJ 20 - 40% Minimal Assist   23 CI 1-20% SBA / CGA   24 CH 0% Independent/ Mod I     Patient left up in chair with all lines intact, call button in reach and nsg notified.    Assessment:  Rosetta Whyte is a 72 y.o. female with a medical diagnosis of Acute cholecystitis and presents with continued deficits as listed below. Pt motivated and cooperative with treatment session. Pt Progressing with PT Intervention. Pt limited this date due to muscle spasms with mobility, nsg notified. Pt would continue to benefit from skilled PT to address overall functional mobility and goals. Goals remain appropriate      Rehab identified problem list/impairments: Rehab identified problem list/impairments: weakness, impaired endurance, impaired functional mobilty, gait instability, impaired balance, impaired self care skills, decreased ROM, impaired joint extensibility    Rehab potential is good.    Activity tolerance: Good    Discharge recommendations: Discharge Facility/Level Of Care Needs: nursing facility, skilled     Barriers to discharge: Barriers to Discharge: Decreased caregiver support    Equipment recommendations: Equipment Needed After Discharge: bedside commode     GOALS:    Physical Therapy Goals         Problem: Physical Therapy Goal    Goal Priority Disciplines Outcome Goal Variances Interventions   Physical Therapy Goal     PT/OT, PT Ongoing (interventions implemented as appropriate)     Description:  Goals to be met by: 2017     Patient will increase functional independence with mobility by performin. Supine to sit with Moderate Assistance-not met  2. Sit to supine with Moderate Assistance-not met  3. Sit to stand transfer with Moderate Assistance-met       Revised: Sit to stand transfer with Contact Guard Assistance - Not met  4. Bed to chair transfer with Moderate Assistance using Rolling Walker - Met       Revised: Bed to chair transfer with Contact Guard Assistance using Rolling Walker - Not Met  6. Gait  x 15 feet with Moderate Assistance using Rolling Walker. -not met  Revised goal: gait 80ft with RW with SBA-not met  7. Lower extremity exercise program x15 reps per handout, with assistance as needed                         PLAN:    Patient to be seen 5 x/week  to address the above listed problems via gait training, therapeutic activities, therapeutic exercises, neuromuscular re-education  Plan of Care expires: 17  Plan of Care reviewed with: patient         Wes Trent, PTA  2017

## 2017-11-17 VITALS
SYSTOLIC BLOOD PRESSURE: 172 MMHG | HEIGHT: 65 IN | RESPIRATION RATE: 18 BRPM | TEMPERATURE: 98 F | HEART RATE: 71 BPM | BODY MASS INDEX: 44.98 KG/M2 | OXYGEN SATURATION: 95 % | WEIGHT: 270 LBS | DIASTOLIC BLOOD PRESSURE: 95 MMHG

## 2017-11-17 PROBLEM — E83.42 HYPOMAGNESEMIA: Status: RESOLVED | Noted: 2017-11-13 | Resolved: 2017-11-17

## 2017-11-17 PROBLEM — E87.6 HYPOKALEMIA: Status: RESOLVED | Noted: 2017-11-15 | Resolved: 2017-11-17

## 2017-11-17 PROBLEM — K81.0 ACUTE CHOLECYSTITIS: Status: RESOLVED | Noted: 2017-11-06 | Resolved: 2017-11-17

## 2017-11-17 LAB
ALBUMIN SERPL BCP-MCNC: 2.6 G/DL
ALP SERPL-CCNC: 71 U/L
ALT SERPL W/O P-5'-P-CCNC: 19 U/L
ANION GAP SERPL CALC-SCNC: 9 MMOL/L
AST SERPL-CCNC: 22 U/L
BASOPHILS # BLD AUTO: 0.1 K/UL
BASOPHILS NFR BLD: 1.2 %
BILIRUB SERPL-MCNC: 0.5 MG/DL
BUN SERPL-MCNC: 29 MG/DL
CALCIUM SERPL-MCNC: 9.5 MG/DL
CHLORIDE SERPL-SCNC: 103 MMOL/L
CO2 SERPL-SCNC: 31 MMOL/L
CREAT SERPL-MCNC: 0.9 MG/DL
DIFFERENTIAL METHOD: ABNORMAL
EOSINOPHIL # BLD AUTO: 0.2 K/UL
EOSINOPHIL NFR BLD: 2.7 %
ERYTHROCYTE [DISTWIDTH] IN BLOOD BY AUTOMATED COUNT: 14.1 %
EST. GFR  (AFRICAN AMERICAN): >60 ML/MIN/1.73 M^2
EST. GFR  (NON AFRICAN AMERICAN): >60 ML/MIN/1.73 M^2
GLUCOSE SERPL-MCNC: 110 MG/DL
HCT VFR BLD AUTO: 35.2 %
HGB BLD-MCNC: 11.4 G/DL
IMM GRANULOCYTES # BLD AUTO: 0.23 K/UL
IMM GRANULOCYTES NFR BLD AUTO: 2.7 %
LYMPHOCYTES # BLD AUTO: 2.2 K/UL
LYMPHOCYTES NFR BLD: 25.9 %
MAGNESIUM SERPL-MCNC: 2 MG/DL
MCH RBC QN AUTO: 29.4 PG
MCHC RBC AUTO-ENTMCNC: 32.4 G/DL
MCV RBC AUTO: 91 FL
MONOCYTES # BLD AUTO: 1 K/UL
MONOCYTES NFR BLD: 11.7 %
NEUTROPHILS # BLD AUTO: 4.7 K/UL
NEUTROPHILS NFR BLD: 55.8 %
NRBC BLD-RTO: 0 /100 WBC
PHOSPHATE SERPL-MCNC: 3.4 MG/DL
PLATELET # BLD AUTO: 315 K/UL
PMV BLD AUTO: 10.1 FL
POTASSIUM SERPL-SCNC: 3.6 MMOL/L
PROT SERPL-MCNC: 6 G/DL
RBC # BLD AUTO: 3.88 M/UL
SODIUM SERPL-SCNC: 143 MMOL/L
WBC # BLD AUTO: 8.37 K/UL

## 2017-11-17 PROCEDURE — 99900035 HC TECH TIME PER 15 MIN (STAT)

## 2017-11-17 PROCEDURE — 84100 ASSAY OF PHOSPHORUS: CPT

## 2017-11-17 PROCEDURE — 85025 COMPLETE CBC W/AUTO DIFF WBC: CPT

## 2017-11-17 PROCEDURE — 97535 SELF CARE MNGMENT TRAINING: CPT

## 2017-11-17 PROCEDURE — 83735 ASSAY OF MAGNESIUM: CPT

## 2017-11-17 PROCEDURE — 97530 THERAPEUTIC ACTIVITIES: CPT

## 2017-11-17 PROCEDURE — 97110 THERAPEUTIC EXERCISES: CPT

## 2017-11-17 PROCEDURE — 27000173 HC ACAPELLA DEVICE DH OR DM

## 2017-11-17 PROCEDURE — 63600175 PHARM REV CODE 636 W HCPCS: Performed by: SURGERY

## 2017-11-17 PROCEDURE — 25000003 PHARM REV CODE 250: Performed by: SURGERY

## 2017-11-17 PROCEDURE — 97116 GAIT TRAINING THERAPY: CPT

## 2017-11-17 PROCEDURE — 80053 COMPREHEN METABOLIC PANEL: CPT

## 2017-11-17 PROCEDURE — 36415 COLL VENOUS BLD VENIPUNCTURE: CPT

## 2017-11-17 RX ORDER — LANOLIN ALCOHOL/MO/W.PET/CERES
800 CREAM (GRAM) TOPICAL 3 TIMES DAILY
Refills: 0 | COMMUNITY
Start: 2017-11-17 | End: 2018-02-23

## 2017-11-17 RX ORDER — HYDRALAZINE HYDROCHLORIDE 50 MG/1
100 TABLET, FILM COATED ORAL 3 TIMES DAILY
Status: DISCONTINUED | OUTPATIENT
Start: 2017-11-17 | End: 2017-11-17 | Stop reason: HOSPADM

## 2017-11-17 RX ORDER — HEPARIN SODIUM 5000 [USP'U]/ML
5000 INJECTION, SOLUTION INTRAVENOUS; SUBCUTANEOUS EVERY 8 HOURS
Start: 2017-11-17 | End: 2017-12-13

## 2017-11-17 RX ORDER — CLONIDINE 0.3 MG/24H
1 PATCH, EXTENDED RELEASE TRANSDERMAL WEEKLY
Status: DISCONTINUED | OUTPATIENT
Start: 2017-11-17 | End: 2017-11-17 | Stop reason: HOSPADM

## 2017-11-17 RX ORDER — CYANOCOBALAMIN (VITAMIN B-12) 250 MCG
1000 TABLET ORAL EVERY OTHER DAY
Status: DISCONTINUED | OUTPATIENT
Start: 2017-11-17 | End: 2017-11-17 | Stop reason: HOSPADM

## 2017-11-17 RX ORDER — OXYCODONE AND ACETAMINOPHEN 5; 325 MG/1; MG/1
1 TABLET ORAL EVERY 4 HOURS PRN
Status: DISCONTINUED | OUTPATIENT
Start: 2017-11-17 | End: 2017-11-17 | Stop reason: HOSPADM

## 2017-11-17 RX ORDER — ERGOCALCIFEROL 1.25 MG/1
50000 CAPSULE ORAL
Status: DISCONTINUED | OUTPATIENT
Start: 2017-11-17 | End: 2017-11-17 | Stop reason: HOSPADM

## 2017-11-17 RX ORDER — PANTOPRAZOLE SODIUM 40 MG/1
40 FOR SUSPENSION ORAL
Status: DISCONTINUED | OUTPATIENT
Start: 2017-11-18 | End: 2017-11-17 | Stop reason: HOSPADM

## 2017-11-17 RX ORDER — OXYCODONE AND ACETAMINOPHEN 5; 325 MG/1; MG/1
1 TABLET ORAL EVERY 6 HOURS PRN
Qty: 30 TABLET | Refills: 0 | Status: SHIPPED | OUTPATIENT
Start: 2017-11-17 | End: 2018-02-23

## 2017-11-17 RX ORDER — DOCUSATE SODIUM 100 MG/1
100 CAPSULE, LIQUID FILLED ORAL 2 TIMES DAILY
Refills: 0 | COMMUNITY
Start: 2017-11-17 | End: 2018-02-23

## 2017-11-17 RX ORDER — POLYETHYLENE GLYCOL 3350 17 G/17G
17 POWDER, FOR SOLUTION ORAL DAILY
Status: DISCONTINUED | OUTPATIENT
Start: 2017-11-17 | End: 2017-11-17 | Stop reason: HOSPADM

## 2017-11-17 RX ORDER — BUMETANIDE 2 MG/1
2 TABLET ORAL DAILY
Start: 2017-11-17 | End: 2018-03-13 | Stop reason: SDUPTHER

## 2017-11-17 RX ORDER — POTASSIUM CHLORIDE 20 MEQ/1
20 TABLET, EXTENDED RELEASE ORAL 2 TIMES DAILY
Status: DISCONTINUED | OUTPATIENT
Start: 2017-11-17 | End: 2017-11-17 | Stop reason: HOSPADM

## 2017-11-17 RX ORDER — ONDANSETRON 8 MG/1
8 TABLET, ORALLY DISINTEGRATING ORAL EVERY 8 HOURS PRN
Status: DISCONTINUED | OUTPATIENT
Start: 2017-11-17 | End: 2017-11-17 | Stop reason: HOSPADM

## 2017-11-17 RX ADMIN — CYANOCOBALAMIN TAB 250 MCG 1000 MCG: 250 TAB at 04:11

## 2017-11-17 RX ADMIN — AMLODIPINE BESYLATE 10 MG: 10 TABLET ORAL at 06:11

## 2017-11-17 RX ADMIN — PANTOPRAZOLE SODIUM 40 MG: 40 TABLET, DELAYED RELEASE ORAL at 05:11

## 2017-11-17 RX ADMIN — HEPARIN SODIUM 5000 UNITS: 5000 INJECTION, SOLUTION INTRAVENOUS; SUBCUTANEOUS at 02:11

## 2017-11-17 RX ADMIN — ERGOCALCIFEROL 50000 UNITS: 1.25 CAPSULE ORAL at 04:11

## 2017-11-17 RX ADMIN — CLONIDINE 1 PATCH: 0.3 PATCH TRANSDERMAL at 04:11

## 2017-11-17 RX ADMIN — HEPARIN SODIUM 5000 UNITS: 5000 INJECTION, SOLUTION INTRAVENOUS; SUBCUTANEOUS at 05:11

## 2017-11-17 RX ADMIN — Medication 1 CAPSULE: at 04:11

## 2017-11-17 RX ADMIN — CARVEDILOL 25 MG: 25 TABLET, FILM COATED ORAL at 09:11

## 2017-11-17 RX ADMIN — BUMETANIDE 2 MG: 1 TABLET ORAL at 09:11

## 2017-11-17 NOTE — PLAN OF CARE
WILMER has faxed [id number], and cx-ray to Cleopatra, admission coordinator for [facility], through [facility].    WILMER has notified Merly Delgado, admission director for [facility], of above documents in [facility].  Merly will notified Cleopatra of documents.    Vinicius Torres, [facility]  Ext. [phone]

## 2017-11-17 NOTE — PLAN OF CARE
Problem: Occupational Therapy Goal  Goal: Occupational Therapy Goal  Goals to be met by: 11/17/2017    Patient will increase functional independence with ADLs by performing:    Feeding with Set-up Assistance.  UE Dressing with Minimal Assistance.  LE Dressing with Minimal Assistance.  Grooming while seated EOB with Stand-by Assistance. Progressing   Toileting from bedside commode with Moderate Assistance for hygiene and clothing management.   Sitting at edge of bed x20 minutes with Stand-by Assistance.  Supine to sit with Minimal Assistance.  Stand pivot transfers with Minimal Assistance x 1 person.      Outcome: Ongoing (interventions implemented as appropriate)  Progressing toward goals  REBEKAH Taylor  11/17/2017  [phone]

## 2017-11-17 NOTE — PROGRESS NOTES
Report given to Cynthia at [facility]. IV d/c'd, pt tolerated well. VSS on RA. Pt wheeled off unit by transport.

## 2017-11-17 NOTE — SUBJECTIVE & OBJECTIVE
Interval History: no events    Medications:  Continuous Infusions:   Scheduled Meds:   amLODIPine  10 mg Oral QAM    bumetanide  2 mg Oral Daily    carvedilol  25 mg Oral BID    docusate sodium  100 mg Oral BID    heparin (porcine)  5,000 Units Subcutaneous Q8H    magnesium oxide  800 mg Oral TID    pantoprazole  40 mg Oral Before breakfast    polyethylene glycol  17 g Oral Daily    potassium chloride 10%  20 mEq Oral BID     PRN Meds:albuterol-ipratropium 2.5mg-0.5mg/3mL, bisacodyl, diphenhydrAMINE, labetalol, ondansetron, oxyCODONE-acetaminophen     Review of patient's allergies indicates:  No Known Allergies  Objective:     Vital Signs (Most Recent):  Temp: 97.5 °F (36.4 °C) (11/17/17 0759)  Pulse: 72 (11/17/17 0759)  Resp: 18 (11/17/17 0759)  BP: (!) 193/84 (11/17/17 0759)  SpO2: (!) 91 % (11/17/17 0759) Vital Signs (24h Range):  Temp:  [97.5 °F (36.4 °C)-98.8 °F (37.1 °C)] 97.5 °F (36.4 °C)  Pulse:  [61-72] 72  Resp:  [16-18] 18  SpO2:  [91 %-95 %] 91 %  BP: (141-193)/(65-84) 193/84     Weight: 127 kg (280 lb)  Body mass index is 46.59 kg/m².    Intake/Output - Last 3 Shifts       11/15 0700 - 11/16 0659 11/16 0700 - 11/17 0659 11/17 0700 - 11/18 0659    P.O. 120 240     Total Intake(mL/kg) 120 (0.9) 240 (1.9)     Urine (mL/kg/hr) 800 (0.3) 0 (0)     Emesis/NG output 0 (0)      Other 0 (0) 0 (0)     Stool 0 (0) 0 (0)     Blood       Total Output 800 0      Net -680 +240             Urine Occurrence 4 x 6 x     Stool Occurrence 1 x 1 x     Emesis Occurrence 0 x 0 x           Physical Exam   Abd soft    Significant Labs:  CBC:   Recent Labs  Lab 11/17/17  0450   WBC 8.37   RBC 3.88*   HGB 11.4*   HCT 35.2*      MCV 91   MCH 29.4   MCHC 32.4     CMP:   Recent Labs  Lab 11/17/17  0450      CALCIUM 9.5   ALBUMIN 2.6*   PROT 6.0      K 3.6   CO2 31*      BUN 29*   CREATININE 0.9   ALKPHOS 71   ALT 19   AST 22   BILITOT 0.5

## 2017-11-17 NOTE — PLAN OF CARE
SW has faxed corrected n/h orders through [facility] along w/Discharge Summary.    Vinicius Torres LMSW  Ext. [phone]

## 2017-11-17 NOTE — PLAN OF CARE
Problem: Patient Care Overview  Goal: Plan of Care Review  Plan of care reviewed with patient who verbalized understanding. Denies pain. Incontinent x2 . Jerky body movements. Administered Mg and K. Tolerated food well. Improved mobility, assist x2. Wound care cloth applied to the perineal folds for moisture skin breakdown. Pt ambulated with PT on hallways. Sat in the chair 2 hours.  Bed locked and in lowest position, call bell is within reach, frequent rounds made for patient safety.  VSS, will continue to monitor.

## 2017-11-17 NOTE — ASSESSMENT & PLAN NOTE
S/P lap converted to open edison 11/6/17    - Regular diet   - Pain/nausea meds PRN  - bowel regimen  - IS/aggressive pulm toilet  - aggressive PT/OT  - Ambulate TID, OOBTC  - DVT ppx  - stable for dc to SNF; orders placed 11/9/17, family agreeable to placement  - Pt does not have a lopes and has not for the past week

## 2017-11-17 NOTE — PT/OT/SLP PROGRESS
Physical Therapy  Treatment    Rosetta Whyte   MRN: [id number]   Admitting Diagnosis: Acute cholecystitis    PT Received On: 11/17/17  PT Start Time: 1005     PT Stop Time: 1045    PT Total Time (min): 40 min       Billable Minutes:  Gait [id number] and Therapeutic Activity 14    Treatment Type: Treatment  PT/PTA: PTA     PTA Visit Number: 2       General Precautions: Standard, fall  Orthopedic Precautions: N/A   Braces: N/A         Subjective:  Communicated with RN prior to session.  I am feeling better today    Pain/Comfort  Pain Rating 1: 0/10  Pain Rating Post-Intervention 1: 0/10    Objective:   Patient found with: peripheral IV    Functional Mobility:  Bed Mobility:   Rolling/Turning to Left: Maximum assistance  Scooting/Bridging: Maximum Assistance  Supine to Sit: Maximum Assistance, With side rail, With leg lift    Transfers:  Sit <> Stand Assistance: Minimum Assistance (from bed and w/c x 2 trials)  Sit <> Stand Assistive Device: Rolling Walker  Bed <> Chair Technique: Stand Pivot  Bed <> Chair Assistance: Minimum Assistance  Bed <> Chair Assistive Device: Rolling Walker    Gait:   Gait Distance: 60 ft x 2 with w/c  in tow, pt required seated rest break between trials. vcs for upright posture,placement of AD and safety  Assistance 1: Minimum assistance, Contact Guard Assistance  Gait Assistive Device: Rolling walker (Bariatric)  Gait Pattern: swing-to gait  Gait Deviation(s): decreased qamar, decreased step length, decreased stride length, forward lean, decreased toe-to-floor clearance, decreased weight-shifting ability    Therapeutic Activities and Exercises:   Discussed/educated patient on progress, safety,PT POC   Patient performed therex X 15 reps seated  B LE A/AAROM AP, LAQ, Hip Flexion  White board updated   Donned an extra gown    AM-PAC 6 CLICK MOBILITY  How much help from another person does this patient currently need?   1 = Unable, Total/Dependent Assistance  2 = A lot, Maximum/Moderate  Assistance  3 = A little, Minimum/Contact Guard/Supervision  4 = None, Modified [place]/Independent    Turning over in bed (including adjusting bedclothes, sheets and blankets)?: 2  Sitting down on and standing up from a chair with arms (e.g., wheelchair, bedside commode, etc.): 3  Moving from lying on back to sitting on the side of the bed?: 2  Moving to and from a bed to a chair (including a wheelchair)?: 3  Need to walk in hospital room?: 3  Climbing 3-5 steps with a railing?: 1  Total Score: 14    AM-PAC Raw Score CMS G-Code Modifier Level of Impairment Assistance   6 % Total / Unable   7 - 9 CM 80 - 100% Maximal Assist   10 - 14 CL 60 - 80% Moderate Assist   15 - 19 CK 40 - 60% Moderate Assist   20 - 22 CJ 20 - 40% Minimal Assist   23 CI 1-20% SBA / CGA   24 CH 0% Independent/ Mod I     Patient left up in chair with all lines intact, call button in reach and nsg notified.    Assessment:  Rosetta Whyte is a 72 y.o. female with a medical diagnosis of Acute cholecystitis and presents with continued deficits as listed below . Pt Progressing with PT Intervention. Pt Progressing with proving gait distance. Pt would continue to benefit from skilled PT to address overall functional mobility and goals. Goals remain appropriate      Rehab identified problem list/impairments: Rehab identified problem list/impairments: weakness, impaired endurance, impaired self care skills, gait instability, impaired functional mobilty, decreased ROM, impaired joint extensibility    Rehab potential is good.    Activity tolerance: Good    Discharge recommendations: Discharge Facility/Level Of Care Needs: nursing facility, skilled     Barriers to discharge: Barriers to Discharge: Decreased caregiver support    Equipment recommendations: Equipment Needed After Discharge: bedside commode     GOALS:    Physical Therapy Goals        Problem: Physical Therapy Goal    Goal Priority Disciplines Outcome Goal Variances Interventions    Physical Therapy Goal     PT/OT, PT Ongoing (interventions implemented as appropriate)     Description:  Goals to be met by: 2017     Patient will increase functional independence with mobility by performin. Supine to sit with Moderate Assistance-not met  2. Sit to supine with Moderate Assistance-not met  3. Sit to stand transfer with Moderate Assistance-met       Revised: Sit to stand transfer with Contact Guard Assistance - Not met  4. Bed to chair transfer with Moderate Assistance using Rolling Walker - Met       Revised: Bed to chair transfer with Contact Guard Assistance using Rolling Walker - Not Met  6. Gait  x 15 feet with Moderate Assistance using Rolling Walker. - met  Revised goal: gait 80ft with RW with SBA-not met  7. Lower extremity exercise program x15 reps per handout, with assistance as needed                          PLAN:    Patient to be seen 5 x/week  to address the above listed problems via gait training, therapeutic activities, therapeutic exercises, neuromuscular re-education  Plan of Care expires: 17  Plan of Care reviewed with: patient         Wes Trent, PTA  2017

## 2017-11-17 NOTE — PRE ADMISSION SCREENING
Per Cleopatra, admission coordinator for [facility], pt has been accepted. SW has refaxed SNF orders along w/face sheet and H&P to [facility] for auth.    SW has left a sg for NAE Florez for [facility], awaiting a return call.    Pt is wendy to transfer today if approved by [facility].    SW is awaiting signature for PASRR; Dr. Raya, , notified.    Vinicius Torres LMSW  Ext. [phone]

## 2017-11-17 NOTE — DISCHARGE SUMMARY
[facility]  DISCHARGE SUMMARY  General Surgery      Admit Date:  11/5/2017    Discharge Date and Time:  11/17/2017  4:00 PM    Attending Physician:  Sanjeev Smith MD     Discharge Provider:  Ginna Lemon MD     Reason for Admission:  Acute cholecystitis     Procedures Performed:  Laparoscopic converted to open subtotal cholecystectomy.    Hospital Course:  Please see the admission H&P and other available documentation for full details related to history prior to this admission.  Briefly, Rosetta Whyte is a 72 y.o. female who was initially admitted from the [facility] ED on 11/5/2017 for development of sepsis related to severe acute cholecystitis which had been neglected for several days prior to presentation.    Following a complete preoperative discussion of the risks and benefits of surgery with signed informed consent, the patient was taken to the operating room on 11/6/2017 and underwent the above stated procedures.  The patient overall tolerated surgery well and there were no complications.  Please see the operative report for full intraoperative findings and details.  Postoperatively, the patient generally did well from a postoperative standpoint and was transferred from the PACU to the floor in stable condition where she had a generally uncomplicated though prolonged hospital course.  Right upper quadrant surgical XIOMY drain was removed after the first week postoperatively as this remained low output and non-bilious. Overall, labs and vital signs remained stable thoughout her course aside from significant chronic ongoing HTN along with a brief period of several days with elevated creatinine in setting of decreased PO intake and ACEI use (which was stopped) and this resolved back to her normal baseline creatinine with some brief IV fluid hydration.  Diet was advanced as tolerated and the patient's pain was controlled on oral pain medications without problem.  Currently, the patient is  doing well at 11 Days Post-Op and is stable and appropriate for discharge home at this time.      Of note, patient's losartan was discontinued and her incoming home dose of Bumex 2 mg BID was decreased to just once a day given her episode of pre-renal creatinine elevation.  Otherwise, she continues on all other home medications.  Her major ongoing limitation at this point in the postoperative setting is continued lack of ability to tolerate an adequate physical activity level with further deconditioning in an already morbidly obese patient.    Abdominal incision with skin staples still in place which may be removed while the patient is at SNF on or after 11/22/2017.    Consults:  PT/OT.    Significant Diagnostic Studies:     Recent Labs  Lab 11/15/17  0445 11/16/17  0428 11/17/17  0450   WBC 8.33 7.99 8.37   HGB 11.5* 11.1* 11.4*   HCT 35.6* 35.1* 35.2*    321 315     Recent Labs  Lab 11/15/17  0445 11/16/17  0428 11/17/17  0450    145 143   K 3.7 3.6 3.6    103 103   CO2 33* 32* 31*   BUN 27* 25* 29*   CREATININE 0.8 0.8 0.9    101 110   CALCIUM 9.4 9.5 9.5   MG 1.2* 1.2* 2.0   PHOS 3.2 3.4 3.4   AST 26 22 22   ALT 20 19 19   ALKPHOS 71 69 71   BILITOT 0.6 0.5 0.5   PROT 5.7* 5.5* 6.0   ALBUMIN 2.4* 2.3* 2.6*   No results for input(s): INR, PTT, LABHEPA, LACTATE, TROPONINI, CPK, CPKMB, MB, BNP in the last 72 hours.No results for input(s): PH, PCO2, PO2, HCO3 in the last 72 hours.      Final Diagnoses:   Principal Problem:  Acute cholecystitis   Secondary Diagnoses:    Active Hospital Problems    Diagnosis  POA    Incontinence of urine in female [R32]  No    Morbid obesity with BMI of 45.0-49.9, adult [E66.01, Z68.42]  Not Applicable    Hypertension [I10]  Yes      Resolved Hospital Problems    Diagnosis Date Resolved POA    *Acute cholecystitis [K81.0] 11/17/2017 Yes    Hypokalemia [E87.6] 11/17/2017 Yes    Hypomagnesemia [E83.42] 11/17/2017 Yes       Discharged Condition:   Good    Disposition:  Home or Self Care    Follow Up/Patient Instructions:     Medications:  Reconciled Home Medications:  Current Discharge Medication List      START taking these medications    Details   docusate sodium (COLACE) 100 MG capsule Take 1 capsule (100 mg total) by mouth 2 (two) times daily.  Refills: 0      HEPARIN SODIUM,PORCINE (HEPARIN, PORCINE,) 5,000 unit/mL injection Inject 1 mL (5,000 Units total) into the skin every 8 (eight) hours. Would recommend continuing chemical DVT prophylaxis until patient is increasing ambulation and reliably active after surgery.      magnesium oxide (MAG-OX) 400 mg tablet Take 2 tablets (800 mg total) by mouth 3 (three) times daily.  Refills: 0      oxyCODONE-acetaminophen (PERCOCET) 5-325 mg per tablet Take 1 tablet by mouth every 6 (six) hours as needed (pain).  Qty: 30 tablet, Refills: 0         CONTINUE these medications which have CHANGED    Details   bumetanide (BUMEX) 2 MG tablet Take 1 tablet (2 mg total) by mouth once daily. Increase back up to 2 mg BID as necessary if patient begins to have signs of volume overload.         CONTINUE these medications which have NOT CHANGED    Details   amlodipine (NORVASC) 10 MG tablet Take 10 mg by mouth every morning.       hydrALAZINE (APRESOLINE) 100 MG tablet Take 100 mg by mouth 3 (three) times daily.       potassium chloride SA (K-DUR,KLOR-CON) 20 MEQ tablet Take 20 mEq by mouth 2 (two) times daily.       CALCIUM CITRATE/VITAMIN D3 (CALCIUM CITRATE + D ORAL) Take 30 mLs by mouth 3 (three) times daily.      carvedilol (COREG) 25 MG tablet Take 25 mg by mouth 2 (two) times daily.       clobetasol (TEMOVATE) 0.05 % cream Apply topically 2 (two) times daily. Apply to legs      cloNIDine 0.3 mg/24 hr td ptwk (CATAPRES) 0.3 mg/24 hr       cyanocobalamin, vitamin B-12, 1,000 mcg Subl Place 1 drop under the tongue every other day.      ergocalciferol (ERGOCALCIFEROL) 50,000 unit Cap Take 1 capsule (50,000 Units total) by  "mouth twice a week.  Qty: 24 capsule, Refills: 0      esomeprazole (NEXIUM PACKET) 40 mg GrPS Take 40 mg by mouth before breakfast.  Qty: 30 each, Refills: 2      ondansetron (ZOFRAN-ODT) 4 MG TbDL Take 2 tablets (8 mg total) by mouth every 8 (eight) hours as needed (nausea).  Qty: 20 tablet, Refills: 0      pediatric multivit-iron-min (FLINTSTONES COMPLETE, IRON,) Chew Take 1 tablet by mouth 2 (two) times daily.      polyethylene glycol (GLYCOLAX) 17 gram PwPk Take 17 g by mouth once daily.  Qty: 30 each, Refills: 0      VITAMIN B COMPLEX (B COMPLEX ORAL) Take 1 drop by mouth 2 (two) times daily.         STOP taking these medications       losartan-hydrochlorothiazide 100-25 mg (HYZAAR) 100-25 mg per tablet Comments:   Reason for Stopping:         potassium chloride 10% (KAYCIEL) 20 mEq/15 mL solution Comments:   Reason for Stopping:         promethazine (PHENERGAN) 25 MG suppository Comments:   Reason for Stopping:         ursodiol (ALEK FORTE) 500 MG tablet Comments:   Reason for Stopping:               Discharge Procedure Orders  WALKER FOR HOME USE   Order Specific Question Answer Comments   Type of Walker: Heavy duty    With wheels? Yes    Height: 5' 5" (1.651 m)    Weight: 122.5 kg (270 lb)    Length of need (1-99 months): 99    Does patient have medical equipment at home? cane, straight    Does patient have medical equipment at home? rollator    Please check all that apply: Patient's condition impairs ambulation.    Please check all that apply: Patient is unable to safely ambulate without equipment.    Please check all that apply: Patient needs help to get in and out of chair.    Please check all that apply: Altered sensory perception.    Please check all that apply: Walker will be used for gait training.      Diet general     Activity as tolerated     Shower on day dressing removed (No bath)   Order Comments: Skin staples on abdominal incisional wound may be removed on or after Wednesday, 11/22/2017.  OK " for patient to take showers daily, and this is encouraged.  No soaking in tub baths until staples have been removed if incision healing well.     Lifting restrictions   Order Comments: No restrictions on any postoperative activity except for heavy lifting greater than >15 lbs for 4 more weeks.  OK to resume full unrestricted activity as tolerated after 12/11/2017.     Call MD for:  temperature >100.4     Call MD for:  persistent nausea and vomiting     Call MD for:  severe uncontrolled pain     Call MD for:  difficulty breathing, headache or visual disturbances     Call MD for:  redness, tenderness, or signs of infection (pain, swelling, redness, odor or green/yellow discharge around incision site)     Call MD for:  hives     Call MD for:  persistent dizziness or light-headedness     No dressing needed     Wound care routine (specify)   Order Comments: Skin staples on abdominal incisional wound may be removed on or after Wednesday, 11/22/2017.  OK for patient to take showers daily, and this is encouraged.  No soaking in tub baths until staples have been removed if incision healing well.       Follow-up Information     Sanjeev Smith MD In 1 month.    Specialty:  General Surgery  Why:  Post-op appointment in 1 month for wound check s/p open cholecystectomy on 11/6/2017.  Staples instructed to be removed while patient is at SNF.  Contact information:  [facility] [facility] [id number] [phone]                   Ginna Lemon MD

## 2017-11-17 NOTE — PT/OT/SLP PROGRESS
Occupational Therapy  Treatment    Rosetta Whyte   MRN: [id number]   Admitting Diagnosis: Acute cholecystitis    OT Date of Treatment: 11/17/17   OT Start Time: 1450  OT Stop Time: 1530  OT Total Time (min): 40 min    Billable Minutes:  Self Care/Home Management 25 and Therapeutic Exercise 15    General Precautions: Standard, fall  Orthopedic Precautions: N/A  Braces: N/A    Do you have any cultural, spiritual, Yazdanism conflicts, given your current situation?: none    Subjective:  Communicated with RN prior to session.  No issues  Pain/Comfort  Pain Rating 1:  (c/o pain RLE with attempts at flexing it in supine for rolling)    Objective:        Functional Mobility:  Bed Mobility:  Rolling/Turning to Left: Maximum assistance  Supine to Sit:  (mod/max a)    Transfers:   Sit <> Stand Assistance: Minimum Assistance  Sit <> Stand Assistive Device: Rolling Walker  Toilet Transfer Technique: Stand Pivot  Toilet Transfer Assistance: Minimum Assistance  Toilet Transfer Assistive Device: Rolling Walker, bedside commode    Functional Ambulation: Ambulate 4 steps BSC-bed and bed-chair with bariatric RW and CG A      Therapeutic Activities and Exercises:  -OT adjusted bariatric BSC to pt  -Worked on rolling to L for supine to sit.  This was difficult for pt and she had trouble moving her body in unfamiliar ways.  Once on her side, she was able to come to sit with mod/max a, improved from max a.  -Transfer to BSC as abovce.  Pt had just used bedpan prior to OT entering so unable to actually practice cleaning self after toileting.  BSC slightly too high.  -Transfer back to eob and worked on unsupported sitting balance via BUE arom.  Pt extremely resistant to freeing arms up in sitting.  She relies on B to stay up.  Once forced to bring to lap, pt is able to hold self up for several minutes without assist.  OT had to frequently re-position LLE into wt bearing position. Per pt, her grandmother dropped her when she was 4 y/o, breaking  "L hip.  Since then, she has completed all sit-stand transitions with the LLE in ext.  The over-use of RLE may be reason for edema and pain now.  -Completed BUE press-ups, press-outs, bicep curls x10 on eob  -Transfer to w/c.  Completed aarom BLE marching (per pt request) x10 with pt requiring max a to lift RLE 1" off floor and mod a for L.    -BSC adjusted again to a better height for pt    AM-PAC 6 CLICK ADL   How much help from another person does this patient currently need?   1 = Unable, Total/Dependent Assistance  2 = A lot, Maximum/Moderate Assistance  3 = A little, Minimum/Contact Guard/Supervision  4 = None, Modified [place]/Independent    Putting on and taking off regular lower body clothing? : 2  Bathing (including washing, rinsing, drying)?: 2  Toileting, which includes using toilet, bedpan, or urinal? : 2  Putting on and taking off regular upper body clothing?: 3  Taking care of personal grooming such as brushing teeth?: 3  Eating meals?: 3  Total Score: 15     AM-PAC Raw Score CMS "G-Code Modifier Level of Impairment Assistance   6 % Total / Unable   7 - 8 CM 80 - 100% Maximal Assist   9-13 CL 60 - 80% Moderate Assist   14 - 19 CK 40 - 60% Moderate Assist   20 - 22 CJ 20 - 40% Minimal Assist   23 CI 1-20% SBA / CGA   24 CH 0% Independent/ Mod I       Patient left up in chair with all lines intact and call button in reach    ASSESSMENT:  Rosetta Whyte is a 72 y.o. female with a medical diagnosis of Acute cholecystitis.  The pt currently has pain and edema RLE.  Pt has over-used this leg for sit-stand transition since she was 4 y/o.  Pt also is insecure in unsupported sitting, using BUEs to hold self up despite having adequate hip fl rom and trunk strength to hold self up without her arms.    Rehab identified problem list/impairments: Rehab identified problem list/impairments: weakness, impaired endurance, impaired self care skills, impaired functional mobilty, gait instability, impaired " balance, pain, decreased ROM, edema, impaired joint extensibility    Rehab potential is good.    Activity tolerance: Good    Discharge recommendations: Discharge Facility/Level Of Care Needs: nursing facility, skilled     Barriers to discharge: Barriers to Discharge: Decreased caregiver support    Equipment recommendations:  (bariatric BSC)     GOALS:    Occupational Therapy Goals        Problem: Occupational Therapy Goal    Goal Priority Disciplines Outcome Interventions   Occupational Therapy Goal     OT, PT/OT Ongoing (interventions implemented as appropriate)    Description:  Goals to be met by: 11/17/2017    Patient will increase functional independence with ADLs by performing:    Feeding with Set-up Assistance.  UE Dressing with Minimal Assistance.  LE Dressing with Minimal Assistance.  Grooming while seated EOB with Stand-by Assistance. Progressing   Toileting from bedside commode with Moderate Assistance for hygiene and clothing management.   Sitting at edge of bed x20 minutes with Stand-by Assistance.  Supine to sit with Minimal Assistance.  Stand pivot transfers with Minimal Assistance x 1 person.                       Plan:  Patient to be seen 5 x/week to address the above listed problems via self-care/home management, therapeutic activities, therapeutic exercises  Plan of Care expires: 12/07/17  Plan of Care reviewed with: patient         REBEKAH Dobson  11/17/2017

## 2017-11-17 NOTE — PLAN OF CARE
Problem: Patient Care Overview  Goal: Plan of Care Review  Outcome: Ongoing (interventions implemented as appropriate)  Pt awake, alert, oriented X4. Transverse right upper side abdominal incision with staples- JACQUELINE. Small incision with staples in middle of abdomen- JACQUELINE. Tolerating regular diet, denies nausea/ vomiting. Vitals stable on room air. Plan of care reviewed with pt, who verbalizes understanding. Pt remained free of falls. No acute events. No distress noted at this time, will continue to monitor pt.

## 2017-11-17 NOTE — PLAN OF CARE
2:00pm- CM/WILMER has notified Dr. Ginna Lemon, , for corrections of SNF orders.  Transportation has been arranged w/[facility].  ETA is 4:30pm.  Nita, sister, has been notified of transfer and is agreeable to SNF placement.     Peri, nurse [id number], will hand off to accepting nurse ph#[phone].    Vinicius Torres, [facility]  Ext. [phone]

## 2017-11-17 NOTE — PROGRESS NOTES
[facility]  General Surgery  Progress Note    Subjective:     History of Present Illness:  Rosetta Whyte is a 72 y.o. female with morbid obesity (s/p sleeve gastrectomy), HTN, and OA presents to [facility] ED with 5 days history of abdominal pain.  She states that the pain started Tuesday.  The pain was located on the right side of her abdomen.  The pain for the first day was tolerable, but for the last 4 days, she states the pain has been 10/10.  She has associated nausea and vomiting, fever/chills.  She has had no appetite and has not eaten a meal for 4 days.  She has never had anything like this happen before, even a mild episode.  She has maintained normal bowel function.  No pale stools, jaundice, itching, or dark urine.  Functionally, she can walk about 1 block before stopping, but she states that this is due to leg swelling and arthritis and not chest pain/shortness of breath.  She has lost 80 pounds since a sleeve gastrectomy 4 months ago.  She also has history of open appendectomy.    Post-Op Info:  Procedure(s) (LRB):  CHOLECYSTECTOMY (N/A)  INCOMPLETE-PROCEDURE/ATTEMPTED Laparscopic cholecystectomy (N/A)   11 Days Post-Op     Interval History: no events    Medications:  Continuous Infusions:   Scheduled Meds:   amLODIPine  10 mg Oral QAM    bumetanide  2 mg Oral Daily    carvedilol  25 mg Oral BID    docusate sodium  100 mg Oral BID    heparin (porcine)  5,000 Units Subcutaneous Q8H    magnesium oxide  800 mg Oral TID    pantoprazole  40 mg Oral Before breakfast    polyethylene glycol  17 g Oral Daily    potassium chloride 10%  20 mEq Oral BID     PRN Meds:albuterol-ipratropium 2.5mg-0.5mg/3mL, bisacodyl, diphenhydrAMINE, labetalol, ondansetron, oxyCODONE-acetaminophen     Review of patient's allergies indicates:  No Known Allergies  Objective:     Vital Signs (Most Recent):  Temp: 97.5 °F (36.4 °C) (11/17/17 0759)  Pulse: 72 (11/17/17 0759)  Resp: 18 (11/17/17 0759)  BP: (!) 193/84  (11/17/17 0759)  SpO2: (!) 91 % (11/17/17 0759) Vital Signs (24h Range):  Temp:  [97.5 °F (36.4 °C)-98.8 °F (37.1 °C)] 97.5 °F (36.4 °C)  Pulse:  [61-72] 72  Resp:  [16-18] 18  SpO2:  [91 %-95 %] 91 %  BP: (141-193)/(65-84) 193/84     Weight: 127 kg (280 lb)  Body mass index is 46.59 kg/m².    Intake/Output - Last 3 Shifts       11/15 0700 - 11/16 0659 11/16 0700 - 11/17 0659 11/17 0700 - 11/18 0659    P.O. 120 240     Total Intake(mL/kg) 120 (0.9) 240 (1.9)     Urine (mL/kg/hr) 800 (0.3) 0 (0)     Emesis/NG output 0 (0)      Other 0 (0) 0 (0)     Stool 0 (0) 0 (0)     Blood       Total Output 800 0      Net -680 +240             Urine Occurrence 4 x 6 x     Stool Occurrence 1 x 1 x     Emesis Occurrence 0 x 0 x           Physical Exam   Abd soft    Significant Labs:  CBC:   Recent Labs  Lab 11/17/17  0450   WBC 8.37   RBC 3.88*   HGB 11.4*   HCT 35.2*      MCV 91   MCH 29.4   MCHC 32.4     CMP:   Recent Labs  Lab 11/17/17  0450      CALCIUM 9.5   ALBUMIN 2.6*   PROT 6.0      K 3.6   CO2 31*      BUN 29*   CREATININE 0.9   ALKPHOS 71   ALT 19   AST 22   BILITOT 0.5         Assessment/Plan:     * Acute cholecystitis    S/P lap converted to open edison 11/6/17    - Regular diet   - Pain/nausea meds PRN  - bowel regimen  - IS/aggressive pulm toilet  - aggressive PT/OT  - Ambulate TID, OOBTC  - DVT ppx  - stable for dc to SNF; orders placed 11/9/17, family agreeable to placement  - Pt does not have a lopes and has not for the past week          Incontinence of urine in female    Bladder scan today        Hypokalemia    Replace         Hypomagnesemia    Replace         Morbid obesity with BMI of 45.0-49.9, adult    Body mass index is 46.59 kg/m².  PT/OT        Hypertension    -Improved on home regimen   - labetalol PRN            Carlos Lopez MD  General Surgery  [facility]

## 2017-11-17 NOTE — PLAN OF CARE
SNF Orders are still not corrected; orders have b-complex vitamine twice. Corrections need to be made before pt can transfer.    Orders corrected and prescription for Percocet has been faxed.    Pt is scheduled to transfer today.    Vinicius Torres LMSW  Ext. [phone]

## 2017-11-17 NOTE — PROGRESS NOTES
Notified MD Ion that pt's BP is 172/95. Ion said okay not to give recently ordered meds before patient is discharged.

## 2017-11-19 NOTE — PROGRESS NOTES
Physical Therapy Discharge Summary    Rosetta Whyte  MRN: [id number]   Acute cholecystitis   Patient Discharged from acute Physical Therapy on 17.  Please refer to prior PT noted date on 17 for functional status.     Assessment:   Patient appropriate for care in another setting.  GOALS:    Physical Therapy Goals        Problem: Physical Therapy Goal    Goal Priority Disciplines Outcome Goal Variances Interventions   Physical Therapy Goal     PT/OT, PT Ongoing (interventions implemented as appropriate)     Description:  Goals to be met by: 2017     Patient will increase functional independence with mobility by performin. Supine to sit with Moderate Assistance-not met  2. Sit to supine with Moderate Assistance-not met  3. Sit to stand transfer with Moderate Assistance-met       Revised: Sit to stand transfer with Contact Guard Assistance - Not met  4. Bed to chair transfer with Moderate Assistance using Rolling Walker - Met       Revised: Bed to chair transfer with Contact Guard Assistance using Rolling Walker - Not Met  6. Gait  x 15 feet with Moderate Assistance using Rolling Walker. - met  Revised goal: gait 80ft with RW with SBA-not met  7. Lower extremity exercise program x15 reps per handout, with assistance as needed                        Reasons for Discontinuation of Therapy Services  Transfer to alternate level of care.      Plan:  Patient Discharged to: Skilled Nursing Facility at NH.

## 2017-11-20 ENCOUNTER — PATIENT OUTREACH (OUTPATIENT)
Dept: ADMINISTRATIVE | Facility: CLINIC | Age: 72
End: 2017-11-20

## 2017-11-20 NOTE — PT/OT/SLP DISCHARGE
Occupational Therapy Discharge Summary    Rosetta Whyte  MRN: [id number]   Acute cholecystitis   Patient Discharged from acute Occupational Therapy on 11/17/2017.  Please refer to prior OT note dated on 11/17/2017 for functional status.     Assessment:   Patient appropriate for care in another setting.  GOALS:    Occupational Therapy Goals        Problem: Occupational Therapy Goal    Goal Priority Disciplines Outcome Interventions   Occupational Therapy Goal     OT, PT/OT Ongoing (interventions implemented as appropriate)    Description:  Goals to be met by: 11/17/2017    Patient will increase functional independence with ADLs by performing:    Feeding with Set-up Assistance.  UE Dressing with Minimal Assistance.  LE Dressing with Minimal Assistance.  Grooming while seated EOB with Stand-by Assistance. Progressing   Toileting from bedside commode with Moderate Assistance for hygiene and clothing management.   Sitting at edge of bed x20 minutes with Stand-by Assistance.  Supine to sit with Minimal Assistance.  Stand pivot transfers with Minimal Assistance x 1 person.                     Reasons for Discontinuation of Therapy Services  Transfer to alternate level of care.      Plan:  Patient Discharged to: Home;  OT.

## 2017-11-22 NOTE — PHYSICIAN QUERY
PT Name: Rosetta Whyte  MR #: [id number]     Physician Query Form - Diagnosis Clarification      CDS/: Marta Gooden               Contact information:[email or web address]    This form is a permanent document in the medical record.     Query Date: November 22, 2017    By submitting this query, we are merely seeking further clarification of documentation.  Please utilize your independent clinical judgment when addressing the question(s) below.     The medical record contains the following:      Findings Supporting Clinical Information Location in Medical Record   initially admitted from the [facility] ED on 11/5/2017 for development of sepsis related to severe acute cholecystitis which had been neglected for several days prior to presentation.     She has associated nausea and vomiting, fever/chills    Rocephin 1G IVPB  Flagyl 500mg IVPB     An inspection of the RUQ revealed a grossly inflamed and necrotic gallbladder.  DC Summary    Consult 11/06 @ 0143      MAR      Op Note 11/06 @ 7150     Please clarify if the Sepsis diagnosis has been:    [  ] Ruled In  [ x ] Ruled In, Now Resolved  [  ] Ruled Out  [  ] Clinically insignificant  [  ] Clinically undetermined  [  ] Other/Clarification of findings (please specify)_______________________________    Please document in your progress notes daily for the duration of treatment, until resolved, and include in your discharge summary.

## 2017-11-29 ENCOUNTER — OFFICE VISIT (OUTPATIENT)
Dept: SURGERY | Facility: CLINIC | Age: 72
End: 2017-11-29
Payer: MEDICARE

## 2017-11-29 VITALS
HEART RATE: 60 BPM | WEIGHT: 270 LBS | DIASTOLIC BLOOD PRESSURE: 87 MMHG | BODY MASS INDEX: 44.98 KG/M2 | SYSTOLIC BLOOD PRESSURE: 177 MMHG | HEIGHT: 65 IN

## 2017-11-29 DIAGNOSIS — Z90.49 S/P CHOLECYSTECTOMY: Primary | ICD-10-CM

## 2017-11-29 PROCEDURE — 99999 PR PBB SHADOW E&M-EST. PATIENT-LVL II: CPT | Mod: PBBFAC,,, | Performed by: SURGERY

## 2017-11-29 PROCEDURE — 99024 POSTOP FOLLOW-UP VISIT: CPT | Mod: S$GLB,,, | Performed by: SURGERY

## 2017-11-29 NOTE — PROGRESS NOTES
SUBJECTIVE:  The patient is a 72 y.o. y/o female 3 weeks s/p open cholecystectomy. She denies pain, fevers, chills, nausea, vomiting, diarrhea, or constipation. Eating well with normal appetite and bowel function. Denies redness around or drainage from incisions.    OBJECTIVE:  GEN: female in NAD  ABD: soft, non-tender, non-distended  INCISIONS: healing well without signs of infection or hernia    ASSESSMENT/PLAN:  Doing well 3 weeks s/p open cholecystectomy for acute cholecystitis. Patient is advised to avoid heavy lifting or strenuous activity for another 2-4 weeks. Patient may bathe and continue to take a regular diet. Will follow-up with me on an as-needed basis. All questions answered; patient is comfortable with follow-up plan.

## 2017-12-12 ENCOUNTER — PATIENT OUTREACH (OUTPATIENT)
Dept: ADMINISTRATIVE | Facility: CLINIC | Age: 72
End: 2017-12-12

## 2017-12-13 RX ORDER — ASPIRIN 81 MG/1
81 TABLET ORAL DAILY
COMMUNITY

## 2017-12-13 RX ORDER — CLOPIDOGREL BISULFATE 75 MG/1
75 TABLET ORAL DAILY
COMMUNITY
End: 2018-02-23

## 2018-01-30 ENCOUNTER — OFFICE VISIT (OUTPATIENT)
Dept: RHEUMATOLOGY | Facility: CLINIC | Age: 73
End: 2018-01-30
Payer: MEDICARE

## 2018-01-30 VITALS
WEIGHT: 270 LBS | SYSTOLIC BLOOD PRESSURE: 195 MMHG | DIASTOLIC BLOOD PRESSURE: 89 MMHG | BODY MASS INDEX: 44.93 KG/M2 | HEART RATE: 58 BPM

## 2018-01-30 DIAGNOSIS — T14.8XXA BLISTERED SKIN: ICD-10-CM

## 2018-01-30 DIAGNOSIS — E66.01 MORBID OBESITY WITH BMI OF 45.0-49.9, ADULT: ICD-10-CM

## 2018-01-30 DIAGNOSIS — R22.43 LOCALIZED SWELLING OF BOTH LOWER LEGS: Primary | ICD-10-CM

## 2018-01-30 PROCEDURE — 99999 PR PBB SHADOW E&M-EST. PATIENT-LVL V: CPT | Mod: PBBFAC,,, | Performed by: INTERNAL MEDICINE

## 2018-01-30 PROCEDURE — 99204 OFFICE O/P NEW MOD 45 MIN: CPT | Mod: S$GLB,,, | Performed by: INTERNAL MEDICINE

## 2018-01-30 RX ORDER — TRAMADOL HYDROCHLORIDE 50 MG/1
50 TABLET ORAL EVERY 12 HOURS PRN
Qty: 60 TABLET | Refills: 3 | Status: ON HOLD | OUTPATIENT
Start: 2018-01-30 | End: 2020-05-29 | Stop reason: HOSPADM

## 2018-01-30 RX ORDER — AMOXICILLIN AND CLAVULANATE POTASSIUM 875; 125 MG/1; MG/1
TABLET, FILM COATED ORAL
COMMUNITY
Start: 2018-01-17 | End: 2018-02-23

## 2018-01-30 RX ORDER — MUPIROCIN 20 MG/G
OINTMENT TOPICAL
COMMUNITY
Start: 2018-01-23 | End: 2018-02-23

## 2018-01-30 RX ORDER — CLINDAMYCIN HYDROCHLORIDE 300 MG/1
300 CAPSULE ORAL 3 TIMES DAILY
Qty: 3030 CAPSULE | Refills: 3 | Status: SHIPPED | OUTPATIENT
Start: 2018-01-30 | End: 2018-02-09

## 2018-01-30 NOTE — PROGRESS NOTES
Chief Complaint   Patient presents with    Pain       Patient was referred by Dr.Ronald Blackmon    History of presenting illness    Very pleasant 72 year old black female comes with leg swelling for 2 years and skin blisters in the legs for the same duration.She says she has had no interventions so far and is worried that the swelling is not better    She is morbidly obese and she cannot walk due to bad knees and metal in the left hip  She has severe pain in her legs when she walks  She is sedentary  She doesn't raise her legs above the level of her heart  Her blood pressures are not controlled  She takes norvasc    She has taken few rounds of antibiotics over the past several months for UTI,cholecystitis etc,but none of them have made the rash worse or better     Old knee xrays : b/l osteoarthritis tricompartmental    She has 2016 venous ultrasound : she had no insufficiency    Past history : arthritis,HTN,vit d deficiency    Family history : nothing significant    Social history : not a smoker,alcoholic    Review of Systems   Constitutional: Negative for activity change, appetite change, chills, diaphoresis, fatigue, fever and unexpected weight change.   HENT: Negative for congestion, dental problem, drooling, ear discharge, ear pain, facial swelling, hearing loss, mouth sores, nosebleeds, postnasal drip, rhinorrhea, sinus pain, sinus pressure, sneezing, sore throat, tinnitus, trouble swallowing and voice change.    Eyes: Negative for photophobia, pain, discharge, redness, itching and visual disturbance.   Respiratory: Negative for apnea, cough, choking, chest tightness, shortness of breath, wheezing and stridor.    Cardiovascular: Positive for leg swelling. Negative for chest pain and palpitations.   Gastrointestinal: Negative for abdominal distention, abdominal pain, anal bleeding, blood in stool, constipation, diarrhea, nausea, rectal pain and vomiting.   Endocrine: Negative for cold intolerance, heat  intolerance, polydipsia, polyphagia and polyuria.   Genitourinary: Negative for decreased urine volume, difficulty urinating, dysuria, enuresis, flank pain, frequency, genital sores, hematuria and urgency.   Musculoskeletal: Negative for arthralgias, back pain, gait problem, joint swelling, myalgias, neck pain and neck stiffness.   Skin: Positive for rash. Negative for color change, pallor and wound.   Allergic/Immunologic: Negative for environmental allergies, food allergies and immunocompromised state.   Neurological: Negative for dizziness, tremors, seizures, syncope, facial asymmetry, speech difficulty, weakness, light-headedness, numbness and headaches.   Hematological: Negative for adenopathy. Does not bruise/bleed easily.   Psychiatric/Behavioral: Negative for agitation, behavioral problems, confusion, decreased concentration, dysphoric mood, hallucinations, self-injury, sleep disturbance and suicidal ideas. The patient is not nervous/anxious and is not hyperactive.      Physical Exam     Tenderness:   RLE: tibiofemoral  LLE: tibiofemoral    ARRINGTON-28 tender joint count: 2  ARRINGTON-28 swollen joint count: 0    Physical Exam   Constitutional: She is oriented to person, place, and time and well-developed, well-nourished, and in no distress. No distress.   HENT:   Head: Normocephalic.   Mouth/Throat: Oropharynx is clear and moist.   Eyes: Conjunctivae are normal. Pupils are equal, round, and reactive to light. Right eye exhibits no discharge. Left eye exhibits no discharge. No scleral icterus.   Neck: Normal range of motion. No thyromegaly present.   Cardiovascular: Normal rate, regular rhythm, normal heart sounds and intact distal pulses.    Pulmonary/Chest: Effort normal and breath sounds normal. No stridor.   Abdominal: Soft. Bowel sounds are normal.   Lymphadenopathy:     She has no cervical adenopathy.   Neurological: She is alert and oriented to person, place, and time.   Skin: Skin is warm. No rash noted. She is  not diaphoretic.     She has multiple blistering lesions on her legs more anterior than posterior,only one is oozing on the right leg.No open ulcers   Psychiatric: Affect and judgment normal.   Musculoskeletal: Normal range of motion.     the edema is chronic,no longer pits to pressure  Seems very woody hard skin    Assessment     72 year old black female comes in for evaluation of her chronic leg edema for > 2 years and skin blistering lesions for the same time    Given the fact that she is morbidly obese and immobile,she might be retaining a lot of water in her legs,this seems to be the more obvious explanation to her symptoms  She had a gastric sleeve and yet only lost 80 pounds  Other factors here : poorly controlled blood pressures,norvasc use         1. Localized swelling of both lower legs    2. Blistered skin    3. Morbid obesity with BMI of 45.0-49.9, adult        Reviewed labs/xrays  Reviewed medications    New problem     Plan    She will need evaluation at a vascular surgery clinic  She will need evaluation of her venous,arterial and lymphatic system     Dermatology help to see if they can think of any other causes of skin blistering  Gave her clindamycin  Gave her tramadol for pain management  She doesn't need wound care now    Physical therapy to help her walking,she seems to have knee and hip osteoarthritis  Nutritionist to help her lose weight which will solve the problem in the long run    Leg elevation  Compression stockings  BP management  Go off norvasc  Low salt diet  See cardiology,better management    Rosetta was seen today for pain.    Diagnoses and all orders for this visit:    Localized swelling of both lower legs  -     Ambulatory consult to Dermatology  -     Ambulatory Referral to Physical/Occupational Therapy  -     Cancel: US Lower Extremity Arteries Bilateral; Future  -     Ambulatory Referral to Vascular Surgery    Blistered skin  -     Ambulatory consult to Dermatology  -      Ambulatory Referral to Physical/Occupational Therapy  -     Cancel: US Lower Extremity Arteries Bilateral; Future  -     Ambulatory Referral to Vascular Surgery    Morbid obesity with BMI of 45.0-49.9, adult    Other orders  -     clindamycin (CLEOCIN) 300 MG capsule; Take 1 capsule (300 mg total) by mouth 3 (three) times daily.  -     traMADol (ULTRAM) 50 mg tablet; Take 1 tablet (50 mg total) by mouth every 12 (twelve) hours as needed for Pain.      Follow up in 6 weeks     Preventive medicine as per PCP

## 2018-01-30 NOTE — LETTER
January 30, 2018      Abelino Fenton MD  [address]  [address]  [facility] [address]           [facility] - Rheumatology  [address]  [facility] [address]  Phone: [phone]  Fax: [phone]          Patient: Rosetta Whyte   MR Number: [id number]   YOB: 1945   Date of Visit: 1/30/2018       Dear Dr. Abelino Fenton:    Thank you for referring Rosetta Whyte to me for evaluation. Attached you will find relevant portions of my assessment and plan of care.    If you have questions, please do not hesitate to call me. I look forward to following Rosetta Whyte along with you.    Sincerely,    Jerardo Bobby MD    Enclosure  CC:  No Recipients    If you would like to receive this communication electronically, please contact [email or web address] or [phone] to request more information on "[employer]" Link access.    For providers and/or their staff who would like to refer a patient to [facility], please contact us through our one-stop-shop provider referral line, [facility], at [phone].    If you feel you have received this communication in error or would no longer like to receive these types of communications, please e-mail [email or web address]

## 2018-02-14 DIAGNOSIS — M79.89 LEG SWELLING: Primary | ICD-10-CM

## 2018-02-23 ENCOUNTER — HOSPITAL ENCOUNTER (OUTPATIENT)
Dept: VASCULAR SURGERY | Facility: CLINIC | Age: 73
Discharge: HOME | End: 2018-02-23
Attending: SURGERY
Payer: MEDICARE

## 2018-02-23 ENCOUNTER — INITIAL CONSULT (OUTPATIENT)
Dept: VASCULAR SURGERY | Facility: CLINIC | Age: 73
End: 2018-02-23
Payer: MEDICARE

## 2018-02-23 VITALS
SYSTOLIC BLOOD PRESSURE: 192 MMHG | TEMPERATURE: 98 F | HEART RATE: 56 BPM | HEIGHT: 62 IN | DIASTOLIC BLOOD PRESSURE: 87 MMHG

## 2018-02-23 DIAGNOSIS — M79.89 LEG SWELLING: ICD-10-CM

## 2018-02-23 DIAGNOSIS — S81.802A LEG WOUND, LEFT, INITIAL ENCOUNTER: Primary | ICD-10-CM

## 2018-02-23 PROCEDURE — 3008F BODY MASS INDEX DOCD: CPT | Mod: S$GLB,,, | Performed by: SURGERY

## 2018-02-23 PROCEDURE — 99204 OFFICE O/P NEW MOD 45 MIN: CPT | Mod: S$GLB,,, | Performed by: SURGERY

## 2018-02-23 PROCEDURE — 99999 PR PBB SHADOW E&M-EST. PATIENT-LVL III: CPT | Mod: PBBFAC,,, | Performed by: SURGERY

## 2018-02-23 PROCEDURE — 1125F AMNT PAIN NOTED PAIN PRSNT: CPT | Mod: S$GLB,,, | Performed by: SURGERY

## 2018-02-23 PROCEDURE — 93970 EXTREMITY STUDY: CPT | Mod: S$GLB,,, | Performed by: SURGERY

## 2018-02-23 PROCEDURE — 1159F MED LIST DOCD IN RCRD: CPT | Mod: S$GLB,,, | Performed by: SURGERY

## 2018-02-23 NOTE — PROGRESS NOTES
Rosetta Whyte  02/23/2018    HPI:  Patient is a 72 y.o. female with a h/o uncontrolled HTN, poor ambulatory status, morbid obesity, EMMANUEL, presents with chronic lower extremity edema and blistering.  This has been going on for quite some time.  When she blister, she has been put on oral antibiotic therapy without relief.  She is sedentary the majority of the day.  She does not wear compression stockings.  She notices that the swelling is better in the morning and worse in the evening.  Rarely elevates her legs above the level of the heart    No MI/stroke  Tobacco use: As a teenager    Past Medical History:   Diagnosis Date    Arthritis     BMI 50.0-59.9, adult     Difficult intubation 11/06/2017    Poor neck extension, Grade 4 view with DL    Hypertension     Morbid obesity     EMMANUEL (obstructive sleep apnea)      Past Surgical History:   Procedure Laterality Date    APPENDECTOMY  1960s    GASTRECTOMY      HIP FRACTURE SURGERY  1949     Family History   Problem Relation Age of Onset    Hypertension Mother     Cancer Mother     Hypertension Sister     Hypertension Brother     Hypertension Brother      Social History     Social History    Marital status:      Spouse name: N/A    Number of children: N/A    Years of education: N/A     Occupational History    Not on file.     Social History Main Topics    Smoking status: Never Smoker    Smokeless tobacco: Never Used    Alcohol use No    Drug use: No    Sexual activity: Not on file     Other Topics Concern    Not on file     Social History Narrative    No narrative on file       Current Outpatient Prescriptions:     aspirin (ECOTRIN) 81 MG EC tablet, Take 81 mg by mouth once daily., Disp: , Rfl:     bumetanide (BUMEX) 2 MG tablet, Take 1 tablet (2 mg total) by mouth once daily. Increase back up to 2 mg BID as necessary if patient begins to have signs of volume overload., Disp: , Rfl:     carvedilol (COREG) 25 MG tablet, Take 25 mg by mouth 2  (two) times daily. , Disp: , Rfl:     clobetasol (TEMOVATE) 0.05 % cream, Apply topically 2 (two) times daily. Apply to legs, Disp: , Rfl:     cloNIDine 0.3 mg/24 hr td ptwk (CATAPRES) 0.3 mg/24 hr, , Disp: , Rfl:     hydrALAZINE (APRESOLINE) 100 MG tablet, Take 100 mg by mouth 3 (three) times daily. , Disp: , Rfl:     potassium chloride SA (K-DUR,KLOR-CON) 20 MEQ tablet, Take 20 mEq by mouth 2 (two) times daily. , Disp: , Rfl:     traMADol (ULTRAM) 50 mg tablet, Take 1 tablet (50 mg total) by mouth every 12 (twelve) hours as needed for Pain., Disp: 60 tablet, Rfl: 3    REVIEW OF SYSTEMS:  General: negative; ENT: negative; Allergy and Immunology: negative; Hematological and Lymphatic: Negative; Endocrine: negative; Respiratory: no cough, shortness of breath, or wheezing; Cardiovascular: no chest pain or dyspnea on exertion; Gastrointestinal: no abdominal pain/back, change in bowel habits, or bloody stools; Genito-Urinary: no dysuria, trouble voiding, or hematuria; Musculoskeletal: negative  Neurological: no TIA or stroke symptoms; Psychiatric: no nervousness, anxiety or depression.    PHYSICAL EXAM:   Right Arm BP - Sittin/87 (18 1531)  Left Arm BP - Sittin/91 (18 1531)  Pulse: (!) 56  Temp: 98.2 °F (36.8 °C)      General appearance:  Alert, obese, and in no distress.  Oriented to person, place, and time   Neurological: Normal speech, no focal findings noted; CN II - XII grossly intact           Musculoskeletal: Digits/nail without cyanosis/clubbing.  Normal muscle strength/tone.                 Neck: Supple, no significant adenopathy; thyroid is not enlarged                  No carotid bruit can be auscultated                Chest:  Clear to auscultation, no wheezes, rales or rhonchi, symmetric air entry     No use of accessory muscles             Cardiac: Normal rate and regular rhythm, S1 and S2 normal; PMI non-displaced          Abdomen: Soft, nontender, nondistended, no masses or  organomegaly     No rebound tenderness noted; bowel sounds normal     Pulsatile aortic mass is not palpable, morbid obesity.     No groin adenopathy      Extremities:   2+ femoral pulses bilaterally     No pedal pulses palpable secondary to edema     Extensive b/l lower extrmity severe edema     Blistering of the skin in bilateral gaiter distribution, no ulceration      LAB RESULTS:  Lab Results   Component Value Date    K 3.6 11/17/2017    K 3.6 11/16/2017    K 3.7 11/15/2017    CREATININE 0.9 11/17/2017    CREATININE 0.8 11/16/2017    CREATININE 0.8 11/15/2017     Lab Results   Component Value Date    WBC 8.37 11/17/2017    WBC 7.99 11/16/2017    WBC 8.33 11/15/2017    HCT 35.2 (L) 11/17/2017    HCT 35.1 (L) 11/16/2017    HCT 35.6 (L) 11/15/2017     11/17/2017     11/16/2017     11/15/2017     Lab Results   Component Value Date    HGBA1C 5.4 05/30/2016    HGBA1C 5.6 02/03/2015    HGBA1C 5.6 11/04/2013     IMAGING:  No DVT seen on recent venous US    IMP/PLAN:  72 y.o. female with poorly controlled HTN, morbid obesity, poor functional status, who does not get out of the wheel chair present with chronic lower extremity edema causing blistering of the skin without active ulceration in the gaiter distribution.     1) Recommend elevation as much as possible and compression stockings (rx given for 15-20mmHg compression)  2) Referral to wound care clinic for evaluation    Erasto Snow MD  Vascular & Endovascular Surgery

## 2018-02-23 NOTE — LETTER
February 23, 2018      Jerardo Bobby MD  [address] [facility] [address]           [facility] - Vascular Surgery  [address]  [place] [address]  Phone: [phone]  Fax: [phone]          Patient: Rosetta Whyte   MR Number: [id number]   YOB: 1945   Date of Visit: 2/23/2018       Dear Dr. Jerardo Bobby:    Thank you for referring Rosteta Whyte to me for evaluation. Attached you will find relevant portions of my assessment and plan of care.    If you have questions, please do not hesitate to call me. I look forward to following Rosetta Whyte along with you.    Sincerely,    Erasto Snow MD    Enclosure  CC:  No Recipients    If you would like to receive this communication electronically, please contact [email or web address] or [phone] to request more information on [employer] access.    For providers and/or their staff who would like to refer a patient to [facility], please contact us through our one-stop-shop provider referral line, [facility], at [phone].    If you feel you have received this communication in error or would no longer like to receive these types of communications, please e-mail [email or web address]

## 2018-03-01 ENCOUNTER — TELEPHONE (OUTPATIENT)
Dept: WOUND CARE | Facility: CLINIC | Age: 73
End: 2018-03-01

## 2018-03-13 ENCOUNTER — OFFICE VISIT (OUTPATIENT)
Dept: RHEUMATOLOGY | Facility: CLINIC | Age: 73
End: 2018-03-13
Payer: MEDICARE

## 2018-03-13 VITALS — HEIGHT: 62 IN

## 2018-03-13 DIAGNOSIS — M17.0 PRIMARY OSTEOARTHRITIS OF BOTH KNEES: ICD-10-CM

## 2018-03-13 DIAGNOSIS — R60.0 LEG EDEMA: Primary | ICD-10-CM

## 2018-03-13 PROCEDURE — 99214 OFFICE O/P EST MOD 30 MIN: CPT | Mod: S$GLB,,, | Performed by: INTERNAL MEDICINE

## 2018-03-13 PROCEDURE — 99999 PR PBB SHADOW E&M-EST. PATIENT-LVL III: CPT | Mod: PBBFAC,,, | Performed by: INTERNAL MEDICINE

## 2018-03-13 RX ORDER — POTASSIUM CHLORIDE 20 MEQ/1
20 TABLET, EXTENDED RELEASE ORAL 2 TIMES DAILY
Qty: 60 TABLET | Refills: 2 | Status: SHIPPED | OUTPATIENT
Start: 2018-03-13 | End: 2018-04-12

## 2018-03-13 RX ORDER — BUMETANIDE 2 MG/1
TABLET ORAL
Qty: 150 TABLET | Refills: 2 | Status: ON HOLD | OUTPATIENT
Start: 2018-03-13 | End: 2020-05-29 | Stop reason: HOSPADM

## 2018-03-13 ASSESSMENT — ROUTINE ASSESSMENT OF PATIENT INDEX DATA (RAPID3)
TOTAL RAPID3 SCORE: 6.61
MDHAQ FUNCTION SCORE: 1.6
FATIGUE SCORE: 5
PSYCHOLOGICAL DISTRESS SCORE: 4.4
PAIN SCORE: 7.5
AM STIFFNESS SCORE: 0, NO
PATIENT GLOBAL ASSESSMENT SCORE: 7

## 2018-03-13 NOTE — PROGRESS NOTES
Chief Complaint   Patient presents with    Appointment    Leg Swelling       Patient is here for a follow up    History of presenting illness    Since the last time I have seen her she has done better    She saw vascular surgery,she was told that she has dependent edema since she is wheel chair bound and she had venous evaluation b/l LE which didn't show thrombosis  She is scheduled to see wound care  She took antibiotics as suggested by me and she has no open wounds today    She took bumex 2 mg daily to bid and then switched to lasix 1 to 2 tabs daily and that seems to help    She is trying to raise her legs above the level of her heart  She is trying to cut down on the dose of norvasc    She will buy compression stockings today    Initial presentation     Very pleasant 72 year old black female with long standing HTN,morbid obesity presented to me with leg swelling for 2 years and skin blisters in the legs for the same duration.    She is morbidly obese and she cannot walk due to bad knees and metal in the left hip  She has severe pain in her legs when she walks  She is sedentary    We are trying to get her to do PT    Old knee xrays : b/l osteoarthritis tricompartmental    We have encouraged weight loss,sent her to nutritionist    She has 2016 venous ultrasound : she had no insufficiency    Past history : arthritis,HTN,vit d deficiency    Family history : nothing significant    Social history : not a smoker,alcoholic    Review of Systems   Constitutional: Negative for activity change, appetite change, chills, diaphoresis, fatigue, fever and unexpected weight change.   HENT: Negative for congestion, dental problem, drooling, ear discharge, ear pain, facial swelling, hearing loss, mouth sores, nosebleeds, postnasal drip, rhinorrhea, sinus pain, sinus pressure, sneezing, sore throat, tinnitus, trouble swallowing and voice change.    Eyes: Negative for photophobia, pain, discharge, redness, itching and visual  disturbance.   Respiratory: Negative for apnea, cough, choking, chest tightness, shortness of breath, wheezing and stridor.    Cardiovascular: Positive for leg swelling. Negative for chest pain and palpitations.   Gastrointestinal: Negative for abdominal distention, abdominal pain, anal bleeding, blood in stool, constipation, diarrhea, nausea, rectal pain and vomiting.   Endocrine: Negative for cold intolerance, heat intolerance, polydipsia, polyphagia and polyuria.   Genitourinary: Negative for decreased urine volume, difficulty urinating, dysuria, enuresis, flank pain, frequency, genital sores, hematuria and urgency.   Musculoskeletal: Negative for arthralgias, back pain, gait problem, joint swelling, myalgias, neck pain and neck stiffness.   Skin: Positive for rash. Negative for color change, pallor and wound.   Allergic/Immunologic: Negative for environmental allergies, food allergies and immunocompromised state.   Neurological: Negative for dizziness, tremors, seizures, syncope, facial asymmetry, speech difficulty, weakness, light-headedness, numbness and headaches.   Hematological: Negative for adenopathy. Does not bruise/bleed easily.   Psychiatric/Behavioral: Negative for agitation, behavioral problems, confusion, decreased concentration, dysphoric mood, hallucinations, self-injury, sleep disturbance and suicidal ideas. The patient is not nervous/anxious and is not hyperactive.      Physical Exam     ARRINGTON-28 tender joint count: 0  ARRINGTON-28 swollen joint count: 0    Physical Exam   Constitutional: She is oriented to person, place, and time and well-developed, well-nourished, and in no distress. No distress.   HENT:   Head: Normocephalic.   Mouth/Throat: Oropharynx is clear and moist.   Eyes: Conjunctivae are normal. Pupils are equal, round, and reactive to light. Right eye exhibits no discharge. Left eye exhibits no discharge. No scleral icterus.   Neck: Normal range of motion. No thyromegaly present.    Cardiovascular: Normal rate, regular rhythm, normal heart sounds and intact distal pulses.    Pulmonary/Chest: Effort normal and breath sounds normal. No stridor.   Abdominal: Soft. Bowel sounds are normal.   Lymphadenopathy:     She has no cervical adenopathy.   Neurological: She is alert and oriented to person, place, and time.   Skin: Skin is warm. No rash noted. She is not diaphoretic.     She has multiple blistering lesions on her legs more anterior than posterior,only one is oozing on the right leg.No open ulcers   Psychiatric: Affect and judgment normal.   Musculoskeletal: Normal range of motion.     the edema is chronic,no longer pits to pressure  Seems very woody hard skin    Assessment     72 year old black female with long standing HTN,morbid obesity s/p gastric bypass comes in for evaluation of her chronic leg edema for > 2 years and skin blistering lesions for the same time     Given the fact that she is morbidly obese and immobile,she might be retaining a lot of water in her legs,this seems to be the more obvious explanation to her symptoms  She had a gastric sleeve and yet only lost 80 pounds  Other factors here : poorly controlled blood pressures,norvasc use     We have sent her to vascular surgery and they agree with out thoughts    It appears that cutting down the dose of norvasc,taking water pills,raising legs above the level of the heart and losing weight all seems to be helping,she is due to buy compression stockings today and will see wound care for the skin lesions today      1. Leg edema    2. Wheel chair as ambulatory aid    3. Primary osteoarthritis of both knees    4. Class 3 obesity with body mass index (BMI) of 40.0 to 44.9 in adult, unspecified obesity type, unspecified whether serious comorbidity present        Plan    Continue follow ups with vascular clinic  Unsure yet if she needs evaluation of the arterial and lymphatic system     Gave her tramadol for pain management  Wound care  clinic evaluation    Physical therapy to help her walking,she seems to have knee and hip osteoarthritis  Nutritionist to help her lose weight which will solve the problem in the long run  Weight loss will definitely help    Leg elevation  Compression stockings    BP management    Go off [facility]  Gave her bumetanide since she ran out of lasix,2 mg bid and combine with potassium,if she tolerates it well and if her PCP can check her lytes she can take upto 5 tabs daily  Told her to keep her PCP informed about the dose,so that she can have periodic lab monitoring    Low salt diet  See cardiology to seek help    Rosetta was seen today for appointment and leg swelling.    Diagnoses and all orders for this visit:    Leg edema  -     Ambulatory Referral to Physical/Occupational Therapy  -     Ambulatory Referral to Nutrition Services    Wheel chair as ambulatory aid  -     Ambulatory Referral to Physical/Occupational Therapy  -     Ambulatory Referral to Nutrition Services    Primary osteoarthritis of both knees  -     Ambulatory Referral to Physical/Occupational Therapy  -     Ambulatory Referral to Nutrition Services    Class 3 obesity with body mass index (BMI) of 40.0 to 44.9 in adult, unspecified obesity type, unspecified whether serious comorbidity present  -     Ambulatory Referral to Nutrition Services    Other orders  -     bumetanide (BUMEX) 2 MG tablet; Can take 2 mg upto 5 times daily but will need assessment of volume status and periodic assessment of electrolytes  -     potassium chloride SA (K-DUR,KLOR-CON) 20 MEQ tablet; Take 1 tablet (20 mEq total) by mouth 2 (two) times daily.      She would like to see me one more time in 3 months and then we can d/c her    Preventive medicine as per PCP

## 2018-03-16 ENCOUNTER — TELEPHONE (OUTPATIENT)
Dept: WOUND CARE | Facility: CLINIC | Age: 73
End: 2018-03-16

## 2018-04-10 ENCOUNTER — INITIAL CONSULT (OUTPATIENT)
Dept: WOUND CARE | Facility: CLINIC | Age: 73
End: 2018-04-10
Payer: MEDICARE

## 2018-04-10 VITALS
BODY MASS INDEX: 49.69 KG/M2 | WEIGHT: 270 LBS | HEIGHT: 62 IN | TEMPERATURE: 116 F | SYSTOLIC BLOOD PRESSURE: 173 MMHG | DIASTOLIC BLOOD PRESSURE: 82 MMHG | HEART RATE: 97 BPM

## 2018-04-10 DIAGNOSIS — B35.3 TINEA PEDIS OF BOTH FEET: ICD-10-CM

## 2018-04-10 DIAGNOSIS — I89.0 LYMPHEDEMA OF BOTH LOWER EXTREMITIES: ICD-10-CM

## 2018-04-10 PROCEDURE — 99212 OFFICE O/P EST SF 10 MIN: CPT | Mod: S$GLB,,, | Performed by: NURSE PRACTITIONER

## 2018-04-10 PROCEDURE — 3077F SYST BP >= 140 MM HG: CPT | Mod: CPTII,S$GLB,, | Performed by: NURSE PRACTITIONER

## 2018-04-10 PROCEDURE — 99999 PR PBB SHADOW E&M-EST. PATIENT-LVL IV: CPT | Mod: PBBFAC,,, | Performed by: NURSE PRACTITIONER

## 2018-04-10 PROCEDURE — 3079F DIAST BP 80-89 MM HG: CPT | Mod: CPTII,S$GLB,, | Performed by: NURSE PRACTITIONER

## 2018-04-10 RX ORDER — KETOCONAZOLE 20 MG/G
CREAM TOPICAL DAILY
Qty: 60 G | Refills: 1 | Status: ON HOLD | OUTPATIENT
Start: 2018-04-10 | End: 2020-05-29 | Stop reason: HOSPADM

## 2018-04-10 NOTE — PROGRESS NOTES
Subjective:       Patient ID: Rosetta Whyte is a 72 y.o. female.    Chief Complaint: Wound Check    HPI   This is a 72 year old female referred for evaluation and management of bilateral leg edema, right greater than left.  The swelling has become progressively worse over the past couple of years.  She also has associated skin changes.  She has no weeping or open wounds noted. She is afebrile.  She denies increased redness or purulent drainage.  She denies pain.    Review of Systems   Constitutional: Negative for chills, diaphoresis and fever.   HENT: Negative for hearing loss, postnasal drip, rhinorrhea, sinus pressure, sneezing, sore throat, tinnitus and trouble swallowing.    Eyes: Negative for visual disturbance.   Respiratory: Positive for apnea (on CPAP) and shortness of breath. Negative for cough and wheezing.    Cardiovascular: Positive for leg swelling. Negative for chest pain and palpitations.   Gastrointestinal: Negative for constipation, diarrhea, nausea and vomiting.   Genitourinary: Negative for difficulty urinating, dysuria, frequency and hematuria.   Musculoskeletal: Positive for back pain. Negative for arthralgias and joint swelling.   Skin: Negative for wound.   Neurological: Negative for dizziness, weakness, light-headedness and headaches.   Hematological: Bruises/bleeds easily.   Psychiatric/Behavioral: Positive for sleep disturbance. Negative for confusion, decreased concentration and dysphoric mood. The patient is not nervous/anxious.        Objective:      Physical Exam   Constitutional: She is oriented to person, place, and time. She appears well-developed and well-nourished. No distress.   HENT:   Head: Normocephalic and atraumatic.   Neck: Normal range of motion.   Cardiovascular: Intact distal pulses.    Pulmonary/Chest: Effort normal. No respiratory distress.   Musculoskeletal: Normal range of motion. She exhibits edema (lower leg lymphedema, right greater than left). She exhibits no  tenderness.        Legs:  Neurological: She is alert and oriented to person, place, and time.   Skin: Skin is warm and dry. No rash noted. She is not diaphoretic. No cyanosis or erythema. Nails show no clubbing.   Psychiatric: She has a normal mood and affect. Her behavior is normal. Judgment and thought content normal.   Nursing note and vitals reviewed.      Assessment:       1. Lymphedema of both lower extremities    2. Tinea pedis of both feet        Plan:           Ketoconazole cream to feet daily.  Moisturizer to lower legs twice daily. Refer to lymphedema clinic for lymphatic drainage and massage therapy.

## 2018-04-10 NOTE — PATIENT INSTRUCTIONS
Apply ketoconazole to feet daily.    Apply Eucerin cream, Cerave or Aquaphor to lower legs twice daily.    Call [phone] to schedule an appointment in the lymphedema clinic.

## 2018-04-19 ENCOUNTER — TELEPHONE (OUTPATIENT)
Dept: WOUND CARE | Facility: CLINIC | Age: 73
End: 2018-04-19

## 2018-04-19 NOTE — TELEPHONE ENCOUNTER
----- Message from Amparo Celaya sent at 4/19/2018  2:29 PM CDT -----  Contact: self   Pato States that they need a call returned by a nurse in reference to an referral she needs.                    Please call Pato  @ [phone] . Thanks :)

## 2018-05-15 ENCOUNTER — CLINICAL SUPPORT (OUTPATIENT)
Dept: REHABILITATION | Facility: HOSPITAL | Age: 73
End: 2018-05-15
Payer: MEDICARE

## 2018-05-15 DIAGNOSIS — I89.0 LYMPHEDEMA: ICD-10-CM

## 2018-05-15 PROCEDURE — G8978 MOBILITY CURRENT STATUS: HCPCS | Mod: CK,PO | Performed by: PHYSICAL THERAPIST

## 2018-05-15 PROCEDURE — 97162 PT EVAL MOD COMPLEX 30 MIN: CPT | Mod: PO | Performed by: PHYSICAL THERAPIST

## 2018-05-15 PROCEDURE — G8979 MOBILITY GOAL STATUS: HCPCS | Mod: CK,PO | Performed by: PHYSICAL THERAPIST

## 2018-05-16 PROBLEM — I89.0 LYMPHEDEMA: Status: ACTIVE | Noted: 2018-05-16

## 2018-05-16 NOTE — PLAN OF CARE
Physical Therapy Initial Evaluation     Patient: Rosetta Whyte  [facility] #: [id number]    Date: 5/15/2018  Physician: Chayito Abraham NP  Diagnosis: Lymphedema  Patient is a 72 y.o. with swelling of B LEs x 2 years. Pt recalls no family history of swelling. Using rollator x 2.5 years. Pt requires assistance getting into the bed from her . Pt also reports she is unable to cook or clean house due to leg swelling.      Pt uses a walk in shower at home. L hip metal implant  Onset: gradual  Surgery: wt loss surgery, gall bladder removal in September   Radiation: no   Chemotherapy: no  Previous Lymphedema Treatment: no    Past Medical History:   Diagnosis Date    Arthritis     BMI 50.0-59.9, adult     Difficult intubation 11/06/2017    Poor neck extension, Grade 4 view with DL    Hypertension     Morbid obesity     EMMANUEL (obstructive sleep apnea)      Current Outpatient Prescriptions   Medication Sig    aspirin (ECOTRIN) 81 MG EC tablet Take 81 mg by mouth once daily.    bumetanide (BUMEX) 2 MG tablet Can take 2 mg upto 5 times daily but will need assessment of volume status and periodic assessment of electrolytes    carvedilol (COREG) 25 MG tablet Take 25 mg by mouth 2 (two) times daily.     clobetasol (TEMOVATE) 0.05 % cream Apply topically 2 (two) times daily. Apply to legs    cloNIDine 0.3 mg/24 hr td ptwk (CATAPRES) 0.3 mg/24 hr     hydrALAZINE (APRESOLINE) 100 MG tablet Take 100 mg by mouth 3 (three) times daily.     ketoconazole (NIZORAL) 2 % cream Apply topically once daily.    potassium chloride SA (K-DUR,KLOR-CON) 20 MEQ tablet Take 20 mEq by mouth 2 (two) times daily.     traMADol (ULTRAM) 50 mg tablet Take 1 tablet (50 mg total) by mouth every 12 (twelve) hours as needed for Pain.     No current facility-administered medications for this visit.      Review of patient's allergies indicates:  No Known Allergies    Precautions: lymphedema     Fall  risk: yes    Subjective     Rosetta Whyte rates pain at a 7/10 where 0 = no pain and 10 = maximal pain. Ankles and knees    Patient's goals are as follows: walk more easily less swelling      Objective     Amount of Swelling: stage 3 B LEs with secondary skin characteristic changes, fibrosclerotic changes      Location of Swelling: B LEs L>R  Skin Integrity: intact, slight redness noted L anterior shin-pt reports grandchild scraped her leg with her sandals  Palpation/Texture: indurated skin B LEs  Posture: wfl, pt ambulates with waddling type gait due to size of LEs. Able to ambulate x 25' then fatigues    Range of Motion - UE/LE  (R) UE WFL, R LE limited due to size of limb, pt can bend her knee slightly approximately 30 degrees to transfer   (L) UE WFL, R LE limited due to size of limb, requires A to move her L LE due to size of limb    Current Functional Status:  Gait: mod I with Rollator  Transfers: mod I with rollator  Bed Mobility: mod A with LEs    Girth Measurements (in centimeters)                                         R                            L  12 cm from 1st toe      26.3                       26.5  10 cm from heel          34.5                        32.5  20 cm from heel          50                           48.5  30 cm from heel          64                           59.5  40 cm from heel          67                           62.5  50 cm from heel          87                           74  60 cm from heel          91                           79    Sensation: intact to light touch B LEs    Treatment Evaluation  Time In: 2pm  Time Out: 3pm    This patient was in agreement to participate in Lymphedema treatment.    Assessment     This patient presents s/p  B LE swelling Resulting in: lymphedema of B LEs, increased pain, increased stiffness in the LES, as well as difficulty performing ADL and placing the pt at rist of higher infection. This pt would benefit from skilled therapy services to address the  above impairments. Pt. Is a fair candidate to achieve goals established due to leg size. Discussed with patient who verbalized understanding.     The following goals were discussed with the patient and patient is in agreement with them as to be addressed in the treatment plan.     Short Term Goals: (4weeks)  1. Decrease girth by 1 cm  2. Patient will demonstrate 100% knowledge of lymphedema precautions and signs of infection.   3. Patient will perform self-bandaging techniques.  4. Patient will perform self lymph drainage techniques.  5. Patient will tolerate daily activities with multilayered bandaging.    Long Term Goals: (8weeks)  1. Decrease girth by 2-3cm.  2. Patient will perform self lymph drainage techniques  3. Patient to aretha/doff compression garment.     CMS Impairment/Limitation/Restriction for FOTO Diseases of Veins and Lymphatics Survey  Status Limitation G-Code CMS Severity Modifier  Intake 42% 58% Current Status CK - At least 40 percent but less than 60 percent  Predicted 55% 45% Goal Status+ CK - At least 40 percent but less than 60 percent  PLAN   Patient to be seen 2 x per week for 8 weeks for the medically necessary treatments to include: decongestive massage, multi layered bandaging, education in lymphedema precautions, self massage, self bandaging, and assistance in obtaining appropriate compression garment.    Ansley Westbrook, PT  5/15/2018

## 2018-06-12 ENCOUNTER — CLINICAL SUPPORT (OUTPATIENT)
Dept: REHABILITATION | Facility: HOSPITAL | Age: 73
End: 2018-06-12
Attending: INTERNAL MEDICINE
Payer: MEDICARE

## 2018-06-12 DIAGNOSIS — I89.0 LYMPHEDEMA: ICD-10-CM

## 2018-06-12 PROCEDURE — 97140 MANUAL THERAPY 1/> REGIONS: CPT | Mod: PO | Performed by: PHYSICAL THERAPIST

## 2018-06-13 NOTE — PROGRESS NOTES
"                                                    Physical Therapy Daily Note     Name: Rosetta Whyte  Clinic Number: [id number]  Diagnosis:   Encounter Diagnosis   Name Primary?    Lymphedema      Physician: Jerardo Bobby*  Precautions: lymphedema  Visit #: 2 of 12  PTA Visit #: 0  Time In: 3:10  Time Out: 4pm  Total Treatment Time 1:1: 50 min    Evaluation Date: 5/15/18  Visit # authorized: 12  Authorization period: 05/15/18-7/15/18  Plan of care Expiration: 7/15/18  MD referral: yes    Subjective     Pt reports: she really did well with the TG  garment on lower leg and TG soft on the upper thigh. My leg feels softer already and is much easier to move when walking. Pt reports her  still assists her with getting in/out of bed but she is able to assist more Pain Scale: Rosetta rates pain on a scale of 0-10 to be n/a currently. 0/10 pain    Objective     Pt presents today with Rollator, ambulating with TG  intact on R LE. No redness noted, no warmth B LEs. Pt's  present during treatment.   Rosetta received the following manual therapy techniques: Manual Lymphatic Drainage were applied to the: cervical, B axilla, trunk and B LEs for 45  Minutes     TG  size J applied to upper thighs, stockinette to lower legs, rosidial soft to B Lower legs f/b short stretch bandaging to B lower legs with pt expressing comfort. Pt instructed to remove if pain, color change or sensation change occurred. Pt also instructed to remove if SOB occurs and seek medical attention. Pt verbalized understanding. Pt's  verbalized ability to re bandage b LEs, also given Tg  for L LE to wear if unable to bandage. TG  for lower leg 6".  Written Home Exercises Provided: pt instructed to perform ankle pumps and glut squeezes 2 x 20 reps/day as well as take frequent walks in home throughout the day to aid lymphatic decongestion  Pt demo good understanding of the education provided. Rosetta demonstrated " good return demonstration of activities.     Education provided re: importance of exercise to aid lymphatic flow  Rosetta verbalized good understanding of education provided.   No spiritual or educational barriers to learning provided    Assessment     Patient tolerated treatment well, progressing with decongestion on the R LE with increased softness noted R LE.  This is a 72 y.o. female referred to outpatient physical therapy and presents with a medical diagnosis of lymphedema and demonstrates limitations as described in the problem list. Pt prognosis is Good. Pt will continue to benefit from skilled outpatient physical therapy to address the deficits listed in the problem list, provide pt/family education and to maximize pt's level of independence in the home and community environment.     Goals as follows:  Short Term Goals: (4weeks)  1. Decrease girth by 1 cm  2. Patient will demonstrate 100% knowledge of lymphedema precautions and signs of infection.   3. Patient will perform self-bandaging techniques.  4. Patient will perform self lymph drainage techniques.  5. Patient will tolerate daily activities with multilayered bandaging.     Long Term Goals: (8weeks)  1. Decrease girth by 2-3cm.  2. Patient will perform self lymph drainage techniques  3. Patient to aretha/doff compression garment     Plan     Continue with established Plan of Care towards PT goals.    Ansley Westbrook, PT  6/12/2018

## 2018-06-15 ENCOUNTER — CLINICAL SUPPORT (OUTPATIENT)
Dept: REHABILITATION | Facility: HOSPITAL | Age: 73
End: 2018-06-15
Attending: INTERNAL MEDICINE
Payer: MEDICARE

## 2018-06-15 DIAGNOSIS — R60.0 LEG EDEMA: ICD-10-CM

## 2018-06-15 DIAGNOSIS — M17.0 PRIMARY OSTEOARTHRITIS OF BOTH KNEES: ICD-10-CM

## 2018-06-15 PROCEDURE — 97140 MANUAL THERAPY 1/> REGIONS: CPT | Mod: PO | Performed by: PHYSICAL THERAPIST

## 2018-06-15 NOTE — PROGRESS NOTES
Physical Therapy Daily Note     Name: Rosetta Whyte  Clinic Number: [id number]  Diagnosis:   No diagnosis found.  Physician: Jerardo Bobby*  Precautions: lymphedema  Visit #: 3 of 12  PTA Visit #: 0  Time In: 3:oopm  Time Out: 4pm  Total Treatment Time 1:1: 55 min    Evaluation Date: 5/15/18  Visit # authorized: 12  Authorization period: 05/15/18-7/15/18  Plan of care Expiration: 7/15/18  MD referral: yes    Subjective     Pt reports: she is doing better. Her  was able to replicate the bandaging on the Lower legs and it is easier to get in and out of the car now. Pt denies SOB  Pain Scale: Rosetta rates pain on a scale of 0-10 to be n/a currently. 0/10 pain    Objective     Girth Measurements (in centimeters)                                          R                            L  12 cm from 1st toe      25.5                  26  10 cm from heel          32                       31  20 cm from heel          50                          47  30 cm from heel          63                          58.5  40 cm from heel          66.5                          62.5  50 cm from heel          81                         72.5  60 cm from heel          87.5                          78  Treatment      Pt presents today with Rollator, ambulating with TG  intact on B LE. No redness noted, no warmth B LEs. Pt's  present during treatment.   Rosetta received the following manual therapy techniques: Manual Lymphatic Drainage were applied to the: cervical, B axilla, trunk and B LEs for 45  Minutes     TG soft applied to upper thighs, stockinette to lower legs, rosidial soft to B Lower legs f/b short stretch bandaging to B lower legs with pt expressing comfort. Pt instructed to remove if pain, color change or sensation change occurred. Pt also instructed to remove if SOB occurs and seek medical attention. Pt verbalized understanding.   Written Home Exercises  Provided: pt instructed to perform ankle pumps and glut squeezes 2 x 20 reps/day, sit to stand exercise 2 x 10 reps as well as take frequent walks in home throughout the day to aid lymphatic decongestion  Pt demo good understanding of the education provided. Rosetta demonstrated good return demonstration of activities.     Education provided re: importance of exercise to aid lymphatic flow  Rosetta verbalized good understanding of education provided.   No spiritual or educational barriers to learning provided    Assessment     Patient tolerated treatment well, progressing with significant decongestion on B LE with increased softness noted R LE.  This is a 72 y.o. female referred to outpatient physical therapy and presents with a medical diagnosis of lymphedema and demonstrates limitations as described in the problem list. Pt prognosis is Good. Pt will continue to benefit from skilled outpatient physical therapy to address the deficits listed in the problem list, provide pt/family education and to maximize pt's level of independence in the home and community environment.     Goals as follows:  Short Term Goals: (4weeks)  1. Decrease girth by 1 cm  2. Patient will demonstrate 100% knowledge of lymphedema precautions and signs of infection.   3. Patient will perform self-bandaging techniques.  4. Patient will perform self lymph drainage techniques.  5. Patient will tolerate daily activities with multilayered bandaging.     Long Term Goals: (8weeks)  1. Decrease girth by 2-3cm.  2. Patient will perform self lymph drainage techniques  3. Patient to aretha/doff compression garment     Plan     Continue with established Plan of Care towards PT goals.    Ansley Westbrook, PT  6/15/2018

## 2018-06-29 ENCOUNTER — CLINICAL SUPPORT (OUTPATIENT)
Dept: REHABILITATION | Facility: HOSPITAL | Age: 73
End: 2018-06-29
Attending: INTERNAL MEDICINE
Payer: MEDICARE

## 2018-06-29 DIAGNOSIS — I89.0 LYMPHEDEMA: ICD-10-CM

## 2018-06-29 PROCEDURE — 97140 MANUAL THERAPY 1/> REGIONS: CPT | Mod: PO | Performed by: PHYSICAL THERAPIST

## 2018-06-29 NOTE — PROGRESS NOTES
Physical Therapy Daily Note     Name: Rosetta Whyte  Clinic Number: [id number]  Diagnosis:   Encounter Diagnosis   Name Primary?    Lymphedema      Physician: Jerardo Bobby*  Precautions: lymphedema  Visit #: 4 of 12  PTA Visit #: 0  Time In: 3:oopm  Time Out: 4pm  Total Treatment Time 1:1: 55 min    Evaluation Date: 5/15/18  Visit # authorized: 12  Authorization period: 05/15/18-7/15/18  Plan of care Expiration: 7/15/18  MD referral: yes    Subjective     Pt reports: she is doing ok had to miss last week due to taking grandchild back to [place]. Pt declines SOB. Pt reports her  did bandaging for her but did have some trouble keeping the bandages up.   Pain Scale: Rosetta rates pain on a scale of 0-10 to be n/a currently. 0/10 pain    Objective   No redness noted or warmth today. B LE swelling with induration noted thighs to feet.   Treatment  Pt presents today with Rollator, ambulating without compression today.  not present and he was not able to apply compression for her today.  Rosetta received the following manual therapy techniques: Manual Lymphatic Drainage were applied to the: cervical, B axilla, trunk and B LEs for 45  Minutes   Trial of pneumatic compression pump performed with 40 mmhg to the R LE with pt expressing comfort. X 45 min. Pt with improved skin softness following pump use. Discussion with pt that she only sees cardiologist for HTN and does not have CHF or pulmonary HTN. O2 sats @95% at rest.    TG soft applied to upper thighs, TG soft to  B Lower legs f/b short stretch bandaging to B lower legs with pt expressing comfort. Pt instructed to remove if pain, color change or sensation change occurred. Pt also instructed to remove if SOB occurs and seek medical attention. Pt verbalized understanding.   Written Home Exercises Provided: pt instructed to perform ankle pumps and glut squeezes 2 x 20 reps/day, sit to stand  exercise 2 x 10 reps as well as take frequent walks in home throughout the day to aid lymphatic decongestion  Pt demo good understanding of the education provided. Rosetta demonstrated good return demonstration of activities.     Education provided re: importance of exercise to aid lymphatic flow, possible candidate for pump use at home following cardiac clearance.   Rosetta verbalized good understanding of education provided.   No spiritual or educational barriers to learning provided    Assessment     Patient tolerated treatment well, presenting today with slight increase in induration due to no compression x 24hrs. Pt tolerated pump trial very well with increased softness noted R LE without SOB  This is a 72 y.o. female referred to outpatient physical therapy and presents with a medical diagnosis of lymphedema and demonstrates limitations as described in the problem list. Pt prognosis is Good. Pt will continue to benefit from skilled outpatient physical therapy to address the deficits listed in the problem list, provide pt/family education and to maximize pt's level of independence in the home and community environment.     Goals as follows:  Short Term Goals: (4weeks)  1. Decrease girth by 1 cm  2. Patient will demonstrate 100% knowledge of lymphedema precautions and signs of infection.   3. Patient will perform self-bandaging techniques.  4. Patient will perform self lymph drainage techniques.  5. Patient will tolerate daily activities with multilayered bandaging.     Long Term Goals: (8weeks)  1. Decrease girth by 2-3cm.  2. Patient will perform self lymph drainage techniques  3. Patient to aretha/doff compression garment     Plan     Continue with established Plan of Care towards PT goals.    Ansley Westbrook, PT  6/29/2018

## 2018-07-06 ENCOUNTER — CLINICAL SUPPORT (OUTPATIENT)
Dept: REHABILITATION | Facility: HOSPITAL | Age: 73
End: 2018-07-06
Attending: INTERNAL MEDICINE
Payer: MEDICARE

## 2018-07-06 DIAGNOSIS — I89.0 LYMPHEDEMA: ICD-10-CM

## 2018-07-06 PROCEDURE — 97140 MANUAL THERAPY 1/> REGIONS: CPT | Mod: PO | Performed by: PHYSICAL THERAPIST

## 2018-07-06 NOTE — PROGRESS NOTES
"                                                    Physical Therapy Daily Note     Name: Rosetta Whyte  Clinic Number: [id number]  Diagnosis:   Encounter Diagnosis   Name Primary?    Lymphedema      Physician: Jerardo Bobby*  Precautions: lymphedema  Visit #: 5 of 12  PTA Visit #: 0  Time In: 3:oopm  Time Out: 4pm  Total Treatment Time 1:1: 60 min    Evaluation Date: 5/15/18  Visit # authorized: 12  Authorization period: 05/15/18-7/15/18  Plan of care Expiration: 7/15/18  MD referral: yes    Subjective     Pt reports: she does well following treatment here. Really lilkes the pump. "My legs feel better and softer". Pt reports her  has a difficult time keeping her bandages up. Also the thigh portion comes down.  Pain Scale: Rosetta rates pain on a scale of 0-10 to be n/a currently. 0/10 pain    Objective   No redness noted or warmth today. B LE swelling with induration noted thighs to feet.   Treatment  Pt presents today with Rollator, ambulating with Tg   compression today. Pt is accompanied by her nephew today. . Rosetta received the following manual therapy techniques: Manual Lymphatic Drainage were applied to the: cervical, B axilla, trunk and B LEs for 55  Minutes. simultaneous pneumatic compression pump performed with 45 mmhg to the R LE with pt expressing comfort. Pt with improved skin softness following pump use. Discussion with pt that she only sees cardiologist for HTN and does not have CHF or pulmonary HTN. O2 sats @97% at rest.    Pt to obtain body shape compression dana for upper thigh swelling. Pt reported they would obtain today from [facility]. TG soft to  B Lower legs f/b short stretch bandaging to B lower legs with pt expressing comfort. Pt instructed to remove if pain, color change or sensation change occurred. Pt also instructed to remove if SOB occurs and seek medical attention. Pt verbalized understanding.   Written Home Exercises Provided: pt instructed to perform ankle pumps " and glut squeezes 2 x 20 reps/day, sit to stand exercise 2 x 10 reps as well as take frequent walks in home throughout the day to aid lymphatic decongestion  Pt demo good understanding of the education provided. Rosetta demonstrated good return demonstration of activities.     Education provided re: importance of exercise to aid lymphatic flow, possible candidate for pump use at home following cardiac clearance. Will inquire regarding insurance auth for LE velcro garments per pt request and obtain order  Rosetta verbalized good understanding of education provided.   No spiritual or educational barriers to learning provided    Assessment     Patient tolerated treatment well, increased softness noted B lower legs. Pt tolerated pump use  very well with increased softness noted R LE without SOB  This is a 72 y.o. female referred to outpatient physical therapy and presents with a medical diagnosis of lymphedema and demonstrates limitations as described in the problem list. Pt prognosis is Good. Pt will continue to benefit from skilled outpatient physical therapy to address the deficits listed in the problem list, provide pt/family education and to maximize pt's level of independence in the home and community environment.     Goals as follows:  Short Term Goals: (4weeks)  1. Decrease girth by 1 cm  2. Patient will demonstrate 100% knowledge of lymphedema precautions and signs of infection.   3. Patient will perform self-bandaging techniques.  4. Patient will perform self lymph drainage techniques.  5. Patient will tolerate daily activities with multilayered bandaging.     Long Term Goals: (8weeks)  1. Decrease girth by 2-3cm.  2. Patient will perform self lymph drainage techniques  3. Patient to aretha/doff compression garment     Plan     Continue with established Plan of Care towards PT goals.    Ansley Westbrook, PT  7/6/2018

## 2018-07-10 ENCOUNTER — CLINICAL SUPPORT (OUTPATIENT)
Dept: REHABILITATION | Facility: HOSPITAL | Age: 73
End: 2018-07-10
Attending: INTERNAL MEDICINE
Payer: MEDICARE

## 2018-07-10 DIAGNOSIS — I89.0 LYMPHEDEMA: ICD-10-CM

## 2018-07-10 PROCEDURE — 97016 VASOPNEUMATIC DEVICE THERAPY: CPT | Mod: PO | Performed by: PHYSICAL THERAPIST

## 2018-07-10 PROCEDURE — 97140 MANUAL THERAPY 1/> REGIONS: CPT | Mod: PO | Performed by: PHYSICAL THERAPIST

## 2018-07-10 NOTE — PROGRESS NOTES
"                                                    Physical Therapy Daily Note     Name: Rosetta Whyte  Clinic Number: [id number]  Diagnosis:   Encounter Diagnosis   Name Primary?    Lymphedema      Physician: Jerardo Bobby*  Precautions: lymphedema  Visit #: 6 of 12  PTA Visit #: 0  Time In: 3:oopm  Time Out: 4:10 pm  Total Treatment Time 1:1: 60 min    Evaluation Date: 5/15/18  Visit # authorized: 12  Authorization period: 05/15/18-7/15/18  Plan of care Expiration: 7/15/18  MD referral: yes    Subjective     Pt reports: she does well following treatment here. Really lilkes the pump. "My legs feel better and softer". Pt reports [employer] has ordered her capris for her and she can  some time tomorrow.   Pain Scale: Rosetta rates pain on a scale of 0-10 to be n/a currently. 0/10 pain    Objective   No redness noted or warmth today. B LE swelling with induration noted thighs to feet. Girth Measurements (in centimeters)                                          R                            L  12 cm from 1st toe      25.5                      26  10 cm from heel          31.6                         29.5  20 cm from heel          47                          42  30 cm from heel          61                         55.5  40 cm from heel          66                           62  50 cm from heel          81                           72  60 cm from heel          86                           80    Treatment  Pt presents today with Rollator, ambulating with SS compression today on lower legs.  Pt is accompanied by her  today. . Rosetta received the following manual therapy techniques: Manual Lymphatic Drainage were applied to the: cervical, B axilla, trunk and B LEs for 55  Minutes. simultaneous pneumatic compression pump performed with 45 mmhg to the R LE with pt expressing comfort x 30 min. Pt with improved skin softness following pump use. Discussion with pt that she only sees cardiologist for HTN and " does not have CHF or pulmonary HTN.     Upper thighs compressed with size J TG  B,  stockinette to lower legs, komprex  to  B Lower legs f/b short stretch bandaging to B lower legs with pt expressing comfort. Pt instructed to remove if pain, color change or sensation change occurred. Pt also instructed to remove if SOB occurs and seek medical attention. Pt verbalized understanding.   Written Home Exercises Provided: pt instructed to perform ankle pumps and glut squeezes 2 x 20 reps/day, sit to stand exercise 2 x 10 reps as well as take frequent walks in home throughout the day to aid lymphatic decongestion  Pt demo good understanding of the education provided. Rosetta demonstrated good return demonstration of activities.     Education provided re: importance of exercise to aid lymphatic flow, possible candidate for pump use at home following cardiac clearance. Letter sent to MD requesting velcro garments for lower legs an pneumatic pump for home use. Will inquire regarding insurance auth for LE velcro garments per pt request and obtain order  Rosetta verbalized good understanding of education provided.   No spiritual or educational barriers to learning provided    Assessment     Patient tolerated treatment well, increased softness noted B lower legs. SIgnificant improvement in decongestion noted today.   This is a 72 y.o. female referred to outpatient physical therapy and presents with a medical diagnosis of lymphedema and demonstrates limitations as described in the problem list. Pt prognosis is Good. Pt will continue to benefit from skilled outpatient physical therapy to address the deficits listed in the problem list, provide pt/family education and to maximize pt's level of independence in the home and community environment.     Goals as follows:  Short Term Goals: (4weeks)  1. Decrease girth by 1 cm MET  2. Patient will demonstrate 100% knowledge of lymphedema precautions and signs of infection. MET  3.  Patient will perform self-bandaging techniques. MET pt's  performs bandaging.   4. Patient will perform self lymph drainage techniques.  5. Patient will tolerate daily activities with multilayered bandaging. MET     Long Term Goals: (8weeks)  1. Decrease girth by 2-3cm.  2. Patient will perform self lymph drainage techniques  3. Patient to aretha/doff compression garment     Plan     Continue with established Plan of Care towards PT goals.    Ansley Westbrook, PT  7/10/2018

## 2018-07-17 ENCOUNTER — TELEPHONE (OUTPATIENT)
Dept: REHABILITATION | Facility: HOSPITAL | Age: 73
End: 2018-07-17

## 2018-08-14 ENCOUNTER — CLINICAL SUPPORT (OUTPATIENT)
Dept: REHABILITATION | Facility: HOSPITAL | Age: 73
End: 2018-08-14
Attending: INTERNAL MEDICINE
Payer: MEDICARE

## 2018-08-14 DIAGNOSIS — I89.0 LYMPHEDEMA: ICD-10-CM

## 2018-08-14 DIAGNOSIS — I89.0 LYMPHEDEMA OF BOTH LOWER EXTREMITIES: Primary | ICD-10-CM

## 2018-08-14 PROCEDURE — 97140 MANUAL THERAPY 1/> REGIONS: CPT | Mod: PO | Performed by: PHYSICAL THERAPIST

## 2018-08-14 PROCEDURE — 97016 VASOPNEUMATIC DEVICE THERAPY: CPT | Mod: PO | Performed by: PHYSICAL THERAPIST
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
## 2018-08-28 ENCOUNTER — TELEPHONE (OUTPATIENT)
Dept: REHABILITATION | Facility: HOSPITAL | Age: 73
End: 2018-08-28

## 2018-09-04 ENCOUNTER — CLINICAL SUPPORT (OUTPATIENT)
Dept: REHABILITATION | Facility: HOSPITAL | Age: 73
End: 2018-09-04
Attending: INTERNAL MEDICINE
Payer: MEDICARE

## 2018-09-04 DIAGNOSIS — I89.0 LYMPHEDEMA: ICD-10-CM

## 2018-09-04 DIAGNOSIS — I89.0 LYMPHEDEMA OF BOTH LOWER EXTREMITIES: Primary | ICD-10-CM

## 2018-09-04 PROCEDURE — 97016 VASOPNEUMATIC DEVICE THERAPY: CPT | Mod: PO | Performed by: PHYSICAL THERAPIST

## 2018-09-04 NOTE — PROGRESS NOTES
Physical Therapy daily Note     Name: Rosetta Whyte  Clinic Number: [id number]  Diagnosis:   Encounter Diagnoses   Name Primary?    Lymphedema of both lower extremities Yes    Lymphedema      Physician: Jerardo Bobby*  Precautions: lymphedema  Visit #: 8 of 12  PTA Visit #: 0  Time In: 2:oopm  Time Out: 3 pm  Total Treatment Time 1:1: 30 min    Evaluation Date: 5/15/18  Visit # authorized: 12  Authorization period: 08/6/18-8/30/18  Plan of care Expiration: 10/1/18  MD referral: yes    Subjective     Pt reports: she has experienced difficulty attending therapy sessions lately. Pt reports she wears the bandaging approximately 1-2 days then uses TG . Pt denies SOB, feels benefit from pump with improved ability to ambulate and move her legs.   Pain Scale: Rosetta rates pain on a scale of 0-10 to be n/a currently. 0/10 pain    Objective     Discussed with pt that her insurance requires her to use [employer] for DME requests. [employer] unable to fulfil order. Ins/demo/notes/order faxed to [employer] today with pt's approval.     No redness noted or warmth today. B LE swelling with induration noted thighs to feet.     Treatment  Pt presents today with Rollator, ambulating without compression today on lower legs.  Pt is accompanied by her  today. . Rosetta received the following : pneumatic compression pump performed 45 mmhg to the R LE with pt expressing comfort x 15 min. Pt with improved skin softness following pump use.     Written Home Exercises Provided: pt instructed to perform ankle pumps and glut squeezes 2 x 20 reps/day, sit to stand exercise 2 x 10 reps as well as take frequent walks in home throughout the day to aid lymphatic decongestion  Pt demo good understanding of the education provided. Rosetta demonstrated good return demonstration of activities.     Education provided re: remain compressed as much as possible for ongoing decongestion. Pt  informed her insurance does not cover velcro wraps. Pt measured for compreflex lite, XXL long for B lower legs. Pt to acquire. Pt given website with ordering info and correct size.   Rosetta verbalized good understanding of education provided.   No spiritual or educational barriers to learning provided    Assessment     Patient tolerated treatment well, increased softness noted R LE following pump use. Pt to manage compression at home and will have pt return to clinic following receipt of pump and inelastic compression garments for lower legs.   This is a 72 y.o. female referred to outpatient physical therapy and presents with a medical diagnosis of lymphedema and demonstrates limitations as described in the problem list. Pt prognosis is Good. Pt will continue to benefit from skilled outpatient physical therapy to address the deficits listed in the problem list, provide pt/family education and to maximize pt's level of independence in the home and community environment.     Goals as follows:  Short Term Goals: (4weeks)  1. Decrease girth by 1 cm MET  2. Patient will demonstrate 100% knowledge of lymphedema precautions and signs of infection. MET  3. Patient will perform self-bandaging techniques. MET pt's  performs bandaging.   4. Patient will perform self lymph drainage techniques.ONGOING  5. Patient will tolerate daily activities with multilayered bandaging. MET     Long Term Goals: 1-3 visits from 9/4/18  1. Decrease girth by 2-3cm. ONGOING  2. Patient will perform self lymph drainage techniques ONGOING  3. Patient to aretha/doff compression garment ONGOING    New goal: pt to be fitted with velcro inelastic wraps for lower legs and pt to return to clinic at this time.      Plan     Request extension of plan of care to meet goals above to 10/1/18 as pt experienced transportation issues and [employer] company changed to comply with [employer]    Ansley Westbrook, PT  9/4/2018

## 2019-02-26 ENCOUNTER — TELEPHONE (OUTPATIENT)
Dept: REHABILITATION | Facility: HOSPITAL | Age: 74
End: 2019-02-26

## 2019-03-08 ENCOUNTER — TELEPHONE (OUTPATIENT)
Dept: REHABILITATION | Facility: HOSPITAL | Age: 74
End: 2019-03-08

## 2019-03-15 ENCOUNTER — DOCUMENTATION ONLY (OUTPATIENT)
Dept: REHABILITATION | Facility: HOSPITAL | Age: 74
End: 2019-03-15

## 2019-03-15 NOTE — PROGRESS NOTES
PHYSICAL THERAPY DISCHARGE:    This patient was originally evaluated at our facility on: 5/15/18         Pt attended PT for a total of 8 Visits receiving: Manual lymphatic drainage, compression pump trial, lymphedema education and risk reduction practices, ins authorization for LE velcro garments and pump, however, ins does not cover  velcro garments, despite faxed request to [facility] regarding ins request approval pt still does not have compression pump. Pt called several times and last phone conversation, discussed pt f/u with MD to obtain orders for additional therapy or for compression pump re order. Pt has transportation issues which affected her ability to attend therapy consistently     This patient's last visit occurred on: 9/4/18      Short term goals were achieved: no    Long term goals were achieved: no    Pt was DC'd from our care secondary to: pt did not attend visits after 9/4/18       Therapist: Ansley Westbrook, PT  3/15/2019

## 2019-04-09 ENCOUNTER — OFFICE VISIT (OUTPATIENT)
Dept: ORTHOPEDICS | Facility: CLINIC | Age: 74
End: 2019-04-09
Payer: MEDICARE

## 2019-04-09 VITALS — HEART RATE: 56 BPM | DIASTOLIC BLOOD PRESSURE: 106 MMHG | SYSTOLIC BLOOD PRESSURE: 220 MMHG

## 2019-04-09 DIAGNOSIS — I89.0 LYMPHEDEMA: Primary | ICD-10-CM

## 2019-04-09 PROCEDURE — 3080F DIAST BP >= 90 MM HG: CPT | Mod: CPTII,S$GLB,, | Performed by: PHYSICIAN ASSISTANT

## 2019-04-09 PROCEDURE — 3077F PR MOST RECENT SYSTOLIC BLOOD PRESSURE >= 140 MM HG: ICD-10-PCS | Mod: CPTII,S$GLB,, | Performed by: PHYSICIAN ASSISTANT

## 2019-04-09 PROCEDURE — 99203 PR OFFICE/OUTPT VISIT, NEW, LEVL III, 30-44 MIN: ICD-10-PCS | Mod: S$GLB,,, | Performed by: PHYSICIAN ASSISTANT

## 2019-04-09 PROCEDURE — 99999 PR PBB SHADOW E&M-EST. PATIENT-LVL IV: CPT | Mod: PBBFAC,,, | Performed by: PHYSICIAN ASSISTANT

## 2019-04-09 PROCEDURE — 99203 OFFICE O/P NEW LOW 30 MIN: CPT | Mod: S$GLB,,, | Performed by: PHYSICIAN ASSISTANT

## 2019-04-09 PROCEDURE — 3288F PR FALLS RISK ASSESSMENT DOCUMENTED: ICD-10-PCS | Mod: CPTII,S$GLB,, | Performed by: PHYSICIAN ASSISTANT

## 2019-04-09 PROCEDURE — 99499 RISK ADDL DX/OHS AUDIT: ICD-10-PCS | Mod: S$GLB,,, | Performed by: PHYSICIAN ASSISTANT

## 2019-04-09 PROCEDURE — 3080F PR MOST RECENT DIASTOLIC BLOOD PRESSURE >= 90 MM HG: ICD-10-PCS | Mod: CPTII,S$GLB,, | Performed by: PHYSICIAN ASSISTANT

## 2019-04-09 PROCEDURE — 3288F FALL RISK ASSESSMENT DOCD: CPT | Mod: CPTII,S$GLB,, | Performed by: PHYSICIAN ASSISTANT

## 2019-04-09 PROCEDURE — 99999 PR PBB SHADOW E&M-EST. PATIENT-LVL IV: ICD-10-PCS | Mod: PBBFAC,,, | Performed by: PHYSICIAN ASSISTANT

## 2019-04-09 PROCEDURE — 1100F PR PT FALLS ASSESS DOC 2+ FALLS/FALL W/INJURY/YR: ICD-10-PCS | Mod: CPTII,S$GLB,, | Performed by: PHYSICIAN ASSISTANT

## 2019-04-09 PROCEDURE — 1100F PTFALLS ASSESS-DOCD GE2>/YR: CPT | Mod: CPTII,S$GLB,, | Performed by: PHYSICIAN ASSISTANT

## 2019-04-09 PROCEDURE — 3077F SYST BP >= 140 MM HG: CPT | Mod: CPTII,S$GLB,, | Performed by: PHYSICIAN ASSISTANT

## 2019-04-09 PROCEDURE — 99499 UNLISTED E&M SERVICE: CPT | Mod: S$GLB,,, | Performed by: PHYSICIAN ASSISTANT

## 2019-04-09 RX ORDER — ATORVASTATIN CALCIUM 40 MG/1
40 TABLET, FILM COATED ORAL DAILY
COMMUNITY

## 2019-04-09 RX ORDER — NIFEDIPINE 30 MG/1
30 TABLET, FILM COATED, EXTENDED RELEASE ORAL DAILY
Status: ON HOLD | COMMUNITY
End: 2020-05-29 | Stop reason: HOSPADM

## 2019-04-09 RX ORDER — LOSARTAN POTASSIUM AND HYDROCHLOROTHIAZIDE 25; 100 MG/1; MG/1
1 TABLET ORAL DAILY
Status: ON HOLD | COMMUNITY
End: 2020-05-29 | Stop reason: HOSPADM

## 2019-04-09 NOTE — PROGRESS NOTES
SUBJECTIVE:     Chief Complaint & History of Present Illness:  Rosetta Whyte is a  New  patient 73 y.o. female who is seen here today with a complaint of  bilateral foot ankle pain and swelling .  Patient is here today for evaluation treatment of pain and soreness in bilateral lower extremities at the foot and ankle secondary to long-term swelling secondary to lymphedema.  Pain has a long history of treatment and struggling with lymphedema dating back over a year.  She is concerned that her in activity secondary to her swelling is causing continued weakness in her lower extremities and ankle she is having difficulty even with short ambulation is  On a scale of 1-10, with 10 being worst pain imaginable, he rates this pain as 5 on good days and 9 on bad days.  she describes the pain as sore and achy.    Review of patient's allergies indicates:  No Known Allergies      Current Outpatient Medications   Medication Sig Dispense Refill    aspirin (ECOTRIN) 81 MG EC tablet Take 81 mg by mouth once daily.      atorvastatin (LIPITOR) 40 MG tablet Take 40 mg by mouth once daily.      losartan-hydrochlorothiazide 100-25 mg (HYZAAR) 100-25 mg per tablet Take 1 tablet by mouth once daily.      NIFEdipine (ADALAT CC) 30 MG TbSR Take 30 mg by mouth once daily.      bumetanide (BUMEX) 2 MG tablet Can take 2 mg upto 5 times daily but will need assessment of volume status and periodic assessment of electrolytes 150 tablet 2    carvedilol (COREG) 25 MG tablet Take 25 mg by mouth 2 (two) times daily.       clobetasol (TEMOVATE) 0.05 % cream Apply topically 2 (two) times daily. Apply to legs      cloNIDine 0.3 mg/24 hr td ptwk (CATAPRES) 0.3 mg/24 hr       hydrALAZINE (APRESOLINE) 100 MG tablet Take 100 mg by mouth 3 (three) times daily.       ketoconazole (NIZORAL) 2 % cream Apply topically once daily. 60 g 1    potassium chloride SA (K-DUR,KLOR-CON) 20 MEQ tablet Take 20 mEq by mouth 2 (two) times daily.       traMADol  (ULTRAM) 50 mg tablet Take 1 tablet (50 mg total) by mouth every 12 (twelve) hours as needed for Pain. 60 tablet 3     No current facility-administered medications for this visit.        Past Medical History:   Diagnosis Date    Arthritis     BMI 50.0-59.9, adult     Difficult intubation 11/06/2017    Poor neck extension, Grade 4 view with DL    Hypertension     Morbid obesity     EMMANUEL (obstructive sleep apnea)        Past Surgical History:   Procedure Laterality Date    APPENDECTOMY  1960s    CHOLECYSTECTOMY N/A 11/6/2017    Performed by Sanjeev Smith MD at [facility] OR [address] [place] [address]    GASTRECTOMY      GASTRECTOMY-SLEEVE-LAPAROSCOPIC - 01206 w/ intraop egd N/A 7/7/2016    Performed by Og Haq Jr., MD at [facility] OR [address] [place] [address]    HIP FRACTURE SURGERY  1949    INCOMPLETE-PROCEDURE/ATTEMPTED Laparscopic cholecystectomy N/A 11/6/2017    Performed by Sanjeev Smith MD at [facility] OR [address] [place] [address]       Vital Signs (Most Recent)  Vitals:    04/09/19 1548   BP: (!) 220/106   Pulse: (!) 56       Review of Systems:  ROS:  Constitutional: no fever or chills, Morbid obesity BMI greater than 50  Eyes: no visual changes  ENT: no nasal congestion or sore throat  Respiratory: no cough or shortness of breath  Cardiovascular: no chest pain or palpitations  Gastrointestinal: no nausea or vomiting, tolerating diet  Genitourinary: no hematuria or dysuria  Integument/Breast: no rash or pruritis  Hematologic/Lymphatic: no easy bruising or lymphadenopathy, Lymphedema bilateral lower extremity  Musculoskeletal: no arthralgias or myalgias  Neurological: no seizures or tremors  Behavioral/Psych: no auditory or visual hallucinations  Endocrine: no heat or cold intolerance, Vitamin-D deficiency, status post bariatric surgery, gastric sleeve, morbid obesity      OBJECTIVE:     PHYSICAL EXAM:    Weight: (no wt. pt. is in a wheel chair legs hurt to stand), General Appearance: Well nourished, well developed, in no acute  distress.  Neurological: Mood & affect are normal.  bilateral  Foot/Ankle    General appearance: no acute distress, alert/oriented x3 and obese  Skin: erythematous and marked edema throughout the entire lower extremity with multiple blisters  Swelling:  severe and diffuse  Warmth: no warmth  Tenderness: diffuse  ROM: limited by pain    Severe swelling across the dorsal medial and lateral aspects of the foot and ankle              ASSESSMENT/PLAN:       ICD-10-CM ICD-9-CM   1. Lymphedema I89.0 457.1       Plan:  After lengthy discussion with the patient regarding treatment options we have determine she never received the compression pump and stockings that she need this has been reordered and submitted to [facility].  Wound care nurse was consulted to the room for assistance with evaluation will reschedule her with physical therapy with lymphedema program to try to get a portion of the swelling under control in the never to increase her movement and activity.  If she can progress to a point that we can have some level of activity we would like to move her forward with a more aggressive physical therapy plan.

## 2019-06-07 ENCOUNTER — CLINICAL SUPPORT (OUTPATIENT)
Dept: REHABILITATION | Facility: HOSPITAL | Age: 74
End: 2019-06-07
Payer: MEDICARE

## 2019-06-07 DIAGNOSIS — I89.0 LYMPHEDEMA: ICD-10-CM

## 2019-06-07 DIAGNOSIS — Z74.09 IMPAIRED FUNCTIONAL MOBILITY AND ACTIVITY TOLERANCE: ICD-10-CM

## 2019-06-07 DIAGNOSIS — I89.0 LYMPHEDEMA OF BOTH LOWER EXTREMITIES: Primary | ICD-10-CM

## 2019-06-07 PROCEDURE — G8979 MOBILITY GOAL STATUS: HCPCS | Mod: CL,PO

## 2019-06-07 PROCEDURE — 97140 MANUAL THERAPY 1/> REGIONS: CPT | Mod: PO

## 2019-06-07 PROCEDURE — 97162 PT EVAL MOD COMPLEX 30 MIN: CPT | Mod: PO

## 2019-06-07 PROCEDURE — G8978 MOBILITY CURRENT STATUS: HCPCS | Mod: CL,PO

## 2019-06-07 PROCEDURE — G8980 MOBILITY D/C STATUS: HCPCS | Mod: CL,PO

## 2019-06-07 NOTE — PLAN OF CARE
[facility]  Physical Therapy Initial Evaluation    Name: Rosetta Whyte  Clinic Number: [id number]    Therapy Diagnosis:   Encounter Diagnoses   Name Primary?    Lymphedema     Lymphedema of both lower extremities Yes    Impaired functional mobility and activity tolerance      Physician: Isrrael Turcios PA*    Physician Orders: PT Eval and Treat   Medical Diagnosis from Referral: lymphedema  Evaluation Date: 6/7/2019  Authorization Period Expiration:5/8/19- 7/8/19  Plan of Care Expiration: 8/28/19  Visit # / Visits authorized: 1/ 12    Time In: 1130a  Time Out: 1240p  Total Billable Time: 70 minutes    Precautions: Fall and obesity, OA    Subjective   Date of onset: swelling for about 4 years  Just recently finding out what is wrong with legs- diagnosed with lymphedema about 1 year ago  History of current condition - Rosetta reports: recent flare of R leg about last month  Last therapy 3/15/19   Brings in pump to be set- [employer] - has had pump for about 1-2 months  Not using pump - only tried 1 x   Pato who came reset it incorrectly - Pump - Biocompression systems- will have to   Does not have any stockings - can't wear due to shape and size  Therapy was bandaging in past  Denies DVT- no recent tests  L hip fracture age 4 y/o surgical fixation     Medical History:   Past Medical History:   Diagnosis Date    Arthritis     BMI 50.0-59.9, adult     Difficult intubation 11/06/2017    Poor neck extension, Grade 4 view with DL    Hypertension     Morbid obesity     EMMANUEL (obstructive sleep apnea)        Surgical History:   Rosetta Whyte  has a past surgical history that includes Hip fracture surgery (1949); Appendectomy (1960s); and Gastrectomy.    Medications:   Rosetta has a current medication list which includes the following prescription(s): aspirin, atorvastatin, bumetanide, carvedilol, clobetasol, clonidine 0.3 mg/24 hr td ptwk, hydralazine, ketoconazole,  losartan-hydrochlorothiazide 100-25 mg, nifedipine, potassium chloride sa, and tramadol.    Allergies:   Review of patient's allergies indicates:  No Known Allergies     Imaging, US  Report Summary:  Impression:   Right Leg:  Color flow evaluation of the lower extremity demonstrates fully compressible and patent veins. There is no evidence of venous thrombosis in the deep or superficial veins. No Significant reflux noted in the GSV including the saphenofemoral   junction (SFJ).The SSV and  accessory saphenous veins were not visualized.     Left Leg:  Color flow evaluation of the lower extremity demonstrates fully compressible and patent veins. There is no evidence of venous thrombosis in the deep or superficial veins. Significant reflux noted in the GSV including the saphenofemoral   junction (SFJ).The  SSV and accessory saphenous veins were not visualized.    Sonographer: Sandy Ramires, RVS    Electronically Signed by:  Erasto Snow MD [[id number]]                        On: 02/23/2018 15:19    Pt denies CHF, KF, and DM.- denies CA - does have HTN  Takes fluid pill   Previous Lymphedema Treatment: March 2019 Ansley Westbrook, PT helped to secure pump  Per pt- received home pump and family questions fit/use - seek vendor or representative support for equipment  Per last PT given measurements, sizing, compression options to purchase online or vendor support  Prior Therapy: PT for knees in past    Social History: lives alone, not driving - can't since leg size  Not working  Family member with her and helps lift her legs and gives some history  Environmental barriers: level home  Needs help with shower - steps over tub, uses transfer tub bench- someone helps lift legs  Slip on sandals - uses reacher to reach feet  Walking with rollator   Abuse/Neglect: no   Nutritional status: morbid obesity BMI > 50   Educational needs: met and repeated on compression needs and pump   Spiritual/Cultural: met   Fall risk: yes due to  severe OA and size- using wide rollator    Occupation: -  Prior Level of Function: limited by leg and body size and OA of knees  Current Level of Function: amb with wide rollator 30-50 ft in clinic  Needs MIN to MOD A to lift legs on table    Pain: L knee needs Knee replacement- needs to lose weight first  Weight loss surgery 3 years ago -  Not successful  Added swelling after surgery - no  Significant weight loss  Current 8/10, worst 10/10, best 8/10   Location: left knee   Description: Aching and joint pain  Aggravating Factors: Walking, Lifting and moving  Easing Factors: lying down     Has her home pump Bio[employer] System in bag- discussed if mechanical issues needs to  or vendor- PT can discuss use of pump systems as component of Complete Decongestive Therapy    Pts goals: help with the swelling    Objective     Morbidly obese female amb to dept with wide rollator- 2 family members present- pt stands with use of UE to stand pivot to mat  Pt requires min/mod A to lift her LE onto/off treatment table  No compression  Amount of Swelling: Location of Swelling: obesity size and shape changes with mod to severe B LE fullness- thigh bulges and lobules near knees and medial thigh    B LE R > L  Skin Integrity: dry , intact, slight redness- general discoloration   stage 3 B LEs with secondary skin characteristic changes, fibrosclerotic changes  Palpation/Texture: indurated skin B LEs  Posture: wfl, pt ambulates with waddling type gait due to size of LEs. Able to ambulate x 25' then fatigues     Range of Motion - UE/LE  (R) R LE limited due to size of limb, pt can bend her knee slightly approximately 30 degrees to transfer   While supine /propped long sitting   Attempt AROM at knees for heel slide R lacking 5 ext to 40 flex  (L)L  LE limited due to size of limb, requires A to move her LE due to size of limb  Supine remains ~ 20 knee flex- unable to extend - flexion attempt to ~ 50    Strength:  functional sit to stand 30 second test  Requires use of UE to stand  Unable to lift LE on/off table- very limited by size/shape       Girth Measurements (in centimeters)  LANDMARK LEFT LE  6/7/19 RIGHT LE  6/7/19   SBP + 20 84.0 cm 97.0 cm   SBP + 10  83.0 cm 95.0 cm   SBP 85.0 cm 85.0 cm   10 below SBP- bulge 70.0 cm 70.0 cm   20 below SBP 62.5 cm 65.0 cm   30 below SBP 46.5 cm 55.0 cm   35 below SBP 41.0 cm 37.0 cm   Ankle 34.5 cm 38.0 cm   Forefoot 25.5 cm 26.0 cm     Sensation: intact to lt touch       CMS Impairment/Limitation/Restriction for FOTO Diseases of Veins and Lymphatics Survey  Status Limitation G-Code CMS Severity Modifier  Intake 21% 79% Current Status CL - At least 60 percent but less than 80 percent  Predicted 40% 60% Goal Status+ CL - At least 60 percent but less than 80 percent  D/C Status CL     Therapist reviewed FOTO scores for Rosetta Whyte on 6/7/2019.   FOTO documents entered into EPIC - Media section.       TREATMENT   Treatment Time In: 1210p  Treatment Time Out: 1240p  Total Treatment time separate from Evaluation: 30 minutes    Rosetta received therapeutic exercises to develop strength, endurance, ROM, flexibility and posture for 5 minutes including:  Encouraged aROM, positioning for knee ROM and stretching    Rosetta received the following manual therapy techniques: Manual Lymphatic Drainage and education in pump, compression needs, vendor support were applied to the: B LE  for 30 minutes, including:  Pt brings her Biocompression sequential pump to clinic  PT provided basic instruction in use of pump systems, sleeve application, schedule and need to follow with compression garments  Discussed need to contact vendor representative who delivered pump to home on adjustments on settings to her device  Recommend 40-45mmHg at distal setting and sequential decrease along sleeve- this is typical pump setting - each device has their own settings and locking systems- this therapist would need  "directions for this device and therefore pt needs to have vendor rep support beyond PT instructions and training in use  Typically use 60 min and follow with compression  Full review on compression needs with obstacles of obesity, size/shape as well as insurance coverage limitations and potential cost  This has been covered in prior PT visits 9/4/2018:  "Education provided re: remain compressed as much as possible for ongoing decongestion. Pt informed her insurance does not cover velcro wraps. Pt measured for compreflex lite, XXL long for B lower legs. Pt to acquire. Pt given website with ordering info and correct size.   Rosetta verbalized good understanding of education provided. "    Pt was informed of PH typically covering basic knee high garments in std sizing  PH uses [employer] if covered items.   Medicare and Medicare group plans typically do not cover compression and will be OOP cost.  She can use [employer] or any medical equipment provider of choice if OOP expense.  Repeated training in compression needs and possible need for custom sizing if outside std measurements.  Provided vendor list and contact.  Pump vendor was [employer] for [employer] - should  listed on pump or paperwork at home.    Home Exercises and Patient Education Provided    Education provided:   - due to non compliance with prior therapy attendance and compression needs - limited expectations for change with obesity related lymphedema and severe OA - pt now has home pump- needs representative support for home use  Needs compression wrap or garment purchase - MD  Orders and acquire from medical equipment provider or vendor of choice - past PT provided online choice.  - Pt was educated in lymphedema etiology and management plans.  Pt was provided with written  risk reductions and precautions for managing lymphedema.      This patient will not be followed in therapy.  Written Home Exercises Provided: Patient instructed to " cont prior HEP.  Exercises were reviewed and Rosetta was able to demonstrate them prior to the end of the session.  Rosetta demonstrated fair  understanding of the education provided.     See EMR under Patient Instructions for exercises provided 6/7/2019.    Assessment   Rosetta is a 73 y.o. female referred to outpatient Physical Therapy with a medical diagnosis of lymphedema. This patient presents s/p morbid obesity and past gastric bypass procedure without weight loss success, severe OA and limited ROM and strength resulting in:obesity related  lymphedema of the B LE, increased pain, increased stiffness in the knees and LE, as well as difficulty performing mobility, transfers, bed mobility , and placing the pt at higher risk of infection.     Pt prognosis is Guarded.     Due to past non compliance with attendance and compression needs - has home pump and needs to follow through with compression wraps - pt is poor candidate for progress with therapy.  Morbid obesity is main limiting factor and has not been addressed or managed to support her LE and lymphedema will not show much change if contributing conditions are not addressed.  Pt should confirm use and set up of pump and acquire compression needs.    Plan of care discussed with patient: Yes  Pt's spiritual, cultural and educational needs considered and patient is agreeable to the plan of care and goals as stated below:     Anticipated Barriers for therapy: poor prognosis- obesity, OA, compliance and attendance    Medical Necessity is demonstrated by the following  History  Co-morbidities and personal factors that may impact the plan of care Co-morbidities:   coping style/mechanism, excessive commute time/distance, financial considerations, high BMI, level of undertstanding of current condition, poor medication/medical compliance, prior abdominal surgery and severe size/shape    Personal Factors:   coping style     moderate   Examination  Body Structures and  Functions, activity limitations and participation restrictions that may impact the plan of care Body Regions:   lower extremities  trunk    Body Systems:    gross symmetry  ROM  strength  gait  transfers  edema  skin integrity  skin color  lymphedema    Participation Restrictions:   Compliance, mobility  Home needs    Activity limitations:   Learning and applying knowledge  no deficits    General Tasks and Commands  no deficits    Communication  no deficits    Mobility  lifting and carrying objects  moving around using equipment (WC)  using transportation (bus, train, plane, car)  driving (bike, car, motorcycle)    Self care  washing oneself (bathing, drying, washing hands)  caring for body parts (brushing teeth, shaving, grooming)  dressing  looking after one's health    Domestic Life  shopping  cooking  doing house work (cleaning house, washing dishes, laundry)  assisting others    Interactions/Relationships  no deficits    Life Areas  no deficits    Community and Social Life  no deficits         high   Clinical Presentation evolving clinical presentation with changing clinical characteristics moderate   Decision Making/ Complexity Score: moderate     Completed education and training  Seek vendor support for pump use  Seek orders ands compression needs per [employer] or vendor of choice  Education with pt and family member    Plan   Plan of care Certification: 6/7/2019 to 8/28/19.    Not following with PT  Home pump and compression needs per pt.  Elma oMody, PT

## 2019-08-02 ENCOUNTER — DOCUMENTATION ONLY (OUTPATIENT)
Dept: REHABILITATION | Facility: HOSPITAL | Age: 74
End: 2019-08-02

## 2019-08-02 DIAGNOSIS — I89.0 LYMPHEDEMA OF BOTH LOWER EXTREMITIES: Primary | ICD-10-CM

## 2019-08-02 DIAGNOSIS — Z74.09 IMPAIRED FUNCTIONAL MOBILITY AND ACTIVITY TOLERANCE: ICD-10-CM

## 2019-08-02 NOTE — PROGRESS NOTES
Patient contacted PT dept for information about acquiring compression garments.   Spoke with her dtr by phone- advised orders for compression from MD or referring provider and all compression needs from [employer] provider.  [facility] Insurance utilizes [employer]- typically [employer] and Group plans do not cover compression beyond std size knee high garments.  Dtr advised of size and shape - may need custom, may need Velcro Inelastic wraps.  Orders from MD and to vendor to purchase.  Provided phone # for [employer].     Dtr voiced understanding.

## 2019-08-02 NOTE — PROGRESS NOTES
Second phone call with Rosetta Whyte- repeated compression orders from MD  Fitting and coverage/purchase from [employer]urable Medical equipment provider  Insurance coverage per plan and checked by vendor  PH may not cover needed items    Suggest Velcro Inelastic wraps - [employer]   Obstacles of shape, size, cost, and compliance.     Repeated with fair understanding- therapy dept does NOT supply or provide compression garments or wraps

## 2019-08-06 ENCOUNTER — TELEPHONE (OUTPATIENT)
Dept: ORTHOPEDICS | Facility: CLINIC | Age: 74
End: 2019-08-06

## 2019-08-06 NOTE — TELEPHONE ENCOUNTER
----- Message from Shirin Garcia sent at 8/6/2019  2:40 PM CDT -----  Contact: Self  Pt is needing a call back in regards to physical therapy @ [phone]

## 2019-08-06 NOTE — TELEPHONE ENCOUNTER
Spoke with Rosetta Kennedy stating I will contact Fransisco Wills at [facility]

## 2019-08-09 ENCOUNTER — DOCUMENTATION ONLY (OUTPATIENT)
Dept: REHABILITATION | Facility: HOSPITAL | Age: 74
End: 2019-08-09

## 2019-08-09 DIAGNOSIS — I89.0 LYMPHEDEMA OF BOTH LOWER EXTREMITIES: Primary | ICD-10-CM

## 2019-08-09 DIAGNOSIS — Z74.09 IMPAIRED FUNCTIONAL MOBILITY AND ACTIVITY TOLERANCE: ICD-10-CM

## 2019-08-09 NOTE — PROGRESS NOTES
Pt's dtr came to clinic  Provided again information to secure compression.  Obstacles due to size, shape, coverage, compliance- may need custom or suggested velcro inelastic wraps    Need MD, NP or PA orders  PT can not write orders    Need vendor for fitting - coverage or purchase    Again provided suggestions, list of products, and list of vendors    [facility] uses [employer] IF covered by insurance    Compression wraps are not typically covered - custom or specialty may  Come at high cost    dtr voiced understanding

## 2019-09-04 ENCOUNTER — TELEPHONE (OUTPATIENT)
Dept: ORTHOPEDICS | Facility: CLINIC | Age: 74
End: 2019-09-04

## 2019-09-04 NOTE — TELEPHONE ENCOUNTER
----- Message from Mar Simon sent at 9/4/2019  2:18 PM CDT -----  Contact: patient   Please call pt at [phone]    Patient stated that [facility] [facility] [employer] needs an order for her compression stockings approval    Thank you

## 2019-09-04 NOTE — TELEPHONE ENCOUNTER
I spoke with patient stating I will speak with Raphael Turcios regarding a scrip for compression stocking

## 2020-05-22 ENCOUNTER — OFFICE VISIT (OUTPATIENT)
Dept: PRIMARY CARE CLINIC | Facility: CLINIC | Age: 75
End: 2020-05-22
Payer: MEDICARE

## 2020-05-22 ENCOUNTER — HOSPITAL ENCOUNTER (INPATIENT)
Facility: HOSPITAL | Age: 75
LOS: 7 days | Discharge: HOME HEALTH | DRG: 304 | End: 2020-05-29
Attending: EMERGENCY MEDICINE | Admitting: INTERNAL MEDICINE
Payer: MEDICARE

## 2020-05-22 DIAGNOSIS — I89.0 LYMPHEDEMA OF BOTH LOWER EXTREMITIES: ICD-10-CM

## 2020-05-22 DIAGNOSIS — R41.82 ALTERED MENTAL STATUS, UNSPECIFIED ALTERED MENTAL STATUS TYPE: ICD-10-CM

## 2020-05-22 DIAGNOSIS — I21.4 NSTEMI (NON-ST ELEVATED MYOCARDIAL INFARCTION): ICD-10-CM

## 2020-05-22 DIAGNOSIS — N17.9 AKI (ACUTE KIDNEY INJURY): ICD-10-CM

## 2020-05-22 DIAGNOSIS — E55.9 VITAMIN D DEFICIENCY: ICD-10-CM

## 2020-05-22 DIAGNOSIS — I67.4 SYSTOLIC HYPERTENSION WITH CEREBROVASCULAR DISEASE: Chronic | ICD-10-CM

## 2020-05-22 DIAGNOSIS — I1A.0 RESISTANT HYPERTENSION: Chronic | ICD-10-CM

## 2020-05-22 DIAGNOSIS — R32 INCONTINENCE OF URINE IN FEMALE: ICD-10-CM

## 2020-05-22 DIAGNOSIS — Z98.84 S/P LAPAROSCOPIC SLEEVE GASTRECTOMY: ICD-10-CM

## 2020-05-22 DIAGNOSIS — J96.01 ACUTE HYPOXEMIC RESPIRATORY FAILURE: ICD-10-CM

## 2020-05-22 DIAGNOSIS — I51.9 DIASTOLIC DYSFUNCTION, LEFT VENTRICLE: ICD-10-CM

## 2020-05-22 DIAGNOSIS — R06.03 RESPIRATORY DISTRESS: ICD-10-CM

## 2020-05-22 DIAGNOSIS — I16.1 HYPERTENSIVE EMERGENCY: Primary | ICD-10-CM

## 2020-05-22 DIAGNOSIS — R06.02 SHORTNESS OF BREATH: ICD-10-CM

## 2020-05-22 DIAGNOSIS — Z98.84 STATUS POST BARIATRIC SURGERY: ICD-10-CM

## 2020-05-22 DIAGNOSIS — E87.6 HYPOKALEMIA: ICD-10-CM

## 2020-05-22 DIAGNOSIS — I51.9 DIASTOLIC DYSFUNCTION, LEFT VENTRICLE: Chronic | ICD-10-CM

## 2020-05-22 DIAGNOSIS — J81.0 ACUTE PULMONARY EDEMA: ICD-10-CM

## 2020-05-22 DIAGNOSIS — R07.9 CHEST PAIN, UNSPECIFIED TYPE: ICD-10-CM

## 2020-05-22 DIAGNOSIS — I16.9 HYPERTENSIVE CRISIS: Primary | ICD-10-CM

## 2020-05-22 DIAGNOSIS — I16.1 HYPERTENSIVE EMERGENCY: ICD-10-CM

## 2020-05-22 DIAGNOSIS — M79.89 LEG SWELLING: ICD-10-CM

## 2020-05-22 DIAGNOSIS — I89.0 LYMPHEDEMA OF BOTH LOWER EXTREMITIES: Chronic | ICD-10-CM

## 2020-05-22 DIAGNOSIS — I10 ESSENTIAL HYPERTENSION: ICD-10-CM

## 2020-05-22 DIAGNOSIS — Z74.09 IMPAIRED FUNCTIONAL MOBILITY AND ACTIVITY TOLERANCE: ICD-10-CM

## 2020-05-22 PROBLEM — L03.119 CELLULITIS OF LEG: Status: ACTIVE | Noted: 2020-05-22

## 2020-05-22 LAB
ALBUMIN SERPL BCP-MCNC: 4.1 G/DL (ref 3.5–5.2)
ALLENS TEST: ABNORMAL
ALLENS TEST: ABNORMAL
ALP SERPL-CCNC: 106 U/L (ref 55–135)
ALT SERPL W/O P-5'-P-CCNC: 9 U/L (ref 10–44)
ANION GAP SERPL CALC-SCNC: 12 MMOL/L (ref 8–16)
AST SERPL-CCNC: 12 U/L (ref 10–40)
BASOPHILS # BLD AUTO: 0.07 K/UL (ref 0–0.2)
BASOPHILS NFR BLD: 0.8 % (ref 0–1.9)
BILIRUB SERPL-MCNC: 0.7 MG/DL (ref 0.1–1)
BNP SERPL-MCNC: 640 PG/ML (ref 0–99)
BUN SERPL-MCNC: 15 MG/DL (ref 8–23)
BUN SERPL-MCNC: 16 MG/DL (ref 6–30)
CALCIUM SERPL-MCNC: 9.5 MG/DL (ref 8.7–10.5)
CHLORIDE SERPL-SCNC: 106 MMOL/L (ref 95–110)
CHLORIDE SERPL-SCNC: 108 MMOL/L (ref 95–110)
CO2 SERPL-SCNC: 25 MMOL/L (ref 23–29)
CREAT SERPL-MCNC: 0.8 MG/DL (ref 0.5–1.4)
CREAT SERPL-MCNC: 0.8 MG/DL (ref 0.5–1.4)
DELSYS: ABNORMAL
DIFFERENTIAL METHOD: ABNORMAL
EOSINOPHIL # BLD AUTO: 0.5 K/UL (ref 0–0.5)
EOSINOPHIL NFR BLD: 5.4 % (ref 0–8)
EP: 10
ERYTHROCYTE [DISTWIDTH] IN BLOOD BY AUTOMATED COUNT: 15.1 % (ref 11.5–14.5)
ERYTHROCYTE [SEDIMENTATION RATE] IN BLOOD BY WESTERGREN METHOD: 10 MM/H
EST. GFR  (AFRICAN AMERICAN): >60 ML/MIN/1.73 M^2
EST. GFR  (NON AFRICAN AMERICAN): >60 ML/MIN/1.73 M^2
FIO2: 60
GLUCOSE SERPL-MCNC: 119 MG/DL (ref 70–110)
GLUCOSE SERPL-MCNC: 151 MG/DL (ref 70–110)
HCO3 UR-SCNC: 27.7 MMOL/L (ref 24–28)
HCO3 UR-SCNC: 29.3 MMOL/L (ref 24–28)
HCT VFR BLD AUTO: 42.8 % (ref 37–48.5)
HCT VFR BLD CALC: 43 %PCV (ref 36–54)
HGB BLD-MCNC: 12.5 G/DL (ref 12–16)
IMM GRANULOCYTES # BLD AUTO: 0.03 K/UL (ref 0–0.04)
IMM GRANULOCYTES NFR BLD AUTO: 0.4 % (ref 0–0.5)
INR PPP: 1.1 (ref 0.8–1.2)
IP: 17
LACTATE SERPL-SCNC: 1.5 MMOL/L (ref 0.5–2.2)
LYMPHOCYTES # BLD AUTO: 2.2 K/UL (ref 1–4.8)
LYMPHOCYTES NFR BLD: 26.4 % (ref 18–48)
MCH RBC QN AUTO: 28.8 PG (ref 27–31)
MCHC RBC AUTO-ENTMCNC: 29.2 G/DL (ref 32–36)
MCV RBC AUTO: 99 FL (ref 82–98)
MIN VOL: 16.5
MODE: ABNORMAL
MONOCYTES # BLD AUTO: 1 K/UL (ref 0.3–1)
MONOCYTES NFR BLD: 11.2 % (ref 4–15)
NEUTROPHILS # BLD AUTO: 4.7 K/UL (ref 1.8–7.7)
NEUTROPHILS NFR BLD: 55.8 % (ref 38–73)
NRBC BLD-RTO: 0 /100 WBC
PCO2 BLDA: 55 MMHG (ref 35–45)
PCO2 BLDA: 69.8 MMHG (ref 35–45)
PH SMN: 7.23 [PH] (ref 7.35–7.45)
PH SMN: 7.31 [PH] (ref 7.35–7.45)
PLATELET # BLD AUTO: 252 K/UL (ref 150–350)
PMV BLD AUTO: 11 FL (ref 9.2–12.9)
PO2 BLDA: 30 MMHG (ref 40–60)
PO2 BLDA: 79 MMHG (ref 80–100)
POC BE: 1 MMOL/L
POC BE: 2 MMOL/L
POC IONIZED CALCIUM: 1.17 MMOL/L (ref 1.06–1.42)
POC SATURATED O2: 44 % (ref 95–100)
POC SATURATED O2: 94 % (ref 95–100)
POC TCO2 (MEASURED): 26 MMOL/L (ref 23–29)
POC TCO2: 29 MMOL/L (ref 23–27)
POC TCO2: 31 MMOL/L (ref 24–29)
POTASSIUM BLD-SCNC: 3 MMOL/L (ref 3.5–5.1)
POTASSIUM SERPL-SCNC: 2.9 MMOL/L (ref 3.5–5.1)
PROT SERPL-MCNC: 7.8 G/DL (ref 6–8.4)
PROTHROMBIN TIME: 11.1 SEC (ref 9–12.5)
RBC # BLD AUTO: 4.34 M/UL (ref 4–5.4)
SAMPLE: ABNORMAL
SARS-COV-2 RDRP RESP QL NAA+PROBE: NEGATIVE
SITE: ABNORMAL
SITE: ABNORMAL
SODIUM BLD-SCNC: 144 MMOL/L (ref 136–145)
SODIUM SERPL-SCNC: 145 MMOL/L (ref 136–145)
SP02: 92
SPONT RATE: 36
TROPONIN I SERPL DL<=0.01 NG/ML-MCNC: 0.02 NG/ML (ref 0–0.03)
WBC # BLD AUTO: 8.46 K/UL (ref 3.9–12.7)

## 2020-05-22 PROCEDURE — 63600175 PHARM REV CODE 636 W HCPCS: Performed by: INTERNAL MEDICINE

## 2020-05-22 PROCEDURE — 85025 COMPLETE CBC W/AUTO DIFF WBC: CPT

## 2020-05-22 PROCEDURE — 99999 PR PBB SHADOW E&M-EST. PATIENT-LVL III: CPT | Mod: PBBFAC,,, | Performed by: FAMILY MEDICINE

## 2020-05-22 PROCEDURE — 12000002 HC ACUTE/MED SURGE SEMI-PRIVATE ROOM

## 2020-05-22 PROCEDURE — 36600 WITHDRAWAL OF ARTERIAL BLOOD: CPT

## 2020-05-22 PROCEDURE — 99292 PR CRITICAL CARE, ADDL 30 MIN: ICD-10-PCS | Mod: ,,, | Performed by: EMERGENCY MEDICINE

## 2020-05-22 PROCEDURE — 82803 BLOOD GASES ANY COMBINATION: CPT

## 2020-05-22 PROCEDURE — 63700000 PHARM REV CODE 250 ALT 637 W/O HCPCS: Performed by: INTERNAL MEDICINE

## 2020-05-22 PROCEDURE — 85610 PROTHROMBIN TIME: CPT

## 2020-05-22 PROCEDURE — 3078F PR MOST RECENT DIASTOLIC BLOOD PRESSURE < 80 MM HG: ICD-10-PCS | Mod: CPTII,S$GLB,, | Performed by: FAMILY MEDICINE

## 2020-05-22 PROCEDURE — 83880 ASSAY OF NATRIURETIC PEPTIDE: CPT

## 2020-05-22 PROCEDURE — 99291 CRITICAL CARE FIRST HOUR: CPT | Mod: ,,, | Performed by: EMERGENCY MEDICINE

## 2020-05-22 PROCEDURE — 25000003 PHARM REV CODE 250: Performed by: INTERNAL MEDICINE

## 2020-05-22 PROCEDURE — 99204 OFFICE O/P NEW MOD 45 MIN: CPT | Mod: S$GLB,,, | Performed by: FAMILY MEDICINE

## 2020-05-22 PROCEDURE — U0002 COVID-19 LAB TEST NON-CDC: HCPCS

## 2020-05-22 PROCEDURE — 3077F PR MOST RECENT SYSTOLIC BLOOD PRESSURE >= 140 MM HG: ICD-10-PCS | Mod: CPTII,S$GLB,, | Performed by: FAMILY MEDICINE

## 2020-05-22 PROCEDURE — 3078F DIAST BP <80 MM HG: CPT | Mod: CPTII,S$GLB,, | Performed by: FAMILY MEDICINE

## 2020-05-22 PROCEDURE — 99291 PR CRITICAL CARE, E/M 30-74 MINUTES: ICD-10-PCS | Mod: ,,, | Performed by: EMERGENCY MEDICINE

## 2020-05-22 PROCEDURE — 99900035 HC TECH TIME PER 15 MIN (STAT)

## 2020-05-22 PROCEDURE — 83605 ASSAY OF LACTIC ACID: CPT

## 2020-05-22 PROCEDURE — 25000003 PHARM REV CODE 250: Performed by: EMERGENCY MEDICINE

## 2020-05-22 PROCEDURE — 63600175 PHARM REV CODE 636 W HCPCS: Performed by: EMERGENCY MEDICINE

## 2020-05-22 PROCEDURE — 27000221 HC OXYGEN, UP TO 24 HOURS

## 2020-05-22 PROCEDURE — 93010 EKG 12-LEAD: ICD-10-PCS | Mod: ,,, | Performed by: INTERNAL MEDICINE

## 2020-05-22 PROCEDURE — 99204 PR OFFICE/OUTPT VISIT, NEW, LEVL IV, 45-59 MIN: ICD-10-PCS | Mod: S$GLB,,, | Performed by: FAMILY MEDICINE

## 2020-05-22 PROCEDURE — 80047 BASIC METABLC PNL IONIZED CA: CPT

## 2020-05-22 PROCEDURE — 96375 TX/PRO/DX INJ NEW DRUG ADDON: CPT

## 2020-05-22 PROCEDURE — 99292 CRITICAL CARE ADDL 30 MIN: CPT | Mod: ,,, | Performed by: EMERGENCY MEDICINE

## 2020-05-22 PROCEDURE — 80053 COMPREHEN METABOLIC PANEL: CPT

## 2020-05-22 PROCEDURE — 1125F PR PAIN SEVERITY QUANTIFIED, PAIN PRESENT: ICD-10-PCS | Mod: S$GLB,,, | Performed by: FAMILY MEDICINE

## 2020-05-22 PROCEDURE — 1159F MED LIST DOCD IN RCRD: CPT | Mod: S$GLB,,, | Performed by: FAMILY MEDICINE

## 2020-05-22 PROCEDURE — 27000190 HC CPAP FULL FACE MASK W/VALVE

## 2020-05-22 PROCEDURE — 84484 ASSAY OF TROPONIN QUANT: CPT

## 2020-05-22 PROCEDURE — 94660 CPAP INITIATION&MGMT: CPT

## 2020-05-22 PROCEDURE — 1101F PR PT FALLS ASSESS DOC 0-1 FALLS W/OUT INJ PAST YR: ICD-10-PCS | Mod: CPTII,S$GLB,, | Performed by: FAMILY MEDICINE

## 2020-05-22 PROCEDURE — 96368 THER/DIAG CONCURRENT INF: CPT

## 2020-05-22 PROCEDURE — 1125F AMNT PAIN NOTED PAIN PRSNT: CPT | Mod: S$GLB,,, | Performed by: FAMILY MEDICINE

## 2020-05-22 PROCEDURE — 1159F PR MEDICATION LIST DOCUMENTED IN MEDICAL RECORD: ICD-10-PCS | Mod: S$GLB,,, | Performed by: FAMILY MEDICINE

## 2020-05-22 PROCEDURE — 96365 THER/PROPH/DIAG IV INF INIT: CPT

## 2020-05-22 PROCEDURE — 99291 CRITICAL CARE FIRST HOUR: CPT | Mod: 25

## 2020-05-22 PROCEDURE — 99999 PR PBB SHADOW E&M-EST. PATIENT-LVL III: ICD-10-PCS | Mod: PBBFAC,,, | Performed by: FAMILY MEDICINE

## 2020-05-22 PROCEDURE — 3077F SYST BP >= 140 MM HG: CPT | Mod: CPTII,S$GLB,, | Performed by: FAMILY MEDICINE

## 2020-05-22 PROCEDURE — 93005 ELECTROCARDIOGRAM TRACING: CPT

## 2020-05-22 PROCEDURE — 94761 N-INVAS EAR/PLS OXIMETRY MLT: CPT

## 2020-05-22 PROCEDURE — 93010 ELECTROCARDIOGRAM REPORT: CPT | Mod: ,,, | Performed by: INTERNAL MEDICINE

## 2020-05-22 PROCEDURE — 99292 CRITICAL CARE ADDL 30 MIN: CPT

## 2020-05-22 PROCEDURE — 63600175 PHARM REV CODE 636 W HCPCS: Performed by: STUDENT IN AN ORGANIZED HEALTH CARE EDUCATION/TRAINING PROGRAM

## 2020-05-22 PROCEDURE — 1101F PT FALLS ASSESS-DOCD LE1/YR: CPT | Mod: CPTII,S$GLB,, | Performed by: FAMILY MEDICINE

## 2020-05-22 RX ORDER — ENOXAPARIN SODIUM 100 MG/ML
40 INJECTION SUBCUTANEOUS EVERY 24 HOURS
Status: DISCONTINUED | OUTPATIENT
Start: 2020-05-22 | End: 2020-05-24

## 2020-05-22 RX ORDER — URSODIOL 500 MG/1
TABLET, FILM COATED ORAL
Status: ON HOLD | COMMUNITY
End: 2020-05-29 | Stop reason: SDUPTHER

## 2020-05-22 RX ORDER — FUROSEMIDE 10 MG/ML
80 INJECTION INTRAMUSCULAR; INTRAVENOUS
Status: DISCONTINUED | OUTPATIENT
Start: 2020-05-23 | End: 2020-05-24

## 2020-05-22 RX ORDER — SODIUM CHLORIDE 0.9 % (FLUSH) 0.9 %
10 SYRINGE (ML) INJECTION
Status: DISCONTINUED | OUTPATIENT
Start: 2020-05-22 | End: 2020-05-29 | Stop reason: HOSPADM

## 2020-05-22 RX ORDER — ESOMEPRAZOLE MAGNESIUM 40 MG/1
GRANULE, DELAYED RELEASE ORAL
Status: ON HOLD | COMMUNITY
End: 2020-05-29 | Stop reason: SDUPTHER

## 2020-05-22 RX ORDER — POTASSIUM CHLORIDE 7.45 MG/ML
10 INJECTION INTRAVENOUS
Status: ACTIVE | OUTPATIENT
Start: 2020-05-22 | End: 2020-05-22

## 2020-05-22 RX ORDER — FENOPROFEN CALCIUM 400 MG/1
CAPSULE ORAL
Status: ON HOLD | COMMUNITY
End: 2020-05-29 | Stop reason: HOSPADM

## 2020-05-22 RX ORDER — GABAPENTIN 300 MG/1
CAPSULE ORAL
Status: ON HOLD | COMMUNITY
End: 2020-05-29 | Stop reason: SDUPTHER

## 2020-05-22 RX ORDER — POTASSIUM CHLORIDE 20 MEQ/15ML
40 SOLUTION ORAL ONCE
Status: COMPLETED | OUTPATIENT
Start: 2020-05-22 | End: 2020-05-22

## 2020-05-22 RX ORDER — NITROGLYCERIN 0.4 MG/1
0.4 TABLET SUBLINGUAL EVERY 5 MIN PRN
Status: DISCONTINUED | OUTPATIENT
Start: 2020-05-22 | End: 2020-05-23

## 2020-05-22 RX ORDER — FUROSEMIDE 40 MG/1
TABLET ORAL
Status: ON HOLD | COMMUNITY
End: 2020-05-29 | Stop reason: SDUPTHER

## 2020-05-22 RX ORDER — CARVEDILOL 25 MG/1
25 TABLET ORAL 2 TIMES DAILY
Status: DISCONTINUED | OUTPATIENT
Start: 2020-05-22 | End: 2020-05-23

## 2020-05-22 RX ORDER — NITROGLYCERIN 20 MG/100ML
200 INJECTION INTRAVENOUS CONTINUOUS
Status: DISCONTINUED | OUTPATIENT
Start: 2020-05-22 | End: 2020-05-23

## 2020-05-22 RX ORDER — ATORVASTATIN CALCIUM 10 MG/1
40 TABLET, FILM COATED ORAL DAILY
Status: DISCONTINUED | OUTPATIENT
Start: 2020-05-23 | End: 2020-05-29 | Stop reason: HOSPADM

## 2020-05-22 RX ORDER — HYDROCODONE BITARTRATE AND ACETAMINOPHEN 7.5; 325 MG/1; MG/1
TABLET ORAL
COMMUNITY
End: 2020-05-22

## 2020-05-22 RX ORDER — ALBUTEROL SULFATE 90 UG/1
AEROSOL, METERED RESPIRATORY (INHALATION)
Status: ON HOLD | COMMUNITY
End: 2020-05-29 | Stop reason: HOSPADM

## 2020-05-22 RX ORDER — AMLODIPINE BESYLATE 10 MG/1
TABLET ORAL
Status: ON HOLD | COMMUNITY
End: 2020-05-29 | Stop reason: SDUPTHER

## 2020-05-22 RX ORDER — AMLODIPINE BESYLATE 10 MG/1
10 TABLET ORAL DAILY
Status: DISCONTINUED | OUTPATIENT
Start: 2020-05-23 | End: 2020-05-23

## 2020-05-22 RX ORDER — LOSARTAN POTASSIUM 25 MG/1
TABLET ORAL
Status: ON HOLD | COMMUNITY
End: 2020-05-29 | Stop reason: HOSPADM

## 2020-05-22 RX ORDER — NITROGLYCERIN 20 MG/100ML
2.3 INJECTION INTRAVENOUS CONTINUOUS
Status: DISCONTINUED | OUTPATIENT
Start: 2020-05-22 | End: 2020-05-22

## 2020-05-22 RX ORDER — FUROSEMIDE 10 MG/ML
80 INJECTION INTRAMUSCULAR; INTRAVENOUS
Status: COMPLETED | OUTPATIENT
Start: 2020-05-22 | End: 2020-05-22

## 2020-05-22 RX ORDER — FUROSEMIDE 10 MG/ML
80 INJECTION INTRAMUSCULAR; INTRAVENOUS ONCE
Status: COMPLETED | OUTPATIENT
Start: 2020-05-22 | End: 2020-05-22

## 2020-05-22 RX ORDER — LOSARTAN POTASSIUM 25 MG/1
25 TABLET ORAL DAILY
Status: DISCONTINUED | OUTPATIENT
Start: 2020-05-23 | End: 2020-05-23

## 2020-05-22 RX ORDER — NITROGLYCERIN 20 MG/100ML
INJECTION INTRAVENOUS
Status: DISPENSED
Start: 2020-05-22 | End: 2020-05-23

## 2020-05-22 RX ORDER — CLOTRIMAZOLE AND BETAMETHASONE DIPROPIONATE 10; .64 MG/G; MG/G
CREAM TOPICAL 2 TIMES DAILY
COMMUNITY

## 2020-05-22 RX ORDER — CEFEPIME HYDROCHLORIDE 2 G/1
2 INJECTION, POWDER, FOR SOLUTION INTRAVENOUS
Status: DISCONTINUED | OUTPATIENT
Start: 2020-05-22 | End: 2020-05-25

## 2020-05-22 RX ORDER — NITROGLYCERIN 20 MG/100ML
5 INJECTION INTRAVENOUS CONTINUOUS
Status: DISCONTINUED | OUTPATIENT
Start: 2020-05-22 | End: 2020-05-22

## 2020-05-22 RX ORDER — HYDROCHLOROTHIAZIDE 25 MG/1
TABLET ORAL
Status: ON HOLD | COMMUNITY
End: 2020-05-29 | Stop reason: HOSPADM

## 2020-05-22 RX ADMIN — NITROGLYCERIN 200 MCG/MIN: 20 INJECTION INTRAVENOUS at 07:05

## 2020-05-22 RX ADMIN — FUROSEMIDE 80 MG: 10 INJECTION, SOLUTION INTRAVENOUS at 06:05

## 2020-05-22 RX ADMIN — VANCOMYCIN HYDROCHLORIDE 2500 MG: 1.5 INJECTION, POWDER, LYOPHILIZED, FOR SOLUTION INTRAVENOUS at 09:05

## 2020-05-22 RX ADMIN — CEFEPIME 2 G: 2 INJECTION, POWDER, FOR SOLUTION INTRAVENOUS at 08:05

## 2020-05-22 RX ADMIN — FUROSEMIDE 80 MG: 10 INJECTION, SOLUTION INTRAVENOUS at 09:05

## 2020-05-22 RX ADMIN — NITROGLYCERIN 2.3 MCG/KG/MIN: 20 INJECTION INTRAVENOUS at 06:05

## 2020-05-22 RX ADMIN — NITROGLYCERIN 100 MCG/MIN: 20 INJECTION INTRAVENOUS at 06:05

## 2020-05-22 RX ADMIN — POTASSIUM CHLORIDE 40 MEQ: 20 SOLUTION ORAL at 09:05

## 2020-05-22 RX ADMIN — ENOXAPARIN SODIUM 40 MG: 100 INJECTION SUBCUTANEOUS at 09:05

## 2020-05-22 NOTE — ED NOTES
2 sets of earrings and 2 bracelets removed from pt and put into zip lock with pt label on outside. Pt clothing and cell phone placed in pt belonging bag at bedside.

## 2020-05-22 NOTE — ED PROVIDER NOTES
Encounter Date: 5/22/2020       History     Chief Complaint   Patient presents with    Shortness of Breath     Pt complains of SOB x1 week and right flank pain secondary to a fall.      74-year-old female with a history of hypertension, morbid obesity status post gastric bypass, chronic leg edema, EMMANUEL, BMI greater than 55, lymphedema lower extremity presenting with acute onset shortness of breath and hypertension.  She also endorses having right flank pain secondary to recent fall.  She is brought in via EMS, is in extreme respiratory distress, with tachypnea, tachycardia and increased work of breathing.    Unable to obtain much HPI from patient as she is in extreme respiratory distress with tachypnea, and hypoxemia.  There is increased work of breathing, with retractions of her accessory muscles.  She does however deny any trauma, falls, lightheadedness, neck pain, back pain or abdominal pain.  Denies any recent fevers or chills.  Denies any dysuria, hematochezia, melena or hemoptysis.  No other aggravating or alleviating factors.    The history is provided by the patient, medical records and the EMS personnel. The history is limited by the condition of the patient.          Review of patient's allergies indicates:  No Known Allergies  Past Medical History:   Diagnosis Date    Arthritis     BMI 50.0-59.9, adult     Difficult intubation 11/06/2017    Poor neck extension, Grade 4 view with DL    Hypertension     Morbid obesity     EMMANUEL (obstructive sleep apnea)      Past Surgical History:   Procedure Laterality Date    APPENDECTOMY  1960s    GASTRECTOMY      HIP FRACTURE SURGERY  1949     Family History   Problem Relation Age of Onset    Hypertension Mother     Cancer Mother     Hypertension Sister     Hypertension Brother     Hypertension Brother      Social History     Tobacco Use    Smoking status: Never Smoker    Smokeless tobacco: Never Used   Substance Use Topics    Alcohol use: No    Drug  use: No     Review of Systems   Unable to perform ROS: Severe respiratory distress       Physical Exam     Initial Vitals [05/22/20 1736]   BP Pulse Resp Temp SpO2   (!) 213/120 96 20 98.6 °F (37 °C) 98 %      MAP       --         Physical Exam    Nursing note and vitals reviewed.  Constitutional: She appears distressed.   HENT:   Head: Normocephalic and atraumatic.   Eyes: EOM are normal.   Neck: Normal range of motion.   Hard to assess jvd due to body habitus   Cardiovascular: Normal heart sounds. Exam reveals no gallop and no friction rub.    No murmur heard.  Tachycardic   Pulmonary/Chest: She is in respiratory distress. She has rales.   Abdominal: Soft. Bowel sounds are normal. She exhibits distension. There is no tenderness.   Musculoskeletal: She exhibits edema.   A lot of edema in bilateral lower extremities.    Neurological:   Hard to assess secondary to resp distress   Skin: Rash noted.         ED Course   Critical Care  Date/Time: 5/22/2020 6:01 PM  Performed by: Alex Hollingsworth DO  Authorized by: Alex Hollingsworth DO   Direct patient critical care time: 37 minutes  Additional history critical care time: 17 minutes  Ordering / reviewing critical care time: 12 minutes  Documentation critical care time: 9 minutes  Consulting other physicians critical care time: 7 minutes  Consult with family critical care time: 5 minutes  Total critical care time (exclusive of procedural time) : 87 minutes  Critical care was necessary to treat or prevent imminent or life-threatening deterioration of the following conditions: cardiac failure and respiratory failure.  Critical care was time spent personally by me on the following activities: blood draw for specimens, development of treatment plan with patient or surrogate, discussions with consultants, evaluation of patient's response to treatment, examination of patient, obtaining history from patient or surrogate, ordering and performing treatments and interventions,  ordering and review of laboratory studies, ordering and review of radiographic studies, pulse oximetry, re-evaluation of patient's condition and review of old charts.        Labs Reviewed   CBC W/ AUTO DIFFERENTIAL - Abnormal; Notable for the following components:       Result Value    Mean Corpuscular Volume 99 (*)     Mean Corpuscular Hemoglobin Conc 29.2 (*)     RDW 15.1 (*)     All other components within normal limits   COMPREHENSIVE METABOLIC PANEL - Abnormal; Notable for the following components:    Potassium 2.9 (*)     Glucose 119 (*)     ALT 9 (*)     All other components within normal limits   B-TYPE NATRIURETIC PEPTIDE - Abnormal; Notable for the following components:     (*)     All other components within normal limits   ISTAT PROCEDURE - Abnormal; Notable for the following components:    POC PH 7.231 (*)     POC PCO2 69.8 (*)     POC PO2 30 (*)     POC HCO3 29.3 (*)     POC SATURATED O2 44 (*)     POC TCO2 31 (*)     All other components within normal limits   ISTAT PROCEDURE - Abnormal; Notable for the following components:    POC Glucose 151 (*)     POC Potassium 3.0 (*)     All other components within normal limits   ISTAT PROCEDURE - Abnormal; Notable for the following components:    POC PH 7.310 (*)     POC PCO2 55.0 (*)     POC PO2 79 (*)     POC SATURATED O2 94 (*)     POC TCO2 29 (*)     All other components within normal limits   TROPONIN I   PROTIME-INR   SARS-COV-2 RNA AMPLIFICATION, QUAL   LACTIC ACID, PLASMA   ISTAT CHEM8     EKG Readings: (Independently Interpreted)   Rhythm: Sinus Tachycardia. Heart Rate: 128. ST Segments: Non-Specific ST Segment Depression. Axis: Left Axis Deviation.     Left atrial enlargement, LVH, left axis deviation     ECG Results          EKG 12-lead (Final result)  Result time 05/23/20 07:27:31    Final result by Interface, Lab In [facility] (05/23/20 07:27:31)                 Narrative:    Test Reason : R06.02,    Vent. Rate : 128 BPM     Atrial Rate : 128  BPM     P-R Int : 140 ms          QRS Dur : 094 ms      QT Int : 324 ms       P-R-T Axes : 076 -31 097 degrees     QTc Int : 473 ms    Sinus tachycardia  Possible Left atrial enlargement  Left axis deviation  LVH with repolarization abnormality  Abnormal ECG  When compared with ECG of 05-NOV-2017 21:52,  No significant change was found  Confirmed by DESIREE MARQUEZ MD ([id number]) on 5/23/2020 7:27:21 AM    Referred By: [email or web address]   SELF           Confirmed By:DESIREE MARQUEZ MD                            Imaging Results          X-Ray Chest AP Portable (Final result)  Result time 05/22/20 18:51:14    Final result by Ananth Suazo MD (05/22/20 18:51:14)                 Impression:      Extensive right greater than left airspace opacity and increased interstitial attenuation favoring pulmonary edema, with multifocal pneumonia or massive aspiration not entirely excluded.    Electronically signed by resident: Dutch Monae  Date:    05/22/2020  Time:    18:46    Electronically signed by: Ananth Suazo MD  Date:    05/22/2020  Time:    18:51             Narrative:    EXAMINATION:  XR CHEST AP PORTABLE    CLINICAL HISTORY:  CHF;    TECHNIQUE:  Single frontal view of the chest was performed.    COMPARISON:  11/10/2017    FINDINGS:  Monitoring leads overlie the chest.    Extensive right greater than left increased interstitial attenuation and airspace opacification which could represent pulmonary edema, with multifocal pneumonia, either by bacterial or viral etiology, or massive aspiration not excluded.No pneumothorax.  Costophrenic angles are obscured by consolidation.  Pleural fluid cannot be excluded.    The cardiac silhouette is mildly enlarged similar to prior.  Hilar contours and mediastinal contours are grossly unchanged.    No acute osseous process definitively seen.  PA and lateral views can be obtained.                              X-Rays:   Independently Interpreted Readings:   Chest X-Ray: Cardiomegaly present.   Increased vascular markings consistent with CHF are present.  Right atrial enlargement is present.  Extensive pulmonary edema, no pneumothorax or free air     Medical Decision Making:   History:   I obtained history from: EMS provider and someone other than patient.       <> Summary of History: Talked to the patient's sister and they state that she has not been compliant with her medication.  They said that she does not like coming to the hospital or taking medication.  They said that she may have fallen a week ago.  They state that the swelling has gotten worse in her legs and that she has been having more difficulty breathing the past few days.   Old Medical Records: I decided to obtain old medical records.  Initial Assessment:   74-year-old female with a history of hypertension, morbid obesity status post gastric bypass, chronic leg edema, EMMANUEL, BMI greater than 55, lymphedema lower extremity presenting with acute onset shortness of breath and hypertension.  Differential Diagnosis:   Differential diagnosis includes but is not limited to: hypertensive emergency, acute pulmonary edema, CHF, ACS, PE      Independently Interpreted Test(s):   I have ordered and independently interpreted X-rays - see prior notes.  I have ordered and independently interpreted EKG Reading(s) - see prior notes  Clinical Tests:   Lab Tests: Ordered and Reviewed  Radiological Study: Ordered and Reviewed  Medical Tests: Ordered and Reviewed  ED Management:  Patient was in the waiting room and then started having respiratory problems.  She was brought back immediately and was placed on a non-rebreather.  Was unable to get a saturation and and BiPAP was placed in the room.  Blood pressure was reading over 220 systolic.  Patient was placed on bipap and was also given sublingual nitro. Blood pressure continue to rise. Stated her on a nitro drip. She was satting about 90% on the bipap. VBG showed a c02 of 70. After 20 minutes on bipap abg showed a  c02 of 55. Patients blood pressures reached into the 300s. However nitro started working and blood pressure started coming down.  Her breathing started to become better as well on the bipap.     Patient's blood pressure responded very well to the nitro drip.  At max she was at 300 mcg per minute, but we were able to start weaning that down.  Her blood pressure came down to I70/90.  We were able to start weaning her BiPAP settings as well.  The ICU was consulted and the patient will be admitted to them.   Other:   I have discussed this case with another health care provider.       <> Summary of the Discussion: Hospital medicine              Attending Attestation:   Physician Attestation Statement for Resident:  As the supervising MD   Physician Attestation Statement: I have personally seen and examined this patient.   I agree with the above history. -:   As the supervising MD I agree with the above PE.    As the supervising MD I agree with the above treatment, course, plan, and disposition.            I have reviewed and concur with the resident's history, physical, assessment, and plan.  I have personally interviewed and examined the patient at bedside.  See below addendum for my evaluation and additional findings. I did supervise any and all procedures and was present for any critical portion, and was always immediately available for help and as a resource.     Briefly,  this is a 74 y.o.female with a history of hypertension, morbid obesity status post gastric bypass, chronic leg edema, EMMANUEL, chronic lymphedema to bilateral lower extremity and BMI greater than 55 presenting with acute respiratory distress with concern for acute flash pulmonary edema with heart failure symptoms.  She is tachypneic, tachycardic and with hypoxemia.  Immediately placed on oxygen including non-rebreather.  She was then transitioned immediately to BiPAP, placed on a nitroglycerin drip for afterload reduction.  Initial blood pressures were  hypertensive with systolic greater than 300, diastolic greater than 160. This was immediately improved with nitroglycerin drip starting at 300 mcg/min.  ECG without signs of ischemia or STEMI on my read.  Chest x-ray shows pulmonary edema picture without pneumothorax or free air.  Placed on cardiac and telemetry monitoring.   After few min of NTG drip, patient's symptoms improved and she was admitted to ICU level of care. Please see critical care note for critical care time.     Complexity: critical care    Final diagnoses:  [R06.02] Shortness of breath  [I16.1] Hypertensive emergency (Primary)  [J81.0] Acute pulmonary edema       Alex Hollingsworth DO, FAAEM    Emergency Medicine Physician  Dept of Emergency Medicine   [facility]  Spectralink: [id number]                          Clinical Impression:       ICD-10-CM ICD-9-CM   1. Hypertensive emergency I16.1 401.9   2. Shortness of breath R06.02 786.05   3. Acute pulmonary edema J81.0 518.4   4. Acute hypoxemic respiratory failure J96.01 518.81         Disposition:   Disposition: Admitted  Condition: Serious     ED Disposition Condition    Admit                           Agusto Cheng MD  Resident  05/22/20 [id number]       Alex Hollingsworth DO  05/23/20 1856

## 2020-05-22 NOTE — ED TRIAGE NOTES
Rosetta Whyte, a 74 y.o. female presents to the ED w/ complaint of SOB x1 week. Generalized fatigue. Worsening on exerction. Pt was seen at clinic today and sent to ED for HTN. Pt was stable upon arrival to ED but suddenly experienced oxygen desaturation with /200. Pt started on nitro drip at 100 mcg per MD order. Pt has not been compliant with lasix. Generalized, massive edema. Pt placed on NRB at 15L with O2 sat 88-90%. Pt placed on BiPAP with O2 sat 94-95%. Endorses CP but denies HA, vision changes, n/v/d, abd pain, dysuria, weakness, dizziness, cough, fever, or chills.    Triage note:  Chief Complaint   Patient presents with    Shortness of Breath     Pt complains of SOB x1 week and right flank pain secondary to a fall.      Review of patient's allergies indicates:  No Known Allergies  Past Medical History:   Diagnosis Date    Arthritis     BMI 50.0-59.9, adult     Difficult intubation 11/06/2017    Poor neck extension, Grade 4 view with DL    Hypertension     Morbid obesity     EMMANUEL (obstructive sleep apnea)      Patient identifiers verified and correct for Rosetta Whyte.    LOC: The patient is awake, alert and aware of environment, acutely distressed, the patient is oriented x 3 and labored speech d/t SOB and BiPAP.  APPEARANCE: Patient in acute distress with MD, RT, and two RNs at bedside, patient is clean and well groomed, patient's clothing is properly fastened.  SKIN: The skin is warm and dry, pale color consistent with ethnicity, patient has normal skin turgor and moist mucus membranes, skin intact, no breakdown or bruising noted.  MUSCULOSKELETAL: Patient moving all extremities spontaneously, obvious swelling and lower leg deformities noted.  RESPIRATORY: Airway is open and patent, respirations are spontaneous, patient has a labored effort and rate with accessory muscle use noted. Placed on BiPAP from NRB at 15L.  CARDIAC: Patient has a tachy rate but regular rhythm, massive periphreal edema  noted all extremities, capillary refill < 3 seconds.  ABDOMEN: Soft and non tender to palpation, distention noted  NEUROLOGIC: PERRL, 3 mm bilaterally, eyes open spontaneously, behavior appropriate to situation, follows commands, facial expression symmetrical, bilateral hand grasp equal and even, purposeful motor response noted, normal sensation in all extremities when touched with a finger.

## 2020-05-22 NOTE — CONSULTS
Consult received. Full note/evaluation to follow.     Mine Francis M.D.  [facility] Pulmonary/Critical Care Fellow

## 2020-05-23 LAB
ALBUMIN SERPL BCP-MCNC: 3.2 G/DL (ref 3.5–5.2)
ALBUMIN SERPL BCP-MCNC: 3.2 G/DL (ref 3.5–5.2)
ALLENS TEST: ABNORMAL
ALLENS TEST: ABNORMAL
ALP SERPL-CCNC: 81 U/L (ref 55–135)
ALP SERPL-CCNC: 83 U/L (ref 55–135)
ALT SERPL W/O P-5'-P-CCNC: 8 U/L (ref 10–44)
ALT SERPL W/O P-5'-P-CCNC: 8 U/L (ref 10–44)
ANION GAP SERPL CALC-SCNC: 10 MMOL/L (ref 8–16)
ANION GAP SERPL CALC-SCNC: 8 MMOL/L (ref 8–16)
ANION GAP SERPL CALC-SCNC: 9 MMOL/L (ref 8–16)
AST SERPL-CCNC: 10 U/L (ref 10–40)
AST SERPL-CCNC: 13 U/L (ref 10–40)
AV INDEX (PROSTH): 0.78
AV MEAN GRADIENT: 6 MMHG
AV PEAK GRADIENT: 9 MMHG
AV VALVE AREA: 2.61 CM2
AV VELOCITY RATIO: 0.69
BASOPHILS # BLD AUTO: 0.03 K/UL (ref 0–0.2)
BASOPHILS # BLD AUTO: 0.05 K/UL (ref 0–0.2)
BASOPHILS NFR BLD: 0.3 % (ref 0–1.9)
BASOPHILS NFR BLD: 0.5 % (ref 0–1.9)
BILIRUB SERPL-MCNC: 0.8 MG/DL (ref 0.1–1)
BILIRUB SERPL-MCNC: 0.8 MG/DL (ref 0.1–1)
BSA FOR ECHO PROCEDURE: 2.56 M2
BUN SERPL-MCNC: 15 MG/DL (ref 8–23)
BUN SERPL-MCNC: 18 MG/DL (ref 8–23)
BUN SERPL-MCNC: 18 MG/DL (ref 8–23)
CALCIUM SERPL-MCNC: 8.4 MG/DL (ref 8.7–10.5)
CALCIUM SERPL-MCNC: 8.7 MG/DL (ref 8.7–10.5)
CALCIUM SERPL-MCNC: 8.7 MG/DL (ref 8.7–10.5)
CHLORIDE SERPL-SCNC: 107 MMOL/L (ref 95–110)
CHLORIDE SERPL-SCNC: 107 MMOL/L (ref 95–110)
CHLORIDE SERPL-SCNC: 108 MMOL/L (ref 95–110)
CO2 SERPL-SCNC: 26 MMOL/L (ref 23–29)
CO2 SERPL-SCNC: 28 MMOL/L (ref 23–29)
CO2 SERPL-SCNC: 28 MMOL/L (ref 23–29)
CREAT SERPL-MCNC: 0.8 MG/DL (ref 0.5–1.4)
CREAT SERPL-MCNC: 1.1 MG/DL (ref 0.5–1.4)
CREAT SERPL-MCNC: 1.1 MG/DL (ref 0.5–1.4)
CV ECHO LV RWT: 0.43 CM
DELSYS: ABNORMAL
DIFFERENTIAL METHOD: ABNORMAL
DIFFERENTIAL METHOD: ABNORMAL
DOP CALC AO PEAK VEL: 1.51 M/S
DOP CALC AO VTI: 28.53 CM
DOP CALC LVOT AREA: 3.3 CM2
DOP CALC LVOT DIAMETER: 2.06 CM
DOP CALC LVOT PEAK VEL: 1.04 M/S
DOP CALC LVOT STROKE VOLUME: 74.52 CM3
DOP CALCLVOT PEAK VEL VTI: 22.37 CM
E WAVE DECELERATION TIME: 268.98 MSEC
E/A RATIO: 0.85
E/E' RATIO: 21.78 M/S
ECHO LV POSTERIOR WALL: 1.1 CM (ref 0.6–1.1)
EOSINOPHIL # BLD AUTO: 0 K/UL (ref 0–0.5)
EOSINOPHIL # BLD AUTO: 0.1 K/UL (ref 0–0.5)
EOSINOPHIL NFR BLD: 0.4 % (ref 0–8)
EOSINOPHIL NFR BLD: 1.5 % (ref 0–8)
ERYTHROCYTE [DISTWIDTH] IN BLOOD BY AUTOMATED COUNT: 15.1 % (ref 11.5–14.5)
ERYTHROCYTE [DISTWIDTH] IN BLOOD BY AUTOMATED COUNT: 15.4 % (ref 11.5–14.5)
EST. GFR  (AFRICAN AMERICAN): 57.6 ML/MIN/1.73 M^2
EST. GFR  (AFRICAN AMERICAN): 57.6 ML/MIN/1.73 M^2
EST. GFR  (AFRICAN AMERICAN): >60 ML/MIN/1.73 M^2
EST. GFR  (NON AFRICAN AMERICAN): 49.9 ML/MIN/1.73 M^2
EST. GFR  (NON AFRICAN AMERICAN): 49.9 ML/MIN/1.73 M^2
EST. GFR  (NON AFRICAN AMERICAN): >60 ML/MIN/1.73 M^2
FLOW: 3
FRACTIONAL SHORTENING: 18 % (ref 28–44)
GLUCOSE SERPL-MCNC: 111 MG/DL (ref 70–110)
GLUCOSE SERPL-MCNC: 123 MG/DL (ref 70–110)
GLUCOSE SERPL-MCNC: 125 MG/DL (ref 70–110)
HCO3 UR-SCNC: 30.7 MMOL/L (ref 24–28)
HCO3 UR-SCNC: 34.3 MMOL/L (ref 24–28)
HCT VFR BLD AUTO: 35.4 % (ref 37–48.5)
HCT VFR BLD AUTO: 36.4 % (ref 37–48.5)
HGB BLD-MCNC: 10.5 G/DL (ref 12–16)
HGB BLD-MCNC: 10.5 G/DL (ref 12–16)
IMM GRANULOCYTES # BLD AUTO: 0.04 K/UL (ref 0–0.04)
IMM GRANULOCYTES # BLD AUTO: 0.05 K/UL (ref 0–0.04)
IMM GRANULOCYTES NFR BLD AUTO: 0.4 % (ref 0–0.5)
IMM GRANULOCYTES NFR BLD AUTO: 0.6 % (ref 0–0.5)
INTERVENTRICULAR SEPTUM: 1.2 CM (ref 0.6–1.1)
LA MAJOR: 5.96 CM
LA MINOR: 5.7 CM
LA WIDTH: 4.12 CM
LEFT ATRIUM SIZE: 4.1 CM
LEFT ATRIUM VOLUME INDEX: 34.8 ML/M2
LEFT ATRIUM VOLUME: 83.67 CM3
LEFT INTERNAL DIMENSION IN SYSTOLE: 4.19 CM (ref 2.1–4)
LEFT VENTRICLE DIASTOLIC VOLUME INDEX: 73.87 ML/M2
LEFT VENTRICLE DIASTOLIC VOLUME: 177.49 ML
LEFT VENTRICLE MASS INDEX: 95 G/M2
LEFT VENTRICLE SYSTOLIC VOLUME INDEX: 32.5 ML/M2
LEFT VENTRICLE SYSTOLIC VOLUME: 78.06 ML
LEFT VENTRICULAR INTERNAL DIMENSION IN DIASTOLE: 5.12 CM (ref 3.5–6)
LEFT VENTRICULAR MASS: 228.82 G
LV LATERAL E/E' RATIO: 24.5 M/S
LV SEPTAL E/E' RATIO: 19.6 M/S
LYMPHOCYTES # BLD AUTO: 0.8 K/UL (ref 1–4.8)
LYMPHOCYTES # BLD AUTO: 0.8 K/UL (ref 1–4.8)
LYMPHOCYTES NFR BLD: 7.5 % (ref 18–48)
LYMPHOCYTES NFR BLD: 9.4 % (ref 18–48)
MAGNESIUM SERPL-MCNC: 1.5 MG/DL (ref 1.6–2.6)
MAGNESIUM SERPL-MCNC: 1.9 MG/DL (ref 1.6–2.6)
MCH RBC QN AUTO: 28.7 PG (ref 27–31)
MCH RBC QN AUTO: 28.8 PG (ref 27–31)
MCHC RBC AUTO-ENTMCNC: 28.8 G/DL (ref 32–36)
MCHC RBC AUTO-ENTMCNC: 29.7 G/DL (ref 32–36)
MCV RBC AUTO: 100 FL (ref 82–98)
MCV RBC AUTO: 97 FL (ref 82–98)
MODE: ABNORMAL
MONOCYTES # BLD AUTO: 1.2 K/UL (ref 0.3–1)
MONOCYTES # BLD AUTO: 1.3 K/UL (ref 0.3–1)
MONOCYTES NFR BLD: 11.4 % (ref 4–15)
MONOCYTES NFR BLD: 15.2 % (ref 4–15)
MV PEAK A VEL: 1.15 M/S
MV PEAK E VEL: 0.98 M/S
NEUTROPHILS # BLD AUTO: 6.3 K/UL (ref 1.8–7.7)
NEUTROPHILS # BLD AUTO: 8.1 K/UL (ref 1.8–7.7)
NEUTROPHILS NFR BLD: 73 % (ref 38–73)
NEUTROPHILS NFR BLD: 79.8 % (ref 38–73)
NRBC BLD-RTO: 0 /100 WBC
NRBC BLD-RTO: 0 /100 WBC
PCO2 BLDA: 52.9 MMHG (ref 35–45)
PCO2 BLDA: 54.4 MMHG (ref 35–45)
PH SMN: 7.36 [PH] (ref 7.35–7.45)
PH SMN: 7.42 [PH] (ref 7.35–7.45)
PLATELET # BLD AUTO: 193 K/UL (ref 150–350)
PLATELET # BLD AUTO: 198 K/UL (ref 150–350)
PMV BLD AUTO: 10.6 FL (ref 9.2–12.9)
PMV BLD AUTO: 11.2 FL (ref 9.2–12.9)
PO2 BLDA: 36 MMHG (ref 40–60)
PO2 BLDA: 39 MMHG (ref 40–60)
POC BE: 10 MMOL/L
POC BE: 5 MMOL/L
POC SATURATED O2: 68 % (ref 95–100)
POC SATURATED O2: 71 % (ref 95–100)
POC TCO2: 32 MMOL/L (ref 24–29)
POC TCO2: 36 MMOL/L (ref 24–29)
POTASSIUM SERPL-SCNC: 2.9 MMOL/L (ref 3.5–5.1)
POTASSIUM SERPL-SCNC: 3.6 MMOL/L (ref 3.5–5.1)
POTASSIUM SERPL-SCNC: 4.3 MMOL/L (ref 3.5–5.1)
PROT SERPL-MCNC: 6.1 G/DL (ref 6–8.4)
PROT SERPL-MCNC: 6.2 G/DL (ref 6–8.4)
RA PRESSURE: 3 MMHG
RBC # BLD AUTO: 3.64 M/UL (ref 4–5.4)
RBC # BLD AUTO: 3.66 M/UL (ref 4–5.4)
SAMPLE: ABNORMAL
SAMPLE: ABNORMAL
SINUS: 3.02 CM
SITE: ABNORMAL
SITE: ABNORMAL
SODIUM SERPL-SCNC: 143 MMOL/L (ref 136–145)
SODIUM SERPL-SCNC: 144 MMOL/L (ref 136–145)
SODIUM SERPL-SCNC: 144 MMOL/L (ref 136–145)
TDI LATERAL: 0.04 M/S
TDI SEPTAL: 0.05 M/S
TDI: 0.05 M/S
WBC # BLD AUTO: 10.13 K/UL (ref 3.9–12.7)
WBC # BLD AUTO: 8.68 K/UL (ref 3.9–12.7)

## 2020-05-23 PROCEDURE — 25000003 PHARM REV CODE 250: Performed by: NURSE PRACTITIONER

## 2020-05-23 PROCEDURE — 99291 CRITICAL CARE FIRST HOUR: CPT | Mod: GC,,, | Performed by: INTERNAL MEDICINE

## 2020-05-23 PROCEDURE — 80048 BASIC METABOLIC PNL TOTAL CA: CPT

## 2020-05-23 PROCEDURE — 63600175 PHARM REV CODE 636 W HCPCS: Performed by: STUDENT IN AN ORGANIZED HEALTH CARE EDUCATION/TRAINING PROGRAM

## 2020-05-23 PROCEDURE — 97166 OT EVAL MOD COMPLEX 45 MIN: CPT

## 2020-05-23 PROCEDURE — 63600175 PHARM REV CODE 636 W HCPCS: Performed by: INTERNAL MEDICINE

## 2020-05-23 PROCEDURE — 80053 COMPREHEN METABOLIC PANEL: CPT

## 2020-05-23 PROCEDURE — 97530 THERAPEUTIC ACTIVITIES: CPT

## 2020-05-23 PROCEDURE — 97535 SELF CARE MNGMENT TRAINING: CPT

## 2020-05-23 PROCEDURE — 85025 COMPLETE CBC W/AUTO DIFF WBC: CPT | Mod: 91

## 2020-05-23 PROCEDURE — 82803 BLOOD GASES ANY COMBINATION: CPT

## 2020-05-23 PROCEDURE — 97162 PT EVAL MOD COMPLEX 30 MIN: CPT

## 2020-05-23 PROCEDURE — 25000003 PHARM REV CODE 250: Performed by: INTERNAL MEDICINE

## 2020-05-23 PROCEDURE — 99292 PR CRITICAL CARE, ADDL 30 MIN: ICD-10-PCS | Mod: GC,,, | Performed by: INTERNAL MEDICINE

## 2020-05-23 PROCEDURE — 25000003 PHARM REV CODE 250: Performed by: STUDENT IN AN ORGANIZED HEALTH CARE EDUCATION/TRAINING PROGRAM

## 2020-05-23 PROCEDURE — 20000000 HC ICU ROOM

## 2020-05-23 PROCEDURE — 83735 ASSAY OF MAGNESIUM: CPT

## 2020-05-23 PROCEDURE — 99900035 HC TECH TIME PER 15 MIN (STAT)

## 2020-05-23 PROCEDURE — 25000003 PHARM REV CODE 250: Performed by: EMERGENCY MEDICINE

## 2020-05-23 PROCEDURE — 99292 CRITICAL CARE ADDL 30 MIN: CPT | Mod: GC,,, | Performed by: INTERNAL MEDICINE

## 2020-05-23 PROCEDURE — 83735 ASSAY OF MAGNESIUM: CPT | Mod: 91

## 2020-05-23 PROCEDURE — 99291 PR CRITICAL CARE, E/M 30-74 MINUTES: ICD-10-PCS | Mod: GC,,, | Performed by: INTERNAL MEDICINE

## 2020-05-23 PROCEDURE — 94761 N-INVAS EAR/PLS OXIMETRY MLT: CPT

## 2020-05-23 PROCEDURE — 87040 BLOOD CULTURE FOR BACTERIA: CPT

## 2020-05-23 PROCEDURE — 80053 COMPREHEN METABOLIC PANEL: CPT | Mod: 91

## 2020-05-23 PROCEDURE — 63700000 PHARM REV CODE 250 ALT 637 W/O HCPCS: Performed by: STUDENT IN AN ORGANIZED HEALTH CARE EDUCATION/TRAINING PROGRAM

## 2020-05-23 PROCEDURE — 94660 CPAP INITIATION&MGMT: CPT

## 2020-05-23 RX ORDER — HYDRALAZINE HYDROCHLORIDE 50 MG/1
100 TABLET, FILM COATED ORAL EVERY 8 HOURS
Status: DISCONTINUED | OUTPATIENT
Start: 2020-05-23 | End: 2020-05-23

## 2020-05-23 RX ORDER — POTASSIUM CHLORIDE 20 MEQ/15ML
40 SOLUTION ORAL
Status: COMPLETED | OUTPATIENT
Start: 2020-05-23 | End: 2020-05-23

## 2020-05-23 RX ORDER — LOSARTAN POTASSIUM 50 MG/1
100 TABLET ORAL DAILY
Status: DISCONTINUED | OUTPATIENT
Start: 2020-05-23 | End: 2020-05-23

## 2020-05-23 RX ORDER — MAGNESIUM SULFATE HEPTAHYDRATE 40 MG/ML
2 INJECTION, SOLUTION INTRAVENOUS ONCE
Status: COMPLETED | OUTPATIENT
Start: 2020-05-23 | End: 2020-05-23

## 2020-05-23 RX ADMIN — HYDRALAZINE HYDROCHLORIDE 100 MG: 50 TABLET, FILM COATED ORAL at 06:05

## 2020-05-23 RX ADMIN — ACETAMINOPHEN 80 MG: 80 TABLET, CHEWABLE ORAL at 06:05

## 2020-05-23 RX ADMIN — CEFEPIME 2 G: 2 INJECTION, POWDER, FOR SOLUTION INTRAVENOUS at 08:05

## 2020-05-23 RX ADMIN — ATORVASTATIN CALCIUM 40 MG: 20 TABLET, FILM COATED ORAL at 08:05

## 2020-05-23 RX ADMIN — POTASSIUM CHLORIDE 40 MEQ: 20 SOLUTION ORAL at 06:05

## 2020-05-23 RX ADMIN — HYDRALAZINE HYDROCHLORIDE 100 MG: 50 TABLET, FILM COATED ORAL at 01:05

## 2020-05-23 RX ADMIN — CEFEPIME 2 G: 2 INJECTION, POWDER, FOR SOLUTION INTRAVENOUS at 09:05

## 2020-05-23 RX ADMIN — FUROSEMIDE 80 MG: 10 INJECTION, SOLUTION INTRAMUSCULAR; INTRAVENOUS at 08:05

## 2020-05-23 RX ADMIN — NITROGLYCERIN 225 MCG/MIN: 20 INJECTION INTRAVENOUS at 12:05

## 2020-05-23 RX ADMIN — ENOXAPARIN SODIUM 40 MG: 100 INJECTION SUBCUTANEOUS at 05:05

## 2020-05-23 RX ADMIN — POTASSIUM CHLORIDE 40 MEQ: 20 SOLUTION ORAL at 03:05

## 2020-05-23 RX ADMIN — CARVEDILOL 25 MG: 25 TABLET, FILM COATED ORAL at 12:05

## 2020-05-23 RX ADMIN — FUROSEMIDE 80 MG: 10 INJECTION, SOLUTION INTRAMUSCULAR; INTRAVENOUS at 09:05

## 2020-05-23 RX ADMIN — VANCOMYCIN HYDROCHLORIDE 1500 MG: 1.5 INJECTION, POWDER, LYOPHILIZED, FOR SOLUTION INTRAVENOUS at 09:05

## 2020-05-23 RX ADMIN — MAGNESIUM SULFATE IN WATER 2 G: 40 INJECTION, SOLUTION INTRAVENOUS at 03:05

## 2020-05-23 RX ADMIN — ACETAMINOPHEN 80 MG: 80 TABLET, CHEWABLE ORAL at 12:05

## 2020-05-23 RX ADMIN — VANCOMYCIN HYDROCHLORIDE 1500 MG: 1.5 INJECTION, POWDER, LYOPHILIZED, FOR SOLUTION INTRAVENOUS at 08:05

## 2020-05-23 NOTE — PT/OT/SLP EVAL
Physical Therapy Evaluation and treatment    Patient Name:  Rosetta Whyte   MRN:  [id number]    Recommendations:     Discharge Recommendations:  nursing facility, skilled   Discharge Equipment Recommendations: hospital bed   Barriers to discharge: Decreased caregiver support pt lives alone and family will not be able to assist.     Assessment:     Rosetta Whyte is a 74 y.o. female admitted with a medical diagnosis of Hypertensive emergency.  She presents with the following impairments/functional limitations:  weakness, impaired functional mobilty, gait instability, impaired endurance, decreased coordination, decreased lower extremity function, pain. Pt anne treatment well and will benefit from skilled PT 4x/wk to progress physically. Pt will need SNF placement when medically stable to maximize rehab potential.     Rehab Prognosis: Good; patient would benefit from acute skilled PT services to address these deficits and reach maximum level of function.    Recent Surgery: * No surgery found *      Plan:     During this hospitalization, patient to be seen 4 x/week to address the identified rehab impairments via gait training, therapeutic activities and progress toward the following goals:    · Plan of Care Expires:  06/21/20    Subjective     Chief Complaint: pt c/o pain L leg  Patient/Family Comments/goals: to get better and go home.   Pain/Comfort:  · Pain Rating 1: 9/10(L leg)  · Pain Rating Post-Intervention 1: 9/10    Patients cultural, spiritual, Jainism conflicts given the current situation: no    Living Environment:  Pt lives alone in 1 st floor apt with slab entrance.   Prior to admission, patients level of function was modified Independent using rollator.   Equipment used at home: bedside commode, rollator.  DME owned (not currently used): none.  Upon discharge, patient will have assistance from no support system in place.     Objective:     Communicated with nurse prior to session.  Patient found supine with  telemetry, pulse ox (continuous), blood pressure cuff, PureWick, oxygen, peripheral IV  upon PT entry to room.    General Precautions: Standard, fall   Orthopedic Precautions:    Braces:       Exams:  · Cognitive Exam:  Patient is oriented to Person, Place, Time and Situation  · RLE ROM: limited by pt size  · RLE Strength: WFL  · LLE ROM: limited by pt size  · LLE Strength: WFL    Functional Mobility:  · Bed Mobility:  Pt needed verbal cues for hand placement and sequencing for functional mobility.   · Rolling Left:  total assistance and of 2 persons  · Supine to Sit: total assistance and of 2 persons  · Sit to Supine: total assistance and of 3 persons  ·   · Balance: pt sat on EOB x 10 min with SBA.       Therapeutic Activities and Exercises:   pt received verbal instructions in role of PT and POC. Pt verbally expressed understanding of such.     AM-PAC 6 CLICK MOBILITY  Total Score:8     Patient left bed in chair position.  with all lines intact and call button in reach.    GOALS:   Multidisciplinary Problems     Physical Therapy Goals        Problem: Physical Therapy Goal    Goal Priority Disciplines Outcome Goal Variances Interventions   Physical Therapy Goal     PT, PT/OT Ongoing, Progressing     Description:  Goals to be met by: 20    Patient will increase functional independence with mobility by performin. Supine to sit with Maximum Assistance -not met  2. Sit to stand transfer with Maximum Assistance with RW - not met  3. Gait  x 50 feet with Contact Guard Assistance using Rolling Walker. -not met  4. Sitting at edge of bed x15 minutes with Stand-by Assistance -not met                      History:     Past Medical History:   Diagnosis Date    Arthritis     BMI 50.0-59.9, adult     Difficult intubation 2017    Poor neck extension, Grade 4 view with DL    Hypertension     Morbid obesity     EMMANUEL (obstructive sleep apnea)        Past Surgical History:   Procedure Laterality Date     APPENDECTOMY  1960s    GASTRECTOMY      HIP FRACTURE SURGERY  1949       Time Tracking:     PT Received On: 05/23/20  PT Start Time: 1020     PT Stop Time: 1044  PT Total Time (min): 24 min     Billable Minutes: Evaluation 10 min and Therapeutic Activity 14 min      Radha Woodward, PT  05/23/2020

## 2020-05-23 NOTE — PLAN OF CARE
Problem: Physical Therapy Goal  Goal: Physical Therapy Goal  Description  Goals to be met by: 20    Patient will increase functional independence with mobility by performin. Supine to sit with Maximum Assistance -not met  2. Sit to stand transfer with Maximum Assistance with RW - not met  3. Gait  x 50 feet with Contact Guard Assistance using Rolling Walker. -not met  4. Sitting at edge of bed x15 minutes with Stand-by Assistance -not met     Outcome: Ongoing, Progressing   Evaluation completed and goals appropriate. Radha Woodward, PT  2020

## 2020-05-23 NOTE — ASSESSMENT & PLAN NOTE
"Patient developed respiratory distress likely due to hypertensive emergency leading to pulmonary edema. In the ED, she was placed on BiPAP with improvement and received Lasix. CXR showed findings consistent with pulmonary edema with BNP elevated to 609.     Plan:  - Continue BiPAP and wean O2 as tolerated  - Continue Lasix 80mg IV BID (patient is on home bumex) for diuresis  - Monitor strict I/Os, UOP   - Follow up 2D ECHO  - Control BP as mentioned in "Hypertensive emergency"  "

## 2020-05-23 NOTE — ASSESSMENT & PLAN NOTE
Concern for bilateral lower extremity cellulitis as they appear erythematous. Patient afebrile, no leukocytosis.     - Started on vancomycin, cefepime. Can likely de-escalate if patient continues to improve.  - Wound care consulted, appreciate assistance

## 2020-05-23 NOTE — PLAN OF CARE
CMICU DAILY GOALS       A: Awake    RASS: Goal - RASS Goal: 0-->alert and calm  Actual - RASS (Leach Agitation-Sedation Scale): 0-->alert and calm   Restraint necessity:    B: Breath   SBT: NA   C: Coordinate A & B, analgesics/sedatives   Pain: managed    SAT: NA  D: Delirium   CAM-ICU: Overall CAM-ICU: Positive  E: Early Mobility   MOVE Screen: Pass   Activity: Activity Management: activity adjusted per tolerance  FAS: Feeding/Nutrition   Diet order: Diet/Nutrition Received: NPO, sips of water,   Fluid restriction:    T: Thrombus   DVT prophylaxis: VTE Required Core Measure: Pharmacological prophylaxis initiated/maintained  H: HOB Elevation   Head of Bed (HOB): HOB at 60-90 degrees  U: Ulcer Prophylaxis   GI: yes  G: Glucose control   managed    S: Skin   Bundle compliance: yes   Bathing/Skin Care: back care, bath, chlorhexidine, bath, complete, dressed/undressed, foot care, incontinence care, linen changed Date: Day Bath 05/23/20 @16:00  B: Bowel Function   no issues   I: Indwelling Catheters   Urban necessity:     CVC necessity: No   IPAD offered: No  D: De-escalation Antibx   Yes  Plan for the day   Continue to monitor pt for one more night, if mental clarity returns to baseline, step pt down tomorrow. Pt was alert and oriented this morning, then this afternoon became somnolent, unable to answer questions correctly. Team was notified, they think her BP was lowered too quickly overnight. New blood cultures also sent this evening. WCTM.  Family/Goals of care/Code Status   Code Status: Full Code     VS and assessment per flow sheet, patient progressing towards goals as tolerated, plan of care reviewed with Rosetta Whyte and family, all concerns addressed, will continue to monitor.

## 2020-05-23 NOTE — PT/OT/SLP EVAL
Occupational Therapy   Evaluation    Name: Rosetta Whyte  MRN: [id number]  Admitting Diagnosis:  Hypertensive emergency      Recommendations:     Discharge Recommendations: nursing facility, skilled  Discharge Equipment Recommendations:  hospital bed  Barriers to discharge:  None    Assessment:     Rosetta Whyte is a 74 y.o. female with a medical diagnosis of Hypertensive emergency.  She presents with the following performance deficits affecting function: weakness, impaired endurance, impaired self care skills, impaired functional mobilty, impaired balance, decreased lower extremity function, pain, decreased ROM, impaired cardiopulmonary response to activity, impaired skin, decreased coordination.      Patient very pleasant and motivated to participate with therapy despite pain in LE's, required total A x 2-3 persons for bed mobility, sat edge of bed for 10 minutes with contact guard assist for completion of ADL's. Patient will require placement in skilled nursing facility to maximize functional I prior to returning home, as well as continued OT services 4x/week during this admission.     Rehab Prognosis: Good; patient would benefit from acute skilled OT services to address these deficits and reach maximum level of function.       Plan:     Patient to be seen 4 x/week to address the above listed problems via self-care/home management, therapeutic activities, therapeutic exercises  · Plan of Care Expires: 06/23/20  · Plan of Care Reviewed with: patient    Subjective     Chief Complaint: Pt with discomfort in L LE  Patient/Family Comments/goals: Return home and to PLOF    Occupational Profile:  Living Environment: Pt lives alone, 1st floor apartment, TTE, t/s (was just placing legs over edge of tub prior to admission), assist provided for IADL's by sister and daughter.  Previous level of function: Mod I with use of rollator    Equipment Used at Home:  bedside commode, rollator  Assistance upon Discharge: Patient has very  limited assist from family, they help with groceries and transportation, unreliable for physical assist otherwise    Pain/Comfort:  · Pain Rating 1: 9/10  · Location - Side 1: Bilateral  · Location - Orientation 1: generalized  · Location 1: leg  · Pain Rating Post-Intervention 1: 9/10    Patients cultural, spiritual, Quaker conflicts given the current situation: no    Objective:     Communicated with: Gemma prior to session.  Patient found supine with telemetry, pulse ox (continuous), blood pressure cuff, PureWick, peripheral IV, oxygen upon OT entry to room.    General Precautions: Standard, fall   Orthopedic Precautions:N/A   Braces: N/A     Occupational Performance:    Bed Mobility:    · Patient completed Rolling/Turning to Right with total assistance and 2 persons, to initiate transfer and for change of linens and mercedes-area hygiene  · Patient completed Scooting/Bridging with total assistance and 2 persons  · Patient completed Supine to Sit with total assistance and 2 persons  · Patient completed Sit to Supine with total assistance and 3 persons    Patient sat edge of bed for ADL's for ~10 minutes, due to body habitus and pain in LE's, required CGA for safety as patient scooted very near edge of bed for placement of LE's on floor    Activities of Daily Living:  · Grooming: contact guard assistance seated edge of bed  · Upper Body Dressing: contact guard assistance seated edge of bed, assist for line mgmt  · Lower Body Dressing: dependence prior to transfer    Cognitive/Visual Perceptual:  Cognitive/Psychosocial Skills:     -       Oriented to: Person, Place, Time and Situation   -       Safety awareness/insight to disability: intact   -       Mood/Affect/Coping skills/emotional control: Cooperative and Pleasant    Physical Exam:  Postural examination/scapula alignment:    -       No postural abnormalities identified  Upper Extremity Range of Motion:     -       Right Upper Extremity: WFL  -       Left Upper  Extremity: WFL  Upper Extremity Strength:    -       Right Upper Extremity: WFL  -       Left Upper Extremity: WFL   Strength:    -       Right Upper Extremity: WFL  -       Left Upper Extremity: WFL    AMPAC 6 Click ADL:  AMPAC Total Score: 8    Treatment & Education:  -Evaluation completed, plan of care established.  -Patient and family educated on roles/goals of OT and POC.  -White board updated.  -Therapist provided time for questions and answered within scope of practice.  -Patient educated on importance of EOB/OOB activity to maximize recovery.  Education:    Patient left supine with all lines intact, call button in reach and nsg notified    GOALS:   Multidisciplinary Problems     Occupational Therapy Goals        Problem: Occupational Therapy Goal    Goal Priority Disciplines Outcome Interventions   Occupational Therapy Goal     OT, PT/OT Ongoing, Progressing    Description:  Pt will complete UB dressing with supervision  Pt will complete bed mobility in preparation for fx'l task with minimal assist  Pt will sit edge of bed for GH task completion with supervision  Pt will perform AROM BUE HEP with mod I  Pt will complete sit EOB > 10 mins for fx'l task with supervision  Pt will complete toilet transfer with minimal assist                    History:     Past Medical History:   Diagnosis Date    Arthritis     BMI 50.0-59.9, adult     Difficult intubation 11/06/2017    Poor neck extension, Grade 4 view with DL    Hypertension     Morbid obesity     EMMANUEL (obstructive sleep apnea)        Past Surgical History:   Procedure Laterality Date    APPENDECTOMY  1960s    GASTRECTOMY      HIP FRACTURE SURGERY  1949       Time Tracking:     OT Date of Treatment: 05/23/20  OT Start Time: 1020  OT Stop Time: 1046  OT Total Time (min): 26 min    Billable Minutes:Evaluation 10  Self Care/Home Management 16    Amparo Cross OT  5/23/2020

## 2020-05-23 NOTE — PLAN OF CARE
Evaluation completed, plan of care established.    Problem: Occupational Therapy Goal  Goal: Occupational Therapy Goal  Description  Pt will complete UB dressing with supervision  Pt will complete bed mobility in preparation for fx'l task with minimal assist  Pt will sit edge of bed for GH task completion with supervision  Pt will perform AROM BUE HEP with mod I  Pt will complete sit EOB > 10 mins for fx'l task with supervision  Pt will complete toilet transfer with minimal assist     Outcome: Ongoing, Progressing

## 2020-05-23 NOTE — HPI
"Ms. Whyte is a 74 year old woman with HTN, EMMANUEL, morbid obesity, lymphedema who presents to the ED from doctor's office after she was told that her BP was elevated. She also reported "tightness" in her chest but no chest pain. Other associated symptoms include SOB, decreased urine output and bilateral lower extremity swelling. She had the SOB when she was trying to fall asleep last night. She notes the BLE swelling started about a week ago. She also has leg pain but that it is not new and is a chronic issue. She denies fevers, chills, cough, nausea, vomiting, diarrhea, rashes, wounds. Of note, she recently fell on a bench this past Sunday and bruised her entire right side. She did not hit her head at the time. She says that she ran out of medications yesterday and could not get them refilled. She states that she has been taking her BP medications. She has not been around sick contacts. She lives by herself at home and says that she use to have a nurse who would come by to help her out.     Per ED note, patient's sister states that patient has been non-compliant with her medications over the past week. According to sister, patient does not like going to the hospital or taking medication. Attempted to contact sister for further information but could not get in touch with her.    In the ED, her BP was as elevated as 303/163. She was started on nitro drip and was placed on BiPAP with improvement. She also received IV Lasix. CXR showed findings consistent with pulmonary edema. She was started on vancomycin and cefepime for concern of bilateral lower extremity cellulitis.   "

## 2020-05-23 NOTE — PLAN OF CARE
CMICU DAILY GOALS       A: Awake    RASS: Goal -  -0  Actual -  0   Restraint necessity:  NA  B: Breath   SBT: Not intubated   C: Coordinate A & B, analgesics/sedatives   Pain: NA     SAT: NA  D: Delirium   CAM-ICU: Overall CAM-ICU: Negative  E: Early Mobility   MOVE Screen: NA    Activity: Activity Management: activity adjusted per tolerance, activity clustered for rest period  FAS: Feeding/Nutrition   Diet order: Diet/Nutrition Received: NPO, other (see comments), sips of water(meds),   Fluid restriction:    T: Thrombus   DVT prophylaxis: VTE Required Core Measure: Pharmacological prophylaxis initiated/maintained  H: HOB Elevation   Head of Bed (HOB): HOB at 30-45 degrees  U: Ulcer Prophylaxis   GI: yes  G: Glucose control   NA     S: Skin   Bundle compliance: yes   Bathing/Skin Care: bath, partial, dressed/undressed, bath, chlorhexidine Date: Due in AM  B: Bowel Function   no issues   I: Indwelling Catheters   Urban necessity:     CVC necessity: No   IPAD offered: No  D: De-escalation Antibx   No  Plan for the day   Transition o2 support; Maintain SBP <150  Family/Goals of care/Code Status   Code Status: Full Code     No acute events throughout day, VS and assessment per flow sheet, patient progressing towards goals as tolerated, plan of care reviewed with Rosetta Whyte and family, all concerns addressed, will continue to monitor.

## 2020-05-23 NOTE — H&P
"[facility]  Critical Care Medicine  History & Physical    Patient Name: Rosetta Whyte  MRN: [id number]  Admission Date: 5/22/2020  Hospital Length of Stay: 1 days  Code Status: Full Code  Attending Physician: Lei Martinez*   Primary Care Provider: Georgie Ortega MD   Principal Problem: Hypertensive emergency    Subjective:     HPI:  Ms. Whyte is a 74 year old woman with HTN, EMMANUEL, morbid obesity, lymphedema who presents to the ED from doctor's office after she was told that her BP was elevated. She also reported "tightness" in her chest but no chest pain. Other associated symptoms include SOB, decreased urine output and bilateral lower extremity swelling. She had the SOB when she was trying to fall asleep last night. She notes the BLE swelling started about a week ago. She also has leg pain but that it is not new and is a chronic issue. She denies fevers, chills, cough, nausea, vomiting, diarrhea, rashes, wounds. Of note, she recently fell on a bench this past Sunday and bruised her entire right side. She did not hit her head at the time. She says that she ran out of medications yesterday and could not get them refilled. She states that she has been taking her BP medications. She has not been around sick contacts. She lives by herself at home and says that she use to have a nurse who would come by to help her out.     Per ED note, patient's sister states that patient has been non-compliant with her medications over the past week. According to sister, patient does not like going to the hospital or taking medication. Attempted to contact sister for further information but could not get in touch with her.    In the ED, her BP was as elevated as 303/163. She was started on nitro drip and was placed on BiPAP with improvement. She also received IV Lasix. CXR showed findings consistent with pulmonary edema. She was started on vancomycin and cefepime for concern of bilateral lower extremity " cellulitis.     Hospital/ICU Course:  Patient was admitted to MICU for hypertensive encephalopathy and acute hypoxemic respiratory failure due to pulmonary edema. She was started on a nitro drip and placed on BiPAP. She was started on vancomycin and cefepime for concern of bilateral lower extremity cellulitis.      Past Medical History:   Diagnosis Date    Arthritis     BMI 50.0-59.9, adult     Difficult intubation 11/06/2017    Poor neck extension, Grade 4 view with DL    Hypertension     Morbid obesity     EMMANUEL (obstructive sleep apnea)        Past Surgical History:   Procedure Laterality Date    APPENDECTOMY  1960s    GASTRECTOMY      HIP FRACTURE SURGERY  1949       Review of patient's allergies indicates:  No Known Allergies    Family History     Problem Relation (Age of Onset)    Cancer Mother    Hypertension Mother, Sister, Brother, Brother        Tobacco Use    Smoking status: Never Smoker    Smokeless tobacco: Never Used   Substance and Sexual Activity    Alcohol use: No    Drug use: No    Sexual activity: Not on file      Review of Systems   Constitutional: Negative for chills and fever.   HENT: Negative for congestion and sore throat.    Eyes: Negative for photophobia and visual disturbance.   Respiratory: Positive for shortness of breath. Negative for cough.    Cardiovascular: Positive for leg swelling. Negative for chest pain.   Gastrointestinal: Negative for abdominal distention, diarrhea and nausea.   Genitourinary: Positive for decreased urine volume. Negative for dysuria.   Musculoskeletal: Positive for myalgias. Negative for arthralgias.   Skin: Negative for rash and wound.   Neurological: Negative for light-headedness and headaches.   Psychiatric/Behavioral: Negative for agitation and confusion.     Objective:     Vital Signs (Most Recent):  Temp: 98.6 °F (37 °C) (05/22/20 1736)  Pulse: 97 (05/22/20 2107)  Resp: 20 (05/22/20 2107)  BP: (!) 184/96 (05/22/20 2107)  SpO2: 100 % (05/22/20  2107) Vital Signs (24h Range):  Temp:  [97.4 °F (36.3 °C)-98.6 °F (37 °C)] 98.6 °F (37 °C)  Pulse:  [] 97  Resp:  [12-36] 20  SpO2:  [90 %-100 %] 100 %  BP: (156-303)/() 184/96   Weight: 131.5 kg (290 lb)  Body mass index is 49.78 kg/m².      Intake/Output Summary (Last 24 hours) at 5/22/2020 2208  Last data filed at 5/22/2020 1911  Gross per 24 hour   Intake 66.87 ml   Output --   Net 66.87 ml       Physical Exam   Constitutional: She is oriented to person, place, and time. She appears well-developed and well-nourished.   HENT:   Head: Normocephalic and atraumatic.   Eyes: EOM are normal.   Neck: Neck supple.   Cardiovascular: Normal rate and regular rhythm.   No murmur heard.  Pulmonary/Chest: She has no wheezes. She has rales.   On BiPAP, effort normal. Decreased breath sounds throughout.   Abdominal: Soft. Bowel sounds are normal. She exhibits no distension. There is tenderness (right side).   Musculoskeletal: Normal range of motion. She exhibits edema (2+ pitting edema up to thighs).   Neurological: She is alert and oriented to person, place, and time.   Skin: Skin is warm and dry. There is erythema (bilateral lower extremities).   Psychiatric: She has a normal mood and affect. Her behavior is normal.   Vitals reviewed.      Vents:  Oxygen Concentration (%): 60 (05/22/20 1847)  Lines/Drains/Airways     Peripheral Intravenous Line                 Peripheral IV - Single Lumen 05/22/20 1818 18 G Right Antecubital less than 1 day         Peripheral IV - Single Lumen 05/22/20 1822 22 G Left Hand less than 1 day              Significant Labs:    CBC/Anemia Profile:  Recent Labs   Lab 05/22/20 1801 05/22/20 1820   WBC 8.46  --    HGB 12.5  --    HCT 42.8 43     --    MCV 99*  --    RDW 15.1*  --         Chemistries:  Recent Labs   Lab 05/22/20 1801      K 2.9*      CO2 25   BUN 15   CREATININE 0.8   CALCIUM 9.5   ALBUMIN 4.1   PROT 7.8   BILITOT 0.7   ALKPHOS 106   ALT 9*   AST 12  "      Coagulation:   Recent Labs   Lab 05/22/20  1801   INR 1.1     Troponin:   Recent Labs   Lab 05/22/20  1801   TROPONINI 0.023       Significant Imaging:   CXR (05/22/2020:  Extensive right greater than left airspace opacity and increased interstitial attenuation favoring pulmonary edema, with multifocal pneumonia or massive aspiration not entirely excluded.    Assessment/Plan:     Pulmonary  Acute hypoxemic respiratory failure  Patient developed respiratory distress likely due to hypertensive emergency leading to pulmonary edema. In the ED, she was placed on BiPAP with improvement and received Lasix. CXR showed findings consistent with pulmonary edema with BNP elevated to 609.     Plan:  - Continue BiPAP and wean O2 as tolerated  - Continue Lasix 80mg IV BID (patient is on home bumex) for diuresis  - Monitor strict I/Os, UOP   - Follow up 2D ECHO  - Control BP as mentioned in "Hypertensive emergency"    Cardiac/Vascular  * Hypertensive emergency  Patient is a 74 year old woman who presented to the ED with hypertensive emergency likely due to medication non-compliance leading to pulmonary edema. Her BP was as high as 303/163, and she was placed on nitro drip with improvement.    Plan:  - Continue nitro gtt and wean as tolerated  - Unclear which home anti-hypertensive medications patient is suppose to be taking. Started amlodipine, coreg, losartan. Per MAR, patient was also on clonidine, hydralazine, nifedipine and HCTZ. Can add additional agents as needed.     ID  Cellulitis of leg  Concern for bilateral lower extremity cellulitis as they appear erythematous. Patient afebrile, no leukocytosis.     - Started on vancomycin, cefepime. Can likely de-escalate if patient continues to improve.  - Wound care consulted, appreciate assistance    Endocrine  Morbid obesity with BMI of 45.0-49.9, adult  Patient has BMI 49.78.    S/P laparoscopic sleeve gastrectomy  Patient had laparoscopic sleeve gastrectomy in July " 2016.    Other  Impaired functional mobility and activity tolerance  PT/OT ordered.    Lymphedema of both lower extremities  Patient has chronic lower extremity lymphedema. Wound care consulted, appreciate assistance.      Critical Care Daily Checklist:    A: Awake: RASS Goal/Actual Goal:    Actual: Leach Agitation Sedation Scale (RASS): Alert and calm   B: Spontaneous Breathing Trial Performed?     C: SAT & SBT Coordinated?  N/A                      D: Delirium: CAM-ICU Overall CAM-ICU: Negative   E: Early Mobility Performed? Yes   F: Feeding Goal:    Status:     Current Diet Order   No orders of the defined types were placed in this encounter.      AS: Analgesia/Sedation None   T: Thromboembolic Prophylaxis Lovenox ppx   H: HOB > 300 Yes   U: Stress Ulcer Prophylaxis (if needed) None   G: Glucose Control Controlled   B: Bowel Function     I: Indwelling Catheter (Lines & Urban) Necessity Urban   D: De-escalation of Antimicrobials/Pharmacotherapies Vancomycin, cefepime    Plan for the day/ETD Wean BiPAP, nitro gtt    Code Status:  Family/Goals of Care: Full Code         Critical secondary to Patient has a condition that poses threat to life and bodily function: Severe Respiratory Distress and Hypertensive emergency     Critical care was time spent personally by me on the following activities: development of treatment plan with patient or surrogate and bedside caregivers, discussions with consultants, evaluation of patient's response to treatment, examination of patient, ordering and performing treatments and interventions, ordering and review of laboratory studies, ordering and review of radiographic studies, pulse oximetry, re-evaluation of patient's condition. This critical care time did not overlap with that of any other provider or involve time for any procedures.     Abbey Solano MD  Critical Care Medicine  [facility]

## 2020-05-23 NOTE — CARE UPDATE
Patient's sister (Nita Bobby) contacted regarding patient health status. She relays that the patient hasn't been compliant with taking or filling her antihypertensive medications. Also voices complaints regarding her bilateral lower extremities and increased swelling over the past 2 months. Will continue to update family daily.

## 2020-05-23 NOTE — NURSING
Patient received as transfer from ED, AAOx4, On NRB tranfered to BiPAP. SBP at 190s. On Nitroglycerin gtt aT 200mcg/min. Patient oriented to room set-up. PIV x2. CCt informed.

## 2020-05-23 NOTE — SUBJECTIVE & OBJECTIVE
Past Medical History:   Diagnosis Date    Arthritis     BMI 50.0-59.9, adult     Difficult intubation 11/06/2017    Poor neck extension, Grade 4 view with DL    Hypertension     Morbid obesity     EMMANUEL (obstructive sleep apnea)        Past Surgical History:   Procedure Laterality Date    APPENDECTOMY  1960s    GASTRECTOMY      HIP FRACTURE SURGERY  1949       Review of patient's allergies indicates:  No Known Allergies    Family History     Problem Relation (Age of Onset)    Cancer Mother    Hypertension Mother, Sister, Brother, Brother        Tobacco Use    Smoking status: Never Smoker    Smokeless tobacco: Never Used   Substance and Sexual Activity    Alcohol use: No    Drug use: No    Sexual activity: Not on file      Review of Systems   Constitutional: Negative for chills and fever.   HENT: Negative for congestion and sore throat.    Eyes: Negative for photophobia and visual disturbance.   Respiratory: Positive for shortness of breath. Negative for cough.    Cardiovascular: Positive for leg swelling. Negative for chest pain.   Gastrointestinal: Negative for abdominal distention, diarrhea and nausea.   Genitourinary: Positive for decreased urine volume. Negative for dysuria.   Musculoskeletal: Positive for myalgias. Negative for arthralgias.   Skin: Negative for rash and wound.   Neurological: Negative for light-headedness and headaches.   Psychiatric/Behavioral: Negative for agitation and confusion.     Objective:     Vital Signs (Most Recent):  Temp: 98.6 °F (37 °C) (05/22/20 1736)  Pulse: 97 (05/22/20 2107)  Resp: 20 (05/22/20 2107)  BP: (!) 184/96 (05/22/20 2107)  SpO2: 100 % (05/22/20 2107) Vital Signs (24h Range):  Temp:  [97.4 °F (36.3 °C)-98.6 °F (37 °C)] 98.6 °F (37 °C)  Pulse:  [] 97  Resp:  [12-36] 20  SpO2:  [90 %-100 %] 100 %  BP: (156-303)/() 184/96   Weight: 131.5 kg (290 lb)  Body mass index is 49.78 kg/m².      Intake/Output Summary (Last 24 hours) at 5/22/2020 2208  Last  data filed at 5/22/2020 1911  Gross per 24 hour   Intake 66.87 ml   Output --   Net 66.87 ml       Physical Exam   Constitutional: She is oriented to person, place, and time. She appears well-developed and well-nourished.   HENT:   Head: Normocephalic and atraumatic.   Eyes: EOM are normal.   Neck: Neck supple.   Cardiovascular: Normal rate and regular rhythm.   No murmur heard.  Pulmonary/Chest: She has no wheezes. She has rales.   On BiPAP, effort normal. Decreased breath sounds throughout.   Abdominal: Soft. Bowel sounds are normal. She exhibits no distension. There is tenderness (right side).   Musculoskeletal: Normal range of motion. She exhibits edema (2+ pitting edema up to thighs).   Neurological: She is alert and oriented to person, place, and time.   Skin: Skin is warm and dry. There is erythema (bilateral lower extremities).   Psychiatric: She has a normal mood and affect. Her behavior is normal.   Vitals reviewed.      Vents:  Oxygen Concentration (%): 60 (05/22/20 1847)  Lines/Drains/Airways     Peripheral Intravenous Line                 Peripheral IV - Single Lumen 05/22/20 1818 18 G Right Antecubital less than 1 day         Peripheral IV - Single Lumen 05/22/20 1822 22 G Left Hand less than 1 day              Significant Labs:    CBC/Anemia Profile:  Recent Labs   Lab 05/22/20  1801 05/22/20  1820   WBC 8.46  --    HGB 12.5  --    HCT 42.8 43     --    MCV 99*  --    RDW 15.1*  --         Chemistries:  Recent Labs   Lab 05/22/20  1801      K 2.9*      CO2 25   BUN 15   CREATININE 0.8   CALCIUM 9.5   ALBUMIN 4.1   PROT 7.8   BILITOT 0.7   ALKPHOS 106   ALT 9*   AST 12       Coagulation:   Recent Labs   Lab 05/22/20  1801   INR 1.1     Troponin:   Recent Labs   Lab 05/22/20  1801   TROPONINI 0.023       Significant Imaging:   CXR (05/22/2020:  Extensive right greater than left airspace opacity and increased interstitial attenuation favoring pulmonary edema, with multifocal pneumonia  or massive aspiration not entirely excluded.

## 2020-05-23 NOTE — ASSESSMENT & PLAN NOTE
Patient is a 74 year old woman who presented to the ED with hypertensive emergency likely due to medication non-compliance leading to pulmonary edema. Her BP was as high as 303/163, and she was placed on nitro drip with improvement.    Plan:  - Continue nitro gtt and wean as tolerated  - Unclear which home anti-hypertensive medications patient is suppose to be taking. Started amlodipine, coreg, losartan. Per MAR, patient was also on clonidine, hydralazine, nifedipine and HCTZ. Can add additional agents as needed.

## 2020-05-23 NOTE — ED NOTES
The patient was incontinent of stool, linens changed and perineal care given. Patient repositioned for comfort, will continue to monitor.

## 2020-05-23 NOTE — HOSPITAL COURSE
Patient was admitted to MICU for hypertensive encephalopathy and acute hypoxemic respiratory failure due to pulmonary edema. She was started on a nitro drip and placed on BiPAP. She was started on vancomycin and cefepime for concern of bilateral lower extremity cellulitis. Patient remains off of anti-HTN gtts for x 24 hours. Currently restarting home amlodipine and coreg. Goal SBP <180. Stepping patient down to IM.

## 2020-05-23 NOTE — PROGRESS NOTES
Pharmacokinetic Initial Assessment: IV Vancomycin    Assessment/Plan:    Initiate intravenous vancomycin with loading dose of 2500 mg once followed by a maintenance dose of vancomycin 1500 mg IV every 12 hours.  Desired empiric serum trough concentration is 10 to 20 mcg/mL.  Draw vancomycin trough level 30 min prior to fourth dose on 05/24/2020 at approximately 0830.  Pharmacy will continue to follow and monitor vancomycin.      Please contact pharmacy at extension [phone] with any questions regarding this assessment.     Thank you for the consult,   Carmelo John       Patient brief summary:  Rosetta Whyte is a 74 y.o. female initiated on antimicrobial therapy with IV Vancomycin for treatment of suspected skin & soft tissue infection    Drug Allergies:   Review of patient's allergies indicates:  No Known Allergies    Actual Body Weight:   131.5 kg    Renal Function:   Estimated Creatinine Clearance: 84.4 mL/min (based on SCr of 0.8 mg/dL).     CBC (last 72 hours):  Recent Labs   Lab Result Units 05/22/20  1801   WBC K/uL 8.46   Hemoglobin g/dL 12.5   Hematocrit % 42.8   Platelets K/uL 252   Gran% % 55.8   Lymph% % 26.4   Mono% % 11.2   Eosinophil% % 5.4   Basophil% % 0.8   Differential Method  Automated       Metabolic Panel (last 72 hours):  Recent Labs   Lab Result Units 05/22/20  1801   Sodium mmol/L 145   Potassium mmol/L 2.9*   Chloride mmol/L 108   CO2 mmol/L 25   Glucose mg/dL 119*   BUN, Bld mg/dL 15   Creatinine mg/dL 0.8   Albumin g/dL 4.1   Total Bilirubin mg/dL 0.7   Alkaline Phosphatase U/L 106   AST U/L 12   ALT U/L 9*       Drug levels (last 3 results):  No results for input(s): VANCOMYCINRA, VANCOMYCINPE, VANCOMYCINTR in the last 72 hours.    Microbiologic Results:  Microbiology Results (last 7 days)     ** No results found for the last 168 hours. **

## 2020-05-24 PROBLEM — I21.4 NSTEMI (NON-ST ELEVATED MYOCARDIAL INFARCTION): Status: ACTIVE | Noted: 2018-11-12

## 2020-05-24 PROBLEM — I51.9 DIASTOLIC DYSFUNCTION, LEFT VENTRICLE: Status: ACTIVE | Noted: 2020-05-24

## 2020-05-24 LAB
ALBUMIN SERPL BCP-MCNC: 2.8 G/DL (ref 3.5–5.2)
ALP SERPL-CCNC: 72 U/L (ref 55–135)
ALT SERPL W/O P-5'-P-CCNC: 8 U/L (ref 10–44)
ANION GAP SERPL CALC-SCNC: 9 MMOL/L (ref 8–16)
AST SERPL-CCNC: 18 U/L (ref 10–40)
BASOPHILS # BLD AUTO: 0.04 K/UL (ref 0–0.2)
BASOPHILS NFR BLD: 0.5 % (ref 0–1.9)
BILIRUB SERPL-MCNC: 0.5 MG/DL (ref 0.1–1)
BUN SERPL-MCNC: 21 MG/DL (ref 8–23)
CALCIUM SERPL-MCNC: 8.2 MG/DL (ref 8.7–10.5)
CHLORIDE SERPL-SCNC: 107 MMOL/L (ref 95–110)
CO2 SERPL-SCNC: 26 MMOL/L (ref 23–29)
CREAT SERPL-MCNC: 1.3 MG/DL (ref 0.5–1.4)
DIFFERENTIAL METHOD: ABNORMAL
EOSINOPHIL # BLD AUTO: 0.3 K/UL (ref 0–0.5)
EOSINOPHIL NFR BLD: 3.7 % (ref 0–8)
ERYTHROCYTE [DISTWIDTH] IN BLOOD BY AUTOMATED COUNT: 15.3 % (ref 11.5–14.5)
EST. GFR  (AFRICAN AMERICAN): 47 ML/MIN/1.73 M^2
EST. GFR  (NON AFRICAN AMERICAN): 40.8 ML/MIN/1.73 M^2
ESTIMATED AVG GLUCOSE: 94 MG/DL (ref 68–131)
GLUCOSE SERPL-MCNC: 120 MG/DL (ref 70–110)
HBA1C MFR BLD HPLC: 4.9 % (ref 4–5.6)
HCT VFR BLD AUTO: 32.8 % (ref 37–48.5)
HGB BLD-MCNC: 9.7 G/DL (ref 12–16)
IMM GRANULOCYTES # BLD AUTO: 0.03 K/UL (ref 0–0.04)
IMM GRANULOCYTES NFR BLD AUTO: 0.4 % (ref 0–0.5)
LYMPHOCYTES # BLD AUTO: 0.9 K/UL (ref 1–4.8)
LYMPHOCYTES NFR BLD: 11.8 % (ref 18–48)
MAGNESIUM SERPL-MCNC: 1.9 MG/DL (ref 1.6–2.6)
MCH RBC QN AUTO: 29 PG (ref 27–31)
MCHC RBC AUTO-ENTMCNC: 29.6 G/DL (ref 32–36)
MCV RBC AUTO: 98 FL (ref 82–98)
MONOCYTES # BLD AUTO: 1.1 K/UL (ref 0.3–1)
MONOCYTES NFR BLD: 14.6 % (ref 4–15)
NEUTROPHILS # BLD AUTO: 5.1 K/UL (ref 1.8–7.7)
NEUTROPHILS NFR BLD: 69 % (ref 38–73)
NRBC BLD-RTO: 0 /100 WBC
PLATELET # BLD AUTO: 173 K/UL (ref 150–350)
PMV BLD AUTO: 11.1 FL (ref 9.2–12.9)
POTASSIUM SERPL-SCNC: 4.1 MMOL/L (ref 3.5–5.1)
PROT SERPL-MCNC: 5.5 G/DL (ref 6–8.4)
RBC # BLD AUTO: 3.34 M/UL (ref 4–5.4)
SODIUM SERPL-SCNC: 142 MMOL/L (ref 136–145)
VANCOMYCIN TROUGH SERPL-MCNC: 43.7 UG/ML (ref 10–22)
WBC # BLD AUTO: 7.38 K/UL (ref 3.9–12.7)

## 2020-05-24 PROCEDURE — 94761 N-INVAS EAR/PLS OXIMETRY MLT: CPT

## 2020-05-24 PROCEDURE — 80053 COMPREHEN METABOLIC PANEL: CPT

## 2020-05-24 PROCEDURE — 63600175 PHARM REV CODE 636 W HCPCS: Performed by: INTERNAL MEDICINE

## 2020-05-24 PROCEDURE — 25000003 PHARM REV CODE 250: Performed by: NURSE PRACTITIONER

## 2020-05-24 PROCEDURE — 99900035 HC TECH TIME PER 15 MIN (STAT)

## 2020-05-24 PROCEDURE — 11000001 HC ACUTE MED/SURG PRIVATE ROOM

## 2020-05-24 PROCEDURE — 83735 ASSAY OF MAGNESIUM: CPT

## 2020-05-24 PROCEDURE — 85025 COMPLETE CBC W/AUTO DIFF WBC: CPT

## 2020-05-24 PROCEDURE — 36415 COLL VENOUS BLD VENIPUNCTURE: CPT

## 2020-05-24 PROCEDURE — 25000003 PHARM REV CODE 250: Performed by: STUDENT IN AN ORGANIZED HEALTH CARE EDUCATION/TRAINING PROGRAM

## 2020-05-24 PROCEDURE — 83036 HEMOGLOBIN GLYCOSYLATED A1C: CPT

## 2020-05-24 PROCEDURE — 94660 CPAP INITIATION&MGMT: CPT

## 2020-05-24 PROCEDURE — 99233 PR SUBSEQUENT HOSPITAL CARE,LEVL III: ICD-10-PCS | Mod: ,,, | Performed by: NURSE PRACTITIONER

## 2020-05-24 PROCEDURE — 63600175 PHARM REV CODE 636 W HCPCS: Performed by: NURSE PRACTITIONER

## 2020-05-24 PROCEDURE — 80202 ASSAY OF VANCOMYCIN: CPT

## 2020-05-24 PROCEDURE — 27000221 HC OXYGEN, UP TO 24 HOURS

## 2020-05-24 PROCEDURE — 99233 SBSQ HOSP IP/OBS HIGH 50: CPT | Mod: ,,, | Performed by: NURSE PRACTITIONER

## 2020-05-24 PROCEDURE — 63600175 PHARM REV CODE 636 W HCPCS: Performed by: STUDENT IN AN ORGANIZED HEALTH CARE EDUCATION/TRAINING PROGRAM

## 2020-05-24 RX ORDER — CARVEDILOL 6.25 MG/1
6.25 TABLET ORAL 2 TIMES DAILY
Status: DISCONTINUED | OUTPATIENT
Start: 2020-05-24 | End: 2020-05-26

## 2020-05-24 RX ORDER — ENOXAPARIN SODIUM 100 MG/ML
40 INJECTION SUBCUTANEOUS EVERY 12 HOURS
Status: DISCONTINUED | OUTPATIENT
Start: 2020-05-24 | End: 2020-05-29 | Stop reason: HOSPADM

## 2020-05-24 RX ORDER — AMLODIPINE BESYLATE 10 MG/1
10 TABLET ORAL DAILY
Status: DISCONTINUED | OUTPATIENT
Start: 2020-05-24 | End: 2020-05-24

## 2020-05-24 RX ORDER — AMLODIPINE BESYLATE 5 MG/1
5 TABLET ORAL DAILY
Status: DISCONTINUED | OUTPATIENT
Start: 2020-05-24 | End: 2020-05-25

## 2020-05-24 RX ORDER — FUROSEMIDE 10 MG/ML
80 INJECTION INTRAMUSCULAR; INTRAVENOUS DAILY
Status: DISCONTINUED | OUTPATIENT
Start: 2020-05-25 | End: 2020-05-25

## 2020-05-24 RX ADMIN — ENOXAPARIN SODIUM 40 MG: 100 INJECTION SUBCUTANEOUS at 08:05

## 2020-05-24 RX ADMIN — HUMAN ALBUMIN MICROSPHERES AND PERFLUTREN 0.66 MG: 10; .22 INJECTION, SOLUTION INTRAVENOUS at 09:05

## 2020-05-24 RX ADMIN — ATORVASTATIN CALCIUM 40 MG: 20 TABLET, FILM COATED ORAL at 08:05

## 2020-05-24 RX ADMIN — CEFEPIME 2 G: 2 INJECTION, POWDER, FOR SOLUTION INTRAVENOUS at 08:05

## 2020-05-24 RX ADMIN — FUROSEMIDE 80 MG: 10 INJECTION, SOLUTION INTRAMUSCULAR; INTRAVENOUS at 08:05

## 2020-05-24 RX ADMIN — CEFEPIME 2 G: 2 INJECTION, POWDER, FOR SOLUTION INTRAVENOUS at 07:05

## 2020-05-24 RX ADMIN — AMLODIPINE BESYLATE 5 MG: 5 TABLET ORAL at 12:05

## 2020-05-24 RX ADMIN — CARVEDILOL 6.25 MG: 6.25 TABLET, FILM COATED ORAL at 12:05

## 2020-05-24 RX ADMIN — CARVEDILOL 6.25 MG: 6.25 TABLET, FILM COATED ORAL at 08:05

## 2020-05-24 NOTE — NURSING
Per Sommer in the lab, it is ok to send CMP/BMP green tops in the red top tubes for this pt, as all the green top tubes are clotting off.     Both red top tubes sent have resulted.     The CBC's still need to be sent in the purple top tubes.    Passed onto night shift also.

## 2020-05-24 NOTE — PLAN OF CARE
CMICU DAILY GOALS       A: Awake    RASS: Goal - RASS Goal: 0-->alert and calm  Actual - RASS (Leach Agitation-Sedation Scale): -1-->drowsy   Restraint necessity:    B: Breath   SBT: Not intubated   C: Coordinate A & B, analgesics/sedatives   Pain: managed    SAT: Not intubated  D: Delirium   CAM-ICU: Overall CAM-ICU: Positive  E: Early Mobility   MOVE Screen: Pass   Activity: Activity Management: activity adjusted per tolerance, activity clustered for rest period  FAS: Feeding/Nutrition   Diet order: Diet/Nutrition Received: NPO, sips of water,   Fluid restriction:    T: Thrombus   DVT prophylaxis: VTE Required Core Measure: Pharmacological prophylaxis initiated/maintained, Provider determined low risk VTE  H: HOB Elevation   Head of Bed (HOB): HOB at 30-45 degrees  U: Ulcer Prophylaxis   GI: no  G: Glucose control   managed    S: Skin   Bundle compliance: yes   Bathing/Skin Care: back care, bath, chlorhexidine, bath, complete, dressed/undressed, foot care, incontinence care, linen changed Date: 5/23/2020, Day Shift  B: Bowel Function   no issues   I: Indwelling Catheters   Urban necessity:     CVC necessity: No   IPAD offered: Not appropriate  D: De-escalation Antibx   No  Plan for the day   Monitor BP, administer meds PRN, Possible step-down today.  Family/Goals of care/Code Status   Code Status: Full Code     No acute events throughout day, VS and assessment per flow sheet, patient progressing towards goals as tolerated, plan of care reviewed with Rosetta Whyte and family, all concerns addressed, will continue to monitor.

## 2020-05-24 NOTE — ASSESSMENT & PLAN NOTE
Lymphedema of both lower extremities  - Concern for bilateral lower extremity cellulitis at admission as they appeared erythematous.   - Patient afebrile, no leukocytosis  - clinical findings possibly secondary to venous stasis changes  - Started on vancomycin, cefepime 5/22  - DC cefepime 2/2 confusion and vanc dosed renally so has not received since 5/22  - will transition to PO doxycycline 100mg BID for total 7 days course of antibiotic treatment (last day 5/29)  - Wound care consulted, appreciate assistance.

## 2020-05-24 NOTE — HOSPITAL COURSE
Symptoms improved. She developed acute kidney injury on 5/23/2020, with creatinine increasing from 0.8 on admission to 1.1. Nitroglycerin drip was weaned off on 5/24/2020. She was transferred out of the ICU on 5/25/2020. Creatinine increased further to 1.7. She was delirious that morning, unable to recall her name or follow basic commands. Head CT showed evidence of basal ganglia calcifications, moderate chronic microvascular ischemic changes, and probable bilateral remote lacunar infarctions. She was put on a low rate of IV fluids. Delirium improved. Physical and Occupational Therapy recommended skilled nursing facility placement. Creatinine peaked at 1.8 on 5/27/2020 before improving. She will continue to hold furosemide. Losartan and hydrochlorothiazide were discontinued. She was accepted to [facility] skilled nursing facility on 5/29/2020 but her family decided to take her home with home health instead.

## 2020-05-24 NOTE — CARE UPDATE
Patient's sister (Nita Bobby) contacted regarding patient health status. And updated regarding transfer to floor. Will continue to update family daily.

## 2020-05-24 NOTE — ASSESSMENT & PLAN NOTE
"Patient developed respiratory distress likely due to hypertensive emergency leading to pulmonary edema. In the ED, she was placed on BiPAP with improvement and received Lasix. CXR showed findings consistent with pulmonary edema with BNP elevated to 609.     Plan:  - Continue BiPAP qhs and wean O2 as tolerated  - Continue Lasix 80mg IV qd (decreased from BID to qd d/t Creatinine bump)--plan to transition to 40 mg Lasix PO (home dose)  - Monitor strict I/Os, UOP   - 2D ECHO: EF 50-55% Grade II (moderate) left ventricular diastolic dysfunction consistent with pseudonormalization. Concentric left ventricular remodeling. Mildly reduced right ventricular systolic function. Mild left atrial enlargement.  - Control BP as mentioned in "Hypertensive emergency"  "

## 2020-05-24 NOTE — SUBJECTIVE & OBJECTIVE
Interval History/Significant Events: No acute overnight events. Patient re-started on x 2 home anti-HTN meds. Plan to step patient down to IM.    Review of Systems   Constitutional: Negative for chills and fever.   HENT: Negative for congestion and sore throat.    Eyes: Negative for photophobia and visual disturbance.   Respiratory: Negative for cough.    Cardiovascular: Negative for chest pain.   Gastrointestinal: Negative for abdominal distention, diarrhea and nausea.   Genitourinary: Negative for dysuria.   Musculoskeletal: Negative for arthralgias.   Skin: Negative for rash and wound.   Neurological: Negative for light-headedness and headaches.   Psychiatric/Behavioral: Negative for agitation and confusion.     Objective:     Vital Signs (Most Recent):  Temp: 98.6 °F (37 °C) (05/24/20 1145)  Pulse: 97 (05/24/20 1229)  Resp: (!) 22 (05/24/20 1200)  BP: (!) 173/89 (05/24/20 1200)  SpO2: (!) 94 % (05/24/20 1200) Vital Signs (24h Range):  Temp:  [97.6 °F (36.4 °C)-98.6 °F (37 °C)] 98.6 °F (37 °C)  Pulse:  [61-97] 97  Resp:  [16-40] 22  SpO2:  [94 %-100 %] 94 %  BP: (107-173)/(55-89) 173/89   Weight: (!) 143.3 kg (316 lb)  Body mass index is 52.59 kg/m².      Intake/Output Summary (Last 24 hours) at 5/24/2020 1233  Last data filed at 5/24/2020 1200  Gross per 24 hour   Intake 320 ml   Output 2410 ml   Net -2090 ml       Physical Exam   Constitutional: She is oriented to person, place, and time. She appears well-developed and well-nourished.   HENT:   Head: Normocephalic and atraumatic.   Eyes: EOM are normal.   Neck: Neck supple.   Cardiovascular: Normal rate and regular rhythm.   No murmur heard.  Pulmonary/Chest: She has no wheezes. She has rales.   On BiPAP, effort normal. Decreased breath sounds throughout.   Abdominal: Soft. Bowel sounds are normal. She exhibits no distension. There is tenderness (right side).   Musculoskeletal: Normal range of motion. She exhibits edema (3+ pitting edema up to thighs).    Neurological: She is alert and oriented to person, place, and time.   Skin: Skin is warm and dry. There is erythema (bilateral lower extremities).   Psychiatric: She has a normal mood and affect. Her behavior is normal.   Vitals reviewed.      Vents:  Oxygen Concentration (%): 40 (05/24/20 0700)  Lines/Drains/Airways     Drain            Female External Urinary Catheter 05/23/20 0030 1 day          Peripheral Intravenous Line                 Peripheral IV - Single Lumen 05/22/20 1818 18 G Right Antecubital 1 day         Peripheral IV - Single Lumen 05/23/20 22 G Right Hand 1 day              Significant Labs:    CBC/Anemia Profile:  Recent Labs   Lab 05/23/20  0129 05/23/20  1654 05/24/20  0311   WBC 10.13 8.68 7.38   HGB 10.5* 10.5* 9.7*   HCT 36.4* 35.4* 32.8*    193 173   * 97 98   RDW 15.1* 15.4* 15.3*        Chemistries:  Recent Labs   Lab 05/23/20  0129 05/23/20  1337 05/23/20  1654 05/24/20  0311    144 143 142   K 2.9* 3.6 4.3 4.1    108 107 107   CO2 28 28 26 26   BUN 15 18 18 21   CREATININE 0.8 1.1 1.1 1.3   CALCIUM 8.7 8.7 8.4* 8.2*   ALBUMIN 3.2*  --  3.2* 2.8*   PROT 6.2  --  6.1 5.5*   BILITOT 0.8  --  0.8 0.5   ALKPHOS 83  --  81 72   ALT 8*  --  8* 8*   AST 10  --  13 18   MG 1.5*  --  1.9 1.9       All pertinent labs within the past 24 hours have been reviewed.    Significant Imaging:  I have reviewed all pertinent imaging results/findings within the past 24 hours.

## 2020-05-24 NOTE — PROGRESS NOTES
"[facility]  Critical Care Medicine  Progress Note    Patient Name: Rosetta Whyte  MRN: [id number]  Admission Date: 5/22/2020  Hospital Length of Stay: 2 days  Code Status: Full Code  Attending Provider: Lei Martinez*  Primary Care Provider: Georgie Ortega MD   Principal Problem: Hypertensive emergency    Subjective:     HPI:  Ms. Whyte is a 74 year old woman with HTN, EMMANUEL, morbid obesity, lymphedema who presents to the ED from doctor's office after she was told that her BP was elevated. She also reported "tightness" in her chest but no chest pain. Other associated symptoms include SOB, decreased urine output and bilateral lower extremity swelling. She had the SOB when she was trying to fall asleep last night. She notes the BLE swelling started about a week ago. She also has leg pain but that it is not new and is a chronic issue. She denies fevers, chills, cough, nausea, vomiting, diarrhea, rashes, wounds. Of note, she recently fell on a bench this past Sunday and bruised her entire right side. She did not hit her head at the time. She says that she ran out of medications yesterday and could not get them refilled. She states that she has been taking her BP medications. She has not been around sick contacts. She lives by herself at home and says that she use to have a nurse who would come by to help her out.     Per ED note, patient's sister states that patient has been non-compliant with her medications over the past week. According to sister, patient does not like going to the hospital or taking medication. Attempted to contact sister for further information but could not get in touch with her.    In the ED, her BP was as elevated as 303/163. She was started on nitro drip and was placed on BiPAP with improvement. She also received IV Lasix. CXR showed findings consistent with pulmonary edema. She was started on vancomycin and cefepime for concern of bilateral lower extremity " cellulitis.     Hospital/ICU Course:  Patient was admitted to MICU for hypertensive encephalopathy and acute hypoxemic respiratory failure due to pulmonary edema. She was started on a nitro drip and placed on BiPAP. She was started on vancomycin and cefepime for concern of bilateral lower extremity cellulitis. Patient remains off of anti-HTN gtts for x 24 hours. Currently restarting home amlodipine and coreg. Goal SBP <180. Stepping patient down to IM.    Interval History/Significant Events: No acute overnight events. Patient re-started on x 2 home anti-HTN meds. Plan to step patient down to IM.    Review of Systems   Constitutional: Negative for chills and fever.   HENT: Negative for congestion and sore throat.    Eyes: Negative for photophobia and visual disturbance.   Respiratory: Negative for cough.    Cardiovascular: Negative for chest pain.   Gastrointestinal: Negative for abdominal distention, diarrhea and nausea.   Genitourinary: Negative for dysuria.   Musculoskeletal: Negative for arthralgias.   Skin: Negative for rash and wound.   Neurological: Negative for light-headedness and headaches.   Psychiatric/Behavioral: Negative for agitation and confusion.     Objective:     Vital Signs (Most Recent):  Temp: 98.6 °F (37 °C) (05/24/20 1145)  Pulse: 97 (05/24/20 1229)  Resp: (!) 22 (05/24/20 1200)  BP: (!) 173/89 (05/24/20 1200)  SpO2: (!) 94 % (05/24/20 1200) Vital Signs (24h Range):  Temp:  [97.6 °F (36.4 °C)-98.6 °F (37 °C)] 98.6 °F (37 °C)  Pulse:  [61-97] 97  Resp:  [16-40] 22  SpO2:  [94 %-100 %] 94 %  BP: (107-173)/(55-89) 173/89   Weight: (!) 143.3 kg (316 lb)  Body mass index is 52.59 kg/m².      Intake/Output Summary (Last 24 hours) at 5/24/2020 1233  Last data filed at 5/24/2020 1200  Gross per 24 hour   Intake 320 ml   Output 2410 ml   Net -2090 ml       Physical Exam   Constitutional: She is oriented to person, place, and time. She appears well-developed and well-nourished.   HENT:   Head:  Normocephalic and atraumatic.   Eyes: EOM are normal.   Neck: Neck supple.   Cardiovascular: Normal rate and regular rhythm.   No murmur heard.  Pulmonary/Chest: She has no wheezes. She has rales.   On BiPAP, effort normal. Decreased breath sounds throughout.   Abdominal: Soft. Bowel sounds are normal. She exhibits no distension. There is tenderness (right side).   Musculoskeletal: Normal range of motion. She exhibits edema (3+ pitting edema up to thighs).   Neurological: She is alert and oriented to person, place, and time.   Skin: Skin is warm and dry. There is erythema (bilateral lower extremities).   Psychiatric: She has a normal mood and affect. Her behavior is normal.   Vitals reviewed.      Vents:  Oxygen Concentration (%): 40 (05/24/20 0700)  Lines/Drains/Airways     Drain            Female External Urinary Catheter 05/23/20 0030 1 day          Peripheral Intravenous Line                 Peripheral IV - Single Lumen 05/22/20 1818 18 G Right Antecubital 1 day         Peripheral IV - Single Lumen 05/23/20 22 G Right Hand 1 day              Significant Labs:    CBC/Anemia Profile:  Recent Labs   Lab 05/23/20  0129 05/23/20  1654 05/24/20  0311   WBC 10.13 8.68 7.38   HGB 10.5* 10.5* 9.7*   HCT 36.4* 35.4* 32.8*    193 173   * 97 98   RDW 15.1* 15.4* 15.3*        Chemistries:  Recent Labs   Lab 05/23/20  0129 05/23/20  1337 05/23/20  1654 05/24/20  0311    144 143 142   K 2.9* 3.6 4.3 4.1    108 107 107   CO2 28 28 26 26   BUN 15 18 18 21   CREATININE 0.8 1.1 1.1 1.3   CALCIUM 8.7 8.7 8.4* 8.2*   ALBUMIN 3.2*  --  3.2* 2.8*   PROT 6.2  --  6.1 5.5*   BILITOT 0.8  --  0.8 0.5   ALKPHOS 83  --  81 72   ALT 8*  --  8* 8*   AST 10  --  13 18   MG 1.5*  --  1.9 1.9       All pertinent labs within the past 24 hours have been reviewed.    Significant Imaging:  I have reviewed all pertinent imaging results/findings within the past 24 hours.      AB  Recent Labs   Lab 05/23/20  6349   PH 7.420  "  PO2 36*   PCO2 52.9*   HCO3 34.3*   BE 10     Assessment/Plan:     Pulmonary  Acute hypoxemic respiratory failure  Patient developed respiratory distress likely due to hypertensive emergency leading to pulmonary edema. In the ED, she was placed on BiPAP with improvement and received Lasix. CXR showed findings consistent with pulmonary edema with BNP elevated to 609.     Plan:  - Continue BiPAP qhs and wean O2 as tolerated  - Continue Lasix 80mg IV qd (decreased from BID to qd d/t Creatinine bump)--plan to transition to 40 mg Lasix PO (home dose)  - Monitor strict I/Os, UOP   - 2D ECHO: EF 50-55% Grade II (moderate) left ventricular diastolic dysfunction consistent with pseudonormalization. Concentric left ventricular remodeling. Mildly reduced right ventricular systolic function. Mild left atrial enlargement.  - Control BP as mentioned in "Hypertensive emergency"    Cardiac/Vascular  * Hypertensive emergency  Patient is a 74 year old woman who presented to the ED with hypertensive emergency likely due to medication non-compliance leading to pulmonary edema. Her BP was as high as 303/163, and she was placed on nitro drip with improvement.    Plan:  - Continue nitro gtt and wean as tolerated  - Unclear which home anti-hypertensive medications patient is suppose to be taking. Started amlodipine, coreg, losartan. Per MAR, patient was also on clonidine, hydralazine, nifedipine and HCTZ. Can add additional agents as needed.     ID  Cellulitis of leg  Concern for bilateral lower extremity cellulitis as they appear erythematous. Patient afebrile, no leukocytosis.     - Started on vancomycin, cefepime. Can likely de-escalate if patient continues to improve.  - Wound care consulted, appreciate assistance.    Endocrine  Morbid obesity with BMI of 45.0-49.9, adult  Patient has BMI 49.78.    --regular diet initiated    S/P laparoscopic sleeve gastrectomy  Patient had laparoscopic sleeve gastrectomy in July " 2016.    Other  Impaired functional mobility and activity tolerance  PT/OT ordered.    Lymphedema of both lower extremities  Patient has chronic lower extremity lymphedema. Wound care consulted, appreciate assistance.      I have spent 35 min with this patient, with over 50% of this time spent coordinating care and speaking with the family.     Raleigh Lezama NP  Critical Care Medicine  [facility]

## 2020-05-24 NOTE — ASSESSMENT & PLAN NOTE
Hypertension, essential  - unknown whether patient was compliant with taking her medications at home  - initially treated with nitro drip in ICU, transitioned to PO medications  - Increase amlodipine to 10 mg dailuy  - Continue carvedilol 6.25 mg BID  - blood pressure improved, still above goal  - if renal function improves, may consider initiating ACE-I or ARB

## 2020-05-24 NOTE — PROGRESS NOTES
Pharmacokinetic Assessment Follow Up: IV Vancomycin    Vancomycin Regimen Assessment & Plan:  - Vancomycin trough collected today prior to 4th dose resulted as 43.7 ug/mL, supra therapeutic for goal trough 10-20 ug/mL.  - Patient with KELSEY today, SCr 1.3 from 0.8 on admission.  - Hold scheduled vancomycin regimen. Collect vancomycin level with morning labs tomorrow. Will re-dose as needed vs schedule new vancomycin regimen depending on level and renal function.    Drug levels (last 3 results):  Recent Labs   Lab Result Units 05/24/20  1012   Vancomycin-Trough ug/mL 43.7*     Pharmacy will continue to follow and monitor vancomycin.    Please contact pharmacy at extension [phone] for questions regarding this assessment.    Thank you for the consult,   Ananda Kitchen     Patient brief summary:  Rosetta Whyte is a 74 y.o. female initiated on antimicrobial therapy with IV Vancomycin for treatment of skin & soft tissue infection    The patient's current regimen is pulse dosing.    Drug Allergies:   Review of patient's allergies indicates:  No Known Allergies    Actual Body Weight:   143.3 kg    Renal Function:   Estimated Creatinine Clearance: 55.7 mL/min (based on SCr of 1.3 mg/dL).,     Dialysis Method (if applicable):  N/A

## 2020-05-24 NOTE — NURSING TRANSFER
Nursing Transfer Note      5/24/2020     Transfer To: 540A    Transfer via bed    Transfer with cardiac monitoring    Transported by LARRY Lazcano and VARUN Nassar    Medicines sent: Chewable Tylenol    Chart send with patient: Yes    Notified: Sister    Patient reassessed at: 1400 on 05/24/20    Upon arrival to floor: cardiac monitor applied, patient oriented to room, call bell in reach and bed in lowest position.    Pt and chart handed off to LARRY Apodaca.    No acute events during transport.

## 2020-05-24 NOTE — ASSESSMENT & PLAN NOTE
Concern for bilateral lower extremity cellulitis as they appear erythematous. Patient afebrile, no leukocytosis.     - Started on vancomycin, cefepime. Can likely de-escalate if patient continues to improve.  - Wound care consulted, appreciate assistance.

## 2020-05-24 NOTE — ASSESSMENT & PLAN NOTE
- thought to be in part due to hypertensive emergency with pulmonary edema  - noted on CXR 5/22 and 5/23  - treated with lasix IV 80mg BID, lowered to 80mg daily on 5/24 due to increase in creatinine  - will hold lasix and diuresis given KELSEY  - transition to lasix PO 40mg daily (home dose) when renal function recovers  - continue BIPAP at night  - monitor strict I/O's

## 2020-05-24 NOTE — PLAN OF CARE
Pt A & O x3: disoriented to time (year, month, day, hour, etc) , VS as charted and BP slightly high, but stable since arriving to floor, BLLE cellulitis so no SCDs in place, however, BLE foam padding on heels, and foam boots in room, CPAP in room for bedtime, pt has a history of falls and requires assistance to reposition, fall precautions in place, pain monitored and controlled with scheduled and PRN medication, call light within reach, tlte and VISI monitors in place.

## 2020-05-24 NOTE — RESIDENT HANDOFF
"ICU Transfer of Care Note  Critical Care Medicine    Admit Date: 5/22/2020  LOS: 2    CC: Hypertensive emergency    Code Status: Full Code       HPI and Hospital Course:     HPI:  Ms. Whyte is a 74 year old woman with HTN, EMMANUEL, morbid obesity, lymphedema who presents to the ED from doctor's office after she was told that her BP was elevated. She also reported "tightness" in her chest but no chest pain. Other associated symptoms include SOB, decreased urine output and bilateral lower extremity swelling. She had the SOB when she was trying to fall asleep last night. She notes the BLE swelling started about a week ago. She also has leg pain but that it is not new and is a chronic issue. She denies fevers, chills, cough, nausea, vomiting, diarrhea, rashes, wounds. Of note, she recently fell on a bench this past Sunday and bruised her entire right side. She did not hit her head at the time. She says that she ran out of medications yesterday and could not get them refilled. She states that she has been taking her BP medications. She has not been around sick contacts. She lives by herself at home and says that she use to have a nurse who would come by to help her out.     Per ED note, patient's sister states that patient has been non-compliant with her medications over the past week. According to sister, patient does not like going to the hospital or taking medication. Attempted to contact sister for further information but could not get in touch with her.    In the ED, her BP was as elevated as 303/163. She was started on nitro drip and was placed on BiPAP with improvement. She also received IV Lasix. CXR showed findings consistent with pulmonary edema. She was started on vancomycin and cefepime for concern of bilateral lower extremity cellulitis.     Hospital/ICU Course:  Patient was admitted to MICU for hypertensive encephalopathy and acute hypoxemic respiratory failure due to pulmonary edema. She was started on a nitro " drip and placed on BiPAP. She was started on vancomycin and cefepime for concern of bilateral lower extremity cellulitis. Patient remains off of anti-HTN gtts for x 24 hours. Currently restarting home amlodipine and coreg. Goal SBP <180. Stepping patient down to IM.      To Follow Up:   --d/c antibiotics if no s/s of infection; follow up wound care recs for bilateral LEs  --restart home antihypertensive meds slowly  --resume regular diet  --maintain BiPAP at night.  --transition from IV Lasix to PO when clincically applicable.    Discharge Plan:   --d/c home with home health    Call with questions.

## 2020-05-24 NOTE — PLAN OF CARE
"Hospital Medicine ICU Acceptance Note    Date of Admit: 5/22/2020  Date of Transfer / Stepdown: 5/24/2020  ICU team stepping patient down: MICU   ICU team member giving verbal handoff: Raleigh Lezama NP  Accepting  team: CARLOS    Brief History of Present Illness:   Ms. Whyte is a 74 year old woman with HTN, EMMANUEL, morbid obesity, lymphedema who presents to the ED from doctor's office after she was told that her BP was elevated. She also reported "tightness" in her chest but no chest pain. Other associated symptoms include SOB, decreased urine output and bilateral lower extremity swelling. She had the SOB when she was trying to fall asleep last night. She notes the BLE swelling started about a week ago. She also has leg pain but that it is not new and is a chronic issue. She denies fevers, chills, cough, nausea, vomiting, diarrhea, rashes, wounds. Of note, she recently fell on a bench this past Sunday and bruised her entire right side. She did not hit her head at the time. She says that she ran out of medications yesterday and could not get them refilled. She states that she has been taking her BP medications. She has not been around sick contacts. She lives by herself at home and says that she use to have a nurse who would come by to help her out.      Per ED note, patient's sister states that patient has been non-compliant with her medications over the past week. According to sister, patient does not like going to the hospital or taking medication. Attempted to contact sister for further information but could not get in touch with her.     In the ED, her BP was as elevated as 303/163. She was started on nitro drip and was placed on BiPAP with improvement. She also received IV Lasix. CXR showed findings consistent with pulmonary edema. She was started on vancomycin and cefepime for concern of bilateral lower extremity cellulitis.      Hospital/ICU Course:   Patient was admitted to MICU for hypertensive encephalopathy and " acute hypoxemic respiratory failure due to pulmonary edema. She was started on a nitro drip and placed on BiPAP, with improvement in her symptoms. She was weaned off of the nitro drip and started PO 5/24. She was started on vancomycin and cefepime for concern of bilateral lower extremity cellulitis. She was transitioned to Hospital Medicine Service 5/25.      Consultants and Procedures:     Consultants:  Critical Care Medicine 5/22    Procedures:    N/A    Transfer Information:     Diet:  Regular diet    Physical Activity:  Progressive mobility protocol; PT/OT evaluations pending    To Do / Pending Studies / Follow ups:  --d/c or de-escalate antibiotics if no s/s of infection; follow up wound care recs for bilateral LEs  --restart home antihypertensive meds slowly  --maintain BiPAP at night.  --monitor renal function and transition from IV Lasix to PO when clincically applicable.  - DC home with home health    Patient has been accepted by Hospital Medicine Team D, who will assume care of the patient upon arrival to the floor from the ICU. Please contact ICU team with any concerns prior to arrival. Please contact Hospital Medicine at [phone] or [phone] (please do NOT leave a voicemail) when patient arrives to the floor.

## 2020-05-25 PROBLEM — R41.82 ALTERED MENTAL STATUS, UNSPECIFIED: Status: ACTIVE | Noted: 2020-05-25

## 2020-05-25 PROBLEM — N17.9 AKI (ACUTE KIDNEY INJURY): Status: ACTIVE | Noted: 2020-05-25

## 2020-05-25 LAB
ALBUMIN SERPL BCP-MCNC: 2.7 G/DL (ref 3.5–5.2)
ALP SERPL-CCNC: 81 U/L (ref 55–135)
ALT SERPL W/O P-5'-P-CCNC: 7 U/L (ref 10–44)
ANION GAP SERPL CALC-SCNC: 12 MMOL/L (ref 8–16)
AST SERPL-CCNC: 9 U/L (ref 10–40)
BASOPHILS # BLD AUTO: 0.05 K/UL (ref 0–0.2)
BASOPHILS NFR BLD: 0.6 % (ref 0–1.9)
BILIRUB SERPL-MCNC: 0.5 MG/DL (ref 0.1–1)
BUN SERPL-MCNC: 29 MG/DL (ref 8–23)
CALCIUM SERPL-MCNC: 8.8 MG/DL (ref 8.7–10.5)
CHLORIDE SERPL-SCNC: 108 MMOL/L (ref 95–110)
CO2 SERPL-SCNC: 26 MMOL/L (ref 23–29)
CREAT SERPL-MCNC: 1.7 MG/DL (ref 0.5–1.4)
DIFFERENTIAL METHOD: ABNORMAL
EOSINOPHIL # BLD AUTO: 0.4 K/UL (ref 0–0.5)
EOSINOPHIL NFR BLD: 4.2 % (ref 0–8)
ERYTHROCYTE [DISTWIDTH] IN BLOOD BY AUTOMATED COUNT: 15.1 % (ref 11.5–14.5)
EST. GFR  (AFRICAN AMERICAN): 34 ML/MIN/1.73 M^2
EST. GFR  (NON AFRICAN AMERICAN): 29.5 ML/MIN/1.73 M^2
GLUCOSE SERPL-MCNC: 108 MG/DL (ref 70–110)
HCT VFR BLD AUTO: 35.5 % (ref 37–48.5)
HGB BLD-MCNC: 10.5 G/DL (ref 12–16)
IMM GRANULOCYTES # BLD AUTO: 0.02 K/UL (ref 0–0.04)
IMM GRANULOCYTES NFR BLD AUTO: 0.2 % (ref 0–0.5)
LYMPHOCYTES # BLD AUTO: 0.8 K/UL (ref 1–4.8)
LYMPHOCYTES NFR BLD: 8.8 % (ref 18–48)
MAGNESIUM SERPL-MCNC: 1.8 MG/DL (ref 1.6–2.6)
MCH RBC QN AUTO: 28.5 PG (ref 27–31)
MCHC RBC AUTO-ENTMCNC: 29.6 G/DL (ref 32–36)
MCV RBC AUTO: 96 FL (ref 82–98)
MONOCYTES # BLD AUTO: 1.4 K/UL (ref 0.3–1)
MONOCYTES NFR BLD: 15.7 % (ref 4–15)
NEUTROPHILS # BLD AUTO: 6.1 K/UL (ref 1.8–7.7)
NEUTROPHILS NFR BLD: 70.5 % (ref 38–73)
NRBC BLD-RTO: 0 /100 WBC
PLATELET # BLD AUTO: 201 K/UL (ref 150–350)
PMV BLD AUTO: 11.3 FL (ref 9.2–12.9)
POTASSIUM SERPL-SCNC: 3 MMOL/L (ref 3.5–5.1)
PROT SERPL-MCNC: 6 G/DL (ref 6–8.4)
RBC # BLD AUTO: 3.69 M/UL (ref 4–5.4)
SODIUM SERPL-SCNC: 146 MMOL/L (ref 136–145)
VANCOMYCIN SERPL-MCNC: 35.2 UG/ML
WBC # BLD AUTO: 8.71 K/UL (ref 3.9–12.7)

## 2020-05-25 PROCEDURE — 25000003 PHARM REV CODE 250: Performed by: NURSE PRACTITIONER

## 2020-05-25 PROCEDURE — 25000003 PHARM REV CODE 250: Performed by: STUDENT IN AN ORGANIZED HEALTH CARE EDUCATION/TRAINING PROGRAM

## 2020-05-25 PROCEDURE — 63600175 PHARM REV CODE 636 W HCPCS: Performed by: INTERNAL MEDICINE

## 2020-05-25 PROCEDURE — 11000001 HC ACUTE MED/SURG PRIVATE ROOM

## 2020-05-25 PROCEDURE — 63600175 PHARM REV CODE 636 W HCPCS: Performed by: NURSE PRACTITIONER

## 2020-05-25 PROCEDURE — 25000003 PHARM REV CODE 250: Performed by: INTERNAL MEDICINE

## 2020-05-25 PROCEDURE — 94761 N-INVAS EAR/PLS OXIMETRY MLT: CPT

## 2020-05-25 PROCEDURE — 83735 ASSAY OF MAGNESIUM: CPT

## 2020-05-25 PROCEDURE — 97535 SELF CARE MNGMENT TRAINING: CPT

## 2020-05-25 PROCEDURE — 80053 COMPREHEN METABOLIC PANEL: CPT

## 2020-05-25 PROCEDURE — 99900035 HC TECH TIME PER 15 MIN (STAT)

## 2020-05-25 PROCEDURE — 80202 ASSAY OF VANCOMYCIN: CPT

## 2020-05-25 PROCEDURE — 99233 PR SUBSEQUENT HOSPITAL CARE,LEVL III: ICD-10-PCS | Mod: ,,, | Performed by: INTERNAL MEDICINE

## 2020-05-25 PROCEDURE — 97530 THERAPEUTIC ACTIVITIES: CPT | Mod: CQ

## 2020-05-25 PROCEDURE — 94660 CPAP INITIATION&MGMT: CPT

## 2020-05-25 PROCEDURE — 99233 SBSQ HOSP IP/OBS HIGH 50: CPT | Mod: ,,, | Performed by: INTERNAL MEDICINE

## 2020-05-25 PROCEDURE — 85025 COMPLETE CBC W/AUTO DIFF WBC: CPT

## 2020-05-25 RX ORDER — AMLODIPINE BESYLATE 5 MG/1
10 TABLET ORAL DAILY
Status: DISCONTINUED | OUTPATIENT
Start: 2020-05-25 | End: 2020-05-29 | Stop reason: HOSPADM

## 2020-05-25 RX ORDER — DOXYCYCLINE HYCLATE 100 MG
100 TABLET ORAL EVERY 12 HOURS
Status: COMPLETED | OUTPATIENT
Start: 2020-05-25 | End: 2020-05-29

## 2020-05-25 RX ORDER — CEFEPIME HYDROCHLORIDE 1 G/1
1 INJECTION, POWDER, FOR SOLUTION INTRAMUSCULAR; INTRAVENOUS
Status: DISCONTINUED | OUTPATIENT
Start: 2020-05-25 | End: 2020-05-25

## 2020-05-25 RX ADMIN — ATORVASTATIN CALCIUM 40 MG: 20 TABLET, FILM COATED ORAL at 10:05

## 2020-05-25 RX ADMIN — CARVEDILOL 6.25 MG: 6.25 TABLET, FILM COATED ORAL at 09:05

## 2020-05-25 RX ADMIN — CARVEDILOL 6.25 MG: 6.25 TABLET, FILM COATED ORAL at 10:05

## 2020-05-25 RX ADMIN — DOXYCYCLINE HYCLATE 100 MG: 100 TABLET, FILM COATED ORAL at 09:05

## 2020-05-25 RX ADMIN — ENOXAPARIN SODIUM 40 MG: 100 INJECTION SUBCUTANEOUS at 09:05

## 2020-05-25 RX ADMIN — AMLODIPINE BESYLATE 10 MG: 5 TABLET ORAL at 10:05

## 2020-05-25 RX ADMIN — ENOXAPARIN SODIUM 40 MG: 100 INJECTION SUBCUTANEOUS at 10:05

## 2020-05-25 NOTE — CONSULTS
Wound consult received on patient's bilateral lower extremities. Lymphedema noted to bilateral lower extremities. No open wounds or breakdown noted to bilateral lower extremities. Not cellulitic in appearance. No wound care needed. Nursing to continue care. Re-consult wound care if needed.

## 2020-05-25 NOTE — NURSING
Plan of care reviewed with pt. Pt aox3, confused. VS as charted. Purposeful rounding for pt care and safety. No c/o pain. No reports of NV. No falls/injury reported this shift. Bilateral lower extremities swollen. Systolic B/P maintained in 130s - 150s. Frequent weight assistance throughout shift. Safety precautions maintained - bed in low position, call light in reach, side rails up x2.

## 2020-05-25 NOTE — SUBJECTIVE & OBJECTIVE
Interval History: Patient appears to be drowsy and slow to answer questions this morning. She could not provide a full review of systems, but did deny that she is having any pain. She denied confusion but was oriented to name only.    Review of Systems   Unable to perform ROS: Mental status change   Constitutional: Negative for chills and fever.   Respiratory: Negative for cough and shortness of breath.    Cardiovascular: Negative for chest pain.   Gastrointestinal: Negative for abdominal pain.   Musculoskeletal: Negative for arthralgias and myalgias.   Psychiatric/Behavioral: Negative for confusion.     Objective:     Vital Signs (Most Recent):  Temp: 98.7 °F (37.1 °C) (05/25/20 0415)  Pulse: 87 (05/25/20 0427)  Resp: (!) 21 (05/25/20 0427)  BP: (!) 144/87 (05/25/20 0427)  SpO2: 96 % (05/25/20 0427) Vital Signs (24h Range):  Temp:  [97.6 °F (36.4 °C)-98.7 °F (37.1 °C)] 98.7 °F (37.1 °C)  Pulse:  [62-97] 87  Resp:  [19-27] 21  SpO2:  [93 %-100 %] 96 %  BP: (121-173)/(55-90) 144/87     Weight: (!) 143.3 kg (316 lb)  Body mass index is 52.59 kg/m².    Intake/Output Summary (Last 24 hours) at 5/25/2020 0714  Last data filed at 5/24/2020 1930  Gross per 24 hour   Intake 247 ml   Output 1410 ml   Net -1163 ml      Physical Exam   Constitutional: She appears well-developed and well-nourished. No distress.   HENT:   Head: Normocephalic and atraumatic.   Eyes: Conjunctivae and EOM are normal.   Cardiovascular: Normal rate, regular rhythm, normal heart sounds and intact distal pulses.   Pulmonary/Chest: Effort normal and breath sounds normal. No respiratory distress.   Abdominal: Soft. Bowel sounds are normal. She exhibits no distension. There is no tenderness.   Neurological: No cranial nerve deficit.   Alert to person only   Skin: Skin is warm and dry.       Significant Labs:   CBC:   Recent Labs   Lab 05/23/20  1654 05/24/20  0311 05/25/20  0343   WBC 8.68 7.38 8.71   HGB 10.5* 9.7* 10.5*   HCT 35.4* 32.8* 35.5*     173 201     CMP:   Recent Labs   Lab 05/23/20  1654 05/24/20  0311 05/25/20  0343    142 146*   K 4.3 4.1 3.0*    107 108   CO2 26 26 26   * 120* 108   BUN 18 21 29*   CREATININE 1.1 1.3 1.7*   CALCIUM 8.4* 8.2* 8.8   PROT 6.1 5.5* 6.0   ALBUMIN 3.2* 2.8* 2.7*   BILITOT 0.8 0.5 0.5   ALKPHOS 81 72 81   AST 13 18 9*   ALT 8* 8* 7*   ANIONGAP 10 9 12   EGFRNONAA 49.9* 40.8* 29.5*       Significant Imaging:   CXR 5/24  FINDINGS:  Moderate cardiomegaly similar to prior exam.  Prominence of the pulmonary vasculature and bilateral interstitial lung markings, slightly improved when compared to prior exam suggestive of improving pulmonary edema.  No pleural effusion.  No pneumothorax.  Osseous structures are unremarkable.

## 2020-05-25 NOTE — PLAN OF CARE
Pt agreeable to therapy and tolerated session fairly secondary to cognition. Pt with decreased initiation, difficulty word finding, delayed processing and sequencing. Pt required max v/c's to sty on task. Pt unable to complete steps to brush teeth such as applying toothpaste and bringing brush to mouth. Pt chewed on toothbrush when brush held to mouth. Pt unsafe to attempt standing at this time. Pt returned to supine. RN present for session.         Problem: Occupational Therapy Goal  Goal: Occupational Therapy Goal  Description  Pt will complete UB dressing with supervision  Pt will complete bed mobility in preparation for fx'l task with minimal assist  Pt will sit edge of bed for GH task completion with supervision  Pt will perform AROM BUE HEP with mod I  Pt will complete sit EOB > 10 mins for fx'l task with supervision  Pt will complete toilet transfer with minimal assist   Outcome: Ongoing, Progressing

## 2020-05-25 NOTE — ASSESSMENT & PLAN NOTE
- Patient was AAO x3 on 5/24, but on morning of 5/25 patient is slow to answer questions  - she was unable to fully participate with therapy due to not being able to follow commands and not moving her extremities  - labs reviewed, KELSEY noted but uncertain exact etiology of AMS  - DC cefepime, though low suspicion this is secondary to cefepime administration  - will evaluate with head CT

## 2020-05-25 NOTE — PROGRESS NOTES
Pt unable to participate effectively with therapy. Pt seems to have experienced a decline in mental status since yesterday, as responses are more delayed, and she is unable to move legs on command. Pt denies pain, is afebrile, and VS stable as charted. VISI monitor in place, tele in place, purewick in place. Urine output sufficient. MD notified of change in mental status and CT scan of head ordered. Pt now sitting up and eating small amounts of food, but continues to have trouble finding words. Sister Nita called and family conversation initiated for reorientation.     Pt remains as this morning with decreased mental status from yesterday. MD notified and CT ordered this morning. MD alerted again this evening and new STAT CT order placed. Purewick, tele, and VISI monitor in place. BP slightly elevated in 160s this afternoon. Pt remains confused and unable to initiate movement without prompting, as described in PT note and RN's earlier note.

## 2020-05-25 NOTE — PT/OT/SLP PROGRESS
Occupational Therapy   Treatment    Name: Rosetta Whyte  MRN: [id number]  Admitting Diagnosis:  Hypertensive emergency       Recommendations:     Discharge Recommendations: nursing facility, skilled  Discharge Equipment Recommendations:  hospital bed  Barriers to discharge:  None    Assessment:     Rosetta Whyte is a 74 y.o. female with a medical diagnosis of Hypertensive emergency.  She presents with the following Performance deficits affecting function are weakness, impaired endurance, impaired self care skills, impaired functional mobilty, impaired cognition, gait instability, decreased lower extremity function, decreased safety awareness, pain.     Pt agreeable to therapy and tolerated session fairly secondary to cognition. Pt with decreased initiation, difficulty word finding, delayed processing and sequencing. Pt required max v/c's to sty on task. Pt unable to complete steps to brush teeth such as applying toothpaste and bringing brush to mouth. Pt chewed on toothbrush when brush held to mouth. Pt unsafe to attempt standing at this time. Pt returned to supine. RN present for session.     Rehab Prognosis:  Good; patient would benefit from acute skilled OT services to address these deficits and reach maximum level of function.       Plan:     Patient to be seen 4 x/week to address the above listed problems via self-care/home management, therapeutic activities, therapeutic exercises  · Plan of Care Expires: 06/23/20  · Plan of Care Reviewed with: patient    Subjective     Pain/Comfort:  · Pain Rating 1: 0/10    Objective:     Communicated with: RN prior to session.  Patient found supine with pressure relief boots, PureWick, telemetry upon OT entry to room.    General Precautions: Standard, fall   Orthopedic Precautions:N/A   Braces: N/A     Occupational Performance:     Bed Mobility:    · Patient completed Scooting/Bridging with maximal assistance and 2 persons  · Patient completed Supine to Sit with maximal  assistance and 2 persons  · Patient completed Sit to Supine with maximal assistance and 2 persons     Functional Mobility/Transfers:  · Pt unsafe to attempt standing at this time  · Functional Mobility: Pt agreeable to therapy and tolerated session fairly secondary to cognition. Pt with decreased initiation, difficulty word finding, delayed processing and sequencing. Pt required max v/c's to sty on task. Pt unable to complete steps to brush teeth such as applying toothpaste and bringing brush to mouth. Pt chewed on toothbrush when brush held to mouth. Pt unsafe to attempt standing at this time. Pt returned to supine. RN present for session.     Activities of Daily Living:  · Grooming: maximal assistance max v/c's for sequencing to brush teeth  · Toileting: maximal assistance [employer]      [facility] 6 Click ADL: 12    Treatment & Education:  - OT/POC-  - Importance of mobility to maximize recovery.  - whiteboard updated  - safety w/ functional mobility; hand placement to ensure safe transfers to various surfaces in prep for ADLs        Patient left supine with all lines intact, call button in reach and RN presentEducation:      GOALS:   Multidisciplinary Problems     Occupational Therapy Goals        Problem: Occupational Therapy Goal    Goal Priority Disciplines Outcome Interventions   Occupational Therapy Goal     OT, PT/OT Ongoing, Progressing    Description:  Pt will complete UB dressing with supervision  Pt will complete bed mobility in preparation for fx'l task with minimal assist  Pt will sit edge of bed for GH task completion with supervision  Pt will perform AROM BUE HEP with mod I  Pt will complete sit EOB > 10 mins for fx'l task with supervision  Pt will complete toilet transfer with minimal assist                    Time Tracking:     OT Date of Treatment: 05/25/20  OT Start Time: 1000  OT Stop Time: 1039  OT Total Time (min): 39 min    Billable Minutes:Self Care/Home Management 39    Hilary Deng  OT  5/25/2020

## 2020-05-25 NOTE — PT/OT/SLP PROGRESS
Physical Therapy Treatment    Patient Name:  Rosetta Whyte   MRN:  [id number]    Recommendations:     Discharge Recommendations:  nursing facility, skilled   Discharge Equipment Recommendations: hospital bed   Barriers to discharge: Decreased caregiver support and increased assist for all mobility and self care    Assessment:     Rosetta Whyte is a 74 y.o. female admitted with a medical diagnosis of Hypertensive emergency.  She presents with the following impairments/functional limitations:  weakness, impaired endurance, impaired self care skills, impaired functional mobilty, impaired balance, decreased upper extremity function, decreased lower extremity function, impaired cognition, impaired cardiopulmonary response to activity.    Rehab Prognosis: Good; patient would benefit from acute skilled PT services to address these deficits and reach maximum level of function.    Recent Surgery: * No surgery found *      Plan:     During this hospitalization, patient to be seen 4 x/week to address the identified rehab impairments via gait training, therapeutic activities and progress toward the following goals:    · Plan of Care Expires:  06/21/20    Subjective     Chief Complaint: none  Patient/Family Comments/goals: disconnected, max cues to remain on task, delayed  Pain/Comfort:  · Pain Rating 1: 7/10  · Location - Side 1: Left  · Location 1: leg(with movement)      Objective:     Communicated with nurse prior to session.  Patient found supine with telemetry, pulse ox (continuous), blood pressure cuff, PureWick, peripheral IV, oxygen upon PT entry to room.     General Precautions: Standard, fall   Orthopedic Precautions:    Braces:       Functional Mobility:  · Bed Mobility:     · Rolling Left:  total assistance and of 3 persons  · Supine to Sit: total assistance and of 3 persons  · Sit to Supine: total assistance and of 3 persons      AM-PAC 6 CLICK MOBILITY  Turning over in bed (including adjusting bedclothes, sheets and  blankets)?: 2  Sitting down on and standing up from a chair with arms (e.g., wheelchair, bedside commode, etc.): 2  Moving from lying on back to sitting on the side of the bed?: 2  Moving to and from a bed to a chair (including a wheelchair)?: 2  Need to walk in hospital room?: 2  Climbing 3-5 steps with a railing?: 2  Basic Mobility Total Score: 12       Therapeutic Activities and Exercises:   sat up EOB with poor balance initially but later able to sit midline with SBA/CGA/MIN A.   Very delayed, no initiation, difficulty with word finding. Nurse present and MD notified.     Patient left supine with all lines intact, call button in reach and nurse present..    GOALS:   Multidisciplinary Problems     Physical Therapy Goals        Problem: Physical Therapy Goal    Goal Priority Disciplines Outcome Goal Variances Interventions   Physical Therapy Goal     PT, PT/OT Ongoing, Progressing     Description:  Goals to be met by: 20    Patient will increase functional independence with mobility by performin. Supine to sit with Maximum Assistance -not met  2. Sit to stand transfer with Maximum Assistance with RW - not met  3. Gait  x 50 feet with Contact Guard Assistance using Rolling Walker. -not met  4. Sitting at edge of bed x15 minutes with Stand-by Assistance -not met                      Time Tracking:     PT Received On: 20  PT Start Time: 1001     PT Stop Time: 1039  PT Total Time (min): 38 min     Billable Minutes: Therapeutic Activity 38    Treatment Type: Treatment  PT/PTA: PTA     PTA Visit Number: [place]     Justyna Montserrat, PTA  2020

## 2020-05-25 NOTE — PROGRESS NOTES
Therapy with vancomycin complete and/or consult discontinued by provider. Pharmacy will sign off, please re-consult as needed.    Paula Malik, PharmD, BCPS  [id number]

## 2020-05-25 NOTE — PROGRESS NOTES
"[facility] Hospital Medicine  Progress Note    Patient Name: Rosetta Whyte  MRN: [id number]  Patient Class: IP- Inpatient   Admission Date: 5/22/2020  Length of Stay: 3 days  Attending Physician: Nandini Esteban MD  Primary Care Provider: Georgie Ortega MD    [facility] Medicine Team: Networked reference to record PCT  Nandini Esteban MD    Subjective:     Principal Problem:Hypertensive emergency        HPI:  Ms. Whyte is a 74 year old woman with HTN, EMMANUEL, morbid obesity, lymphedema who presents to the ED from doctor's office after she was told that her BP was elevated. She also reported "tightness" in her chest but no chest pain. Other associated symptoms include SOB, decreased urine output and bilateral lower extremity swelling. She had the SOB when she was trying to fall asleep last night. She notes the BLE swelling started about a week ago. She also has leg pain but that it is not new and is a chronic issue. She denies fevers, chills, cough, nausea, vomiting, diarrhea, rashes, wounds. Of note, she recently fell on a bench this past Sunday and bruised her entire right side. She did not hit her head at the time. She says that she ran out of medications yesterday and could not get them refilled. She states that she has been taking her BP medications. She has not been around sick contacts. She lives by herself at home and says that she use to have a nurse who would come by to help her out.      Per ED note, patient's sister states that patient has been non-compliant with her medications over the past week. According to sister, patient does not like going to the hospital or taking medication. Attempted to contact sister for further information but could not get in touch with her.     In the ED, her BP was as elevated as 303/163. She was started on nitro drip and was placed on BiPAP with improvement. She also received IV Lasix. CXR showed findings consistent with pulmonary edema. She was started " on vancomycin and cefepime for concern of bilateral lower extremity cellulitis.      Overview/Hospital Course:  Patient was admitted to MICU for hypertensive encephalopathy and acute hypoxemic respiratory failure due to pulmonary edema. She was started on a nitro drip and placed on BiPAP, with improvement in her symptoms. She was weaned off of the nitro drip and started PO 5/24. She was started on vancomycin and cefepime for concern of bilateral lower extremity cellulitis.  She was transitioned to Hospital Medicine Service 5/25.    Interval History: Patient appears to be drowsy and slow to answer questions this morning. She could not provide a full review of systems, but did deny that she is having any pain. She denied confusion but was oriented to name only.    Review of Systems   Unable to perform ROS: Mental status change   Constitutional: Negative for chills and fever.   Respiratory: Negative for cough and shortness of breath.    Cardiovascular: Negative for chest pain.   Gastrointestinal: Negative for abdominal pain.   Musculoskeletal: Negative for arthralgias and myalgias.   Psychiatric/Behavioral: Negative for confusion.     Objective:     Vital Signs (Most Recent):  Temp: 98.7 °F (37.1 °C) (05/25/20 0415)  Pulse: 87 (05/25/20 0427)  Resp: (!) 21 (05/25/20 0427)  BP: (!) 144/87 (05/25/20 0427)  SpO2: 96 % (05/25/20 0427) Vital Signs (24h Range):  Temp:  [97.6 °F (36.4 °C)-98.7 °F (37.1 °C)] 98.7 °F (37.1 °C)  Pulse:  [62-97] 87  Resp:  [19-27] 21  SpO2:  [93 %-100 %] 96 %  BP: (121-173)/(55-90) 144/87     Weight: (!) 143.3 kg (316 lb)  Body mass index is 52.59 kg/m².    Intake/Output Summary (Last 24 hours) at 5/25/2020 0714  Last data filed at 5/24/2020 1930  Gross per 24 hour   Intake 247 ml   Output 1410 ml   Net -1163 ml      Physical Exam   Constitutional: She appears well-developed and well-nourished. No distress.   HENT:   Head: Normocephalic and atraumatic.   Eyes: Conjunctivae and EOM are normal.    Cardiovascular: Normal rate, regular rhythm, normal heart sounds and intact distal pulses.   Pulmonary/Chest: Effort normal and breath sounds normal. No respiratory distress.   Abdominal: Soft. Bowel sounds are normal. She exhibits no distension. There is no tenderness.   Neurological: No cranial nerve deficit.   Alert to person only   Skin: Skin is warm and dry.       Significant Labs:   CBC:   Recent Labs   Lab 05/23/20  1654 05/24/20  0311 05/25/20  0343   WBC 8.68 7.38 8.71   HGB 10.5* 9.7* 10.5*   HCT 35.4* 32.8* 35.5*    173 201     CMP:   Recent Labs   Lab 05/23/20  1654 05/24/20  0311 05/25/20  0343    142 146*   K 4.3 4.1 3.0*    107 108   CO2 26 26 26   * 120* 108   BUN 18 21 29*   CREATININE 1.1 1.3 1.7*   CALCIUM 8.4* 8.2* 8.8   PROT 6.1 5.5* 6.0   ALBUMIN 3.2* 2.8* 2.7*   BILITOT 0.8 0.5 0.5   ALKPHOS 81 72 81   AST 13 18 9*   ALT 8* 8* 7*   ANIONGAP 10 9 12   EGFRNONAA 49.9* 40.8* 29.5*       Significant Imaging:   CXR 5/24  FINDINGS:  Moderate cardiomegaly similar to prior exam.  Prominence of the pulmonary vasculature and bilateral interstitial lung markings, slightly improved when compared to prior exam suggestive of improving pulmonary edema.  No pleural effusion.  No pneumothorax.  Osseous structures are unremarkable.      Assessment/Plan:      * Hypertensive emergency  Hypertension, essential  - unknown whether patient was compliant with taking her medications at home  - initially treated with nitro drip in ICU, transitioned to PO medications  - Increase amlodipine to 10 mg dailuy  - Continue carvedilol 6.25 mg BID  - blood pressure improved, still above goal  - if renal function improves, may consider initiating ACE-I or ARB    KELSEY (acute kidney injury)  - likely secondary to aggressive diuresis with lasix while in ICU  - will hold off diuresis today  - once KELSEY resolves will restart home dose lasix  - encouraged PO water intake      Altered mental status,  unspecified  - Patient was AAO x3 on 5/24, but on morning of 5/25 patient is slow to answer questions  - she was unable to fully participate with therapy due to not being able to follow commands and not moving her extremities  - labs reviewed, KELSEY noted but uncertain exact etiology of AMS  - DC cefepime, though low suspicion this is secondary to cefepime administration  - will evaluate with head CT      Diastolic dysfunction, left ventricle  - Noted on Echocardiogram 5/23/20 with grade II diastolic dysfunction and EF low-normal at 50-55%    Acute hypoxemic respiratory failure  - thought to be in part due to hypertensive emergency with pulmonary edema  - noted on CXR 5/22 and 5/23  - treated with lasix IV 80mg BID, lowered to 80mg daily on 5/24 due to increase in creatinine  - will hold lasix and diuresis given KELSEY  - transition to lasix PO 40mg daily (home dose) when renal function recovers  - continue BIPAP at night  - monitor strict I/O's      Cellulitis of leg  Lymphedema of both lower extremities  - Concern for bilateral lower extremity cellulitis at admission as they appeared erythematous.   - Patient afebrile, no leukocytosis  - clinical findings possibly secondary to venous stasis changes  - Started on vancomycin, cefepime 5/22  - DC cefepime 2/2 confusion and vanc dosed renally so has not received since 5/22  - will transition to PO doxycycline 100mg BID for total 7 days course of antibiotic treatment (last day 5/29)  - Wound care consulted, appreciate assistance.      Morbid obesity with BMI of 45.0-49.9, adult  S/p laparoscopic sleeve gastrectomy 6/2016        VTE Risk Mitigation (From admission, onward)         Ordered     enoxaparin injection 40 mg  Every 12 hours      05/24/20 0742     IP VTE HIGH RISK PATIENT  Once      05/22/20 2007     Place sequential compression device  Until discontinued      05/22/20 2007                Diet: Regular  Disposition: to SNF once medically ready      Nandini  MD Eulalia  Department of Hospital Medicine   [facility]

## 2020-05-25 NOTE — PROGRESS NOTES
Pharmacist Renal Dose Adjustment Note    Rosetta Whyte is a 74 y.o. female being treated with the medication cefepime    Patient Data:    Vital Signs (Most Recent):  Temp: 98.7 °F (37.1 °C) (05/25/20 0415)  Pulse: 85 (05/25/20 0757)  Resp: (!) 21 (05/25/20 0427)  BP: (!) 144/87 (05/25/20 0427)  SpO2: 96 % (05/25/20 0427)   Vital Signs (72h Range):  Temp:  [97.4 °F (36.3 °C)-99.1 °F (37.3 °C)]   Pulse:  []   Resp:  [12-40]   BP: ()/()   SpO2:  [90 %-100 %]      Recent Labs   Lab 05/23/20  1654 05/24/20  0311 05/25/20  0343   CREATININE 1.1 1.3 1.7*     Serum creatinine: 1.7 mg/dL (H) 05/25/20 0343  Estimated creatinine clearance: 42.6 mL/min (A)    Cefepime 2g IV q12h changed to cefepime 1g IV q12h    Paula Malik, PharmD, BCPS  [id number]

## 2020-05-25 NOTE — PROGRESS NOTES
Pharmacokinetic Assessment Follow Up: IV Vancomycin    Vancomycin serum concentration assessment(s):    · Vancomycin random=35.2 mcg/mL    Vancomycin Regimen Plan:    · Pts lvl continues to be supratherapeutic. KELSEY continues to worsen, SCr 1.7 today.  · No vancomycin today, random in the AM.    Drug levels (last 3 results):  Recent Labs   Lab Result Units 05/24/20  1012 05/25/20  0343   Vancomycin, Random ug/mL  --  35.2   Vancomycin-Trough ug/mL 43.7*  --        Pharmacy will continue to follow and monitor vancomycin.    Please contact pharmacy at extension [phone] for questions regarding this assessment.    Thank you for the consult,   Paula Malik, PharmD, [facility]  [phone]       Patient brief summary:  Rosetta Whyte is a 74 y.o. female initiated on antimicrobial therapy with IV Vancomycin for treatment of skin & soft tissue infection    The patient's current regimen is pulse dosing.     Drug Allergies:   Review of patient's allergies indicates:  No Known Allergies    Actual Body Weight:   143kg    Renal Function:   Estimated Creatinine Clearance: 42.6 mL/min (A) (based on SCr of 1.7 mg/dL (H)).,       CBC (last 72 hours):  Recent Labs   Lab Result Units 05/22/20  1801 05/23/20  0129 05/23/20  1654 05/24/20  0311 05/25/20  0343   WBC K/uL 8.46 10.13 8.68 7.38 8.71   Hemoglobin g/dL 12.5 10.5* 10.5* 9.7* 10.5*   Hemoglobin A1C %  --   --   --  4.9  --    Hematocrit % 42.8 36.4* 35.4* 32.8* 35.5*   Platelets K/uL 252 198 193 173 201   Gran% % 55.8 79.8* 73.0 69.0 70.5   Lymph% % 26.4 7.5* 9.4* 11.8* 8.8*   Mono% % 11.2 11.4 15.2* 14.6 15.7*   Eosinophil% % 5.4 0.4 1.5 3.7 4.2   Basophil% % 0.8 0.5 0.3 0.5 0.6   Differential Method  Automated Automated Automated Automated Automated       Metabolic Panel (last 72 hours):  Recent Labs   Lab Result Units 05/22/20  1801 05/23/20  0129 05/23/20  1337 05/23/20  1654 05/24/20  0311 05/25/20  0343   Sodium mmol/L 145 144 144 143 142 146*   Potassium mmol/L 2.9* 2.9* 3.6 4.3 4.1  3.0*   Chloride mmol/L 108 107 108 107 107 108   CO2 mmol/L 25 28 28 26 26 26   Glucose mg/dL 119* 123* 125* 111* 120* 108   BUN, Bld mg/dL 15 15 18 18 21 29*   Creatinine mg/dL 0.8 0.8 1.1 1.1 1.3 1.7*   Albumin g/dL 4.1 3.2*  --  3.2* 2.8* 2.7*   Total Bilirubin mg/dL 0.7 0.8  --  0.8 0.5 0.5   Alkaline Phosphatase U/L 106 83  --  81 72 81   AST U/L 12 10  --  13 18 9*   ALT U/L 9* 8*  --  8* 8* 7*   Magnesium mg/dL  --  1.5*  --  1.9 1.9 1.8       Vancomycin Administrations:  vancomycin given in the last 96 hours                   vancomycin 1.5 g in dextrose 5 % 250 mL IVPB (ready to mix) (mg) 1,500 mg New Bag 05/23/20 2126     1,500 mg New Bag  0826                Microbiologic Results:  Microbiology Results (last 7 days)     Procedure Component Value Units Date/Time    Blood culture [[id number]] Collected:  05/23/20 1823    Order Status:  Completed Specimen:  Blood from Peripheral, Antecubital, Left Updated:  05/24/20 2012     Blood Culture, Routine No Growth to date      No Growth to date    Narrative:       Blood cultures from 2 different sites. 4 bottles total.  Please draw before starting antibiotics.    Blood culture [[id number]] Collected:  05/23/20 1828    Order Status:  Completed Specimen:  Blood from Line, Arterial, Right Updated:  05/24/20 2012     Blood Culture, Routine No Growth to date      No Growth to date    Narrative:       Blood cultures from 2 different sites. 4 bottles total.  Please draw before starting antibiotics.    Culture, Respiratory with Gram Stain [[id number]]     Order Status:  No result Specimen:  Respiratory     Urine Culture High Risk [[id number]]     Order Status:  Canceled Specimen:  Urine

## 2020-05-25 NOTE — ASSESSMENT & PLAN NOTE
- likely secondary to aggressive diuresis with lasix while in ICU  - will hold off diuresis today  - once KELSEY resolves will restart home dose lasix  - encouraged PO water intake

## 2020-05-25 NOTE — PLAN OF CARE
05/25/20 1516   Discharge Assessment   Assessment Type Discharge Planning Assessment   Confirmed/corrected address and phone number on facesheet? Yes   Assessment information obtained from? Caregiver  (Nita Bobby sister)   Expected Length of Stay (days) 5   Communicated expected length of stay with patient/caregiver yes   Prior to hospitilization cognitive status: Not Oriented to Place;Not Oriented to Time   Prior to hospitalization functional status: Independent;Assistive Equipment   Current cognitive status: Alert/Oriented   Current Functional Status: Assistive Equipment;Needs Assistance   Lives With child(greg), dependent  (adult daughter)   Able to Return to Prior Arrangements yes   Is patient able to care for self after discharge? Unable to determine at this time (comments)   Who are your caregiver(s) and their phone number(s)? Nita Bobby sister [phone] cell   Patient's perception of discharge disposition skilled nursing facility   Readmission Within the Last 30 Days no previous admission in last 30 days   Patient currently being followed by outpatient case management? No   Patient currently receives any other outside agency services? No   Equipment Currently Used at Home walker, rolling   Do you have any problems affording any of your prescribed medications? No   Is the patient taking medications as prescribed? yes   Does the patient have transportation home? No  (CG request ambulance due to large legs.)   Dialysis Name and Scheduled days n/a   Does the patient receive services at the [facility]? No   Discharge Plan A Skilled Nursing Facility   Discharge Plan B Home Health;Home with family   DME Needed Upon Discharge  wheelchair   Patient/Family in Agreement with Plan yes

## 2020-05-25 NOTE — PLAN OF CARE
Problem: Physical Therapy Goal  Goal: Physical Therapy Goal  Description  Goals to be met by: 20    Patient will increase functional independence with mobility by performin. Supine to sit with Maximum Assistance -not met  2. Sit to stand transfer with Maximum Assistance with RW - not met  3. Gait  x 50 feet with Contact Guard Assistance using Rolling Walker. -not met  4. Sitting at edge of bed x15 minutes with Stand-by Assistance -not met     Outcome: Ongoing, Progressing

## 2020-05-26 PROBLEM — E87.6 HYPOKALEMIA: Status: ACTIVE | Noted: 2020-05-26

## 2020-05-26 LAB
ALBUMIN SERPL BCP-MCNC: 2.5 G/DL (ref 3.5–5.2)
ALP SERPL-CCNC: 67 U/L (ref 55–135)
ALT SERPL W/O P-5'-P-CCNC: 5 U/L (ref 10–44)
AMORPH CRY UR QL COMP ASSIST: ABNORMAL
ANION GAP SERPL CALC-SCNC: 8 MMOL/L (ref 8–16)
AST SERPL-CCNC: 9 U/L (ref 10–40)
BACTERIA #/AREA URNS AUTO: ABNORMAL /HPF
BASOPHILS # BLD AUTO: 0.05 K/UL (ref 0–0.2)
BASOPHILS NFR BLD: 0.7 % (ref 0–1.9)
BILIRUB SERPL-MCNC: 0.5 MG/DL (ref 0.1–1)
BILIRUB UR QL STRIP: NEGATIVE
BUN SERPL-MCNC: 33 MG/DL (ref 8–23)
CALCIUM SERPL-MCNC: 8.7 MG/DL (ref 8.7–10.5)
CHLORIDE SERPL-SCNC: 108 MMOL/L (ref 95–110)
CLARITY UR REFRACT.AUTO: ABNORMAL
CO2 SERPL-SCNC: 29 MMOL/L (ref 23–29)
COLOR UR AUTO: YELLOW
CREAT SERPL-MCNC: 1.7 MG/DL (ref 0.5–1.4)
DIFFERENTIAL METHOD: ABNORMAL
EOSINOPHIL # BLD AUTO: 0.4 K/UL (ref 0–0.5)
EOSINOPHIL NFR BLD: 5.3 % (ref 0–8)
ERYTHROCYTE [DISTWIDTH] IN BLOOD BY AUTOMATED COUNT: 15.1 % (ref 11.5–14.5)
EST. GFR  (AFRICAN AMERICAN): 34 ML/MIN/1.73 M^2
EST. GFR  (NON AFRICAN AMERICAN): 29.5 ML/MIN/1.73 M^2
GLUCOSE SERPL-MCNC: 93 MG/DL (ref 70–110)
GLUCOSE UR QL STRIP: NEGATIVE
HCT VFR BLD AUTO: 35 % (ref 37–48.5)
HGB BLD-MCNC: 10.3 G/DL (ref 12–16)
HGB UR QL STRIP: ABNORMAL
HYALINE CASTS UR QL AUTO: 0 /LPF
IMM GRANULOCYTES # BLD AUTO: 0.02 K/UL (ref 0–0.04)
IMM GRANULOCYTES NFR BLD AUTO: 0.3 % (ref 0–0.5)
KETONES UR QL STRIP: ABNORMAL
LEUKOCYTE ESTERASE UR QL STRIP: NEGATIVE
LYMPHOCYTES # BLD AUTO: 1 K/UL (ref 1–4.8)
LYMPHOCYTES NFR BLD: 13.1 % (ref 18–48)
MAGNESIUM SERPL-MCNC: 1.8 MG/DL (ref 1.6–2.6)
MCH RBC QN AUTO: 28.3 PG (ref 27–31)
MCHC RBC AUTO-ENTMCNC: 29.4 G/DL (ref 32–36)
MCV RBC AUTO: 96 FL (ref 82–98)
MICROSCOPIC COMMENT: ABNORMAL
MONOCYTES # BLD AUTO: 1.1 K/UL (ref 0.3–1)
MONOCYTES NFR BLD: 14.6 % (ref 4–15)
NEUTROPHILS # BLD AUTO: 5 K/UL (ref 1.8–7.7)
NEUTROPHILS NFR BLD: 66 % (ref 38–73)
NITRITE UR QL STRIP: NEGATIVE
NRBC BLD-RTO: 0 /100 WBC
PH UR STRIP: 5 [PH] (ref 5–8)
PLATELET # BLD AUTO: 191 K/UL (ref 150–350)
PMV BLD AUTO: 11.5 FL (ref 9.2–12.9)
POTASSIUM SERPL-SCNC: 3.2 MMOL/L (ref 3.5–5.1)
PROT SERPL-MCNC: 5.6 G/DL (ref 6–8.4)
PROT UR QL STRIP: ABNORMAL
RBC # BLD AUTO: 3.64 M/UL (ref 4–5.4)
RBC #/AREA URNS AUTO: 17 /HPF (ref 0–4)
SODIUM SERPL-SCNC: 145 MMOL/L (ref 136–145)
SP GR UR STRIP: 1.02 (ref 1–1.03)
SQUAMOUS #/AREA URNS AUTO: 2 /HPF
URN SPEC COLLECT METH UR: ABNORMAL
WBC # BLD AUTO: 7.49 K/UL (ref 3.9–12.7)
WBC #/AREA URNS AUTO: 0 /HPF (ref 0–5)

## 2020-05-26 PROCEDURE — 83735 ASSAY OF MAGNESIUM: CPT

## 2020-05-26 PROCEDURE — 81001 URINALYSIS AUTO W/SCOPE: CPT

## 2020-05-26 PROCEDURE — 99233 SBSQ HOSP IP/OBS HIGH 50: CPT | Mod: ,,, | Performed by: INTERNAL MEDICINE

## 2020-05-26 PROCEDURE — 94761 N-INVAS EAR/PLS OXIMETRY MLT: CPT

## 2020-05-26 PROCEDURE — 99233 PR SUBSEQUENT HOSPITAL CARE,LEVL III: ICD-10-PCS | Mod: ,,, | Performed by: INTERNAL MEDICINE

## 2020-05-26 PROCEDURE — 25000003 PHARM REV CODE 250: Performed by: NURSE PRACTITIONER

## 2020-05-26 PROCEDURE — 85025 COMPLETE CBC W/AUTO DIFF WBC: CPT

## 2020-05-26 PROCEDURE — 11000001 HC ACUTE MED/SURG PRIVATE ROOM

## 2020-05-26 PROCEDURE — 25000003 PHARM REV CODE 250: Performed by: INTERNAL MEDICINE

## 2020-05-26 PROCEDURE — 25000003 PHARM REV CODE 250: Performed by: STUDENT IN AN ORGANIZED HEALTH CARE EDUCATION/TRAINING PROGRAM

## 2020-05-26 PROCEDURE — 36415 COLL VENOUS BLD VENIPUNCTURE: CPT

## 2020-05-26 PROCEDURE — 80053 COMPREHEN METABOLIC PANEL: CPT

## 2020-05-26 PROCEDURE — 99900035 HC TECH TIME PER 15 MIN (STAT)

## 2020-05-26 PROCEDURE — 63600175 PHARM REV CODE 636 W HCPCS: Performed by: NURSE PRACTITIONER

## 2020-05-26 RX ORDER — POTASSIUM CHLORIDE 20 MEQ/1
20 TABLET, EXTENDED RELEASE ORAL 2 TIMES DAILY
Status: DISPENSED | OUTPATIENT
Start: 2020-05-26 | End: 2020-05-27

## 2020-05-26 RX ORDER — HYDRALAZINE HYDROCHLORIDE 25 MG/1
25 TABLET, FILM COATED ORAL EVERY 8 HOURS
Status: DISCONTINUED | OUTPATIENT
Start: 2020-05-26 | End: 2020-05-29 | Stop reason: HOSPADM

## 2020-05-26 RX ORDER — SODIUM CHLORIDE 9 MG/ML
INJECTION, SOLUTION INTRAVENOUS CONTINUOUS
Status: ACTIVE | OUTPATIENT
Start: 2020-05-26 | End: 2020-05-27

## 2020-05-26 RX ORDER — CARVEDILOL 12.5 MG/1
12.5 TABLET ORAL 2 TIMES DAILY
Status: DISCONTINUED | OUTPATIENT
Start: 2020-05-26 | End: 2020-05-29 | Stop reason: HOSPADM

## 2020-05-26 RX ADMIN — SODIUM CHLORIDE: 0.9 INJECTION, SOLUTION INTRAVENOUS at 01:05

## 2020-05-26 RX ADMIN — DOXYCYCLINE HYCLATE 100 MG: 100 TABLET, FILM COATED ORAL at 09:05

## 2020-05-26 RX ADMIN — ATORVASTATIN CALCIUM 40 MG: 20 TABLET, FILM COATED ORAL at 09:05

## 2020-05-26 RX ADMIN — CARVEDILOL 6.25 MG: 6.25 TABLET, FILM COATED ORAL at 09:05

## 2020-05-26 RX ADMIN — POTASSIUM CHLORIDE 20 MEQ: 1500 TABLET, EXTENDED RELEASE ORAL at 09:05

## 2020-05-26 RX ADMIN — HYDRALAZINE HYDROCHLORIDE 25 MG: 25 TABLET, FILM COATED ORAL at 09:05

## 2020-05-26 RX ADMIN — AMLODIPINE BESYLATE 10 MG: 5 TABLET ORAL at 09:05

## 2020-05-26 RX ADMIN — HYDRALAZINE HYDROCHLORIDE 25 MG: 25 TABLET, FILM COATED ORAL at 04:05

## 2020-05-26 RX ADMIN — ENOXAPARIN SODIUM 40 MG: 100 INJECTION SUBCUTANEOUS at 09:05

## 2020-05-26 RX ADMIN — CARVEDILOL 12.5 MG: 12.5 TABLET, FILM COATED ORAL at 09:05

## 2020-05-26 NOTE — PHYSICIAN QUERY
"PT Name: Rosetta Whyte  MR #: [id number]    Physician Query Form - Heart  Condition Clarification     CDS/: Marylou Lundy               Contact information: [email or web address]    This form is a permanent document in the medical record.     Query Date: May 26, 2020    By submitting this query, we are merely seeking further clarification of documentation. Please utilize your independent clinical judgment when addressing the question(s) below.    The medical record contains the following   Indicators     Supporting Clinical Findings Location in Medical Record   x   Labs 5/22    x EF · The estimated ejection fraction is 50-55%. Echo 5/23    x Radiology findings Extensive right greater than left airspace opacity and increased interstitial attenuation favoring pulmonary edema, with multifocal pneumonia or massive aspiration not entirely excluded. CXR 5/22    x Echo Results · Low normal left ventricular systolic function. The estimated ejection fraction is 50-55%.  · Grade II (moderate) left ventricular diastolic dysfunction consistent with pseudonormalization.  · Concentric left ventricular remodeling.  · Mildly reduced right ventricular systolic function.  · Normal central venous pressure (3 mmHg).  · Mild left atrial enlargement. Echo 5/23     "Ascites" documented     x "SOB" or "RIVERA" documented Acute hypoxemic respiratory failure  - thought to be in part due to hypertensive emergency with pulmonary edema HM note 5/25    "Hypoxia" documented     x Heart Failure documented Diastolic dysfunction, left ventricle HM note 5/26    x "Edema" documented Abdominal: Soft. Bowel sounds are normal. She exhibits no distension. There is tenderness (right side).   Musculoskeletal: Normal range of motion. She exhibits edema (2+ pitting edema up to thighs).  H&P    x Diuretics/Meds furosemide injection 80 mg   Dose: 80 mg  Freq: Once Route: IV  Start: 05/22/20 2100 End: 05/22/20 2103    furosemide injection 80 mg   Dose: 80 " mg  Freq: ED 1 Time Route: IV  Start: 05/22/20 1800 End: 05/22/20 1801    furosemide injection 80 mg   Dose: 80 mg  Freq: Every 12 hours (non-standard times) Route: IV  Start: 05/23/20 0900 End: 05/24/20 1048 MAR           MAR           MAR     Treatment:      Other:      Heart failure (HF) can be acute, chronic or both. It is generally further specificed as systolic, diastolic, or combined. Lastly, it is important to identify an underlying etiology if known or suspected.     Common clues to acute exacerbation:  Rapidly progressive symptoms (w/in 2 weeks of presentation), using IV diuretics to treat, using supplemental O2, pulmonary edema on Xray, MI w/in 4 weeks, and/or BNP >500    Systolic Heart Failure: is defined as chart documentation of a left ventricular ejection fraction (LVEF) less than 40%     Diastolic Heart Failure: is defined as a left ventricular ejection fraction (LVEF) greater than 40%   +      Evidence of diastolic dysfunction on echocardiography OR    Right heart catheterization wedge pressure above 12 mm Hg OR    Left heart catheterization left ventricular end diastolic pressure 18 mm Hg or above.    References: *American Heart Association    The clinical guidelines noted below are only system guidelines, and do not replace the providers clinical judgment.     Provider, please specify the diagnosis associated with above clinical findings  [   ] Acute Diastolic Heart Failure - New diagnosis.  EF > 40%  and acute HF symptoms documented   [  X diastolic   ] Other Type of Heart Failure (please specify type):   [ ] Heart Failure Ruled Out   [   ] Other (please specify):   [  ] Clinically Undetermined                           Please document in your progress notes daily for the duration of treatment until resolved and include in your discharge summary.

## 2020-05-26 NOTE — PROGRESS NOTES
"[facility] Hospital Medicine  Progress Note    Patient Name: Rosetta Whyte  MRN: [id number]  Patient Class: IP- Inpatient   Admission Date: 5/22/2020  Length of Stay: 4 days  Attending Physician: Nandini Esteban MD  Primary Care Provider: Georgie Ortega MD    [facility] Medicine Team: [facility] HOSP MED D Nandini Esteban MD    Subjective:     Principal Problem:Hypertensive emergency        HPI:  Ms. Whyte is a 74 year old woman with HTN, EMMANUEL, morbid obesity, lymphedema who presents to the ED from doctor's office after she was told that her BP was elevated. She also reported "tightness" in her chest but no chest pain. Other associated symptoms include SOB, decreased urine output and bilateral lower extremity swelling. She had the SOB when she was trying to fall asleep last night. She notes the BLE swelling started about a week ago. She also has leg pain but that it is not new and is a chronic issue. She denies fevers, chills, cough, nausea, vomiting, diarrhea, rashes, wounds. Of note, she recently fell on a bench this past Sunday and bruised her entire right side. She did not hit her head at the time. She says that she ran out of medications yesterday and could not get them refilled. She states that she has been taking her BP medications. She has not been around sick contacts. She lives by herself at home and says that she use to have a nurse who would come by to help her out.      Per ED note, patient's sister states that patient has been non-compliant with her medications over the past week. According to sister, patient does not like going to the hospital or taking medication. Attempted to contact sister for further information but could not get in touch with her.     In the ED, her BP was as elevated as 303/163. She was started on nitro drip and was placed on BiPAP with improvement. She also received IV Lasix. CXR showed findings consistent with pulmonary edema. She was started on vancomycin and " "cefepime for concern of bilateral lower extremity cellulitis.      Overview/Hospital Course:  Patient was admitted to MICU for hypertensive encephalopathy and acute hypoxemic respiratory failure due to pulmonary edema. She was started on a nitro drip and placed on BiPAP, with improvement in her symptoms. She was weaned off of the nitro drip and started PO 5/24. She was started on vancomycin and cefepime for concern of bilateral lower extremity cellulitis.  She was transitioned to Hospital Medicine Service 5/25. She was noted to have altered mental status on the morning 5/25 not able to recall her name or follow basic commands. CT head 5/25 showed evidence of basal ganglia calcifications, moderate chronic microvascular ischemic changes and probable bilateral remote lacunar infarcts. She showed some improvements in mental status 5/26, AAO x2    Interval History: Patient denies any issues this morning and denies any pain. She is able to tell me her full name and that she is at [facility] and the reason why. However, she could not recall the date. She states she "saw me yesterday" but cannot recall whether she ate breakfast this morning. Nursing staff did note she did eat her breakfast without assistance.     Review of Systems   Unable to perform ROS: Mental status change   Constitutional: Negative for chills and fever.   Respiratory: Negative for cough and shortness of breath.    Cardiovascular: Negative for chest pain.   Gastrointestinal: Negative for abdominal pain.   Musculoskeletal: Negative for arthralgias and myalgias.     Objective:     Vital Signs (Most Recent):  Temp: 98.3 °F (36.8 °C) (05/26/20 0415)  Pulse: 73 (05/26/20 0750)  Resp: (!) 21 (05/26/20 0415)  BP: (!) 167/86 (05/26/20 0415)  SpO2: 97 % (05/26/20 0415) Vital Signs (24h Range):  Temp:  [98.2 °F (36.8 °C)-99 °F (37.2 °C)] 98.3 °F (36.8 °C)  Pulse:  [72-88] 73  Resp:  [20-21] 21  SpO2:  [95 %-98 %] 97 %  BP: (155-167)/(69-86) 167/86     Weight: (!) " 143.3 kg (316 lb)  Body mass index is 52.59 kg/m².  No intake or output data in the 24 hours ending 05/26/20 0805   Physical Exam   Constitutional: She appears well-developed and well-nourished. No distress.   HENT:   Head: Normocephalic and atraumatic.   Eyes: Conjunctivae and EOM are normal.   Cardiovascular: Normal rate, regular rhythm, normal heart sounds and intact distal pulses.   Pulmonary/Chest: Effort normal and breath sounds normal. No respiratory distress.   Abdominal: Soft. Bowel sounds are normal. She exhibits no distension. There is no tenderness.   Musculoskeletal: She exhibits edema. She exhibits no tenderness.   Neurological: She is alert. No cranial nerve deficit.   Oriented to person, place and situation; not time   Skin: Skin is warm and dry.   Vitals reviewed.      Significant Labs:   CBC:   Recent Labs   Lab 05/25/20 0343 05/26/20  0425   WBC 8.71 7.49   HGB 10.5* 10.3*   HCT 35.5* 35.0*    191     CMP:   Recent Labs   Lab 05/25/20 0343 05/26/20  0425   * 145   K 3.0* 3.2*    108   CO2 26 29    93   BUN 29* 33*   CREATININE 1.7* 1.7*   CALCIUM 8.8 8.7   PROT 6.0 5.6*   ALBUMIN 2.7* 2.5*   BILITOT 0.5 0.5   ALKPHOS 81 67   AST 9* 9*   ALT 7* 5*   ANIONGAP 12 8   EGFRNONAA 29.5* 29.5*       Significant Imaging:  CT Head 5/25/20  Impression:       No hemorrhage or major vascular distribution infarction.    Basal ganglia calcifications, moderate chronic microvascular ischemic changes and probable bilateral remote lacunar infarcts.     Echo 5/23  Conclusions  · Low normal left ventricular systolic function. The estimated ejection fraction is 50-55%.  · Grade II (moderate) left ventricular diastolic dysfunction consistent with pseudonormalization.  · Concentric left ventricular remodeling.  · Mildly reduced right ventricular systolic function.  · Normal central venous pressure (3 mmHg).  · Mild left atrial enlargement.      Assessment/Plan:      * Hypertensive  emergency  Hypertension, essential  - unknown whether patient was compliant with taking her medications at home  - initially treated with nitro drip in ICU, transitioned to PO medications  - Continue amlodipine to 10 mg dailuy  - Increase carvedilol to 12.5 mg BID  - blood pressure still above goal  - add home hydralazine 25mg TID  - if renal function improves, may consider initiating ACE-I or ARB    Hypokalemia  -Replete orally      KELSEY (acute kidney injury)  - likely secondary to aggressive diuresis with lasix while in ICU  - holding diuresis  - will start slow fluids given her AMS and being uncertain how much PO she is getting  - once KELSEY resolves will restart home dose lasix  - encouraged PO water intake      Altered mental status, unspecified  - Patient was AAO x3 on 5/24, but on morning of 5/25 patient is slow to answer questions  - she was unable to fully participate with therapy due to not being able to follow commands and not moving her extremities  - labs reviewed, KELSEY noted but uncertain exact etiology of AMS  - DC cefepime 5/25  - head CT 5/25 showed no hemorrhage or major vascular distribution infarction. Basal ganglia calcifications, moderate chronic microvascular ischemic changes and probable bilateral remote lacunar infarcts.  - Will get UA to rule out UTI  - may consider additional imaging if patient does not improve    Diastolic dysfunction, left ventricle  - Noted on Echocardiogram 5/23/20 with grade II diastolic dysfunction and EF low-normal at 50-55%    Acute hypoxemic respiratory failure  - thought to be in part due to hypertensive emergency with pulmonary edema  - noted on CXR 5/22 and 5/23  - treated with lasix IV 80mg BID, lowered to 80mg daily on 5/24 due to increase in creatinine  - will hold lasix and diuresis given KELSEY  - transition to lasix PO 40mg daily (home dose) when renal function recovers  - continue BIPAP at night  - monitor strict I/O's      Cellulitis of leg  Lymphedema of both  lower extremities  - Concern for bilateral lower extremity cellulitis at admission as they appeared erythematous.   - Patient afebrile, no leukocytosis  - clinical findings possibly secondary to venous stasis changes  - Started on vancomycin, cefepime 5/22  - DC cefepime 2/2 confusion on 5/25 and vanc dosed renally so has not received since 5/22  - Continue PO doxycycline 100mg BID for total 7 days course of antibiotic treatment (last day 5/29)  - Wound care evaluated patient, no wound care needs at this time    Morbid obesity with BMI of 45.0-49.9, adult  S/p laparoscopic sleeve gastrectomy 6/2016        VTE Risk Mitigation (From admission, onward)         Ordered     enoxaparin injection 40 mg  Every 12 hours      05/24/20 0742     IP VTE HIGH RISK PATIENT  Once      05/22/20 2007     Place sequential compression device  Until discontinued      05/22/20 2007                Diet: Regular  Disposition: Pending AMS evaluation. To SNF once medically ready      Nandini Esteban MD  Department of Hospital Medicine   [facility]

## 2020-05-26 NOTE — ASSESSMENT & PLAN NOTE
- Patient was AAO x3 on 5/24, but on morning of 5/25 patient is slow to answer questions  - she was unable to fully participate with therapy due to not being able to follow commands and not moving her extremities  - labs reviewed, KELSEY noted but uncertain exact etiology of AMS  - DC cefepime 5/25  - head CT 5/25 showed no hemorrhage or major vascular distribution infarction. Basal ganglia calcifications, moderate chronic microvascular ischemic changes and probable bilateral remote lacunar infarcts.  - Will get UA to rule out UTI  - may consider additional imaging if patient does not improve

## 2020-05-26 NOTE — ASSESSMENT & PLAN NOTE
- likely secondary to aggressive diuresis with lasix while in ICU  - holding diuresis  - will start slow fluids given her AMS and being uncertain how much PO she is getting  - once KELSEY resolves will restart home dose lasix  - encouraged PO water intake

## 2020-05-26 NOTE — ASSESSMENT & PLAN NOTE
Lymphedema of both lower extremities  - Concern for bilateral lower extremity cellulitis at admission as they appeared erythematous.   - Patient afebrile, no leukocytosis  - clinical findings possibly secondary to venous stasis changes  - Started on vancomycin, cefepime 5/22  - DC cefepime 2/2 confusion on 5/25 and vanc dosed renally so has not received since 5/22  - Continue PO doxycycline 100mg BID for total 7 days course of antibiotic treatment (last day 5/29)  - Wound care evaluated patient, no wound care needs at this time

## 2020-05-26 NOTE — SUBJECTIVE & OBJECTIVE
"Interval History: Patient denies any issues this morning and denies any pain. She is able to tell me her full name and that she is at [facility] and the reason why. However, she could not recall the date. She states she "saw me yesterday" but cannot recall whether she ate breakfast this morning. Nursing staff did note she did eat her breakfast without assistance.     Review of Systems   Unable to perform ROS: Mental status change   Constitutional: Negative for chills and fever.   Respiratory: Negative for cough and shortness of breath.    Cardiovascular: Negative for chest pain.   Gastrointestinal: Negative for abdominal pain.   Musculoskeletal: Negative for arthralgias and myalgias.     Objective:     Vital Signs (Most Recent):  Temp: 98.3 °F (36.8 °C) (05/26/20 0415)  Pulse: 73 (05/26/20 0750)  Resp: (!) 21 (05/26/20 0415)  BP: (!) 167/86 (05/26/20 0415)  SpO2: 97 % (05/26/20 0415) Vital Signs (24h Range):  Temp:  [98.2 °F (36.8 °C)-99 °F (37.2 °C)] 98.3 °F (36.8 °C)  Pulse:  [72-88] 73  Resp:  [20-21] 21  SpO2:  [95 %-98 %] 97 %  BP: (155-167)/(69-86) 167/86     Weight: (!) 143.3 kg (316 lb)  Body mass index is 52.59 kg/m².  No intake or output data in the 24 hours ending 05/26/20 0805   Physical Exam   Constitutional: She appears well-developed and well-nourished. No distress.   HENT:   Head: Normocephalic and atraumatic.   Eyes: Conjunctivae and EOM are normal.   Cardiovascular: Normal rate, regular rhythm, normal heart sounds and intact distal pulses.   Pulmonary/Chest: Effort normal and breath sounds normal. No respiratory distress.   Abdominal: Soft. Bowel sounds are normal. She exhibits no distension. There is no tenderness.   Musculoskeletal: She exhibits edema. She exhibits no tenderness.   Neurological: She is alert. No cranial nerve deficit.   Oriented to person, place and situation; not time   Skin: Skin is warm and dry.   Vitals reviewed.      Significant Labs:   CBC:   Recent Labs   Lab 05/25/20  0343 " 05/26/20  0425   WBC 8.71 7.49   HGB 10.5* 10.3*   HCT 35.5* 35.0*    191     CMP:   Recent Labs   Lab 05/25/20  0343 05/26/20  0425   * 145   K 3.0* 3.2*    108   CO2 26 29    93   BUN 29* 33*   CREATININE 1.7* 1.7*   CALCIUM 8.8 8.7   PROT 6.0 5.6*   ALBUMIN 2.7* 2.5*   BILITOT 0.5 0.5   ALKPHOS 81 67   AST 9* 9*   ALT 7* 5*   ANIONGAP 12 8   EGFRNONAA 29.5* 29.5*       Significant Imaging:  CT Head 5/25/20  Impression:       No hemorrhage or major vascular distribution infarction.    Basal ganglia calcifications, moderate chronic microvascular ischemic changes and probable bilateral remote lacunar infarcts.     Echo 5/23  Conclusions  · Low normal left ventricular systolic function. The estimated ejection fraction is 50-55%.  · Grade II (moderate) left ventricular diastolic dysfunction consistent with pseudonormalization.  · Concentric left ventricular remodeling.  · Mildly reduced right ventricular systolic function.  · Normal central venous pressure (3 mmHg).  · Mild left atrial enlargement.

## 2020-05-26 NOTE — ASSESSMENT & PLAN NOTE
Hypertension, essential  - unknown whether patient was compliant with taking her medications at home  - initially treated with nitro drip in ICU, transitioned to PO medications  - Continue amlodipine to 10 mg dailuy  - Increase carvedilol to 12.5 mg BID  - blood pressure still above goal  - add home hydralazine 25mg TID  - if renal function improves, may consider initiating ACE-I or ARB

## 2020-05-27 VITALS
DIASTOLIC BLOOD PRESSURE: 124 MMHG | OXYGEN SATURATION: 95 % | TEMPERATURE: 97 F | SYSTOLIC BLOOD PRESSURE: 220 MMHG | HEART RATE: 86 BPM

## 2020-05-27 PROBLEM — R41.82 ALTERED MENTAL STATUS, UNSPECIFIED: Status: RESOLVED | Noted: 2020-05-25 | Resolved: 2020-05-27

## 2020-05-27 PROBLEM — I51.9 DIASTOLIC DYSFUNCTION, LEFT VENTRICLE: Chronic | Status: ACTIVE | Noted: 2020-05-24

## 2020-05-27 PROBLEM — I89.0 LYMPHEDEMA: Status: RESOLVED | Noted: 2018-05-16 | Resolved: 2020-05-27

## 2020-05-27 PROBLEM — I16.1 HYPERTENSIVE EMERGENCY: Status: RESOLVED | Noted: 2020-05-22 | Resolved: 2020-05-27

## 2020-05-27 PROBLEM — D64.9 ANEMIA: Status: ACTIVE | Noted: 2020-05-27

## 2020-05-27 PROBLEM — B35.3 TINEA PEDIS OF BOTH FEET: Status: RESOLVED | Noted: 2018-04-10 | Resolved: 2020-05-27

## 2020-05-27 PROBLEM — I89.0 LYMPHEDEMA OF BOTH LOWER EXTREMITIES: Chronic | Status: ACTIVE | Noted: 2018-04-10

## 2020-05-27 PROBLEM — L03.119 CELLULITIS OF LEG: Status: RESOLVED | Noted: 2020-05-22 | Resolved: 2020-05-27

## 2020-05-27 PROBLEM — J96.01 ACUTE HYPOXEMIC RESPIRATORY FAILURE: Status: RESOLVED | Noted: 2020-05-22 | Resolved: 2020-05-27

## 2020-05-27 LAB
ALBUMIN SERPL BCP-MCNC: 2.6 G/DL (ref 3.5–5.2)
ALP SERPL-CCNC: 69 U/L (ref 55–135)
ALT SERPL W/O P-5'-P-CCNC: 6 U/L (ref 10–44)
ANION GAP SERPL CALC-SCNC: 8 MMOL/L (ref 8–16)
AST SERPL-CCNC: 10 U/L (ref 10–40)
BASOPHILS # BLD AUTO: 0.05 K/UL (ref 0–0.2)
BASOPHILS NFR BLD: 0.8 % (ref 0–1.9)
BILIRUB SERPL-MCNC: 0.4 MG/DL (ref 0.1–1)
BUN SERPL-MCNC: 37 MG/DL (ref 8–23)
CALCIUM SERPL-MCNC: 8.7 MG/DL (ref 8.7–10.5)
CHLORIDE SERPL-SCNC: 108 MMOL/L (ref 95–110)
CO2 SERPL-SCNC: 28 MMOL/L (ref 23–29)
CREAT SERPL-MCNC: 1.8 MG/DL (ref 0.5–1.4)
CREAT UR-MCNC: 178 MG/DL (ref 15–325)
DIFFERENTIAL METHOD: ABNORMAL
EOSINOPHIL # BLD AUTO: 0.6 K/UL (ref 0–0.5)
EOSINOPHIL NFR BLD: 8.3 % (ref 0–8)
ERYTHROCYTE [DISTWIDTH] IN BLOOD BY AUTOMATED COUNT: 15.2 % (ref 11.5–14.5)
EST. GFR  (AFRICAN AMERICAN): 31.7 ML/MIN/1.73 M^2
EST. GFR  (NON AFRICAN AMERICAN): 27.5 ML/MIN/1.73 M^2
GLUCOSE SERPL-MCNC: 98 MG/DL (ref 70–110)
HCT VFR BLD AUTO: 35.1 % (ref 37–48.5)
HGB BLD-MCNC: 10.3 G/DL (ref 12–16)
IMM GRANULOCYTES # BLD AUTO: 0.04 K/UL (ref 0–0.04)
IMM GRANULOCYTES NFR BLD AUTO: 0.6 % (ref 0–0.5)
LYMPHOCYTES # BLD AUTO: 1 K/UL (ref 1–4.8)
LYMPHOCYTES NFR BLD: 15.5 % (ref 18–48)
MAGNESIUM SERPL-MCNC: 1.9 MG/DL (ref 1.6–2.6)
MCH RBC QN AUTO: 28.4 PG (ref 27–31)
MCHC RBC AUTO-ENTMCNC: 29.3 G/DL (ref 32–36)
MCV RBC AUTO: 97 FL (ref 82–98)
MONOCYTES # BLD AUTO: 0.9 K/UL (ref 0.3–1)
MONOCYTES NFR BLD: 13.3 % (ref 4–15)
NEUTROPHILS # BLD AUTO: 4.1 K/UL (ref 1.8–7.7)
NEUTROPHILS NFR BLD: 61.5 % (ref 38–73)
NRBC BLD-RTO: 0 /100 WBC
PLATELET # BLD AUTO: 208 K/UL (ref 150–350)
PMV BLD AUTO: 11.4 FL (ref 9.2–12.9)
POTASSIUM SERPL-SCNC: 3.2 MMOL/L (ref 3.5–5.1)
POTASSIUM UR-SCNC: 40 MMOL/L (ref 15–95)
PROT SERPL-MCNC: 5.7 G/DL (ref 6–8.4)
RBC # BLD AUTO: 3.63 M/UL (ref 4–5.4)
SODIUM SERPL-SCNC: 144 MMOL/L (ref 136–145)
SODIUM UR-SCNC: 30 MMOL/L (ref 20–250)
WBC # BLD AUTO: 6.64 K/UL (ref 3.9–12.7)

## 2020-05-27 PROCEDURE — 83735 ASSAY OF MAGNESIUM: CPT

## 2020-05-27 PROCEDURE — 99900035 HC TECH TIME PER 15 MIN (STAT)

## 2020-05-27 PROCEDURE — 94761 N-INVAS EAR/PLS OXIMETRY MLT: CPT

## 2020-05-27 PROCEDURE — 82570 ASSAY OF URINE CREATININE: CPT

## 2020-05-27 PROCEDURE — 80053 COMPREHEN METABOLIC PANEL: CPT

## 2020-05-27 PROCEDURE — 84300 ASSAY OF URINE SODIUM: CPT

## 2020-05-27 PROCEDURE — 63600175 PHARM REV CODE 636 W HCPCS: Performed by: NURSE PRACTITIONER

## 2020-05-27 PROCEDURE — 36415 COLL VENOUS BLD VENIPUNCTURE: CPT

## 2020-05-27 PROCEDURE — 99233 PR SUBSEQUENT HOSPITAL CARE,LEVL III: ICD-10-PCS | Mod: ,,, | Performed by: INTERNAL MEDICINE

## 2020-05-27 PROCEDURE — 25000003 PHARM REV CODE 250: Performed by: INTERNAL MEDICINE

## 2020-05-27 PROCEDURE — 99233 SBSQ HOSP IP/OBS HIGH 50: CPT | Mod: ,,, | Performed by: INTERNAL MEDICINE

## 2020-05-27 PROCEDURE — 25000003 PHARM REV CODE 250: Performed by: STUDENT IN AN ORGANIZED HEALTH CARE EDUCATION/TRAINING PROGRAM

## 2020-05-27 PROCEDURE — 84133 ASSAY OF URINE POTASSIUM: CPT

## 2020-05-27 PROCEDURE — 97535 SELF CARE MNGMENT TRAINING: CPT

## 2020-05-27 PROCEDURE — 11000001 HC ACUTE MED/SURG PRIVATE ROOM

## 2020-05-27 PROCEDURE — 85025 COMPLETE CBC W/AUTO DIFF WBC: CPT

## 2020-05-27 PROCEDURE — 97530 THERAPEUTIC ACTIVITIES: CPT

## 2020-05-27 RX ORDER — SODIUM CHLORIDE 9 MG/ML
INJECTION, SOLUTION INTRAVENOUS CONTINUOUS
Status: ACTIVE | OUTPATIENT
Start: 2020-05-27 | End: 2020-05-28

## 2020-05-27 RX ORDER — POTASSIUM CHLORIDE 20 MEQ/1
20 TABLET, EXTENDED RELEASE ORAL 2 TIMES DAILY
Status: COMPLETED | OUTPATIENT
Start: 2020-05-27 | End: 2020-05-27

## 2020-05-27 RX ADMIN — DOXYCYCLINE HYCLATE 100 MG: 100 TABLET, FILM COATED ORAL at 09:05

## 2020-05-27 RX ADMIN — HYDRALAZINE HYDROCHLORIDE 25 MG: 25 TABLET, FILM COATED ORAL at 02:05

## 2020-05-27 RX ADMIN — CARVEDILOL 12.5 MG: 12.5 TABLET, FILM COATED ORAL at 09:05

## 2020-05-27 RX ADMIN — ENOXAPARIN SODIUM 40 MG: 100 INJECTION SUBCUTANEOUS at 08:05

## 2020-05-27 RX ADMIN — POTASSIUM CHLORIDE 20 MEQ: 1500 TABLET, EXTENDED RELEASE ORAL at 08:05

## 2020-05-27 RX ADMIN — ATORVASTATIN CALCIUM 40 MG: 20 TABLET, FILM COATED ORAL at 08:05

## 2020-05-27 RX ADMIN — SODIUM CHLORIDE: 0.9 INJECTION, SOLUTION INTRAVENOUS at 02:05

## 2020-05-27 RX ADMIN — AMLODIPINE BESYLATE 10 MG: 5 TABLET ORAL at 08:05

## 2020-05-27 RX ADMIN — DOXYCYCLINE HYCLATE 100 MG: 100 TABLET, FILM COATED ORAL at 08:05

## 2020-05-27 RX ADMIN — CARVEDILOL 12.5 MG: 12.5 TABLET, FILM COATED ORAL at 08:05

## 2020-05-27 RX ADMIN — POTASSIUM CHLORIDE 20 MEQ: 1500 TABLET, EXTENDED RELEASE ORAL at 09:05

## 2020-05-27 RX ADMIN — ENOXAPARIN SODIUM 40 MG: 100 INJECTION SUBCUTANEOUS at 09:05

## 2020-05-27 RX ADMIN — HYDRALAZINE HYDROCHLORIDE 25 MG: 25 TABLET, FILM COATED ORAL at 09:05

## 2020-05-27 RX ADMIN — HYDRALAZINE HYDROCHLORIDE 25 MG: 25 TABLET, FILM COATED ORAL at 06:05

## 2020-05-27 NOTE — ASSESSMENT & PLAN NOTE
Hypertension, essential  - unknown whether patient was compliant with taking her medications at home  - initially treated with nitro drip in ICU, transitioned to PO medications  - Continue amlodipine to 10 mg dailuy  - Continue carvedilol to 12.5 mg BID  - continue hydralazine 25mg TID  - BP improved, some measurements still above goal  - if renal function improves, may consider initiating ACE-I or ARB

## 2020-05-27 NOTE — SUBJECTIVE & OBJECTIVE
Interval History: Ms. Sargent is feeling well today without many concerns. She does not feel confused and she has been eating/drinking well. She denies any acute concerns.    Review of Systems   Constitutional: Negative for appetite change, chills and fever.   Respiratory: Negative for cough and shortness of breath.    Cardiovascular: Negative for chest pain and palpitations.   Gastrointestinal: Negative for abdominal pain, constipation, diarrhea, nausea and vomiting.   Musculoskeletal: Negative for arthralgias and myalgias.   Neurological: Negative for dizziness, light-headedness and headaches.   Psychiatric/Behavioral: Negative for agitation and confusion.     Objective:     Vital Signs (Most Recent):  Temp: 98.1 °F (36.7 °C) (05/27/20 0855)  Pulse: (!) 56 (05/27/20 1130)  Resp: (!) 23 (05/27/20 0855)  BP: (!) 147/80 (05/27/20 0855)  SpO2: 98 % (05/27/20 0855) Vital Signs (24h Range):  Temp:  [96.3 °F (35.7 °C)-98.2 °F (36.8 °C)] 98.1 °F (36.7 °C)  Pulse:  [56-77] 56  Resp:  [16-23] 23  SpO2:  [94 %-98 %] 98 %  BP: (127-154)/(60-80) 147/80     Weight: (!) 143.3 kg (316 lb)  Body mass index is 52.59 kg/m².    Intake/Output Summary (Last 24 hours) at 5/27/2020 1206  Last data filed at 5/27/2020 0600  Gross per 24 hour   Intake 1140 ml   Output 800 ml   Net 340 ml      Physical Exam   Constitutional: She is oriented to person, place, and time. She appears well-developed and well-nourished. No distress.   HENT:   Head: Normocephalic and atraumatic.   Eyes: Conjunctivae and EOM are normal.   Cardiovascular: Normal rate, regular rhythm, normal heart sounds and intact distal pulses.   Pulmonary/Chest: Effort normal and breath sounds normal. No respiratory distress.   Abdominal: Soft. Bowel sounds are normal. She exhibits no distension. There is no tenderness.   Musculoskeletal: She exhibits edema. She exhibits no tenderness.   Neurological: She is alert and oriented to person, place, and time. No cranial nerve deficit.    Oriented to person, place and situation; not time   Skin: Skin is warm and dry.   Psychiatric: She has a normal mood and affect. Her behavior is normal.   Vitals reviewed.      Significant Labs:   CBC:   Recent Labs   Lab 05/26/20 0425 05/27/20 0359   WBC 7.49 6.64   HGB 10.3* 10.3*   HCT 35.0* 35.1*    208     CMP:   Recent Labs   Lab 05/26/20 0425 05/27/20 0359    144   K 3.2* 3.2*    108   CO2 29 28   GLU 93 98   BUN 33* 37*   CREATININE 1.7* 1.8*   CALCIUM 8.7 8.7   PROT 5.6* 5.7*   ALBUMIN 2.5* 2.6*   BILITOT 0.5 0.4   ALKPHOS 67 69   AST 9* 10   ALT 5* 6*   ANIONGAP 8 8   EGFRNONAA 29.5* 27.5*       Significant Imaging: I have reviewed all pertinent imaging results/findings within the past 24 hours.

## 2020-05-27 NOTE — PT/OT/SLP PROGRESS
Occupational Therapy   Treatment    Name: Rosetta Whyte  MRN: [id number]  Admitting Diagnosis:  Hypertensive emergency       Recommendations:     Discharge Recommendations: nursing facility, skilled  Discharge Equipment Recommendations:  hospital bed  Barriers to discharge:  Decreased caregiver support    Assessment:     Rosetta Whyte is a 74 y.o. female with a medical diagnosis of Hypertensive emergency.  She presents with the following Performance deficits affecting function are weakness, impaired endurance, impaired self care skills, gait instability, impaired functional mobilty, impaired balance, decreased lower extremity function, decreased safety awareness, pain.     Pt motivated for therapy and tolerated session well secondary to weakness. Pt with improved cognition from last therapy session. Pt oriented x4 and able to hold conversation. Pt still requires Max A x2 in bed mobility and t/f's due to weakness and body habitus. Pt performed sit<>stand x4 trials with Max A x2 persons x3 trials. Pt unable to clear bed. Pt continues to progress toward her goals. Continue OT POC    Rehab Prognosis:  Good; patient would benefit from acute skilled OT services to address these deficits and reach maximum level of function.       Plan:     Patient to be seen 4 x/week to address the above listed problems via self-care/home management, therapeutic activities, therapeutic exercises  · Plan of Care Expires: 06/23/20  · Plan of Care Reviewed with: patient    Subjective     Pain/Comfort:  · Pain Rating 1: 0/10    Objective:     Communicated with: RN prior to session.  Patient found supine with pressure relief boots, PureWick, telemetry upon OT entry to room.    General Precautions: Standard, fall   Orthopedic Precautions:N/A   Braces: N/A     Occupational Performance:     Bed Mobility:    · Patient completed Scooting/Bridging with maximal assistance and 2 persons  · Patient completed Supine to Sit with maximal assistance and 2  persons  · Patient completed Sit to Supine with maximal assistance and 2 persons     Functional Mobility/Transfers:  · Patient completed Sit <> Stand Transfer with maximal assistance and of 2 persons  with  rolling walker   · Functional Mobility: Pt motivated for therapy and tolerated session well secondary to weakness. Pt with improved cognition from last therapy session. Pt oriented x4 and able to hold conversation. Pt still requires Max A x2 in bed mobility and t/f's due to weakness and body habitus. Pt performed sit<>stand x4 trials with Max A x2 persons x3 trials. Pt unable to clear bed. Pt continues to progress toward her goals. Continue OT POC    Activities of Daily Living:  · Grooming: stand by assistance seated EOB with setup      [facility] 6 Click ADL: 12    Treatment & Education:  - OT/POC-  - Importance of mobility to maximize recovery.  - whiteboard updated  - safety w/ functional mobility; hand placement to ensure safe transfers to various surfaces in prep for ADLs  - Reviewed gait sequence and RW management via verbalization and demonstration   - encouraged to ambulate within household environment at least every hour to hour 1/2      Patient left supine with all lines intact, call button in reach and RN notifiedEducation:      GOALS:   Multidisciplinary Problems     Occupational Therapy Goals        Problem: Occupational Therapy Goal    Goal Priority Disciplines Outcome Interventions   Occupational Therapy Goal     OT, PT/OT Ongoing, Progressing    Description:  Pt will complete UB dressing with supervision  Pt will complete bed mobility in preparation for fx'l task with minimal assist  Pt will sit edge of bed for GH task completion with supervision  Pt will perform AROM BUE HEP with mod I  Pt will complete sit EOB > 10 mins for fx'l task with supervision  Pt will complete toilet transfer with minimal assist                    Time Tracking:     OT Date of Treatment: 05/27/20  OT Start Time: 1327  OT Stop  Time: 1406  OT Total Time (min): 39 min    Billable Minutes:Self Care/Home Management 39    Hilary Deng OT  5/27/2020

## 2020-05-27 NOTE — PLAN OF CARE
Problem: Fall Injury Risk  Goal: Absence of Fall and Fall-Related Injury  Outcome: Ongoing, Progressing     Problem: Adult Inpatient Plan of Care  Goal: Plan of Care Review  Outcome: Ongoing, Progressing  Goal: Absence of Hospital-Acquired Illness or Injury  Outcome: Ongoing, Progressing  Goal: Readiness for Transition of Care  Outcome: Ongoing, Progressing     Problem: Bariatric Environmental Safety  Goal: Safety Maintained with Care  Outcome: Ongoing, Progressing     Problem: Skin Injury Risk Increased  Goal: Skin Health and Integrity  Outcome: Ongoing, Progressing  Disoriented to time, purewic in place and draining clear yellow urine, purewic and pads replaced 5/27, no skin breakdown, waffle mattress in place, sacral foam in place, heel protectors in place, worked w/ PT/OT, pt encouraged to shift weight with purposeful rounding, no falls, VSS, WCTM.

## 2020-05-27 NOTE — PROGRESS NOTES
"[facility] Hospital Medicine  Progress Note    Patient Name: Rosetta Whyte  MRN: [id number]  Patient Class: IP- Inpatient   Admission Date: 5/22/2020  Length of Stay: 5 days  Attending Physician: Nandini Esteban MD  Primary Care Provider: Georgie Ortega MD    [facility] Medicine Team: [facility] D Nandini Esteban MD    Subjective:     Principal Problem:Hypertensive emergency        HPI:  Ms. Whyte is a 74 year old woman with HTN, EMMANUEL, morbid obesity, lymphedema who presents to the ED from doctor's office after she was told that her BP was elevated. She also reported "tightness" in her chest but no chest pain. Other associated symptoms include SOB, decreased urine output and bilateral lower extremity swelling. She had the SOB when she was trying to fall asleep last night. She notes the BLE swelling started about a week ago. She also has leg pain but that it is not new and is a chronic issue. She denies fevers, chills, cough, nausea, vomiting, diarrhea, rashes, wounds. Of note, she recently fell on a bench this past Sunday and bruised her entire right side. She did not hit her head at the time. She says that she ran out of medications yesterday and could not get them refilled. She states that she has been taking her BP medications. She has not been around sick contacts. She lives by herself at home and says that she use to have a nurse who would come by to help her out.      Per ED note, patient's sister states that patient has been non-compliant with her medications over the past week. According to sister, patient does not like going to the hospital or taking medication. Attempted to contact sister for further information but could not get in touch with her.     In the ED, her BP was as elevated as 303/163. She was started on nitro drip and was placed on BiPAP with improvement. She also received IV Lasix. CXR showed findings consistent with pulmonary edema. She was started on vancomycin and " cefepime for concern of bilateral lower extremity cellulitis.      Overview/Hospital Course:  Patient was admitted to MICU for hypertensive encephalopathy and acute hypoxemic respiratory failure due to pulmonary edema. She was started on a nitro drip and placed on BiPAP, with improvement in her symptoms. She was weaned off of the nitro drip and started PO 5/24. She was started on vancomycin and cefepime for concern of bilateral lower extremity cellulitis.  She was transitioned to Hospital Medicine Service 5/25. She was noted to have altered mental status on the morning 5/25 not able to recall her name or follow basic commands. CT head 5/25 showed evidence of basal ganglia calcifications, moderate chronic microvascular ischemic changes and probable bilateral remote lacunar infarcts. She as also noted to have an KELSEY, thought to be from lasix therapy for volume overload. She was started on gentle fluids. She showed some improvements in mental status 5/26, AAO x2 and even more on 5/27 to AAOx3, near her baseline per her sister, Nita.     Interval History: Ms. Sargent is feeling well today without many concerns. She does not feel confused and she has been eating/drinking well. She denies any acute concerns.    Review of Systems   Constitutional: Negative for appetite change, chills and fever.   Respiratory: Negative for cough and shortness of breath.    Cardiovascular: Negative for chest pain and palpitations.   Gastrointestinal: Negative for abdominal pain, constipation, diarrhea, nausea and vomiting.   Musculoskeletal: Negative for arthralgias and myalgias.   Neurological: Negative for dizziness, light-headedness and headaches.   Psychiatric/Behavioral: Negative for agitation and confusion.     Objective:     Vital Signs (Most Recent):  Temp: 98.1 °F (36.7 °C) (05/27/20 0855)  Pulse: (!) 56 (05/27/20 1130)  Resp: (!) 23 (05/27/20 0855)  BP: (!) 147/80 (05/27/20 0855)  SpO2: 98 % (05/27/20 0855) Vital Signs (24h  Range):  Temp:  [96.3 °F (35.7 °C)-98.2 °F (36.8 °C)] 98.1 °F (36.7 °C)  Pulse:  [56-77] 56  Resp:  [16-23] 23  SpO2:  [94 %-98 %] 98 %  BP: (127-154)/(60-80) 147/80     Weight: (!) 143.3 kg (316 lb)  Body mass index is 52.59 kg/m².    Intake/Output Summary (Last 24 hours) at 5/27/2020 1206  Last data filed at 5/27/2020 0600  Gross per 24 hour   Intake 1140 ml   Output 800 ml   Net 340 ml      Physical Exam   Constitutional: She is oriented to person, place, and time. She appears well-developed and well-nourished. No distress.   HENT:   Head: Normocephalic and atraumatic.   Eyes: Conjunctivae and EOM are normal.   Cardiovascular: Normal rate, regular rhythm, normal heart sounds and intact distal pulses.   Pulmonary/Chest: Effort normal and breath sounds normal. No respiratory distress.   Abdominal: Soft. Bowel sounds are normal. She exhibits no distension. There is no tenderness.   Musculoskeletal: She exhibits edema. She exhibits no tenderness.   Neurological: She is alert and oriented to person, place, and time. No cranial nerve deficit.   Oriented to person, place and situation; not time   Skin: Skin is warm and dry.   Psychiatric: She has a normal mood and affect. Her behavior is normal.   Vitals reviewed.      Significant Labs:   CBC:   Recent Labs   Lab 05/26/20  0425 05/27/20  0359   WBC 7.49 6.64   HGB 10.3* 10.3*   HCT 35.0* 35.1*    208     CMP:   Recent Labs   Lab 05/26/20  0425 05/27/20  0359    144   K 3.2* 3.2*    108   CO2 29 28   GLU 93 98   BUN 33* 37*   CREATININE 1.7* 1.8*   CALCIUM 8.7 8.7   PROT 5.6* 5.7*   ALBUMIN 2.5* 2.6*   BILITOT 0.5 0.4   ALKPHOS 67 69   AST 9* 10   ALT 5* 6*   ANIONGAP 8 8   EGFRNONAA 29.5* 27.5*       Significant Imaging: I have reviewed all pertinent imaging results/findings within the past 24 hours.      Assessment/Plan:      * Hypertensive emergency  Hypertension, essential  - unknown whether patient was compliant with taking her medications at  home  - initially treated with nitro drip in ICU, transitioned to PO medications  - Continue amlodipine to 10 mg dailuy  - Continue carvedilol to 12.5 mg BID  - continue hydralazine 25mg TID  - BP improved, some measurements still above goal  - if renal function improves, may consider initiating ACE-I or ARB    Anemia  - baseline hgb ~10-12  - blood noted on UA but otherwise no reported bleeding by nursing or patient  - stable at 10, monitor      Hypokalemia  -Replete orally      KELSEY (acute kidney injury)  - likely secondary to aggressive diuresis with lasix while in ICU  - holding diuresis  - continue slow fluids given her AMS and being uncertain how much PO she is getting  - Urine lytes pending to assess for possible etiology  - once KELSEY resolves will restart home dose lasix  - encouraged PO water intake      Altered mental status, unspecified  - Patient was AAO x3 on 5/24, but on morning of 5/25 patient is slow to answer questions and was unable to fully participate with therapy due to not being able to follow commands and not moving her extremities  - labs reviewed, KELSEY noted but uncertain exact etiology of AMS  - DC'd cefepime 5/25  - head CT 5/25 showed no hemorrhage or major vascular distribution infarction. Basal ganglia calcifications, moderate chronic microvascular ischemic changes and probable bilateral remote lacunar infarcts.  - UA not indicative of UTI  - Patient recovering and near her baseline on 5/27    Diastolic dysfunction, left ventricle  - Noted on Echocardiogram 5/23/20 with grade II diastolic dysfunction and EF low-normal at 50-55%    Acute hypoxemic respiratory failure  - thought to be in part due to hypertensive emergency with pulmonary edema  - noted on CXR 5/22 and 5/23  - treated with lasix IV 80mg BID, lowered to 80mg daily on 5/24 due to increase in creatinine  - hold lasix and diuresis given KELSEY  - Urine lytes pending  - transition to lasix PO 40mg daily (home dose) when renal function  recovers  - continue BIPAP at night  - monitor strict I/O's      Lymphedema of both lower extremities  - Concern for bilateral lower extremity cellulitis at admission as they appeared erythematous.   - Patient afebrile, no leukocytosis  - clinical findings possibly secondary to venous stasis changes  - Started on vancomycin, cefepime 5/22  - DC cefepime 2/2 confusion on 5/25 and vanc dosed renally so has not received since 5/22  - Continue PO doxycycline 100mg BID for total 7 days course of antibiotic treatment (last day 5/29)  - Wound care evaluated patient, no wound care needs at this time        VTE Risk Mitigation (From admission, onward)         Ordered     enoxaparin injection 40 mg  Every 12 hours      05/24/20 0742     IP VTE HIGH RISK PATIENT  Once      05/22/20 2007     Place sequential compression device  Until discontinued      05/22/20 2007                Diet: Regular  Disposition: SNF once medically ready      Nandini Esteban MD  Department of Hospital Medicine   [facility]

## 2020-05-27 NOTE — ASSESSMENT & PLAN NOTE
- baseline hgb ~10-12  - blood noted on UA but otherwise no reported bleeding by nursing or patient  - stable at 10, monitor

## 2020-05-27 NOTE — ASSESSMENT & PLAN NOTE
- thought to be in part due to hypertensive emergency with pulmonary edema  - noted on CXR 5/22 and 5/23  - treated with lasix IV 80mg BID, lowered to 80mg daily on 5/24 due to increase in creatinine  - hold lasix and diuresis given KELSEY  - Urine lytes pending  - transition to lasix PO 40mg daily (home dose) when renal function recovers  - continue BIPAP at night  - monitor strict I/O's

## 2020-05-27 NOTE — ASSESSMENT & PLAN NOTE
- likely secondary to aggressive diuresis with lasix while in ICU  - holding diuresis  - continue slow fluids given her AMS and being uncertain how much PO she is getting  - Urine lytes pending to assess for possible etiology  - once KELSEY resolves will restart home dose lasix  - encouraged PO water intake

## 2020-05-27 NOTE — PROGRESS NOTES
Subjective:   Patient ID: Rosetta Whyte is a 74 y.o. female.    Chief Complaint: Establish Care; Fall (on sunday); and Shortness of Breath      HPI  74-year-old female brought into the ER by daughter.  Patient experiencing fall and shortness of breath including fall today.  Blood pressure exceedingly high with systolic greater than 220 and diastolic greater than 120.  Patient reports occasional focal neurological weakness of upper extremities    Patient and daughter indicated that EMS indicated.  They agree to such.  EMS contacted and brief history given to such.    Follow with PCP after ER/hospital discharge    ALLERGIES AND MEDICATIONS: updated and reviewed.  Review of patient's allergies indicates:  No Known Allergies  No current facility-administered medications for this visit.   No current outpatient medications on file.    Facility-Administered Medications Ordered in Other Visits:     acetaminophen chewable tablet 80 mg, 80 mg, Oral, Q6H PRN, Raleigh Lezama NP, 80 mg at 05/23/20 1847    amLODIPine tablet 10 mg, 10 mg, Oral, Daily, Nandini Esteban MD, 10 mg at 05/26/20 0944    atorvastatin tablet 40 mg, 40 mg, Oral, Daily, Abbey Solano MD, 40 mg at 05/26/20 0943    carvediloL tablet 12.5 mg, 12.5 mg, Oral, BID, Nandini Esteban MD, 12.5 mg at 05/26/20 2157    doxycycline tablet 100 mg, 100 mg, Oral, Q12H, Nandini Esteban MD, 100 mg at 05/26/20 2157    enoxaparin injection 40 mg, 40 mg, Subcutaneous, Q12H, Raleigh Lezama NP, 40 mg at 05/26/20 2157    hydrALAZINE tablet 25 mg, 25 mg, Oral, Q8H, Nandini Esteban MD, 25 mg at 05/27/20 0623    potassium chloride SA CR tablet 20 mEq, 20 mEq, Oral, BID, Nandini Esteban MD, 20 mEq at 05/26/20 2157    potassium chloride SA CR tablet 20 mEq, 20 mEq, Oral, BID, Nandini Esteban MD    sodium chloride 0.9% flush 10 mL, 10 mL, Intravenous, PRN, Abbey Solano MD        Objective:     Vitals:    05/22/20 1614 05/22/20 1622   BP: (!) 230/122 (!) 220/124    Pulse: 86    Temp: 97.4 °F (36.3 °C)    TempSrc: Oral    SpO2: 95%    PainSc:   9    PainLoc: Generalized      There is no height or weight on file to calculate BMI.        Assessment and Plan:   There are no diagnoses linked to this encounter.    No follow-ups on file.  Hypertensive urgency  Possible stroke/TIA  Severe obesity with BMI greater than 50  Hyperlipidemia  Essential hypertension

## 2020-05-27 NOTE — PLAN OF CARE
Problem: Physical Therapy Goal  Goal: Physical Therapy Goal  Description  Goals to be met by: 20    Patient will increase functional independence with mobility by performin. Supine to sit with Maximum Assistance -not met  2. Sit to stand transfer with Maximum Assistance with RW - not met  3. Gait  x 50 feet with Contact Guard Assistance using Rolling Walker. -not met  4. Sitting at edge of bed x15 minutes with Stand-by Assistance -not met     Outcome: Ongoing, Progressing     Patient progressing appropriately towards current goals and plan of care remains appropriate at this time. Patient demonstrates good motivation and improving activity tolerance secondary to improved command following and attention to task.  Patient demonstrates decreased functional mobility secondary to weakness.  Patient required max A x 2 for bed mobility and transfers.  Patient able to stand x 2 with assistance x 2 and greatly increased trunk flexion leading to decreased safety and functional mobility.  Patient continues to benefit from skilled PT for strengthening and mobility training.      Dutch Jarquin, PT, DPT  2020  Pager #: [phone]

## 2020-05-27 NOTE — ASSESSMENT & PLAN NOTE
- Concern for bilateral lower extremity cellulitis at admission as they appeared erythematous.   - Patient afebrile, no leukocytosis  - clinical findings possibly secondary to venous stasis changes  - Started on vancomycin, cefepime 5/22  - DC cefepime 2/2 confusion on 5/25 and vanc dosed renally so has not received since 5/22  - Continue PO doxycycline 100mg BID for total 7 days course of antibiotic treatment (last day 5/29)  - Wound care evaluated patient, no wound care needs at this time

## 2020-05-27 NOTE — PLAN OF CARE
Pt motivated for therapy and tolerated session well secondary to weakness. Pt with improved cognition from last therapy session. Pt oriented x4 and able to hold conversation. Pt still requires Max A x2 in bed mobility and t/f's due to weakness and body habitus. Pt performed sit<>stand x4 trials with Max A x2 persons x3 trials. Pt unable to clear bed. Pt continues to progress toward her goals. Continue OT POC      Problem: Occupational Therapy Goal  Goal: Occupational Therapy Goal  Description  Pt will complete UB dressing with supervision  Pt will complete bed mobility in preparation for fx'l task with minimal assist  Pt will sit edge of bed for GH task completion with supervision  Pt will perform AROM BUE HEP with mod I  Pt will complete sit EOB > 10 mins for fx'l task with supervision  Pt will complete toilet transfer with minimal assist   Outcome: Ongoing, Progressing

## 2020-05-27 NOTE — ASSESSMENT & PLAN NOTE
- Patient was AAO x3 on 5/24, but on morning of 5/25 patient is slow to answer questions and was unable to fully participate with therapy due to not being able to follow commands and not moving her extremities  - labs reviewed, KELSEY noted but uncertain exact etiology of AMS  - DC'd cefepime 5/25  - head CT 5/25 showed no hemorrhage or major vascular distribution infarction. Basal ganglia calcifications, moderate chronic microvascular ischemic changes and probable bilateral remote lacunar infarcts.  - UA not indicative of UTI  - Patient recovering and near her baseline on 5/27

## 2020-05-27 NOTE — PT/OT/SLP PROGRESS
"Physical Therapy Treatment    Patient Name:  Rosetta Whyte   MRN:  [id number]    Recommendations:     Discharge Recommendations:  nursing facility, skilled   Discharge Equipment Recommendations: hospital bed   Barriers to discharge: Decreased caregiver support    Assessment:     Rosetta Whyte is a 74 y.o. female admitted with a medical diagnosis of Hypertensive emergency.  She presents with the following impairments/functional limitations:  weakness, gait instability, decreased ROM, decreased lower extremity function, impaired balance, decreased safety awareness, pain, impaired self care skills, impaired functional mobilty.      Patient demonstrates good motivation and improving activity tolerance secondary to improved command following and attention to task.  Patient demonstrates decreased functional mobility secondary to weakness.  Patient required max A x 2 for bed mobility and transfers.  Patient able to stand x 2 with assistance x 2 and greatly increased trunk flexion leading to decreased safety and functional mobility.  Patient continues to benefit from skilled PT for strengthening and mobility training.      Rehab Prognosis: Good; patient continues to benefit from acute skilled PT services to address these deficits and reach maximum level of function.  Patient remains most appropriate to discharge to nursing facility, skilled  Recent Surgery: * No surgery found *      Plan:     During this hospitalization, patient to be seen 4 x/week to address the identified rehab impairments via gait training, therapeutic activities, therapeutic exercises, neuromuscular re-education and progress toward the following goals:    · Plan of Care Expires:  06/21/20    Subjective     Subjective: "My family was telling me I was speaking nonsense on Monday."   Pain/Comfort:  · Pain Rating 1: 0/10  · Pain Rating Post-Intervention 1: 0/10      Objective:     Communicated with RN prior to session.  Patient found supine with pressure relief " boots, PureWick, telemetry upon PT entry to room.     General Precautions: Standard, fall   Orthopedic Precautions:N/A   Braces: N/A     Functional Mobility:  · Bed Mobility:     · Supine to Sit: maximal assistance and of 2 persons for LE and trunk management  · Sit to Supine: maximal assistance and of 2 persons for LE and trunk management  · Transfers:     · Sit to Stand:  maximal assistance and of 2 persons with rolling walker  · Gait: Patient unable to take steps secondary to marked flexion and LE weakness.  · Balance:   · Sitting: GOOD: Maintains balance through MODERATE excursions of active trunk movement  · Standing: POOR+: Needs MIN (minimal ) assist during gait      AM-PAC 6 CLICK MOBILITY  Turning over in bed (including adjusting bedclothes, sheets and blankets)?: 2  Sitting down on and standing up from a chair with arms (e.g., wheelchair, bedside commode, etc.): 2  Moving from lying on back to sitting on the side of the bed?: 2  Moving to and from a bed to a chair (including a wheelchair)?: 2  Need to walk in hospital room?: 1  Climbing 3-5 steps with a railing?: 1  Basic Mobility Total Score: 10       Therapeutic Activities and Exercises:   Patient able to obtain stance x 2 with max A x 2 and greatly increased trunk flexion.  Patient was with some difficulty sequencing safe transfers.      Patient Education:    Patient educated on assistive device use, bed mobility training, Fall risk, gait training, home safety, Home exercise program and transfer training by explanation.  Patient was receptive to education and needs reinforcement.     Patient left supine with all lines intact and call button in reach.    GOALS:   Multidisciplinary Problems     Physical Therapy Goals        Problem: Physical Therapy Goal    Goal Priority Disciplines Outcome Goal Variances Interventions   Physical Therapy Goal     PT, PT/OT Ongoing, Progressing     Description:  Goals to be met by: 6/13/20    Patient will increase  functional independence with mobility by performin. Supine to sit with Maximum Assistance -not met  2. Sit to stand transfer with Maximum Assistance with RW - not met  3. Gait  x 50 feet with Contact Guard Assistance using Rolling Walker. -not met  4. Sitting at edge of bed x15 minutes with Stand-by Assistance -not met                      Time Tracking:     PT Received On: 20  PT Start Time: 1327     PT Stop Time: 1406  PT Total Time (min): 39 min     Billable Minutes: Therapeutic Activity 39 min    Treatment Type: Treatment  PT/PTA: PT     PTA Visit Number: 0     Dutch Jarquin, PT  2020

## 2020-05-28 PROBLEM — I67.4 SYSTOLIC HYPERTENSION WITH CEREBROVASCULAR DISEASE: Status: ACTIVE | Noted: 2020-05-28

## 2020-05-28 PROBLEM — D64.9 ANEMIA: Chronic | Status: ACTIVE | Noted: 2020-05-27

## 2020-05-28 PROBLEM — E87.6 HYPOKALEMIA: Status: RESOLVED | Noted: 2020-05-26 | Resolved: 2020-05-28

## 2020-05-28 PROBLEM — I67.4 SYSTOLIC HYPERTENSION WITH CEREBROVASCULAR DISEASE: Chronic | Status: ACTIVE | Noted: 2020-05-28

## 2020-05-28 LAB
ALBUMIN SERPL BCP-MCNC: 2.5 G/DL (ref 3.5–5.2)
ALP SERPL-CCNC: 66 U/L (ref 55–135)
ALT SERPL W/O P-5'-P-CCNC: 8 U/L (ref 10–44)
ANION GAP SERPL CALC-SCNC: 7 MMOL/L (ref 8–16)
AST SERPL-CCNC: 13 U/L (ref 10–40)
BACTERIA BLD CULT: NORMAL
BACTERIA BLD CULT: NORMAL
BASOPHILS # BLD AUTO: 0.05 K/UL (ref 0–0.2)
BASOPHILS NFR BLD: 0.8 % (ref 0–1.9)
BILIRUB SERPL-MCNC: 0.4 MG/DL (ref 0.1–1)
BUN SERPL-MCNC: 42 MG/DL (ref 8–23)
CALCIUM SERPL-MCNC: 8.6 MG/DL (ref 8.7–10.5)
CHLORIDE SERPL-SCNC: 111 MMOL/L (ref 95–110)
CO2 SERPL-SCNC: 27 MMOL/L (ref 23–29)
CREAT SERPL-MCNC: 1.7 MG/DL (ref 0.5–1.4)
DIFFERENTIAL METHOD: ABNORMAL
EOSINOPHIL # BLD AUTO: 0.5 K/UL (ref 0–0.5)
EOSINOPHIL NFR BLD: 7.9 % (ref 0–8)
ERYTHROCYTE [DISTWIDTH] IN BLOOD BY AUTOMATED COUNT: 15.2 % (ref 11.5–14.5)
EST. GFR  (AFRICAN AMERICAN): 34 ML/MIN/1.73 M^2
EST. GFR  (NON AFRICAN AMERICAN): 29.5 ML/MIN/1.73 M^2
GLUCOSE SERPL-MCNC: 99 MG/DL (ref 70–110)
HCT VFR BLD AUTO: 35.6 % (ref 37–48.5)
HGB BLD-MCNC: 10.3 G/DL (ref 12–16)
IMM GRANULOCYTES # BLD AUTO: 0.03 K/UL (ref 0–0.04)
IMM GRANULOCYTES NFR BLD AUTO: 0.5 % (ref 0–0.5)
LYMPHOCYTES # BLD AUTO: 1 K/UL (ref 1–4.8)
LYMPHOCYTES NFR BLD: 16.6 % (ref 18–48)
MAGNESIUM SERPL-MCNC: 1.8 MG/DL (ref 1.6–2.6)
MCH RBC QN AUTO: 28 PG (ref 27–31)
MCHC RBC AUTO-ENTMCNC: 28.9 G/DL (ref 32–36)
MCV RBC AUTO: 97 FL (ref 82–98)
MONOCYTES # BLD AUTO: 0.8 K/UL (ref 0.3–1)
MONOCYTES NFR BLD: 13.2 % (ref 4–15)
NEUTROPHILS # BLD AUTO: 3.7 K/UL (ref 1.8–7.7)
NEUTROPHILS NFR BLD: 61 % (ref 38–73)
NRBC BLD-RTO: 0 /100 WBC
PLATELET # BLD AUTO: 213 K/UL (ref 150–350)
PMV BLD AUTO: 11 FL (ref 9.2–12.9)
POTASSIUM SERPL-SCNC: 3.6 MMOL/L (ref 3.5–5.1)
PROT SERPL-MCNC: 5.7 G/DL (ref 6–8.4)
RBC # BLD AUTO: 3.68 M/UL (ref 4–5.4)
SODIUM SERPL-SCNC: 145 MMOL/L (ref 136–145)
WBC # BLD AUTO: 6.07 K/UL (ref 3.9–12.7)

## 2020-05-28 PROCEDURE — 25000003 PHARM REV CODE 250: Performed by: STUDENT IN AN ORGANIZED HEALTH CARE EDUCATION/TRAINING PROGRAM

## 2020-05-28 PROCEDURE — 99232 SBSQ HOSP IP/OBS MODERATE 35: CPT | Mod: ,,, | Performed by: HOSPITALIST

## 2020-05-28 PROCEDURE — 94761 N-INVAS EAR/PLS OXIMETRY MLT: CPT

## 2020-05-28 PROCEDURE — 11000001 HC ACUTE MED/SURG PRIVATE ROOM

## 2020-05-28 PROCEDURE — 36415 COLL VENOUS BLD VENIPUNCTURE: CPT

## 2020-05-28 PROCEDURE — 99900035 HC TECH TIME PER 15 MIN (STAT)

## 2020-05-28 PROCEDURE — 99232 PR SUBSEQUENT HOSPITAL CARE,LEVL II: ICD-10-PCS | Mod: ,,, | Performed by: HOSPITALIST

## 2020-05-28 PROCEDURE — 85025 COMPLETE CBC W/AUTO DIFF WBC: CPT

## 2020-05-28 PROCEDURE — 83735 ASSAY OF MAGNESIUM: CPT

## 2020-05-28 PROCEDURE — 63600175 PHARM REV CODE 636 W HCPCS: Performed by: NURSE PRACTITIONER

## 2020-05-28 PROCEDURE — 94660 CPAP INITIATION&MGMT: CPT

## 2020-05-28 PROCEDURE — 25000003 PHARM REV CODE 250: Performed by: INTERNAL MEDICINE

## 2020-05-28 PROCEDURE — 80053 COMPREHEN METABOLIC PANEL: CPT

## 2020-05-28 RX ADMIN — ENOXAPARIN SODIUM 40 MG: 100 INJECTION SUBCUTANEOUS at 08:05

## 2020-05-28 RX ADMIN — HYDRALAZINE HYDROCHLORIDE 25 MG: 25 TABLET, FILM COATED ORAL at 10:05

## 2020-05-28 RX ADMIN — ENOXAPARIN SODIUM 40 MG: 100 INJECTION SUBCUTANEOUS at 10:05

## 2020-05-28 RX ADMIN — HYDRALAZINE HYDROCHLORIDE 25 MG: 25 TABLET, FILM COATED ORAL at 06:05

## 2020-05-28 RX ADMIN — ATORVASTATIN CALCIUM 40 MG: 20 TABLET, FILM COATED ORAL at 08:05

## 2020-05-28 RX ADMIN — DOXYCYCLINE HYCLATE 100 MG: 100 TABLET, FILM COATED ORAL at 10:05

## 2020-05-28 RX ADMIN — CARVEDILOL 12.5 MG: 12.5 TABLET, FILM COATED ORAL at 10:05

## 2020-05-28 RX ADMIN — AMLODIPINE BESYLATE 10 MG: 5 TABLET ORAL at 08:05

## 2020-05-28 RX ADMIN — HYDRALAZINE HYDROCHLORIDE 25 MG: 25 TABLET, FILM COATED ORAL at 02:05

## 2020-05-28 RX ADMIN — DOXYCYCLINE HYCLATE 100 MG: 100 TABLET, FILM COATED ORAL at 08:05

## 2020-05-28 RX ADMIN — CARVEDILOL 12.5 MG: 12.5 TABLET, FILM COATED ORAL at 08:05

## 2020-05-28 NOTE — PROGRESS NOTES
"[facility] Hospital Medicine  Progress Note    Patient Name: Rosetta Whyte  MRN: [id number]  Patient Class: IP- Inpatient   Admission Date: 5/22/2020  Length of Stay: 6 days  Attending Physician: Gareth Wong MD  Primary Care Provider: Georgie Ortega MD    [facility] Medicine Team: [facility] HOSP MED D Gareth Wong MD    Subjective:     Principal Problem:Hypertensive emergency        HPI:  Rosetta Whyte is a 74 year old black woman with morbid obesity, history of laparoscopic sleeve gastrectomy on 7/7/2016, resistant hypertension, lower extremity lymphedema, left ventricular diastolic dysfunction. She lives in [facility]. Her primary care physician is Dr. Georgie Ortega.    She was seen at [facility] on 5/22/2020 . Her blood pressure was 220/124. She is prescribed amlodipine 10 mg daily, carvedilol 25 mg twice daily, clonidine 0.3 mg patch, furosemide 40 mg daily, hydralazine 100 mg three times daily, hydrochlorothiazide 25 mg daily, losartan 25 mg daily. She had run out of medications the day before and could not get them refilled. She was sent to [facility] Emergency Department. She reported "tightness" in her chest, shortness of breath that occurred while trying to fall asleep the night before, decreased urine output, and bilateral lower extremity swelling that started about a week ago. She recently fell on a bench on 5/17/2020 and bruised her entire right side. She lives by herself and used to have a nurse who would come by to help her out. Her sister reported that she had not taken her medications over the past week and did not like going to the hospital or taking medications. In the emergency department, her blood pressure was as high as 303/163. She was started on ntiroglycerin infusion and placed on BiPAP. She was given IV furosemide. Chest X-ray showed pulmonary edema. She was started on vancomycin and cefepime for " bilateral lower extremity cellulitis. She was admitted to Critical Care Medicine.    Overview/Hospital Course:  Symptoms improved. She developed acute kidney injury on 5/23/2020, with creatinine increasing from 0.8 on admission to 1.1. Nitroglycerin drip was weaned off on 5/24/2020. She was transferred out of the ICU on 5/25/2020. Creatinine increased further to 1.7. She was delirious that morning, unable to recall her name or follow basic commands. Head CT showed evidence of basal ganglia calcifications, moderate chronic microvascular ischemic changes, and probable bilateral remote lacunar infarctions. She was put on a low rate of IV fluids. Delirium improved. Physical and Occupational Therapy recommended skilled nursing facility placement. Creatinine peaked at 1.8 on 5/27/2020 before improving.     Interval History: Awaiting SNF.    Review of Systems   Constitutional: Negative for chills and fever.   Gastrointestinal: Negative for nausea and vomiting.     Objective:     Vital Signs (Most Recent):  Temp: 97.5 °F (36.4 °C) (05/28/20 1149)  Pulse: 70 (05/28/20 1203)  Resp: (!) 22 (05/28/20 1149)  BP: (!) 145/81 (05/28/20 1149)  SpO2: 98 % (05/28/20 1149) Vital Signs (24h Range):  Temp:  [96.5 °F (35.8 °C)-98.2 °F (36.8 °C)] 97.5 °F (36.4 °C)  Pulse:  [63-82] 70  Resp:  [18-22] 22  SpO2:  [93 %-99 %] 98 %  BP: (131-161)/(62-88) 145/81     Weight: (!) 143.3 kg (316 lb)  Body mass index is 52.59 kg/m².    Intake/Output Summary (Last 24 hours) at 5/28/2020 1252  Last data filed at 5/28/2020 0610  Gross per 24 hour   Intake 960 ml   Output 500 ml   Net 460 ml      Physical Exam   Constitutional: She appears well-developed. No distress.   Pulmonary/Chest: Effort normal. No respiratory distress.   Neurological: She is alert.   Psychiatric: She has a normal mood and affect.   Nursing note and vitals reviewed.      Significant Labs: All pertinent labs within the past 24 hours have been reviewed.    Significant Imaging: I have  reviewed all pertinent imaging results/findings within the past 24 hours.   X-Ray Chest AP Portable 5/22/20: FINDINGS:  Monitoring leads overlie the chest.  Extensive right greater than left increased interstitial attenuation and airspace opacification which could represent pulmonary edema, with multifocal pneumonia, either by bacterial or viral etiology, or massive aspiration not excluded.No pneumothorax.  Costophrenic angles are obscured by consolidation.  Pleural fluid cannot be excluded.  The cardiac silhouette is mildly enlarged similar to prior.  Hilar contours and mediastinal contours are grossly unchanged.  No acute osseous process definitively seen.  PA and lateral views can be obtained.  Impression:   Extensive right greater than left airspace opacity and increased interstitial attenuation favoring pulmonary edema, with multifocal pneumonia or massive aspiration not entirely excluded.  Transthoracic echo (TTE) complete with contrast 5/23/20:   · Low normal left ventricular systolic function. The estimated ejection fraction is 50-55%.  · Grade II (moderate) left ventricular diastolic dysfunction consistent with pseudonormalization.  · Concentric left ventricular remodeling.  · Mildly reduced right ventricular systolic function.  · Normal central venous pressure (3 mmHg).  · Mild left atrial enlargement.  X-Ray Chest 1 View 5/24/20: FINDINGS: Moderate cardiomegaly similar to prior exam.  Prominence of the pulmonary vasculature and bilateral interstitial lung markings, slightly improved when compared to prior exam suggestive of improving pulmonary edema.  No pleural effusion.  No pneumothorax.  Osseous structures are unremarkable.  CT Head Without Contrast 5/25/20: FINDINGS: Motion significantly degrades the exam.  Intracranial compartment:  Ventricles and sulci are normal in size for age without evidence of hydrocephalus. No extra-axial blood or fluid collections.  Moderate chronic microvascular ischemic  changes.  Probable left caudate head lacunar infarction.  Probable right internal capsule remote lacunar infarct.  Bilateral basal ganglia calcifications.  No parenchymal mass, hemorrhage, edema or major vascular distribution infarct.  Skull/extracranial contents (limited evaluation): No fracture. Mastoid air cells and paranasal sinuses are essentially clear.  Impression:   No hemorrhage or major vascular distribution infarction.  Basal ganglia calcifications, moderate chronic microvascular ischemic changes and probable bilateral remote lacunar infarcts.      Assessment/Plan:      Anemia  Stable.    KELSEY (acute kidney injury)  Improving. Holding home furosemide.    Diastolic dysfunction, left ventricle  Resistant hypertension  Systolic hypertension with cerebrovascular disease  Continue home amlodipine, carvedilol, hydralazine.    Impaired functional mobility and activity tolerance  Skilled nursing facility placement.    Lymphedema of both lower extremities  Doxycycline until 5/29/2020.      VTE Risk Mitigation (From admission, onward)         Ordered     enoxaparin injection 40 mg  Every 12 hours      05/24/20 0742     IP VTE HIGH RISK PATIENT  Once      05/22/20 2007     Place sequential compression device  Until discontinued      05/22/20 2007                Disposition: SNF      Gareth Wong MD  Department of Hospital Medicine   [facility]

## 2020-05-28 NOTE — PROGRESS NOTES
Physician Skin Integrity Evaluation    NAME: Rosetta Whyte  : 1945  MRN: [id number]        Subjective    Physician Skin Integrity [place] Evaluation of patient as part of the comprehensive skin care team during hospital admission.       Rosetta Whyte 74 y.o. female is being evaluated as per the Proactive skin integrity report.      Patient was admitted to [facility] Med D team.   LENGTH OF STAY:  6 days  DATE OF ADMISSION: 2020 .    Principal Problem: Hypertensive emergency      Patient was seen and evaluated at bedside along with Malinda Francis PA-C. Patients chart was reviewed. Nursing notes were reviewed.     Vitals:    20 1203   BP:    Pulse: 70   Resp:    Temp:      NAD  Skin - no evidence of pressure injury    Assessment/Plan:  74 y.o. female admitted to hospital secondary to Hypertensive emergency  - No evidence of pressure injury  - Recommend frequent pressure relieving maneuvers and weight shift encouraged while in bed, patient also encouraged to remain out of bed as much as possible.  - Will continue to evaluate weekly or as needed

## 2020-05-28 NOTE — SUBJECTIVE & OBJECTIVE
Interval History: Awaiting SNF.    Review of Systems   Constitutional: Negative for chills and fever.   Gastrointestinal: Negative for nausea and vomiting.     Objective:     Vital Signs (Most Recent):  Temp: 97.5 °F (36.4 °C) (05/28/20 1149)  Pulse: 70 (05/28/20 1203)  Resp: (!) 22 (05/28/20 1149)  BP: (!) 145/81 (05/28/20 1149)  SpO2: 98 % (05/28/20 1149) Vital Signs (24h Range):  Temp:  [96.5 °F (35.8 °C)-98.2 °F (36.8 °C)] 97.5 °F (36.4 °C)  Pulse:  [63-82] 70  Resp:  [18-22] 22  SpO2:  [93 %-99 %] 98 %  BP: (131-161)/(62-88) 145/81     Weight: (!) 143.3 kg (316 lb)  Body mass index is 52.59 kg/m².    Intake/Output Summary (Last 24 hours) at 5/28/2020 1252  Last data filed at 5/28/2020 0610  Gross per 24 hour   Intake 960 ml   Output 500 ml   Net 460 ml      Physical Exam   Constitutional: She appears well-developed. No distress.   Pulmonary/Chest: Effort normal. No respiratory distress.   Neurological: She is alert.   Psychiatric: She has a normal mood and affect.   Nursing note and vitals reviewed.      Significant Labs: All pertinent labs within the past 24 hours have been reviewed.    Significant Imaging: I have reviewed all pertinent imaging results/findings within the past 24 hours.   X-Ray Chest AP Portable 5/22/20: FINDINGS:  Monitoring leads overlie the chest.  Extensive right greater than left increased interstitial attenuation and airspace opacification which could represent pulmonary edema, with multifocal pneumonia, either by bacterial or viral etiology, or massive aspiration not excluded.No pneumothorax.  Costophrenic angles are obscured by consolidation.  Pleural fluid cannot be excluded.  The cardiac silhouette is mildly enlarged similar to prior.  Hilar contours and mediastinal contours are grossly unchanged.  No acute osseous process definitively seen.  PA and lateral views can be obtained.  Impression:   Extensive right greater than left airspace opacity and increased interstitial attenuation  favoring pulmonary edema, with multifocal pneumonia or massive aspiration not entirely excluded.  Transthoracic echo (TTE) complete with contrast 5/23/20:   · Low normal left ventricular systolic function. The estimated ejection fraction is 50-55%.  · Grade II (moderate) left ventricular diastolic dysfunction consistent with pseudonormalization.  · Concentric left ventricular remodeling.  · Mildly reduced right ventricular systolic function.  · Normal central venous pressure (3 mmHg).  · Mild left atrial enlargement.  X-Ray Chest 1 View 5/24/20: FINDINGS: Moderate cardiomegaly similar to prior exam.  Prominence of the pulmonary vasculature and bilateral interstitial lung markings, slightly improved when compared to prior exam suggestive of improving pulmonary edema.  No pleural effusion.  No pneumothorax.  Osseous structures are unremarkable.  CT Head Without Contrast 5/25/20: FINDINGS: Motion significantly degrades the exam.  Intracranial compartment:  Ventricles and sulci are normal in size for age without evidence of hydrocephalus. No extra-axial blood or fluid collections.  Moderate chronic microvascular ischemic changes.  Probable left caudate head lacunar infarction.  Probable right internal capsule remote lacunar infarct.  Bilateral basal ganglia calcifications.  No parenchymal mass, hemorrhage, edema or major vascular distribution infarct.  Skull/extracranial contents (limited evaluation): No fracture. Mastoid air cells and paranasal sinuses are essentially clear.  Impression:   No hemorrhage or major vascular distribution infarction.  Basal ganglia calcifications, moderate chronic microvascular ischemic changes and probable bilateral remote lacunar infarcts.

## 2020-05-28 NOTE — PLAN OF CARE
Problem: Fall Injury Risk  Goal: Absence of Fall and Fall-Related Injury  Outcome: Ongoing, Progressing     Problem: Adult Inpatient Plan of Care  Goal: Plan of Care Review  Outcome: Ongoing, Progressing  Goal: Patient-Specific Goal (Individualization)  Outcome: Ongoing, Progressing  Goal: Absence of Hospital-Acquired Illness or Injury  Outcome: Ongoing, Progressing  Goal: Optimal Comfort and Wellbeing  Outcome: Ongoing, Progressing  Goal: Readiness for Transition of Care  Outcome: Ongoing, Progressing  Goal: Rounds/Family Conference  Outcome: Ongoing, Progressing     Problem: Bariatric Environmental Safety  Goal: Safety Maintained with Care  Outcome: Ongoing, Progressing     Problem: Infection  Goal: Infection Symptom Resolution  Outcome: Ongoing, Progressing     Problem: Skin Injury Risk Increased  Goal: Skin Health and Integrity  Outcome: Ongoing, Progressing     Problem: Hypertension Acute  Goal: Blood Pressure Within Desired Range  Outcome: Ongoing, Progressing     Problem: Electrolyte Imbalance (Acute Kidney Injury/Impairment)  Goal: Serum Electrolyte Balance  Outcome: Ongoing, Progressing     Problem: Fluid Imbalance (Acute Kidney Injury/Impairment)  Goal: Optimal Fluid Balance  Outcome: Ongoing, Progressing     Problem: Hematologic Alteration (Acute Kidney Injury/Impairment)  Goal: Hemoglobin, Hematocrit and Platelets Within Normal Range  Outcome: Ongoing, Progressing     Problem: Oral Intake Inadequate (Acute Kidney Injury/Impairment)  Goal: Optimal Nutrition Intake  Outcome: Ongoing, Progressing     Problem: Renal Function Impairment (Acute Kidney Injury/Impairment)  Goal: Effective Renal Function  Outcome: Ongoing, Progressing     Pt. AAOx3. Resting in bed. No pain noted. Call light in reach. Bed in lowest position. Will continue to monitor.

## 2020-05-28 NOTE — ASSESSMENT & PLAN NOTE
Resistant hypertension  Systolic hypertension with cerebrovascular disease  Continue home amlodipine, carvedilol, hydralazine.

## 2020-05-29 VITALS
HEART RATE: 68 BPM | DIASTOLIC BLOOD PRESSURE: 75 MMHG | RESPIRATION RATE: 23 BRPM | TEMPERATURE: 97 F | BODY MASS INDEX: 48.82 KG/M2 | OXYGEN SATURATION: 98 % | SYSTOLIC BLOOD PRESSURE: 138 MMHG | HEIGHT: 65 IN | WEIGHT: 293 LBS

## 2020-05-29 LAB
ALBUMIN SERPL BCP-MCNC: 2.6 G/DL (ref 3.5–5.2)
ALP SERPL-CCNC: 67 U/L (ref 55–135)
ALT SERPL W/O P-5'-P-CCNC: 14 U/L (ref 10–44)
ANION GAP SERPL CALC-SCNC: 6 MMOL/L (ref 8–16)
AST SERPL-CCNC: 19 U/L (ref 10–40)
BILIRUB SERPL-MCNC: 0.4 MG/DL (ref 0.1–1)
BUN SERPL-MCNC: 46 MG/DL (ref 8–23)
CALCIUM SERPL-MCNC: 9 MG/DL (ref 8.7–10.5)
CHLORIDE SERPL-SCNC: 112 MMOL/L (ref 95–110)
CO2 SERPL-SCNC: 28 MMOL/L (ref 23–29)
CREAT SERPL-MCNC: 1.7 MG/DL (ref 0.5–1.4)
EST. GFR  (AFRICAN AMERICAN): 34 ML/MIN/1.73 M^2
EST. GFR  (NON AFRICAN AMERICAN): 29.5 ML/MIN/1.73 M^2
GLUCOSE SERPL-MCNC: 103 MG/DL (ref 70–110)
MAGNESIUM SERPL-MCNC: 1.9 MG/DL (ref 1.6–2.6)
POTASSIUM SERPL-SCNC: 3.7 MMOL/L (ref 3.5–5.1)
PROT SERPL-MCNC: 5.9 G/DL (ref 6–8.4)
SODIUM SERPL-SCNC: 146 MMOL/L (ref 136–145)

## 2020-05-29 PROCEDURE — 86580 TB INTRADERMAL TEST: CPT | Performed by: HOSPITALIST

## 2020-05-29 PROCEDURE — 83735 ASSAY OF MAGNESIUM: CPT

## 2020-05-29 PROCEDURE — 80053 COMPREHEN METABOLIC PANEL: CPT

## 2020-05-29 PROCEDURE — 63600175 PHARM REV CODE 636 W HCPCS: Performed by: NURSE PRACTITIONER

## 2020-05-29 PROCEDURE — 25000003 PHARM REV CODE 250: Performed by: INTERNAL MEDICINE

## 2020-05-29 PROCEDURE — 30200315 PPD INTRADERMAL TEST REV CODE 302: Performed by: HOSPITALIST

## 2020-05-29 PROCEDURE — 25000003 PHARM REV CODE 250: Performed by: STUDENT IN AN ORGANIZED HEALTH CARE EDUCATION/TRAINING PROGRAM

## 2020-05-29 PROCEDURE — 94761 N-INVAS EAR/PLS OXIMETRY MLT: CPT

## 2020-05-29 PROCEDURE — 36415 COLL VENOUS BLD VENIPUNCTURE: CPT

## 2020-05-29 RX ORDER — URSODIOL 500 MG/1
500 TABLET, FILM COATED ORAL DAILY
Start: 2020-05-29 | End: 2020-06-02 | Stop reason: SDUPTHER

## 2020-05-29 RX ORDER — ESOMEPRAZOLE MAGNESIUM 40 MG/1
40 GRANULE, DELAYED RELEASE ORAL
Start: 2020-05-29 | End: 2020-06-02 | Stop reason: SDUPTHER

## 2020-05-29 RX ORDER — AMLODIPINE BESYLATE 10 MG/1
10 TABLET ORAL DAILY
Start: 2020-05-29 | End: 2020-06-02 | Stop reason: SDUPTHER

## 2020-05-29 RX ORDER — FUROSEMIDE 40 MG/1
40 TABLET ORAL DAILY
Start: 2020-05-29 | End: 2020-06-02 | Stop reason: SDUPTHER

## 2020-05-29 RX ORDER — GABAPENTIN 300 MG/1
300 CAPSULE ORAL NIGHTLY
Start: 2020-05-29

## 2020-05-29 RX ADMIN — ATORVASTATIN CALCIUM 40 MG: 20 TABLET, FILM COATED ORAL at 09:05

## 2020-05-29 RX ADMIN — TUBERCULIN PURIFIED PROTEIN DERIVATIVE 5 UNITS: 5 INJECTION, SOLUTION INTRADERMAL at 12:05

## 2020-05-29 RX ADMIN — AMLODIPINE BESYLATE 10 MG: 5 TABLET ORAL at 09:05

## 2020-05-29 RX ADMIN — HYDRALAZINE HYDROCHLORIDE 25 MG: 25 TABLET, FILM COATED ORAL at 02:05

## 2020-05-29 RX ADMIN — CARVEDILOL 12.5 MG: 12.5 TABLET, FILM COATED ORAL at 09:05

## 2020-05-29 RX ADMIN — HYDRALAZINE HYDROCHLORIDE 25 MG: 25 TABLET, FILM COATED ORAL at 06:05

## 2020-05-29 RX ADMIN — DOXYCYCLINE HYCLATE 100 MG: 100 TABLET, FILM COATED ORAL at 09:05

## 2020-05-29 RX ADMIN — ENOXAPARIN SODIUM 40 MG: 100 INJECTION SUBCUTANEOUS at 09:05

## 2020-05-29 NOTE — PLAN OF CARE
05/29/20 1114   Post-Acute Status   Post-Acute Authorization Placement   Post-Acute Placement Status Pending Post-Acute Clinical Review   Patient denied by the following SNF providers: [facility] (level of functioning too low), [facility] (no bariatric equipment), [facility] (no bariatric bed), and [facility].     (11:14AM) WILMER outreached [facility] at [phone] to follow up on patient referral. Transferred to admissions. No answer. Left voicemail requesting return call.  Malinda Fritz LMSW [facility]     (11:15AM) WILMER outreached [facility] via phone at [phone] to follow up on patient referral. Transferred to admissions. No answer. Unable to leave voicemail. WILMER followed up by sending msg in [facility].  Malinda Fritz LMSW [facility]     (11:15AM) WILMER outreached [facility] ar [phone] to follow up on patient referral status. Transferred to Jaleesa Adrian in admissions. No answer. Left voicemail requesting return call.  Malinda Fritz LMSW [facility]     (11:18AM) WILMER outreached [facility] via phone at [phone] to follow up on patient referral. Spoke with Marylou who states patient referral is still under review. WILMER sent provider patient updated clinicals via [facility]. SW will continue to follow.  Malinda Fritz LMSW [facility]    (11:22AM) WILMER outreached [facility] at [phone] to follow up on patient referral. Transferred to admissions. No answer. Unable to leave voicemail. WILMER sent provider msg on [facility] requesting return call and update.  Malinda Fritz LMSW [facility]     (11:28AM) WILMER outreached [facility] at [phone]. Spoke with  who states ADMISSIONS dep is unavailable at this time and to call back in one hr. SW will continue to follow.  Malinda Fritz LMSW [facility]      (11:40AM) Patient referral denied by [facility] and [facility] due to inability to meet patient needs.  Malinda Fritz, JEANMARIE  [facility]

## 2020-05-29 NOTE — PLAN OF CARE
05/29/20 1235   Post-Acute Status   Post-Acute Authorization Placement   Post-Acute Placement Status Pending Payor Review   WILMER spoke with Marylou from [facility] who states patient accepted and with bed availability today. Provider states able to admit patient today and can accommodate bariatric needs. WILMER will follow up with PHN.  Malinda Fritz LMSW [facility]     (12:40PM) WILMER outreached PHN via phone to update on [facility] declining patient and the need for auth to be switched to [facility]. Transferred to [facility] No answer. Left voicemail requesting return call.  Malinda Fritz LMSW [facility]     (1:48PM) WILMER received call from Marylou with [facility] who states patient daughter is refusing to sign intake paperwork to proceed with patient admit due to wanting patient to discharge home from hospital. Marylou reports unable to accept patient without intake paperwork completion.   Malinda Fritz LMSW [facility]

## 2020-05-29 NOTE — DISCHARGE SUMMARY
"[facility] Hospital Medicine  Discharge Summary      Patient Name: Rosetta Whyte  MRN: [id number]  Admission Date: 5/22/2020  Hospital Length of Stay: 7 days  Discharge Date and Time: 5/29/2020  7:37 PM  Attending Physician: Gareth Wong MD   Discharging Provider: Gareth Wong MD  Primary Care Provider: Georgie Ortega MD  [facility] Medicine Team: [facility] MED D Gareth Wong MD    HPI:   Rosetta Whyte is a 74 year old black woman with morbid obesity, history of laparoscopic sleeve gastrectomy on 7/7/2016, resistant hypertension, lower extremity lymphedema, left ventricular diastolic dysfunction. She lives in [facility]. Her primary care physician is Dr. Georgie Otrega.    She was seen at [facility] on 5/22/2020. Her blood pressure was 220/124. She is prescribed amlodipine 10 mg daily, carvedilol 25 mg twice daily, clonidine 0.3 mg patch, furosemide 40 mg daily, hydralazine 100 mg three times daily, hydrochlorothiazide 25 mg daily, losartan 25 mg daily. She had run out of medications the day before and could not get them refilled. She was sent to [facility]. She reported "tightness" in her chest, shortness of breath that occurred while trying to fall asleep the night before, decreased urine output, and bilateral lower extremity swelling that started about a week ago. She recently fell on a bench on 5/17/2020 and bruised her entire right side. She lives by herself and used to have a nurse who would come by to help her out. Her sister reported that she had not taken her medications over the past week and did not like going to the hospital or taking medications. In the emergency department, her blood pressure was as high as 303/163. She was started on ntiroglycerin infusion and placed on BiPAP. She was given IV furosemide. Chest X-ray showed pulmonary edema. She was started on vancomycin and cefepime for " bilateral lower extremity cellulitis. She was admitted to Critical Care Medicine.      Hospital Course:   Symptoms improved. She developed acute kidney injury on 5/23/2020, with creatinine increasing from 0.8 on admission to 1.1. Nitroglycerin drip was weaned off on 5/24/2020. She was transferred out of the ICU on 5/25/2020. Creatinine increased further to 1.7. She was delirious that morning, unable to recall her name or follow basic commands. Head CT showed evidence of basal ganglia calcifications, moderate chronic microvascular ischemic changes, and probable bilateral remote lacunar infarctions. She was put on a low rate of IV fluids. Delirium improved. Physical and Occupational Therapy recommended skilled nursing facility placement. Creatinine peaked at 1.8 on 5/27/2020 before improving. She will continue to hold furosemide. Losartan and hydrochlorothiazide were discontinued. She was accepted to [facility] skilled nursing facility on 5/29/2020 but her family decided to take her home with home health instead.     Consults:   Consults (From admission, onward)        Status Ordering Provider     Inpatient consult to Critical Care Medicine  Once     Provider:  (Not yet assigned)    JENIFER Tamayo     Inpatient consult to [facility]  Once     Provider:  (Not yet assigned)    Completed BIN WATKINS        Final Active Diagnoses:    Diagnosis Date Noted POA    Systolic hypertension with cerebrovascular disease [I67.4] 05/28/2020 Yes     Chronic    Anemia [D64.9] 05/27/2020 Yes     Chronic    KELSEY (acute kidney injury) [N17.9] 05/25/2020 No    Diastolic dysfunction, left ventricle [I51.9] 05/24/2020 Yes     Chronic    Impaired functional mobility and activity tolerance [Z74.09] 06/07/2019 Yes    Lymphedema of both lower extremities [I89.0] 04/10/2018 Yes     Chronic    S/P laparoscopic sleeve gastrectomy [Z98.84] 08/07/2016 Not Applicable     Chronic    Resistant hypertension  [I10]  Yes     Chronic      Problems Resolved During this Admission:    Diagnosis Date Noted Date Resolved POA    PRINCIPAL PROBLEM:  Hypertensive emergency [I16.1] 05/22/2020 05/27/2020 Yes    Hypokalemia [E87.6] 05/26/2020 05/28/2020 No    Altered mental status, unspecified [R41.82] 05/25/2020 05/27/2020 No    Cellulitis of leg [L03.119] 05/22/2020 05/27/2020 Yes    Acute hypoxemic respiratory failure [J96.01] 05/22/2020 05/27/2020 Yes    Morbid obesity with BMI of 45.0-49.9, adult [E66.01, Z68.42] 08/07/2016 05/27/2020 Not Applicable       Discharged Condition: good    Disposition: Home-Health Care Svc    Follow Up:  Follow-up Information     Georgie Ortega MD In 2 weeks.    Specialty:  Internal Medicine  Contact information:  [address] [place] [address]  [address]  [place] [address]  [phone]                 Patient Instructions:      Diet Adult Regular     Order Specific Question Answer Comments   Na restriction, if any: 2gNa      Vital signs per facility protocol     Skin assessment every shift      Notify Physician     Order Specific Question Answer Comments   Temperature (F) greater than 101    Systolic Blood Pressure SBP greater than or equal to 160    Systolic Blood Pressure SBP less than or equal to 90    Diastolic Blood Pressure DBP greater than or equal to 110    Diastolic Blood Pressure DBP less than or equal to 60    Pulse greater than or equal to 120    Pulse less than or equal to 50    Respirations Rate RR greater than or equal to 30    Respirations Rate RR less than or equal to 6    Urine output less than 60 over 2 hours   SPO2% less than 90      Notify your health care provider if you experience any of the following:  difficulty breathing or increased cough     Notify your health care provider if you experience any of the following:  increased confusion or weakness     Full code       Significant Diagnostic Studies:   X-Ray Chest AP Portable 5/22/20: FINDINGS:  Monitoring leads overlie the  chest.  Extensive right greater than left increased interstitial attenuation and airspace opacification which could represent pulmonary edema, with multifocal pneumonia, either by bacterial or viral etiology, or massive aspiration not excluded.No pneumothorax.  Costophrenic angles are obscured by consolidation.  Pleural fluid cannot be excluded.  The cardiac silhouette is mildly enlarged similar to prior.  Hilar contours and mediastinal contours are grossly unchanged.  No acute osseous process definitively seen.  PA and lateral views can be obtained.  Impression:   Extensive right greater than left airspace opacity and increased interstitial attenuation favoring pulmonary edema, with multifocal pneumonia or massive aspiration not entirely excluded.  Transthoracic echo (TTE) complete with contrast 5/23/20:   · Low normal left ventricular systolic function. The estimated ejection fraction is 50-55%.  · Grade II (moderate) left ventricular diastolic dysfunction consistent with pseudonormalization.  · Concentric left ventricular remodeling.  · Mildly reduced right ventricular systolic function.  · Normal central venous pressure (3 mmHg).  · Mild left atrial enlargement.  X-Ray Chest 1 View 5/24/20: FINDINGS: Moderate cardiomegaly similar to prior exam.  Prominence of the pulmonary vasculature and bilateral interstitial lung markings, slightly improved when compared to prior exam suggestive of improving pulmonary edema.  No pleural effusion.  No pneumothorax.  Osseous structures are unremarkable.  CT Head Without Contrast 5/25/20: FINDINGS: Motion significantly degrades the exam.  Intracranial compartment:  Ventricles and sulci are normal in size for age without evidence of hydrocephalus. No extra-axial blood or fluid collections.  Moderate chronic microvascular ischemic changes.  Probable left caudate head lacunar infarction.  Probable right internal capsule remote lacunar infarct.  Bilateral basal ganglia  calcifications.  No parenchymal mass, hemorrhage, edema or major vascular distribution infarct.  Skull/extracranial contents (limited evaluation): No fracture. Mastoid air cells and paranasal sinuses are essentially clear.  Impression:   No hemorrhage or major vascular distribution infarction.  Basal ganglia calcifications, moderate chronic microvascular ischemic changes and probable bilateral remote lacunar infarcts.    Pending Diagnostic Studies:     Procedure Component Value Units Date/Time    Creatinine, urine, random [[id number]] Collected:  05/27/20 1718    Order Status:  Sent Lab Status:  In process Updated:  05/27/20 1718    Specimen:  Urine, Catheterized     Potassium, urine, random [[id number]] Collected:  05/27/20 1718    Order Status:  Sent Lab Status:  In process Updated:  05/27/20 1718    Specimen:  Urine, Catheterized     Sodium, urine, random [[id number]] Collected:  05/27/20 1718    Order Status:  Sent Lab Status:  In process Updated:  05/27/20 1718    Specimen:  Urine, Catheterized          Medications:  Reconciled Home Medications:      Medication List      CHANGE how you take these medications    amLODIPine 10 MG tablet  Commonly known as:  NORVASC  Take 1 tablet (10 mg total) by mouth once daily.  What changed:  See the new instructions.     esomeprazole 40 mg Grps  Commonly known as:  NexIUM Packet  Take 40 mg by mouth before breakfast.  What changed:  See the new instructions.     furosemide 40 MG tablet  Commonly known as:  LASIX  Take 1 tablet (40 mg total) by mouth once daily. HOLD until your doctor tells you to restart it.  What changed:  See the new instructions.     gabapentin 300 MG capsule  Commonly known as:  NEURONTIN  Take 1 capsule (300 mg total) by mouth every evening.  What changed:  See the new instructions.     ursodioL 500 MG tablet  Commonly known as:  ACTIGALL  Take 1 tablet (500 mg total) by mouth once daily.  What changed:  See the new instructions.        CONTINUE taking  these medications    aspirin 81 MG EC tablet  Commonly known as:  ECOTRIN  Take 81 mg by mouth once daily.     atorvastatin 40 MG tablet  Commonly known as:  LIPITOR  Take 40 mg by mouth once daily.     carvediloL 25 MG tablet  Commonly known as:  COREG  Take 25 mg by mouth 2 (two) times daily.     clobetasoL 0.05 % cream  Commonly known as:  TEMOVATE  Apply topically 2 (two) times daily. Apply to legs     clotrimazole-betamethasone 1-0.05% cream  Commonly known as:  LOTRISONE  Apply topically 2 (two) times daily.     hydrALAZINE 100 MG tablet  Commonly known as:  APRESOLINE  Take 100 mg by mouth 3 (three) times daily.        STOP taking these medications    bumetanide 2 MG tablet  Commonly known as:  BUMEX     cloNIDine 0.3 mg/24 hr td ptwk 0.3 mg/24 hr  Commonly known as:  CATAPRES     fenoprofen 400 mg Cap  Commonly known as:  NALFON     hydroCHLOROthiazide 25 MG tablet  Commonly known as:  HYDRODIURIL     HYDROcodone-acetaminophen 7.5-325 mg per tablet  Commonly known as:  NORCO     ketoconazole 2 % cream  Commonly known as:  NIZORAL     losartan 25 MG tablet  Commonly known as:  COZAAR     losartan-hydrochlorothiazide 100-25 mg 100-25 mg per tablet  Commonly known as:  HYZAAR     NIFEdipine 30 MG Tbsr  Commonly known as:  ADALAT CC     potassium chloride SA 20 MEQ tablet  Commonly known as:  K-DUR,KLOR-CON     PROAIR HFA 90 mcg/actuation inhaler  Generic drug:  albuterol     traMADoL 50 mg tablet  Commonly known as:  ULTRAM            Indwelling Lines/Drains at time of discharge: None    Time spent on the discharge of patient: 35 minutes  Patient was seen and examined on the date of discharge and determined to be suitable for discharge.         Gareth Wong MD  Department of Hospital Medicine  [facility]

## 2020-05-29 NOTE — PLAN OF CARE
SW spoke with patient's sister. Family requesting equipment for discharge. Ordered BSC, W/C and Hospital bed to be delivered to patient's home.

## 2020-05-29 NOTE — PLAN OF CARE
Rosetta Whyte will need ambulance transportation at discharge due to debility after a week long hospital stay.    Gareth Wong MD  [facility]  05/29/2020

## 2020-05-29 NOTE — PT/OT/SLP PROGRESS
Occupational Therapy      Patient Name:  Rosetta Whyte   MRN:  [id number]    Patient not seen today secondary to Nursing care. Will follow-up Monday.    Oscar Ellison, OT  5/29/2020

## 2020-05-29 NOTE — PLAN OF CARE
Patient is AAOX4. Vital signs have been stable. No falls to report this shift. Patient is resting comfortably in bed. Denies pain. Will continue to monitor.

## 2020-05-29 NOTE — PLAN OF CARE
WILMER scheduled discharge transportation to [facility] home [facility]. Patient scheduled for pickup at 6:00PM. Wilmer called and provided patient nurse with update about transport arrangements. WILMER notified patient sister of transport time. SW in communication with patient CM and patient care team.  Malinda Fritz, LMSW [facility]       (4:35PM)IWLMER outreached Lavern with PHN requesting how to proceed with getting auth for DME needs. Lavern reports do not need auth for bedside commode and that she will work on getting auth for wheelchair and hospital bed once orders are sent. Lavern reports will provide to after hour workers that come in from 5-6:30. WILMER sent DME orders to N via [employer].  Malinda Fritz, LMSW [facility]

## 2020-05-29 NOTE — PLAN OF CARE
05/29/20 1413   Post-Acute Status   Post-Acute Authorization Placement;Home Health   Post-Acute Placement Status Discharge Plan Changed   Home Health Status Awaiting Orders for HH   SW outreached patient sister, Nita, via phone to discuss patient discharge concerns. Nita reports wanting patient to return home with HH as she has concerns with inpt care due to covid-19. Sister reports understanding that patient is max assist and says that she, along with patient other sister, can handle patient care needs. Sister is requesting ambulance for patient discharge home. SW informed CM to have MD document medical necessity for patient discharge transport (ambulance) in order to get approved for auth by N. SW will follow up with N to update on change of plans and to get patient set up with HH. Pending HH orders at this time.  Malinda Fritz LMSW [facility]     (2:20PM) WILMER outreached PHN to update on patient discharge plans. Informed to cancel SNF referral and to send new referral to [facility] for HH in order to complete HH set up. SW sent [facility] provider [facility]. HH orders and ambulance transport request included. SW will continue to follow.  Malinda Fritz LMSW [facility]    (3:20PM) WILMER outreached N to follow up on patient HH set up. No answer. Left voicemail to Vikki requesting return call.  Malinda Fritz LMSW [facility]     (3:22PM) WILMER outreached PHN. Spoke with Jyoti who states person assigned still working on setting patient up with HH. Provider trying to get patient with [facility]. Auth case # for ambulance transport is [id number].  Malinda Fritz LMSW [facility]     (3:32PM) WILMER outreached [facility] to follow up on status with PHN auth. Provider states havent received anything from [facility] for this patient and that referral has to be sent from N in order to be  provided with auth number.   Malinda Fritz LMSW [facility]     (4:10PM) WILMER receved call from [facility] with PHN stating patient unable to be seen by [facility] until Monday. WILMER requested that patient be seen by HH provider tomorrow due to high needs.   Malinda Fritz LMSW [facility]     (4:24PM) WILMER received call from [facility] with PHN stating [facility] able to accept patient and admit for home care tomorrow. Patient HH set up complete at this time.  Malinda Fritz LMSW [facility]     (4:27PM) WILMER outreached Nita (patient sister) via phone to update on patient HH set up and transport. Nita requesting a bariatric wheelchair to be ordered for patient and a hospital bed. WILMER informed  who is placing order for bedside commode, wheelchair and hospital bed. WILMER informed Nita that DME would be delivered to patient address.   Malinda Fritz LMSW [facility]

## 2020-05-29 NOTE — PLAN OF CARE
[facility]     Department of Hospital Medicine     [facility] [place] [address]     [phone] after hours  [phone] fax       NURSING HOME ORDERS    Patient Name: Rosetta Whyte  YOB: 1945    05/29/2020    Admit to Nursing Home:      Skilled Bed      Diagnoses:  Active Hospital Problems    Diagnosis  POA    Systolic hypertension with cerebrovascular disease [I67.4]  Yes     Chronic    Anemia [D64.9]  Yes     Chronic    KELSEY (acute kidney injury) [N17.9]  No    Diastolic dysfunction, left ventricle [I51.9]  Yes     Chronic    Impaired functional mobility and activity tolerance [Z74.09]  Yes    Lymphedema of both lower extremities [I89.0]  Yes     Chronic    S/P laparoscopic sleeve gastrectomy [Z98.84]  Not Applicable     Chronic     7/7/2016      Resistant hypertension [I10]  Yes     Chronic      Resolved Hospital Problems    Diagnosis Date Resolved POA    *Hypertensive emergency [I16.1] 05/27/2020 Yes    Hypokalemia [E87.6] 05/28/2020 No    Altered mental status, unspecified [R41.82] 05/27/2020 No    Cellulitis of leg [L03.119] 05/27/2020 Yes    Acute hypoxemic respiratory failure [J96.01] 05/27/2020 Yes    Morbid obesity with BMI of 45.0-49.9, adult [E66.01, Z68.42] 05/27/2020 Not Applicable     Allergies:Review of patient's allergies indicates:  No Known Allergies    Discharge Procedure Orders   Diet Adult Regular     Order Specific Question Answer Comments   Na restriction, if any: 2gNa      Vital signs per facility protocol     Skin assessment every shift      Notify Physician     Order Specific Question Answer Comments   Temperature (F) greater than 101    Systolic Blood Pressure SBP greater than or equal to 160    Systolic Blood Pressure SBP less than or equal to 90    Diastolic Blood Pressure DBP greater than or equal to 110    Diastolic Blood Pressure DBP less than or equal to 60    Pulse greater than or equal to 120     Pulse less than or equal to 50    Respirations Rate RR greater than or equal to 30    Respirations Rate RR less than or equal to 6    Urine output less than 60 over 2 hours   SPO2% less than 90      Full code       LABS:  Per facility protocol   BMP twice weekly for 2 weeks then weekly     Recent Labs   Lab 05/27/20  0359 05/28/20  0339 05/29/20  0357    145 146*   K 3.2* 3.6 3.7    111* 112*   CO2 28 27 28   BUN 37* 42* 46*   CREATININE 1.8* 1.7* 1.7*   CALCIUM 8.7 8.6* 9.0   PROT 5.7* 5.7* 5.9*   BILITOT 0.4 0.4 0.4   ALKPHOS 69 66 67   ALT 6* 8* 14   AST 10 13 19       Nursing Precautions:    - Fall precautions per nursing home protocol   - Decubitus precautions:        -  for positioning   - Pressure reducing foam mattress   - Turn patient every two hours. Use wedge pillows to anchor patient    CONSULTS:     Physical Therapy to evaluate and treat     Occupational Therapy to evaluate and treat    CPAP FiO2 28% EPAP 5 cm H2O nightly      Medications:    Rosetta Whyte   [facility] Medication Instructions KARY:[id number]    Printed on:05/29/20 0924   Medication Information                      amLODIPine (NORVASC) 10 MG tablet  Take 1 tablet (10 mg total) by mouth once daily.             aspirin (ECOTRIN) 81 MG EC tablet  Take 81 mg by mouth once daily.             atorvastatin (LIPITOR) 40 MG tablet  Take 40 mg by mouth once daily.             carvedilol (COREG) 25 MG tablet  Take 25 mg by mouth 2 (two) times daily.              clobetasol (TEMOVATE) 0.05 % cream  Apply topically 2 (two) times daily. Apply to legs             clotrimazole-betamethasone 1-0.05% (LOTRISONE) cream  Apply topically 2 (two) times daily to wherever you usually apply it.             esomeprazole (NEXIUM PACKET) 40 mg GrPS  Take 40 mg by mouth before breakfast.             furosemide (LASIX) 40 MG tablet  Take 1 tablet (40 mg total) by mouth once daily. HOLD until your doctor tells you to restart it.             gabapentin  (NEURONTIN) 300 MG capsule  Take 1 capsule (300 mg total) by mouth every evening.             hydrALAZINE (APRESOLINE) 100 MG tablet  Take 100 mg by mouth 3 (three) times daily.              ursodioL (ACTIGALL) 500 MG tablet  Take 1 tablet (500 mg total) by mouth once daily.                 Follow-up Information     Georgie Ortega MD In 2 weeks.    Specialty:  Internal Medicine  Contact information:  [address] [place] [address]  [place] [address]  [phone]                     _________________________________  Gareth Wong MD  05/29/2020

## 2020-05-29 NOTE — PLAN OF CARE
05/29/20 0948   Post-Acute Status   Post-Acute Authorization Placement   Post-Acute Placement Status Pending Payor Review   WILMER following patient for post acute care needs. Patient accepted to [facility] for post acute care. WILMER sent provider updated clinicals via [employer]. CM placed PPD order. SW will follow up with PHN to ensure auth is being initiated. SW will continue to follow.  Malinda Fritz LMSW [facility]     (9:55AM) WILMER outreached PHN via phone at [phone] to follow up on patient auth status. Provider reports will proceed with auth once orders come through and will call once auth approved. SW will continue to follow.  Malinda Fritz LMSW [facility]     (10:10AM) WILMER outreached state at [phone] to complete LOCET. Provided caller with contact info. Provider states will have someone call back to complete LOCET.   Malinda Fritz LMSW [facility]    (10:23AM) WILMER called in LOCET to [facility] and faxed PASRR to state. Awaiting 142.  Malinda Fritz LMSW [facility]    (10:57AM) 142 received and uploaded to [employer].  Malinda Fritz LMSW [facility]     (10:57AM) WILMER outreached Anurag at [facility] to follow up on patient acceptance and to proceed with admit today. More reports accepting patient but is now realizing she will need bariatric equipment. Provider states that they do not have bariatric equipment to accommodate patient at this time and is unable to accept patient for admit. WILMER will continue to follow.  Malinda Fritz LMSW [facility]

## 2020-05-29 NOTE — PLAN OF CARE
[facility]    HOME HEALTH ORDERS  FACE TO FACE ENCOUNTER    Patient Name: Rosetta Whyte  YOB: 1945    PCP: Georgie Ortega MD   PCP Address: [address] [place] [address] B / [place] [address]  PCP Phone Number: [phone]  PCP Fax: [phone]    Encounter Date: 05/29/2020    Admit to Home Health    Diagnoses:  Active Hospital Problems    Diagnosis  POA    Systolic hypertension with cerebrovascular disease [I67.4]  Yes     Chronic    Anemia [D64.9]  Yes     Chronic    KELSEY (acute kidney injury) [N17.9]  No    Diastolic dysfunction, left ventricle [I51.9]  Yes     Chronic    Impaired functional mobility and activity tolerance [Z74.09]  Yes    Lymphedema of both lower extremities [I89.0]  Yes     Chronic    S/P laparoscopic sleeve gastrectomy [Z98.84]  Not Applicable     Chronic     7/7/2016      Resistant hypertension [I10]  Yes     Chronic      Resolved Hospital Problems    Diagnosis Date Resolved POA    *Hypertensive emergency [I16.1] 05/27/2020 Yes    Hypokalemia [E87.6] 05/28/2020 No    Altered mental status, unspecified [R41.82] 05/27/2020 No    Cellulitis of leg [L03.119] 05/27/2020 Yes    Acute hypoxemic respiratory failure [J96.01] 05/27/2020 Yes    Morbid obesity with BMI of 45.0-49.9, adult [E66.01, Z68.42] 05/27/2020 Not Applicable       No future appointments.  Follow-up Information     Georgie Ortega MD In 2 weeks.    Specialty:  Internal Medicine  Contact information:  [address] [place] [address]  [address]  [place] [address]  [phone]                     I have seen and examined this patient face to face today. My clinical findings that support the need for the home health skilled services and home bound status are the following:  Weakness/numbness causing balance and gait disturbance due to Weakness/Debility making it taxing to leave home.    Allergies:Review of patient's allergies indicates:  No Known Allergies    Diet: 2 gram sodium diet    Activities:  activity as tolerated    Nursing:   SN to complete comprehensive assessment including routine vital signs. Instruct on disease process and s/s of complications to report to MD. Review/verify medication list sent home with the patient at time of discharge  and instruct patient/caregiver as needed. Frequency may be adjusted depending on start of care date.    Notify MD if SBP > 160 or < 90; DBP > 90 or < 50; HR > 120 or < 50; Temp > 101      CONSULTS:    Physical Therapy to evaluate and treat. Evaluate for home safety and equipment needs; Establish/upgrade home exercise program. Perform / instruct on therapeutic exercises, gait training, transfer training, and Range of Motion.  Occupational Therapy to evaluate and treat. Evaluate home environment for safety and equipment needs. Perform/Instruct on transfers, ADL training, ROM, and therapeutic exercises.  Aide to provide assistance with personal care, ADLs, and vital signs.    MISCELLANEOUS CARE:  N/A    WOUND CARE ORDERS  n/a      Medications: Review discharge medications with patient and family and provide education.      Current Discharge Medication List      CONTINUE these medications which have CHANGED    Details   amLODIPine (NORVASC) 10 MG tablet Take 1 tablet (10 mg total) by mouth once daily.    Comments: .  Associated Diagnoses: Resistant hypertension; Systolic hypertension with cerebrovascular disease      esomeprazole (NEXIUM PACKET) 40 mg GrPS Take 40 mg by mouth before breakfast.      furosemide (LASIX) 40 MG tablet Take 1 tablet (40 mg total) by mouth once daily. HOLD until your doctor tells you to restart it.    Associated Diagnoses: Resistant hypertension; Systolic hypertension with cerebrovascular disease      gabapentin (NEURONTIN) 300 MG capsule Take 1 capsule (300 mg total) by mouth every evening.      ursodioL (ACTIGALL) 500 MG tablet Take 1 tablet (500 mg total) by mouth once daily.         CONTINUE these medications which have NOT CHANGED     Details   aspirin (ECOTRIN) 81 MG EC tablet Take 81 mg by mouth once daily.      atorvastatin (LIPITOR) 40 MG tablet Take 40 mg by mouth once daily.      carvedilol (COREG) 25 MG tablet Take 25 mg by mouth 2 (two) times daily.       clobetasol (TEMOVATE) 0.05 % cream Apply topically 2 (two) times daily. Apply to legs      clotrimazole-betamethasone 1-0.05% (LOTRISONE) cream Apply topically 2 (two) times daily.      hydrALAZINE (APRESOLINE) 100 MG tablet Take 100 mg by mouth 3 (three) times daily.          STOP taking these medications       albuterol (PROAIR HFA) 90 mcg/actuation inhaler Comments:   Reason for Stopping:         bumetanide (BUMEX) 2 MG tablet Comments:   Reason for Stopping:         cloNIDine 0.3 mg/24 hr td ptwk (CATAPRES) 0.3 mg/24 hr Comments:   Reason for Stopping:         fenoprofen (NALFON) 400 mg Cap Comments:   Reason for Stopping:         hydroCHLOROthiazide (HYDRODIURIL) 25 MG tablet Comments:   Reason for Stopping:         HYDROcodone-acetaminophen (NORCO) 7.5-325 mg per tablet Comments:   Reason for Stopping:         ketoconazole (NIZORAL) 2 % cream Comments:   Reason for Stopping:         losartan (COZAAR) 25 MG tablet Comments:   Reason for Stopping:         losartan-hydrochlorothiazide 100-25 mg (HYZAAR) 100-25 mg per tablet Comments:   Reason for Stopping:         NIFEdipine (ADALAT CC) 30 MG TbSR Comments:   Reason for Stopping:         potassium chloride SA (K-DUR,KLOR-CON) 20 MEQ tablet Comments:   Reason for Stopping:         traMADol (ULTRAM) 50 mg tablet Comments:   Reason for Stopping:               I certify that this patient is confined to her home and needs intermittent skilled nursing care, physical therapy and occupational therapy.    Electronically signed  Gareth Wong MD  [facility]  05/29/2020

## 2020-05-30 NOTE — NURSING
Discharge instructions given to patient. Verbalizes understanding. IV removed, catheter intact. No complaitns/concerns voiced at this time.

## 2020-06-02 ENCOUNTER — TELEPHONE (OUTPATIENT)
Dept: PHARMACY | Facility: CLINIC | Age: 75
End: 2020-06-02

## 2020-06-02 DIAGNOSIS — I1A.0 RESISTANT HYPERTENSION: Chronic | ICD-10-CM

## 2020-06-02 DIAGNOSIS — I67.4 SYSTOLIC HYPERTENSION WITH CEREBROVASCULAR DISEASE: Chronic | ICD-10-CM

## 2020-06-02 RX ORDER — FUROSEMIDE 40 MG/1
40 TABLET ORAL DAILY
Qty: 30 TABLET | Refills: 1 | Status: SHIPPED | OUTPATIENT
Start: 2020-06-02

## 2020-06-02 RX ORDER — ESOMEPRAZOLE MAGNESIUM 40 MG/1
40 CAPSULE, DELAYED RELEASE ORAL
Qty: 30 CAPSULE | Refills: 1 | Status: SHIPPED | OUTPATIENT
Start: 2020-06-02

## 2020-06-02 RX ORDER — URSODIOL 500 MG/1
500 TABLET, FILM COATED ORAL DAILY
Qty: 30 TABLET | Refills: 1 | Status: SHIPPED | OUTPATIENT
Start: 2020-06-02

## 2020-06-02 RX ORDER — ESOMEPRAZOLE MAGNESIUM 40 MG/1
40 GRANULE, DELAYED RELEASE ORAL
Qty: 30 EACH | Refills: 1 | Status: SHIPPED | OUTPATIENT
Start: 2020-06-02 | End: 2020-06-02 | Stop reason: CLARIF

## 2020-06-02 RX ORDER — AMLODIPINE BESYLATE 10 MG/1
10 TABLET ORAL DAILY
Qty: 30 TABLET | Refills: 1 | Status: SHIPPED | OUTPATIENT
Start: 2020-06-02

## 2020-06-02 NOTE — PLAN OF CARE
Patient refused SNF placement, insisting on going home with  services. Per PHN, [facility] accepted patient.        05/29/20 2000   Final Note   Assessment Type Final Discharge Note   Anticipated Discharge Disposition Home-Health   What phone number can be called within the next 1-3 days to see how you are doing after discharge? [phone]   Hospital Follow Up  Appt(s) scheduled? Yes   Discharge plans and expectations educations in teach back method with documentation complete? Yes     Georgie Ortega MD In 2 weeks.    Specialty:  Internal Medicine  Contact information:  [address] [place] [address]  [address]  [place] [id number] [phone]

## 2020-06-04 PROCEDURE — G0180 MD CERTIFICATION HHA PATIENT: HCPCS | Mod: ,,, | Performed by: HOSPITALIST

## 2020-06-04 PROCEDURE — G0180 PR HOME HEALTH MD CERTIFICATION: ICD-10-PCS | Mod: ,,, | Performed by: HOSPITALIST

## 2020-06-19 ENCOUNTER — EXTERNAL HOME HEALTH (OUTPATIENT)
Dept: HOME HEALTH SERVICES | Facility: HOSPITAL | Age: 75
End: 2020-06-19

## 2020-07-01 ENCOUNTER — DOCUMENT SCAN (OUTPATIENT)
Dept: HOME HEALTH SERVICES | Facility: HOSPITAL | Age: 75
End: 2020-07-01
Payer: MEDICARE

## 2020-07-02 ENCOUNTER — EXTERNAL HOME HEALTH (OUTPATIENT)
Dept: HOME HEALTH SERVICES | Facility: HOSPITAL | Age: 75
End: 2020-07-02

## 2020-08-07 ENCOUNTER — EXTERNAL HOME HEALTH (OUTPATIENT)
Dept: HOME HEALTH SERVICES | Facility: HOSPITAL | Age: 75
End: 2020-08-07
Payer: MEDICARE

## 2020-08-11 ENCOUNTER — DOCUMENT SCAN (OUTPATIENT)
Dept: HOME HEALTH SERVICES | Facility: HOSPITAL | Age: 75
End: 2020-08-11

## 2020-08-11 ENCOUNTER — DOCUMENT SCAN (OUTPATIENT)
Dept: HOME HEALTH SERVICES | Facility: HOSPITAL | Age: 75
End: 2020-08-11
Payer: MEDICARE

## 2020-09-02 ENCOUNTER — PATIENT OUTREACH (OUTPATIENT)
Dept: ADMINISTRATIVE | Facility: HOSPITAL | Age: 75
End: 2020-09-02

## 2020-09-02 NOTE — PROGRESS NOTES
Pre-visit chart review completed.  Immunizations reviewed and updated.    Patient due for the following    Hepatitis C Screening     TETANUS VACCINE     Mammogram     Shingles Vaccine (1 of 2)    Colorectal Cancer Screening     Pneumococcal Vaccine (65+ Low/Medium Risk) (1 of 2 - PCV13)    Influenza Vaccine (1)      Patient new to provider.    Dexa scan found in care everywhere.  Scanned to media.    updated.

## 2020-12-11 ENCOUNTER — TELEPHONE (OUTPATIENT)
Dept: PRIMARY CARE CLINIC | Facility: CLINIC | Age: 75
End: 2020-12-11

## 2020-12-11 NOTE — TELEPHONE ENCOUNTER
Patient informed, verbalized understanding. She states she will call one of her family members to bring her.

## 2020-12-11 NOTE — TELEPHONE ENCOUNTER
----- Message from Sanjeev De Oliveira MD sent at 12/11/2020 12:48 PM CST -----  Please call patient Patient  Coming in this afternoon.  Significant noncompliance.  Last time she was here I had to call the ambulance.        i strongly encourage her to go to urgent care if not the ER immediately with her complaints today of significant lower extremity swelling.    Echocardiogram reviewed from May.  She has some diastolic dysfunction; in other words heart failure.  There will be no adequate way for me completely work this up in clinic especially late on a Friday

## 2022-11-11 NOTE — ASSESSMENT & PLAN NOTE
Nutrition Assessment   Assessment Type: Follow up  Reason for Visit: MST  Referral Requested By: Nurse  Chart Medications Lab Results Reviewed:  Yes    Nutritional Risk Factors: Unintentional weight loss, Poor PO prior to admission and High risk diagnosis    Current Diet Order: Regular  Diet Tolerance: tolerating  Food Allergies: no  Priority Points: Status 2    Demographic/Anthropometrics Information  Gender: female   Patient Age: 81 year old  Height:    Ht Readings from Last 1 Encounters:   11/03/22 5' 8\" (1.727 m)      Weight:   Wt Readings from Last 1 Encounters:   11/03/22 51 kg      BMI:   BMI Readings from Last 1 Encounters:   11/03/22 17.10 kg/m²     Ideal Body Wt: Ideal body weight: 63.9 kg  6/5/22  49.7 kg  8/4/22 50.5 kg  10/04/22 46.7 kg  Current 51 kg  Physical Appearance: Muscle wasting  Weight Classification: Underweight (BMI < 18.5)      Nutrition Diagnosis (PES)  Inadequate oral intake related to Decreased intake as evidenced by Other: low BMI    Nutrition Plan  Recommended Nutrition Intervention: Coordination of nutrition care by a nutrition professional, Meals and snacks  and nutrition supplemental therapy  Monitor: Biochemical data, medical tests, procedures, Food and beverage intake and Weight    Discharge Needs: Pending  Care Plan Discussed With: Pt unable to participate in plan of care  Goals: Increase oral intake to >/=50%of meals and supplements  Goal Progress: Extended  Timeframe to Achieve Goal: 1-3 days    Dietitian Notes/Impressions/Recommendations:  SBO, chronic ileus  PMH: COPD, parkinsons, hernia repair, IBS,    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    11/4 Pt confused and caregiver not able to provide information. Per EMR pt with poor po intake. Pt is underweight.  No significant weight loss reported per EMR. Pt with SBO and currently npo. Surgery on consult.     11/8 RN reported pt has low appetite in he morning however during dinner time it gets better. Wt appears to be trending  Patient has chronic lower extremity lymphedema. Wound care consulted, appreciate assistance.   up.  Labs: reviewed, yousuf YODER    11/11 Spoke w/ the pt's caregiver and reported pt having good po intake.   Labs: lytes WNL    TREATMENT PLAN: Monitoring & Interventions   1. Diet: Continue regular diet  2. Oral Nutrition Supplement:Continue Ensure plus BID and send chocolate magic cup.   3. RD monitoring intake trends, weight, labs. Further recommendations based on clinical course.     Malnutrition Status: suspected

## 2023-10-03 NOTE — SUBJECTIVE & OBJECTIVE
Interval History: No events.  Pain improving slowly.  Pt is thirsty.  No BM.  Minimal to no nausea.  Minimal ambulation    Medications:  Continuous Infusions:   Scheduled Meds:   amLODIPine  10 mg Oral QAM    carvedilol  25 mg Oral BID    cloNIDine 0.3 mg/24 hr td ptwk  1 patch Transdermal Weekly    cloNIDine  0.1 mg Oral Once    famotidine (PF)  20 mg Intravenous BID    hydrALAZINE  100 mg Oral TID    labetalol  20 mg Intravenous Once    lactated ringers  1,000 mL Intravenous Once    losartan-hydrochlorothiazide 100-25 mg  1 tablet Oral QAM     PRN Meds:albuterol-ipratropium 2.5mg-0.5mg/3mL, diphenhydrAMINE, hydrALAZINE, labetalol, ondansetron, oxyCODONE-acetaminophen, oxyCODONE-acetaminophen, promethazine (PHENERGAN) IVPB, sodium chloride 0.9%     Review of patient's allergies indicates:  No Known Allergies  Objective:     Vital Signs (Most Recent):  Temp: 97.7 °F (36.5 °C) (11/08/17 0730)  Pulse: 86 (11/08/17 0730)  Resp: 20 (11/08/17 0730)  BP: (!) 121/58 (11/08/17 0730)  SpO2: (!) 94 % (11/08/17 0756) Vital Signs (24h Range):  Temp:  [96.7 °F (35.9 °C)-98.5 °F (36.9 °C)] 97.7 °F (36.5 °C)  Pulse:  [76-98] 86  Resp:  [17-26] 20  SpO2:  [92 %-96 %] 94 %  BP: (109-138)/(54-75) 121/58     Weight: 127 kg (280 lb)  Body mass index is 46.59 kg/m².    Intake/Output - Last 3 Shifts       11/06 0700 - 11/07 0659 11/07 0700 - 11/08 0659 11/08 0700 - 11/09 0659    I.V. (mL/kg) 1600 (12.6) 1075 (8.5)     IV Piggyback 1800      Total Intake(mL/kg) 3400 (26.8) 1075 (8.5)     Urine (mL/kg/hr) 0 (0) 150 (0)     Drains 80 (0) 40 (0)     Stool 0 (0) 0 (0)     Total Output 80 190      Net +3320 +885             Urine Occurrence 2 x 2 x     Stool Occurrence 0 x 0 x           Physical Exam   Gen: NAD  CV: RRR  Pulm: Coarse  GI: Soft, ATTP.  Staples CDI, minimal drain output    Significant Labs:  CBC:   Recent Labs  Lab 11/08/17  0542   WBC 11.10   RBC 4.11   HGB 12.2   HCT 37.6      MCV 92   MCH 29.7   MCHC 32.4      CMP:   Recent Labs  Lab 11/07/17  0536   *   CALCIUM 8.8   ALBUMIN 2.3*   PROT 5.9*      K 3.8   CO2 23      BUN 24*   CREATININE 0.9   ALKPHOS 110   ALT 16   AST 22   BILITOT 1.0       Significant Diagnostics:  n/a   Elidel Counseling: Patient may experience a mild burning sensation during topical application. Elidel is not approved in children less than 2 years of age. There have been case reports of hematologic and skin malignancies in patients using topical calcineurin inhibitors although causality is questionable.

## 2024-11-20 ENCOUNTER — HOSPITAL ENCOUNTER (INPATIENT)
Facility: HOSPITAL | Age: 79
LOS: 5 days | Discharge: HOME | DRG: 300 | End: 2024-11-28
Attending: EMERGENCY MEDICINE | Admitting: HOSPITALIST
Payer: MEDICARE

## 2024-11-20 DIAGNOSIS — R07.9 CHEST PAIN: ICD-10-CM

## 2024-11-20 DIAGNOSIS — Z74.09 IMPAIRED FUNCTIONAL MOBILITY AND ACTIVITY TOLERANCE: Primary | ICD-10-CM

## 2024-11-20 DIAGNOSIS — N17.9 AKI (ACUTE KIDNEY INJURY): ICD-10-CM

## 2024-11-20 DIAGNOSIS — R19.5 LOOSE STOOLS: ICD-10-CM

## 2024-11-20 DIAGNOSIS — I82.411 ACUTE DEEP VEIN THROMBOSIS (DVT) OF FEMORAL VEIN OF RIGHT LOWER EXTREMITY: ICD-10-CM

## 2024-11-20 DIAGNOSIS — N30.01 ACUTE CYSTITIS WITH HEMATURIA: ICD-10-CM

## 2024-11-20 LAB
ALBUMIN SERPL BCP-MCNC: 2.4 G/DL (ref 3.5–5.2)
ALP SERPL-CCNC: 129 U/L (ref 40–150)
ALT SERPL W/O P-5'-P-CCNC: 6 U/L (ref 10–44)
ANION GAP SERPL CALC-SCNC: 7 MMOL/L (ref 8–16)
AST SERPL-CCNC: 11 U/L (ref 10–40)
BASOPHILS # BLD AUTO: 0.12 K/UL (ref 0–0.2)
BASOPHILS NFR BLD: 1.5 % (ref 0–1.9)
BILIRUB SERPL-MCNC: 0.2 MG/DL (ref 0.1–1)
BNP SERPL-MCNC: 80 PG/ML (ref 0–99)
BUN SERPL-MCNC: 43 MG/DL (ref 8–23)
CALCIUM SERPL-MCNC: 8.5 MG/DL (ref 8.7–10.5)
CHLORIDE SERPL-SCNC: 119 MMOL/L (ref 95–110)
CO2 SERPL-SCNC: 21 MMOL/L (ref 23–29)
CREAT SERPL-MCNC: 1.1 MG/DL (ref 0.5–1.4)
DIFFERENTIAL METHOD BLD: ABNORMAL
EOSINOPHIL # BLD AUTO: 0.2 K/UL (ref 0–0.5)
EOSINOPHIL NFR BLD: 2.4 % (ref 0–8)
ERYTHROCYTE [DISTWIDTH] IN BLOOD BY AUTOMATED COUNT: 15 % (ref 11.5–14.5)
EST. GFR  (NO RACE VARIABLE): 51.4 ML/MIN/1.73 M^2
GLUCOSE SERPL-MCNC: 107 MG/DL (ref 70–110)
HCT VFR BLD AUTO: 44.5 % (ref 37–48.5)
HCV AB SERPL QL IA: NORMAL
HGB BLD-MCNC: 12.3 G/DL (ref 12–16)
HIV 1+2 AB+HIV1 P24 AG SERPL QL IA: NORMAL
IMM GRANULOCYTES # BLD AUTO: 0.1 K/UL (ref 0–0.04)
IMM GRANULOCYTES NFR BLD AUTO: 1.3 % (ref 0–0.5)
LYMPHOCYTES # BLD AUTO: 1.3 K/UL (ref 1–4.8)
LYMPHOCYTES NFR BLD: 16.7 % (ref 18–48)
MCH RBC QN AUTO: 30.8 PG (ref 27–31)
MCHC RBC AUTO-ENTMCNC: 27.6 G/DL (ref 32–36)
MCV RBC AUTO: 112 FL (ref 82–98)
MONOCYTES # BLD AUTO: 0.7 K/UL (ref 0.3–1)
MONOCYTES NFR BLD: 8.9 % (ref 4–15)
NEUTROPHILS # BLD AUTO: 5.4 K/UL (ref 1.8–7.7)
NEUTROPHILS NFR BLD: 69.2 % (ref 38–73)
NRBC BLD-RTO: 0 /100 WBC
OHS QRS DURATION: 92 MS
OHS QTC CALCULATION: 420 MS
PLATELET # BLD AUTO: 239 K/UL (ref 150–450)
PMV BLD AUTO: 10.3 FL (ref 9.2–12.9)
POTASSIUM SERPL-SCNC: 4.4 MMOL/L (ref 3.5–5.1)
PROT SERPL-MCNC: 6 G/DL (ref 6–8.4)
RBC # BLD AUTO: 3.99 M/UL (ref 4–5.4)
SODIUM SERPL-SCNC: 147 MMOL/L (ref 136–145)
TROPONIN I SERPL DL<=0.01 NG/ML-MCNC: 0.02 NG/ML (ref 0–0.03)
TROPONIN I SERPL DL<=0.01 NG/ML-MCNC: 0.02 NG/ML (ref 0–0.03)
WBC # BLD AUTO: 7.84 K/UL (ref 3.9–12.7)

## 2024-11-20 PROCEDURE — 25000003 PHARM REV CODE 250

## 2024-11-20 PROCEDURE — 83880 ASSAY OF NATRIURETIC PEPTIDE: CPT | Performed by: EMERGENCY MEDICINE

## 2024-11-20 PROCEDURE — 86803 HEPATITIS C AB TEST: CPT | Performed by: EMERGENCY MEDICINE

## 2024-11-20 PROCEDURE — 93005 ELECTROCARDIOGRAM TRACING: CPT

## 2024-11-20 PROCEDURE — 93010 ELECTROCARDIOGRAM REPORT: CPT | Mod: ,,, | Performed by: INTERNAL MEDICINE

## 2024-11-20 PROCEDURE — 85025 COMPLETE CBC W/AUTO DIFF WBC: CPT | Performed by: EMERGENCY MEDICINE

## 2024-11-20 PROCEDURE — 84484 ASSAY OF TROPONIN QUANT: CPT | Mod: 91 | Performed by: EMERGENCY MEDICINE

## 2024-11-20 PROCEDURE — 99285 EMERGENCY DEPT VISIT HI MDM: CPT | Mod: 25

## 2024-11-20 PROCEDURE — 80053 COMPREHEN METABOLIC PANEL: CPT | Performed by: EMERGENCY MEDICINE

## 2024-11-20 PROCEDURE — 87389 HIV-1 AG W/HIV-1&-2 AB AG IA: CPT | Performed by: EMERGENCY MEDICINE

## 2024-11-20 RX ORDER — PANTOPRAZOLE SODIUM 40 MG/1
40 TABLET, DELAYED RELEASE ORAL DAILY
Status: DISCONTINUED | OUTPATIENT
Start: 2024-11-21 | End: 2024-11-20

## 2024-11-20 RX ORDER — PANTOPRAZOLE SODIUM 40 MG/1
40 TABLET, DELAYED RELEASE ORAL DAILY
Status: DISCONTINUED | OUTPATIENT
Start: 2024-11-20 | End: 2024-11-20

## 2024-11-20 RX ORDER — PANTOPRAZOLE SODIUM 40 MG/1
40 TABLET, DELAYED RELEASE ORAL
Status: COMPLETED | OUTPATIENT
Start: 2024-11-20 | End: 2024-11-20

## 2024-11-20 RX ADMIN — PANTOPRAZOLE SODIUM 40 MG: 40 TABLET, DELAYED RELEASE ORAL at 07:11

## 2024-11-20 NOTE — Clinical Note
Diagnosis: Chest pain [502588]   Future Attending Provider: DESIREE GONZALEZ [[id number]]   Is the patient being sent to ED Observation?: No   Special Needs:: No Special Needs [1]

## 2024-11-21 PROBLEM — G47.33 OBSTRUCTIVE SLEEP APNEA: Status: ACTIVE | Noted: 2017-11-17

## 2024-11-21 PROBLEM — I82.411 RIGHT FEMORAL VEIN DVT: Status: ACTIVE | Noted: 2024-11-21

## 2024-11-21 PROBLEM — R07.9 CHEST PAIN: Status: ACTIVE | Noted: 2024-11-21

## 2024-11-21 PROBLEM — I10 ESSENTIAL (PRIMARY) HYPERTENSION: Status: ACTIVE | Noted: 2017-11-17

## 2024-11-21 PROBLEM — N30.01 ACUTE CYSTITIS WITH HEMATURIA: Status: ACTIVE | Noted: 2024-11-21

## 2024-11-21 PROBLEM — M79.675 PAIN IN TOES OF BOTH FEET: Status: ACTIVE | Noted: 2024-11-21

## 2024-11-21 PROBLEM — E66.01 MORBID (SEVERE) OBESITY DUE TO EXCESS CALORIES: Status: ACTIVE | Noted: 2017-11-17

## 2024-11-21 PROBLEM — M79.674 PAIN IN TOES OF BOTH FEET: Status: ACTIVE | Noted: 2024-11-21

## 2024-11-21 LAB
ANION GAP SERPL CALC-SCNC: 5 MMOL/L (ref 8–16)
ASCENDING AORTA: 2.83 CM
AV AREA BY CONTINUOUS VTI: 3 CM2
AV INDEX (PROSTH): 0.76
AV LVOT MEAN GRADIENT: 3 MMHG
AV LVOT PEAK GRADIENT: 6 MMHG
AV MEAN GRADIENT: 5.7 MMHG
AV PEAK GRADIENT: 11.6 MMHG
AV VALVE AREA BY VELOCITY RATIO: 2.9 CM²
AV VALVE AREA: 3.1 CM2
AV VELOCITY RATIO: 0.71
BACTERIA #/AREA URNS AUTO: ABNORMAL /HPF
BILIRUB UR QL STRIP: NEGATIVE
BSA FOR ECHO PROCEDURE: 2.54 M2
BUN SERPL-MCNC: 41 MG/DL (ref 8–23)
CALCIUM SERPL-MCNC: 8.3 MG/DL (ref 8.7–10.5)
CHLORIDE SERPL-SCNC: 119 MMOL/L (ref 95–110)
CLARITY UR REFRACT.AUTO: ABNORMAL
CO2 SERPL-SCNC: 20 MMOL/L (ref 23–29)
COLOR UR AUTO: YELLOW
CREAT SERPL-MCNC: 1.1 MG/DL (ref 0.5–1.4)
CV ECHO LV RWT: 0.45 CM
DOP CALC AO PEAK VEL: 1.7 M/S
DOP CALC AO VTI: 34.8 CM
DOP CALC LVOT AREA: 4.2 CM2
DOP CALC LVOT DIAMETER: 2.3 CM
DOP CALC LVOT PEAK VEL: 1.2 M/S
DOP CALC LVOT STROKE VOLUME: 109.2 CM3
DOP CALCLVOT PEAK VEL VTI: 26.3 CM
E WAVE DECELERATION TIME: 261.43 MS
E/A RATIO: 0.84
E/E' RATIO: 10.8 M/S
ECHO EF ESTIMATED: 67 %
ECHO LV POSTERIOR WALL: 1 CM (ref 0.6–1.1)
ERYTHROCYTE [DISTWIDTH] IN BLOOD BY AUTOMATED COUNT: 14.8 % (ref 11.5–14.5)
ERYTHROCYTE [DISTWIDTH] IN BLOOD BY AUTOMATED COUNT: 14.8 % (ref 11.5–14.5)
EST. GFR  (NO RACE VARIABLE): 51.4 ML/MIN/1.73 M^2
FRACTIONAL SHORTENING: 36.4 % (ref 28–44)
GLUCOSE SERPL-MCNC: 126 MG/DL (ref 70–110)
GLUCOSE UR QL STRIP: NEGATIVE
HCT VFR BLD AUTO: 38.2 % (ref 37–48.5)
HCT VFR BLD AUTO: 38.9 % (ref 37–48.5)
HGB BLD-MCNC: 11.5 G/DL (ref 12–16)
HGB BLD-MCNC: 11.6 G/DL (ref 12–16)
HGB UR QL STRIP: ABNORMAL
INTERVENTRICULAR SEPTUM: 1 CM (ref 0.6–1.1)
KETONES UR QL STRIP: NEGATIVE
LA MAJOR: 5.86 CM
LA MINOR: 5.31 CM
LA WIDTH: 4.4 CM
LEFT ATRIUM SIZE: 4 CM
LEFT ATRIUM VOLUME INDEX MOD: 34.6 ML/M2
LEFT ATRIUM VOLUME INDEX: 35.2 ML/M2
LEFT ATRIUM VOLUME MOD: 82 ML
LEFT ATRIUM VOLUME: 83.35 CM3
LEFT INTERNAL DIMENSION IN SYSTOLE: 2.8 CM (ref 2.1–4)
LEFT VENTRICLE DIASTOLIC VOLUME INDEX: 37.18 ML/M2
LEFT VENTRICLE DIASTOLIC VOLUME: 88.11 ML
LEFT VENTRICLE MASS INDEX: 62.4 G/M2
LEFT VENTRICLE SYSTOLIC VOLUME INDEX: 12.3 ML/M2
LEFT VENTRICLE SYSTOLIC VOLUME: 29.08 ML
LEFT VENTRICULAR INTERNAL DIMENSION IN DIASTOLE: 4.4 CM (ref 3.5–6)
LEFT VENTRICULAR MASS: 147.8 G
LEUKOCYTE ESTERASE UR QL STRIP: ABNORMAL
LV LATERAL E/E' RATIO: 11.57
LV SEPTAL E/E' RATIO: 10.13
MAGNESIUM SERPL-MCNC: 2.2 MG/DL (ref 1.6–2.6)
MCH RBC QN AUTO: 30.7 PG (ref 27–31)
MCH RBC QN AUTO: 30.7 PG (ref 27–31)
MCHC RBC AUTO-ENTMCNC: 29.6 G/DL (ref 32–36)
MCHC RBC AUTO-ENTMCNC: 30.4 G/DL (ref 32–36)
MCV RBC AUTO: 101 FL (ref 82–98)
MCV RBC AUTO: 104 FL (ref 82–98)
MICROSCOPIC COMMENT: ABNORMAL
MV PEAK A VEL: 0.97 M/S
MV PEAK E VEL: 0.81 M/S
NITRITE UR QL STRIP: POSITIVE
OHS CV RV/LV RATIO: 0.84 CM
PH UR STRIP: 5 [PH] (ref 5–8)
PLATELET # BLD AUTO: 230 K/UL (ref 150–450)
PLATELET # BLD AUTO: ABNORMAL K/UL (ref 150–450)
PMV BLD AUTO: 9.8 FL (ref 9.2–12.9)
PMV BLD AUTO: ABNORMAL FL (ref 9.2–12.9)
POTASSIUM SERPL-SCNC: 4 MMOL/L (ref 3.5–5.1)
PROT UR QL STRIP: NEGATIVE
RA MAJOR: 5.75 CM
RA PRESSURE ESTIMATED: 3 MMHG
RA WIDTH: 3.53 CM
RBC # BLD AUTO: 3.75 M/UL (ref 4–5.4)
RBC # BLD AUTO: 3.78 M/UL (ref 4–5.4)
RBC #/AREA URNS AUTO: 6 /HPF (ref 0–4)
RIGHT VENTRICLE DIASTOLIC BASEL DIMENSION: 3.7 CM
SINUS: 2.98 CM
SODIUM SERPL-SCNC: 144 MMOL/L (ref 136–145)
SP GR UR STRIP: 1.01 (ref 1–1.03)
SQUAMOUS #/AREA URNS AUTO: 5 /HPF
STJ: 2.7 CM
TDI LATERAL: 0.07 M/S
TDI SEPTAL: 0.08 M/S
TDI: 0.08 M/S
TRICUSPID ANNULAR PLANE SYSTOLIC EXCURSION: 1.59 CM
URN SPEC COLLECT METH UR: ABNORMAL
WBC # BLD AUTO: 7.73 K/UL (ref 3.9–12.7)
WBC # BLD AUTO: 8.39 K/UL (ref 3.9–12.7)
WBC #/AREA URNS AUTO: 19 /HPF (ref 0–5)
Z-SCORE OF LEFT VENTRICULAR DIMENSION IN END DIASTOLE: -8.45
Z-SCORE OF LEFT VENTRICULAR DIMENSION IN END SYSTOLE: -6.22

## 2024-11-21 PROCEDURE — 63600175 PHARM REV CODE 636 W HCPCS: Performed by: PHYSICIAN ASSISTANT

## 2024-11-21 PROCEDURE — 87186 SC STD MICRODIL/AGAR DIL: CPT

## 2024-11-21 PROCEDURE — 85027 COMPLETE CBC AUTOMATED: CPT

## 2024-11-21 PROCEDURE — 81003 URINALYSIS AUTO W/O SCOPE: CPT

## 2024-11-21 PROCEDURE — 81001 URINALYSIS AUTO W/SCOPE: CPT

## 2024-11-21 PROCEDURE — 25000003 PHARM REV CODE 250: Performed by: PHYSICIAN ASSISTANT

## 2024-11-21 PROCEDURE — 85027 COMPLETE CBC AUTOMATED: CPT | Mod: 91 | Performed by: STUDENT IN AN ORGANIZED HEALTH CARE EDUCATION/TRAINING PROGRAM

## 2024-11-21 PROCEDURE — 87086 URINE CULTURE/COLONY COUNT: CPT

## 2024-11-21 PROCEDURE — 99223 1ST HOSP IP/OBS HIGH 75: CPT | Mod: ,,, | Performed by: PODIATRIST

## 2024-11-21 PROCEDURE — G0378 HOSPITAL OBSERVATION PER HR: HCPCS

## 2024-11-21 PROCEDURE — 87088 URINE BACTERIA CULTURE: CPT

## 2024-11-21 PROCEDURE — 25500020 PHARM REV CODE 255: Performed by: HOSPITALIST

## 2024-11-21 PROCEDURE — 87184 SC STD DISK METHOD PER PLATE: CPT

## 2024-11-21 PROCEDURE — 83735 ASSAY OF MAGNESIUM: CPT

## 2024-11-21 PROCEDURE — 80048 BASIC METABOLIC PNL TOTAL CA: CPT

## 2024-11-21 PROCEDURE — 25000003 PHARM REV CODE 250

## 2024-11-21 RX ORDER — LOSARTAN POTASSIUM AND HYDROCHLOROTHIAZIDE 25; 100 MG/1; MG/1
1 TABLET ORAL DAILY
Status: ON HOLD | COMMUNITY
End: 2024-11-25 | Stop reason: HOSPADM

## 2024-11-21 RX ORDER — FUROSEMIDE 40 MG/1
40 TABLET ORAL DAILY
Status: DISCONTINUED | OUTPATIENT
Start: 2024-11-21 | End: 2024-11-23

## 2024-11-21 RX ORDER — CLINDAMYCIN HYDROCHLORIDE 300 MG/1
CAPSULE ORAL
COMMUNITY
End: 2024-11-21

## 2024-11-21 RX ORDER — AZITHROMYCIN 250 MG/1
TABLET, FILM COATED ORAL
COMMUNITY
End: 2024-11-21

## 2024-11-21 RX ORDER — IPRATROPIUM BROMIDE AND ALBUTEROL SULFATE 2.5; .5 MG/3ML; MG/3ML
3 SOLUTION RESPIRATORY (INHALATION) EVERY 4 HOURS PRN
Status: DISCONTINUED | OUTPATIENT
Start: 2024-11-21 | End: 2024-11-28 | Stop reason: HOSPADM

## 2024-11-21 RX ORDER — METOLAZONE 2.5 MG/1
1 TABLET ORAL DAILY
COMMUNITY
End: 2024-11-21

## 2024-11-21 RX ORDER — LOSARTAN POTASSIUM AND HYDROCHLOROTHIAZIDE 25; 100 MG/1; MG/1
1 TABLET ORAL DAILY
Status: DISCONTINUED | OUTPATIENT
Start: 2024-11-21 | End: 2024-11-22

## 2024-11-21 RX ORDER — ACETAMINOPHEN 325 MG/1
650 TABLET ORAL EVERY 4 HOURS PRN
Status: DISCONTINUED | OUTPATIENT
Start: 2024-11-21 | End: 2024-11-28 | Stop reason: HOSPADM

## 2024-11-21 RX ORDER — CARVEDILOL 25 MG/1
25 TABLET ORAL 2 TIMES DAILY
Status: DISCONTINUED | OUTPATIENT
Start: 2024-11-21 | End: 2024-11-23

## 2024-11-21 RX ORDER — CLONIDINE 0.3 MG/24H
1 PATCH, EXTENDED RELEASE TRANSDERMAL
COMMUNITY
End: 2024-11-21

## 2024-11-21 RX ORDER — SODIUM CHLORIDE 0.9 % (FLUSH) 0.9 %
5 SYRINGE (ML) INJECTION
Status: DISCONTINUED | OUTPATIENT
Start: 2024-11-21 | End: 2024-11-28 | Stop reason: HOSPADM

## 2024-11-21 RX ORDER — ERGOCALCIFEROL 1.25 MG/1
50000 CAPSULE ORAL
COMMUNITY
End: 2024-11-21

## 2024-11-21 RX ORDER — NALOXONE HCL 0.4 MG/ML
0.02 VIAL (ML) INJECTION
Status: DISCONTINUED | OUTPATIENT
Start: 2024-11-21 | End: 2024-11-28 | Stop reason: HOSPADM

## 2024-11-21 RX ORDER — PROCHLORPERAZINE EDISYLATE 5 MG/ML
5 INJECTION INTRAMUSCULAR; INTRAVENOUS EVERY 6 HOURS PRN
Status: DISCONTINUED | OUTPATIENT
Start: 2024-11-21 | End: 2024-11-28 | Stop reason: HOSPADM

## 2024-11-21 RX ORDER — FAMOTIDINE 20 MG/1
20 TABLET, FILM COATED ORAL 2 TIMES DAILY
Status: DISCONTINUED | OUTPATIENT
Start: 2024-11-21 | End: 2024-11-23

## 2024-11-21 RX ORDER — BUMETANIDE 2 MG/1
2 TABLET ORAL
COMMUNITY
End: 2024-11-21

## 2024-11-21 RX ORDER — ACETAMINOPHEN 500 MG
1000 TABLET ORAL EVERY 8 HOURS PRN
Status: DISCONTINUED | OUTPATIENT
Start: 2024-11-21 | End: 2024-11-28 | Stop reason: HOSPADM

## 2024-11-21 RX ORDER — ALUMINUM HYDROXIDE, MAGNESIUM HYDROXIDE, AND SIMETHICONE 1200; 120; 1200 MG/30ML; MG/30ML; MG/30ML
30 SUSPENSION ORAL 4 TIMES DAILY PRN
Status: DISCONTINUED | OUTPATIENT
Start: 2024-11-21 | End: 2024-11-28 | Stop reason: HOSPADM

## 2024-11-21 RX ORDER — HYDROCODONE BITARTRATE AND ACETAMINOPHEN 7.5; 325 MG/15ML; MG/15ML
15 SOLUTION ORAL
COMMUNITY
End: 2024-11-21

## 2024-11-21 RX ORDER — POLYETHYLENE GLYCOL 3350 17 G/17G
17 POWDER, FOR SOLUTION ORAL 2 TIMES DAILY PRN
Status: DISCONTINUED | OUTPATIENT
Start: 2024-11-21 | End: 2024-11-28 | Stop reason: HOSPADM

## 2024-11-21 RX ORDER — AMLODIPINE BESYLATE 10 MG/1
10 TABLET ORAL DAILY
Status: DISCONTINUED | OUTPATIENT
Start: 2024-11-21 | End: 2024-11-23

## 2024-11-21 RX ORDER — POTASSIUM CHLORIDE 20 MEQ/15ML
SOLUTION ORAL
COMMUNITY
End: 2024-11-21

## 2024-11-21 RX ORDER — LOSARTAN POTASSIUM 25 MG/1
TABLET ORAL
COMMUNITY
End: 2024-11-21

## 2024-11-21 RX ORDER — ENOXAPARIN SODIUM 100 MG/ML
60 INJECTION SUBCUTANEOUS EVERY 12 HOURS
Status: DISCONTINUED | OUTPATIENT
Start: 2024-11-21 | End: 2024-11-21

## 2024-11-21 RX ORDER — AMOXICILLIN 500 MG/1
CAPSULE ORAL
COMMUNITY
End: 2024-11-21

## 2024-11-21 RX ORDER — ONDANSETRON 8 MG/1
8 TABLET, ORALLY DISINTEGRATING ORAL EVERY 8 HOURS PRN
Status: DISCONTINUED | OUTPATIENT
Start: 2024-11-21 | End: 2024-11-28 | Stop reason: HOSPADM

## 2024-11-21 RX ORDER — MONTELUKAST SODIUM 10 MG/1
TABLET ORAL
COMMUNITY
End: 2024-11-21

## 2024-11-21 RX ORDER — METRONIDAZOLE 500 MG/1
TABLET ORAL
COMMUNITY
End: 2024-11-21

## 2024-11-21 RX ORDER — TALC
6 POWDER (GRAM) TOPICAL NIGHTLY PRN
Status: DISCONTINUED | OUTPATIENT
Start: 2024-11-21 | End: 2024-11-28 | Stop reason: HOSPADM

## 2024-11-21 RX ORDER — SIMETHICONE 80 MG
1 TABLET,CHEWABLE ORAL 4 TIMES DAILY PRN
Status: DISCONTINUED | OUTPATIENT
Start: 2024-11-21 | End: 2024-11-28 | Stop reason: HOSPADM

## 2024-11-21 RX ORDER — ALBUTEROL SULFATE 90 UG/1
1 INHALANT RESPIRATORY (INHALATION)
COMMUNITY

## 2024-11-21 RX ORDER — HYDROCHLOROTHIAZIDE 25 MG/1
25 TABLET ORAL DAILY
COMMUNITY
End: 2024-11-21

## 2024-11-21 RX ORDER — CEPHALEXIN 500 MG/1
CAPSULE ORAL
COMMUNITY
End: 2024-11-21

## 2024-11-21 RX ORDER — CEFTRIAXONE 1 G/1
1 INJECTION, POWDER, FOR SOLUTION INTRAMUSCULAR; INTRAVENOUS
Status: DISCONTINUED | OUTPATIENT
Start: 2024-11-21 | End: 2024-11-23

## 2024-11-21 RX ADMIN — IOHEXOL 100 ML: 350 INJECTION, SOLUTION INTRAVENOUS at 05:11

## 2024-11-21 RX ADMIN — FAMOTIDINE 20 MG: 20 TABLET ORAL at 10:11

## 2024-11-21 RX ADMIN — CEFTRIAXONE SODIUM 1 G: 1 INJECTION, POWDER, FOR SOLUTION INTRAMUSCULAR; INTRAVENOUS at 09:11

## 2024-11-21 RX ADMIN — APIXABAN 10 MG: 5 TABLET, FILM COATED ORAL at 09:11

## 2024-11-21 RX ADMIN — FUROSEMIDE 40 MG: 40 TABLET ORAL at 09:11

## 2024-11-21 RX ADMIN — CARVEDILOL 25 MG: 12.5 TABLET, FILM COATED ORAL at 09:11

## 2024-11-21 RX ADMIN — AMLODIPINE BESYLATE 10 MG: 10 TABLET ORAL at 09:11

## 2024-11-21 RX ADMIN — HYPROMELLOSE 2910 1 DROP: 5 SOLUTION/ DROPS OPHTHALMIC at 10:11

## 2024-11-21 RX ADMIN — APIXABAN 10 MG: 5 TABLET, FILM COATED ORAL at 10:11

## 2024-11-21 RX ADMIN — LOSARTAN POTASSIUM AND HYDROCHLOROTHIAZIDE 1 TABLET: 100; 25 TABLET, FILM COATED ORAL at 09:11

## 2024-11-21 RX ADMIN — FAMOTIDINE 20 MG: 20 TABLET ORAL at 09:11

## 2024-11-21 RX ADMIN — HYPROMELLOSE 2910 1 DROP: 5 SOLUTION/ DROPS OPHTHALMIC at 09:11

## 2024-11-21 RX ADMIN — CARVEDILOL 25 MG: 12.5 TABLET, FILM COATED ORAL at 10:11

## 2024-11-21 NOTE — HPI
Rosetta Whyte is a 78 yo F with PMHx of HTN, morbid obesity, hx of gastric bypass, lymphedema, EMMANUEL, HFpEF who presented to ED for chest pain. Endorses sudden, midsternal, nonradiating chest pain that began after eating lunch and lasted for approximately 30 minutes. She had a similar experience of Friday, but it did not last as long. Chest pain has not recurred since being in the ED. She also endorses pain to her bilateral big toes as she states she recently hit them at her home. She has been mostly bedbound for the past couple of years. She lives alone, but her daughter and sister both live down her street and come by to see her daily, keep her clean, and provide her food. She would like to start working on NH placement. Also reports L eye irritation for the past few weeks. Denies fever, chills, palpitations, SOB, cough, n/v/d, dysuria, headaches.    In ED:  Afebrile. HDS. No leukocytosis. CMP notable for Na 147, serum bicarb  21, BUN 43. BNP 80. Trop 0.024 >> 0.021. CXR without acute process. Given po pantoprazole 40 mg. Placed in observation with HM.

## 2024-11-21 NOTE — ASSESSMENT & PLAN NOTE
Body mass index is 54.24 kg/m². Morbid obesity complicates all aspects of disease management from diagnostic modalities to treatment. Weight loss encouraged and health benefits explained to patient.

## 2024-11-21 NOTE — ASSESSMENT & PLAN NOTE
- BLE US with R femoral DVT  - started on Eliquis 10mg PO BID x 7 days then 5mg BID   - pending CTA chest

## 2024-11-21 NOTE — CONSULTS
Hospital Medicine Pharmacy Consult Note    PharmD Consult Received For:     Other : Medication apixaban was evaluated and the cost for a 30-day supply is $0.  Please let us know if you have any questions.     Pharmacy will sign-off, please re-consult as needed    Thank you for the consult,  Jose Dowling  [place] [phone]    **Note: Consults are reviewed Monday-Friday 7:00am-3:30pm. The above recommendations are only suggested. The recommendations should be considered in conjunction with all patient factors.**

## 2024-11-21 NOTE — ASSESSMENT & PLAN NOTE
Rosetta Whyte is a 79 y.o. female contracted bilateral big toes.  Patient has been experiencing pain for quite some time.  Hallux rigidus/decreased range of motion for the 1st MPJ noted in bilateral great toes.  No open wounds.  Left hallux with sublingual hematoma.  No concern for acute infection and bilateral feet.  No acute surgical intervention indicated at this time.    - left hallux with sublingual hematoma, we will debrided nail and assess for potential wound.  - recommend bilateral heel offloading boots.  Patient is susceptible to pressure ulcers of the bilateral heel.  - nothing to culture.  - bilateral x-rays ordered, to assess for potential trauma.  - pending no acute abnormalities and x-rays, podiatry will sign off.    Future discharge recommendations  - recommend ambulatory referral to Podiatry, for regular routine foot care.  - recommend outpatient referral to wound care, patient will if it from lymphedema clinic as well.  - while bed-bound, recommend heel offloading.

## 2024-11-21 NOTE — SUBJECTIVE & OBJECTIVE
Interval History: NAEON. VSS. Patient seen and examined at bedside today. Patient reports resolution of CP. Echo with no wall motion abnormalities. BLE US with R femoral DVT. Started on Eliquis. Pending CTA. UA infectious, started on CTX. Podiatry evaluated patient, recommending B offloading boots and B foot XR. Wound care consulted, appreciate recs.     Review of Systems   Constitutional:  Positive for activity change. Negative for chills and fever.   HENT:  Negative for trouble swallowing.    Eyes:  Negative for photophobia and visual disturbance.   Respiratory:  Negative for cough, chest tightness and shortness of breath.    Cardiovascular:  Positive for chest pain. Negative for palpitations and leg swelling.   Gastrointestinal:  Positive for abdominal distention. Negative for abdominal pain, constipation, diarrhea, nausea and vomiting.   Genitourinary:  Negative for dysuria, frequency and hematuria.   Musculoskeletal:  Positive for gait problem. Negative for back pain and neck pain.   Skin:  Positive for wound. Negative for rash.   Neurological:  Negative for dizziness, syncope, speech difficulty and light-headedness.   Psychiatric/Behavioral:  Negative for agitation and confusion. The patient is not nervous/anxious.      Objective:     Vital Signs (Most Recent):  Temp: 97.9 °F (36.6 °C) (11/21/24 1309)  Pulse: 60 (11/21/24 1309)  Resp: 20 (11/21/24 0710)  BP: (!) 90/54 (11/21/24 1309)  SpO2: 99 % (11/21/24 1309) Vital Signs (24h Range):  Temp:  [97.4 °F (36.3 °C)-98.2 °F (36.8 °C)] 97.9 °F (36.6 °C)  Pulse:  [60-85] 60  Resp:  [15-20] 20  SpO2:  [96 %-99 %] 99 %  BP: ()/(54-71) 90/54     Weight: (!) 181.9 kg (401 lb 0.3 oz)  Body mass index is 68.83 kg/m².    Intake/Output Summary (Last 24 hours) at 11/21/2024 1346  Last data filed at 11/21/2024 0047  Gross per 24 hour   Intake --   Output 100 ml   Net -100 ml         Physical Exam  Vitals and nursing note reviewed.   Constitutional:       General: She is  not in acute distress.     Appearance: She is well-developed. She is obese.      Comments: Morbid obesity with BMI 54.   HENT:      Head: Normocephalic and atraumatic.      Mouth/Throat:      Pharynx: No oropharyngeal exudate.   Eyes:      Conjunctiva/sclera: Conjunctivae normal.      Pupils: Pupils are equal, round, and reactive to light.   Cardiovascular:      Rate and Rhythm: Normal rate and regular rhythm.      Heart sounds: Normal heart sounds.   Pulmonary:      Effort: Pulmonary effort is normal.      Breath sounds: Normal breath sounds.      Comments: Limited 2/2 body habitus  Abdominal:      General: Bowel sounds are normal. There is no distension.      Palpations: Abdomen is soft.      Tenderness: There is no abdominal tenderness.   Musculoskeletal:         General: Tenderness (BLE) present. Normal range of motion.      Cervical back: Normal range of motion and neck supple.      Right lower leg: Edema present.      Left lower leg: Edema present.   Lymphadenopathy:      Cervical: No cervical adenopathy.   Skin:     General: Skin is warm and dry.      Capillary Refill: Capillary refill takes less than 2 seconds.      Findings: No rash.      Comments: Significant lymphedema to BLE with skin changes including papillomas and hyperkeratosis. Small wound to L big toe   Neurological:      Mental Status: She is alert and oriented to person, place, and time.      Cranial Nerves: No cranial nerve deficit.      Sensory: No sensory deficit.      Coordination: Coordination normal.   Psychiatric:         Behavior: Behavior normal.         Thought Content: Thought content normal.         Judgment: Judgment normal.                           Significant Labs: All pertinent labs within the past 24 hours have been reviewed.  BMP:   Recent Labs   Lab 11/21/24  0315   *      K 4.0   *   CO2 20*   BUN 41*   CREATININE 1.1   CALCIUM 8.3*   MG 2.2     CBC:   Recent Labs   Lab 11/20/24  1705 11/21/24  0315  11/21/24  1045   WBC 7.84 7.73 8.39   HGB 12.3 11.5* 11.6*   HCT 44.5 38.9 38.2    SEE COMMENT 230     CMP:   Recent Labs   Lab 11/20/24  1705 11/21/24  0315   * 144   K 4.4 4.0   * 119*   CO2 21* 20*    126*   BUN 43* 41*   CREATININE 1.1 1.1   CALCIUM 8.5* 8.3*   PROT 6.0  --    ALBUMIN 2.4*  --    BILITOT 0.2  --    ALKPHOS 129  --    AST 11  --    ALT 6*  --    ANIONGAP 7* 5*     Urine Studies:   Recent Labs   Lab 11/21/24  0546   COLORU Yellow   APPEARANCEUA Hazy*   PHUR 5.0   SPECGRAV 1.015   PROTEINUA Negative   GLUCUA Negative   KETONESU Negative   BILIRUBINUA Negative   OCCULTUA Trace*   NITRITE Positive*   LEUKOCYTESUR 2+*   RBCUA 6*   WBCUA 19*   BACTERIA Many*   SQUAMEPITHEL 5       Significant Imaging: I have reviewed all pertinent imaging results/findings within the past 24 hours.

## 2024-11-21 NOTE — ED NOTES
Patient identifiers for Rosetta Whyte 79 y.o. female checked and correct.  Chief Complaint   Patient presents with    Chest Pain     Starting after eating lunch; pressure in chest-substernal. Pt denies SOB or other s/s.     Past Medical History:   Diagnosis Date    Arthritis     BMI 50.0-59.9, adult     Difficult intubation 11/06/2017    Poor neck extension, Grade 4 view with DL    Hypertension     Morbid obesity     EMMANUEL (obstructive sleep apnea)      Allergies reported: Review of patient's allergies indicates:  No Known Allergies      LOC: Patient is awake, alert, and aware of environment with an appropriate affect. Patient is oriented x 4 and speaking appropriately.  APPEARANCE: Patient resting comfortably and in no acute distress. Patient is clean and well groomed, patient's clothing is properly fastened.  HEENT: - JVD, + midline trach  SKIN: The skin is warm and dry. Patient has normal skin turgor and moist mucus membranes. Wounds to bilateral LE.  MUSKULOSKELETAL: Patient is moving all extremities well, no obvious deformities noted. Pulses intact. PMS x 4  RESPIRATORY: Airway is open and patent. Respirations are spontaneous and non-labored with normal effort and rate.  CARDIAC: Patient has a normal rate and rhythm. 82 on cardiac monitor. Bilateral LE edema  ABDOMEN: No distention noted. Soft and non-tender upon palpation.  NEUROLOGICAL: pupils 4 mm, PERRL. Facial expression is symmetrical. Hand grasps are equal bilaterally. Normal sensation in all extremities when touched with finger.

## 2024-11-21 NOTE — ASSESSMENT & PLAN NOTE
79 y.o. who presents with midsternal chest pain without radiation that lasted for 30 minutes and occurred after eating lunch. Pain resolved without intervention and has not recurred.     - EKG without acute ischemic changes  - trop 0.024 >>0.021  - high suspicion for gastric reflux. Start pepcid BID  - will obtain updated echo   - cardiac telemetry

## 2024-11-21 NOTE — ED NOTES
Received bedside report from LARRY Mckeon. Assumed care of pt at this time  Pt AAOx4, resting comfortably in bed, NAD, respirations E/UL, updated on POC, wheels locked and in low position, call bell with in reach, Comfort positioning and restroom needs were addressed. Necessary items were placed with in reach and was advised when a reassessment would take place.

## 2024-11-21 NOTE — PLAN OF CARE
Problem: Skin Injury Risk Increased  Goal: Skin Health and Integrity  Outcome: Progressing     Problem: Adult Inpatient Plan of Care  Goal: Plan of Care Review  Outcome: Progressing  Goal: Patient-Specific Goal (Individualized)  Outcome: Progressing  Goal: Absence of Hospital-Acquired Illness or Injury  Outcome: Progressing  Goal: Optimal Comfort and Wellbeing  Outcome: Progressing  Goal: Readiness for Transition of Care  Outcome: Progressing     Problem: Bariatric Environmental Safety  Goal: Safety Maintained with Care  Outcome: Progressing     Problem: Infection  Goal: Absence of Infection Signs and Symptoms  Outcome: Progressing

## 2024-11-21 NOTE — PROGRESS NOTES
[facility]  Hospital Medicine  Progress Note    Patient Name: Rosetta Whyte  MRN: [id number]  Patient Class: OP- Observation   Admission Date: 11/20/2024  Length of Stay: 0 days  Attending Physician: Martita Montesinos MD  Primary Care Provider: Georgie Ortega MD        Subjective:     Principal Problem:Impaired functional mobility and activity tolerance        HPI:  Rosetta Whyte is a 78 yo F with PMHx of HTN, morbid obesity, hx of gastric bypass, lymphedema, EMMANUEL, HFpEF who presented to ED for chest pain. Endorses sudden, midsternal, nonradiating chest pain that began after eating lunch and lasted for approximately 30 minutes. She had a similar experience of Friday, but it did not last as long. Chest pain has not recurred since being in the ED. She also endorses pain to her bilateral big toes as she states she recently hit them at her home. She has been mostly bedbound for the past couple of years. She lives alone, but her daughter and sister both live down her street and come by to see her daily, keep her clean, and provide her food. She would like to start working on NH placement. Also reports L eye irritation for the past few weeks. Denies fever, chills, palpitations, SOB, cough, n/v/d, dysuria, headaches.    In ED:  Afebrile. HDS. No leukocytosis. CMP notable for Na 147, serum bicarb  21, BUN 43. BNP 80. Trop 0.024 >> 0.021. CXR without acute process. Given po pantoprazole 40 mg. Placed in observation with HM.     Overview/Hospital Course:  Patient admitted to hospital medicine for chest pain and BLE edema. Troponin down trending. Echo with EF of 60-65% with no wall motion changes. BLE US with R femoral DVT, started on Eliquis. CTA pending. Podiatry consulted, ordered B foot XR which is pending. UA infectious, started on CTX. Patient and family interested in long term placement which CMSW has discussed. Plan for discharge home when stable.    Interval History: NAEON. VSS. Patient seen and  examined at bedside today. Patient reports resolution of CP. Echo with no wall motion abnormalities. BLE US with R femoral DVT. Started on Eliquis. Pending CTA. UA infectious, started on CTX. Podiatry evaluated patient, recommending B offloading boots and B foot XR. Wound care consulted, appreciate recs.     Review of Systems   Constitutional:  Positive for activity change. Negative for chills and fever.   HENT:  Negative for trouble swallowing.    Eyes:  Negative for photophobia and visual disturbance.   Respiratory:  Negative for cough, chest tightness and shortness of breath.    Cardiovascular:  Positive for chest pain. Negative for palpitations and leg swelling.   Gastrointestinal:  Positive for abdominal distention. Negative for abdominal pain, constipation, diarrhea, nausea and vomiting.   Genitourinary:  Negative for dysuria, frequency and hematuria.   Musculoskeletal:  Positive for gait problem. Negative for back pain and neck pain.   Skin:  Positive for wound. Negative for rash.   Neurological:  Negative for dizziness, syncope, speech difficulty and light-headedness.   Psychiatric/Behavioral:  Negative for agitation and confusion. The patient is not nervous/anxious.      Objective:     Vital Signs (Most Recent):  Temp: 97.9 °F (36.6 °C) (11/21/24 1309)  Pulse: 60 (11/21/24 1309)  Resp: 20 (11/21/24 0710)  BP: (!) 90/54 (11/21/24 1309)  SpO2: 99 % (11/21/24 1309) Vital Signs (24h Range):  Temp:  [97.4 °F (36.3 °C)-98.2 °F (36.8 °C)] 97.9 °F (36.6 °C)  Pulse:  [60-85] 60  Resp:  [15-20] 20  SpO2:  [96 %-99 %] 99 %  BP: ()/(54-71) 90/54     Weight: (!) 181.9 kg (401 lb 0.3 oz)  Body mass index is 68.83 kg/m².    Intake/Output Summary (Last 24 hours) at 11/21/2024 1346  Last data filed at 11/21/2024 0047  Gross per 24 hour   Intake --   Output 100 ml   Net -100 ml         Physical Exam  Vitals and nursing note reviewed.   Constitutional:       General: She is not in acute distress.     Appearance: She is  well-developed. She is obese.      Comments: Morbid obesity with BMI 54.   HENT:      Head: Normocephalic and atraumatic.      Mouth/Throat:      Pharynx: No oropharyngeal exudate.   Eyes:      Conjunctiva/sclera: Conjunctivae normal.      Pupils: Pupils are equal, round, and reactive to light.   Cardiovascular:      Rate and Rhythm: Normal rate and regular rhythm.      Heart sounds: Normal heart sounds.   Pulmonary:      Effort: Pulmonary effort is normal.      Breath sounds: Normal breath sounds.      Comments: Limited 2/2 body habitus  Abdominal:      General: Bowel sounds are normal. There is no distension.      Palpations: Abdomen is soft.      Tenderness: There is no abdominal tenderness.   Musculoskeletal:         General: Tenderness (BLE) present. Normal range of motion.      Cervical back: Normal range of motion and neck supple.      Right lower leg: Edema present.      Left lower leg: Edema present.   Lymphadenopathy:      Cervical: No cervical adenopathy.   Skin:     General: Skin is warm and dry.      Capillary Refill: Capillary refill takes less than 2 seconds.      Findings: No rash.      Comments: Significant lymphedema to BLE with skin changes including papillomas and hyperkeratosis. Small wound to L big toe   Neurological:      Mental Status: She is alert and oriented to person, place, and time.      Cranial Nerves: No cranial nerve deficit.      Sensory: No sensory deficit.      Coordination: Coordination normal.   Psychiatric:         Behavior: Behavior normal.         Thought Content: Thought content normal.         Judgment: Judgment normal.                           Significant Labs: All pertinent labs within the past 24 hours have been reviewed.  BMP:   Recent Labs   Lab 11/21/24  0315   *      K 4.0   *   CO2 20*   BUN 41*   CREATININE 1.1   CALCIUM 8.3*   MG 2.2     CBC:   Recent Labs   Lab 11/20/24  1705 11/21/24  0315 11/21/24  1045   WBC 7.84 7.73 8.39   HGB 12.3 11.5*  11.6*   HCT 44.5 38.9 38.2    SEE COMMENT 230     CMP:   Recent Labs   Lab 11/20/24  1705 11/21/24  0315   * 144   K 4.4 4.0   * 119*   CO2 21* 20*    126*   BUN 43* 41*   CREATININE 1.1 1.1   CALCIUM 8.5* 8.3*   PROT 6.0  --    ALBUMIN 2.4*  --    BILITOT 0.2  --    ALKPHOS 129  --    AST 11  --    ALT 6*  --    ANIONGAP 7* 5*     Urine Studies:   Recent Labs   Lab 11/21/24  0546   COLORU Yellow   APPEARANCEUA Hazy*   PHUR 5.0   SPECGRAV 1.015   PROTEINUA Negative   GLUCUA Negative   KETONESU Negative   BILIRUBINUA Negative   OCCULTUA Trace*   NITRITE Positive*   LEUKOCYTESUR 2+*   RBCUA 6*   WBCUA 19*   BACTERIA Many*   SQUAMEPITHEL 5       Significant Imaging: I have reviewed all pertinent imaging results/findings within the past 24 hours.    Assessment/Plan:      * Impaired functional mobility and activity tolerance  - has been bedbound for multiple years now. Lives alone, but family checks in on her  - she would like to proceed with NH placement. Explained that she would likely be discharged back home while placement is pending. Patient agrees to plan   - CM to assist with initiating process of NH placement     Right femoral vein DVT  - BLE US with R femoral DVT  - started on Eliquis 10mg PO BID x 7 days then 5mg BID   - pending CTA chest      Acute cystitis with hematuria  - UA infectious  - started on CTX  - follow up UCx      Pain in toes of both feet  - wound noted below L big toe nail  - podiatry consulted, Recommend:  - left hallux with sublingual hematoma, we will debrided nail and assess for potential wound.  - recommend bilateral heel offloading boots.  Patient is susceptible to pressure ulcers of the bilateral heel.  - nothing to culture.  - bilateral x-rays ordered, to assess for potential trauma.  - pending no acute abnormalities and x-rays, podiatry will sign off and follow up outpatient    Chest pain  79 y.o. who presents with midsternal chest pain without radiation that  lasted for 30 minutes and occurred after eating lunch. Pain resolved without intervention and has not recurred.     - EKG without acute ischemic changes  - trop 0.024 >>0.021  - high suspicion for gastric reflux. Start pepcid BID  - Echo below  - cardiac telemetry    Results for orders placed during the hospital encounter of 11/20/24    Echo    Interpretation Summary    Left Ventricle: The left ventricle is normal in size. Normal wall thickness. There is concentric remodeling. There is normal systolic function with a visually estimated ejection fraction of 60 - 65%. There is normal diastolic function.    Right Ventricle: Normal right ventricular cavity size. Wall thickness is normal. Systolic function is normal.    Left Atrium: Left atrium is mildly dilated.    IVC/SVC: Normal venous pressure at 3 mmHg.    Essential (primary) hypertension  Patients blood pressure range in the last 24 hours was: BP  Min: 131/58  Max: 160/70.The patient's inpatient anti-hypertensive regimen is listed below:  Current Antihypertensives  , Daily, Oral  amLODIPine tablet 10 mg, Daily, Oral  carvediloL tablet 25 mg, 2 times daily, Oral  furosemide tablet 40 mg, Daily, Oral  losartan-hydrochlorothiazide 100-25 mg per tablet 1 tablet, Daily, Oral    Plan  - BP is controlled, no changes needed to their regimen  - patient reports compliance with home meds and has them at bedside, but on review of dispense report she has not filled amlodipine, lasix, or coreg in many months. Encourage compliance with home regimen     Morbid (severe) obesity due to excess calories  Body mass index is 54.24 kg/m². Morbid obesity complicates all aspects of disease management from diagnostic modalities to treatment. Weight loss encouraged and health benefits explained to patient.     Diastolic dysfunction, left ventricle  - last echo in 2020 with GII LV DD and preserved EF  - strict I/O, daily weights, and 1.5L fluid restriction   - BNP wnl (though likely falsely  low 2/2 morbid obesity). CXR without pulmonary edema. Significant BLE edema, but likely from chronic lymphedema   - continue coreg and losartan  - restart lasix   - monitor tele   - echo - see CHEST PAIN     Lymphedema of both lower extremities  - significant lymphedema to BLE  - afebrile and without leukocytosis   - BLE US with R femoral DVT  - wound care consulted  - would benefit from referral to lymphedema clinic on discharge     S/P laparoscopic sleeve gastrectomy  - noted       VTE Risk Mitigation (From admission, onward)           Ordered     apixaban tablet 10 mg  2 times daily         11/21/24 0919     IP VTE HIGH RISK PATIENT  Once         11/21/24 0014                    Discharge Planning   MC:      Code Status: Full Code   Is the patient medically ready for discharge?:     Reason for patient still in hospital (select all that apply): Patient trending condition, Treatment, Imaging, and Consult recommendations  Discharge Plan A: Home Health, Home with family   Discharge Delays: None known at this time              Peri La PA-C  Department of Hospital Medicine   [facility]

## 2024-11-21 NOTE — ASSESSMENT & PLAN NOTE
- last echo in 2020 with GII LV DD and preserved EF  - strict I/O, daily weights, and 1.5L fluid restriction   - BNP wnl (though likely falsely low 2/2 morbid obesity). CXR without pulmonary edema. Significant BLE edema, but likely from chronic lymphedema   - continue coreg and losartan  - restart lasix   - monitor tele   - echo - see CHEST PAIN

## 2024-11-21 NOTE — PLAN OF CARE
11/20/24 1937   Post-Acute Status   Post-Acute Authorization Home Health   Home Health Status Pending medical clearance/testing   Discharge Delays None known at this time   Discharge Plan   Discharge Plan A Home Health   Discharge Plan B Home Health;Home with family         Discharge Plan A and Plan B have been determined by review of patient's clinical status, future medical and therapeutic needs, and coverage/benefits for post-acute care in coordination with multidisciplinary team members.     Pt is agreeable with home [facility].          Met with Rosetta Whyte to review discharge recommendation of Home Health   and is agreeable to plan    Patient/family provided list of facilities in-network with patient's payor plan. Providers that are owned, operated, or affiliated with [facility] are included on the list.     Notified that referral sent to below listed facilities from in-network list based on proximity to home/family support:   1.[facility]  2.[facility]  3.[facility]  4.[facility]  5. [facility]  6. [facility]  7. [facility]  8. [facility]  9. [facility]  10. [facility]  11. [facility]      Patient/family instructed to identify preference. Na    Preferred Facility: (if more than 1, listed in order of descending preference)  1.[facility]    If an additional preferred facility not listed above is identified, additional referral to be sent. If above facilities unable to accept, will send additional referrals to in-network providers.        Janessa Rice LMSW  Case Management  Emergency Department  [phone]

## 2024-11-21 NOTE — ASSESSMENT & PLAN NOTE
- significant lymphedema to BLE  - afebrile and without leukocytosis   - BLE US with R femoral DVT  - wound care consulted  - would benefit from referral to lymphedema clinic on discharge

## 2024-11-21 NOTE — H&P
[facility]  [facility]  History & Physical    Patient Name: Rosetta Whyte  MRN: [id number]  Patient Class: OP- Observation  Admission Date: 11/20/2024  Attending Physician: Олег You MD   Primary Care Provider: Georgie Ortega MD         Patient information was obtained from patient and ER records.     Subjective:     Principal Problem:Impaired functional mobility and activity tolerance    Chief Complaint:   Chief Complaint   Patient presents with    Chest Pain     Starting after eating lunch; pressure in chest-substernal. Pt denies SOB or other s/s.        HPI: Rosetta Whyte is a 80 yo F with PMHx of HTN, morbid obesity, hx of gastric bypass, lymphedema, EMMANUEL, HFpEF who presented to ED for chest pain. Endorses sudden, midsternal, nonradiating chest pain that began after eating lunch and lasted for approximately 30 minutes. She had a similar experience of Friday, but it did not last as long. Chest pain has not recurred since being in the ED. She also endorses pain to her bilateral big toes as she states she recently hit them at her home. She has been mostly bedbound for the past couple of years. She lives alone, but her daughter and sister both live down her street and come by to see her daily, keep her clean, and provide her food. She would like to start working on NH placement. Also reports L eye irritation for the past few weeks. Denies fever, chills, palpitations, SOB, cough, n/v/d, dysuria, headaches.    In ED:  Afebrile. HDS. No leukocytosis. CMP notable for Na 147, serum bicarb  21, BUN 43. BNP 80. Trop 0.024 >> 0.021. CXR without acute process. Given po pantoprazole 40 mg. Placed in observation with HM.     Past Medical History:   Diagnosis Date    Arthritis     BMI 50.0-59.9, adult     Difficult intubation 11/06/2017    Poor neck extension, Grade 4 view with DL    Hypertension     Morbid obesity     EMMANUEL (obstructive sleep apnea)        Past Surgical History:   Procedure  Laterality Date    APPENDECTOMY  1960s    HIP FRACTURE SURGERY  1949    LAPAROSCOPIC SLEEVE GASTRECTOMY  07/07/2016       Review of patient's allergies indicates:  No Known Allergies    No current facility-administered medications on file prior to encounter.     Current Outpatient Medications on File Prior to Encounter   Medication Sig    albuterol (PROAIR HFA) 90 mcg/actuation inhaler Inhale 1 puff into the lungs.    amLODIPine (NORVASC) 10 MG tablet Take 1 tablet (10 mg total) by mouth once daily.    aspirin (ECOTRIN) 81 MG EC tablet Take 81 mg by mouth once daily.    atorvastatin (LIPITOR) 40 MG tablet Take 40 mg by mouth once daily.    carvedilol (COREG) 25 MG tablet Take 25 mg by mouth 2 (two) times daily.     furosemide (LASIX) 40 MG tablet Take 1 tablet (40 mg total) by mouth once daily. HOLD until your doctor tells you to restart it.    gabapentin (NEURONTIN) 300 MG capsule Take 1 capsule (300 mg total) by mouth every evening.    hydrALAZINE (APRESOLINE) 100 MG tablet Take 100 mg by mouth 3 (three) times daily.     losartan-hydrochlorothiazide 100-25 mg (HYZAAR) 100-25 mg per tablet Take 1 tablet by mouth once daily.    [DISCONTINUED] amoxicillin (AMOXIL) 500 MG capsule     [DISCONTINUED] azithromycin (Z-HUE) 250 MG tablet Use as directed.  Take by oral route for 5 days.    [DISCONTINUED] bumetanide (BUMEX) 2 MG tablet Take 2 mg by mouth.    [DISCONTINUED] cephALEXin (KEFLEX) 500 MG capsule     [DISCONTINUED] clindamycin (CLEOCIN) 300 MG capsule Take 1 capsule 4 times a day by oral route for 10 days.    [DISCONTINUED] clobetasol (TEMOVATE) 0.05 % cream Apply topically 2 (two) times daily. Apply to legs    [DISCONTINUED] cloNIDine 0.3 mg/24 hr td ptwk (CATAPRES) 0.3 mg/24 hr Place 1 patch onto the skin every 7 days.    [DISCONTINUED] clotrimazole-betamethasone 1-0.05% (LOTRISONE) cream Apply topically 2 (two) times daily.    [DISCONTINUED] ergocalciferol (ERGOCALCIFEROL) 50,000 unit Cap Take 50,000 Units by  mouth every 7 days.    [DISCONTINUED] esomeprazole (NEXIUM) 40 MG capsule Take 1 capsule (40 mg total) by mouth before breakfast.    [DISCONTINUED] hydroCHLOROthiazide (HYDRODIURIL) 25 MG tablet Take 25 mg by mouth once daily.    [DISCONTINUED] hydrocodone-acetaminophen (HYCET) solution 7.5-325 mg/15mL Take 15 mLs by mouth.    [DISCONTINUED] losartan (COZAAR) 25 MG tablet     [DISCONTINUED] metOLazone (ZAROXOLYN) 2.5 MG tablet Take 1 tablet by mouth once daily.    [DISCONTINUED] metroNIDAZOLE (FLAGYL) 500 MG tablet     [DISCONTINUED] montelukast (SINGULAIR) 10 mg tablet Take 1 tablet every day by oral route for 30 days.    [DISCONTINUED] potassium chloride 10% (KAYCIEL) 20 mEq/15 mL oral solution     [DISCONTINUED] ursodioL (ACTIGALL) 500 MG tablet Take 1 tablet (500 mg total) by mouth once daily.     Family History       Problem Relation (Age of Onset)    Cancer Mother    Hypertension Mother, Sister, Brother, Brother          Tobacco Use    Smoking status: Never    Smokeless tobacco: Never   Substance and Sexual Activity    Alcohol use: No    Drug use: No    Sexual activity: Not on file     Review of Systems   Constitutional:  Positive for activity change. Negative for chills and fever.   HENT:  Negative for trouble swallowing.    Eyes:  Negative for photophobia and visual disturbance.   Respiratory:  Negative for cough, chest tightness and shortness of breath.    Cardiovascular:  Positive for chest pain. Negative for palpitations and leg swelling.   Gastrointestinal:  Positive for abdominal distention. Negative for abdominal pain, constipation, diarrhea, nausea and vomiting.   Genitourinary:  Negative for dysuria, frequency and hematuria.   Musculoskeletal:  Positive for gait problem. Negative for back pain and neck pain.   Skin:  Negative for rash and wound.   Neurological:  Negative for dizziness, syncope, speech difficulty and light-headedness.   Psychiatric/Behavioral:  Negative for agitation and confusion.  The patient is not nervous/anxious.      Objective:     Vital Signs (Most Recent):  Temp: 98 °F (36.7 °C) (11/21/24 0047)  Pulse: 82 (11/21/24 0047)  Resp: 20 (11/20/24 2300)  BP: (!) 144/68 (11/21/24 0047)  SpO2: 98 % (11/21/24 0300) Vital Signs (24h Range):  Temp:  [97.8 °F (36.6 °C)-98.2 °F (36.8 °C)] 98 °F (36.7 °C)  Pulse:  [81-85] 82  Resp:  [15-20] 20  SpO2:  [96 %-99 %] 98 %  BP: (131-160)/(58-70) 144/68     Weight: (!) 143.3 kg (316 lb)  Body mass index is 54.24 kg/m².     Physical Exam  Vitals and nursing note reviewed.   Constitutional:       General: She is not in acute distress.     Appearance: She is well-developed. She is obese.      Comments: Morbid obesity with BMI 54.   HENT:      Head: Normocephalic and atraumatic.      Mouth/Throat:      Pharynx: No oropharyngeal exudate.   Eyes:      Conjunctiva/sclera: Conjunctivae normal.      Pupils: Pupils are equal, round, and reactive to light.   Cardiovascular:      Rate and Rhythm: Normal rate and regular rhythm.      Heart sounds: Normal heart sounds.   Pulmonary:      Effort: Pulmonary effort is normal.      Breath sounds: Normal breath sounds.      Comments: Limited 2/2 body habitus  Abdominal:      General: Bowel sounds are normal. There is no distension.      Palpations: Abdomen is soft.      Tenderness: There is no abdominal tenderness.   Musculoskeletal:         General: Tenderness (BLE) present. Normal range of motion.      Cervical back: Normal range of motion and neck supple.      Right lower leg: Edema present.      Left lower leg: Edema present.   Lymphadenopathy:      Cervical: No cervical adenopathy.   Skin:     General: Skin is warm and dry.      Capillary Refill: Capillary refill takes less than 2 seconds.      Findings: No rash.      Comments: Significant lymphedema to BLE with skin changes including papillomas and hyperkeratosis. Small wound to L big toe   Neurological:      Mental Status: She is alert and oriented to person, place, and  time.      Cranial Nerves: No cranial nerve deficit.      Sensory: No sensory deficit.      Coordination: Coordination normal.   Psychiatric:         Behavior: Behavior normal.         Thought Content: Thought content normal.         Judgment: Judgment normal.              CRANIAL NERVES     CN III, IV, VI   Pupils are equal, round, and reactive to light.       Significant Labs: All pertinent labs within the past 24 hours have been reviewed.  CBC:   Recent Labs   Lab 11/20/24 1705 11/21/24 0315   WBC 7.84 7.73   HGB 12.3 11.5*   HCT 44.5 38.9     --      CMP:   Recent Labs   Lab 11/20/24 1705 11/21/24 0315   * 144   K 4.4 4.0   * 119*   CO2 21* 20*    126*   BUN 43* 41*   CREATININE 1.1 1.1   CALCIUM 8.5* 8.3*   PROT 6.0  --    ALBUMIN 2.4*  --    BILITOT 0.2  --    ALKPHOS 129  --    AST 11  --    ALT 6*  --    ANIONGAP 7* 5*     Cardiac Markers:   Recent Labs   Lab 11/20/24 1705   BNP 80     Troponin:   Recent Labs   Lab 11/20/24 1705 11/20/24  1942   TROPONINI 0.024 0.021       Significant Imaging: I have reviewed all pertinent imaging results/findings within the past 24 hours.  Imaging Results              X-Ray Chest AP Portable (Final result)  Result time 11/20/24 19:29:56      Final result by Palomo Wilkerson MD (11/20/24 19:29:56)                   Impression:      1. No acute cardiopulmonary process.      Electronically signed by: Palomo Wilkerson MD  Date:    11/20/2024  Time:    19:29               Narrative:    EXAMINATION:  XR CHEST AP PORTABLE    CLINICAL HISTORY:  Chest Pain;    TECHNIQUE:  Single frontal view of the chest was performed.    COMPARISON:  05/24/2020    FINDINGS:  The cardiomediastinal silhouette is prominent, magnified by technique, stable..  There is no pleural effusion.  The trachea is midline.  The lungs are symmetrically expanded bilaterally with mildly coarse interstitial attenuation accentuated by habitus..  No large focal consolidation seen.   There is no pneumothorax.  The osseous structures are remarkable for degenerative change..                                    Assessment/Plan:     * Impaired functional mobility and activity tolerance  - has been bedbound for multiple years now. Lives alone, but family checks in on her  - she would like to proceed with NH placement. Explained that she would likely be discharged back home while placement is pending. Patient agrees to plan   - CM to assist with initiating process of NH placement     Chest pain  79 y.o. who presents with midsternal chest pain without radiation that lasted for 30 minutes and occurred after eating lunch. Pain resolved without intervention and has not recurred.     - EKG without acute ischemic changes  - trop 0.024 >>0.021  - high suspicion for gastric reflux. Start pepcid BID  - will obtain updated echo   - cardiac telemetry    Lymphedema of both lower extremities  - significant lymphedema to BLE  - afebrile and without leukocytosis   - BLE US pending   - wound care consulted  - would benefit from referral to lymphedema clinic on discharge     Essential (primary) hypertension  Patients blood pressure range in the last 24 hours was: BP  Min: 131/58  Max: 160/70.The patient's inpatient anti-hypertensive regimen is listed below:  Current Antihypertensives  , Daily, Oral  amLODIPine tablet 10 mg, Daily, Oral  carvediloL tablet 25 mg, 2 times daily, Oral  furosemide tablet 40 mg, Daily, Oral  losartan-hydrochlorothiazide 100-25 mg per tablet 1 tablet, Daily, Oral    Plan  - BP is controlled, no changes needed to their regimen  - patient reports compliance with home meds and has them at bedside, but on review of dispense report she has not filled amlodipine, lasix, or coreg in many months. Encourage compliance with home regimen     Pain in toes of both feet  - wound noted below L big toe nail  - podiatry consulted     Morbid (severe) obesity due to excess calories  Body mass index is 54.24  kg/m². Morbid obesity complicates all aspects of disease management from diagnostic modalities to treatment. Weight loss encouraged and health benefits explained to patient.     Diastolic dysfunction, left ventricle  - last echo in 2020 with GII LV DD and preserved EF  - strict I/O, daily weights, and 1.5L fluid restriction   - BNP wnl (though likely falsely low 2/2 morbid obesity). CXR without pulmonary edema. Significant BLE edema, but likely from chronic lymphedema   - continue coreg and losartan  - restart lasix   - monitor tele   - echo pending     S/P laparoscopic sleeve gastrectomy  - noted       VTE Risk Mitigation (From admission, onward)           Ordered     enoxaparin injection 60 mg  Every 12 hours         11/21/24 0014     IP VTE HIGH RISK PATIENT  Once         11/21/24 0014                         On 11/21/2024, patient should be placed in hospital observation services under my care in collaboration with Dr. Eddie Bajwa.      Princess Patterson PA-C  Department of Hospital Medicine  [facility]

## 2024-11-21 NOTE — ED PROVIDER NOTES
Encounter Date: 11/20/2024       History     Chief Complaint   Patient presents with    Chest Pain     Starting after eating lunch; pressure in chest-substernal. Pt denies SOB or other s/s.     Ms. Whyte is a 79-year-old female with PMH of HTN, severe obesity s/p gastric bypass, lymphedema EMMANUEL, who is brought in by EMS due to chest pain. Patient states she had sudden oppresive midsternal chest pain 6/10 around at 11:30 am, after eating some potatoes. The pain was a 6/10, stayed there for 30 minutes, it went away after some relaxation technices. There were no changes with position and she says she didn't take any medication. This pain resembles a previous episode she had on Friday but this one lasted for more time, reason why she was concerned and came to the ED.     Patient states she has been having abdominal distention, postprandial fullness, some lesions on her toes. Denies fevers, vomiting, nausea, dizziness, palpitations, syncope, seizures.         Review of patient's allergies indicates:  No Known Allergies  Past Medical History:   Diagnosis Date    Arthritis     BMI 50.0-59.9, adult     Difficult intubation 11/06/2017    Poor neck extension, Grade 4 view with DL    Hypertension     Morbid obesity     EMMANUEL (obstructive sleep apnea)      Past Surgical History:   Procedure Laterality Date    APPENDECTOMY  1960s    HIP FRACTURE SURGERY  1949    LAPAROSCOPIC SLEEVE GASTRECTOMY  07/07/2016     Family History   Problem Relation Name Age of Onset    Hypertension Mother      Cancer Mother      Hypertension Sister      Hypertension Brother      Hypertension Brother       Social History     Tobacco Use    Smoking status: Never    Smokeless tobacco: Never   Substance Use Topics    Alcohol use: No    Drug use: No     Review of Systems    Physical Exam     Initial Vitals [11/20/24 1538]   BP Pulse Resp Temp SpO2   (!) 155/62 85 16 97.8 °F (36.6 °C) 99 %      MAP       --         Physical Exam    Constitutional: She appears  well-developed and well-nourished.   HENT:   Head: Normocephalic and atraumatic.   Eyes: Conjunctivae and EOM are normal. Pupils are equal, round, and reactive to light.   Neck: Neck supple.   Normal range of motion.  Cardiovascular:  Normal rate and regular rhythm.           Pulmonary/Chest: Breath sounds normal.   Abdominal: Abdomen is soft. Bowel sounds are normal.   Musculoskeletal:         General: Edema present. Normal range of motion.      Cervical back: Normal range of motion and neck supple.      Comments: Lymphedema with bumps w/o signs of infections.   Sensation preserved on toes. No weakness on toes. No necrotic ulcers or lesions on toes.      Neurological: She is alert and oriented to person, place, and time.   Skin: Skin is warm and dry. Capillary refill takes less than 2 seconds.         ED Course   Procedures  Labs Reviewed   CBC W/ AUTO DIFFERENTIAL - Abnormal       Result Value    WBC 7.84      RBC 3.99 (*)     Hemoglobin 12.3      Hematocrit 44.5       (*)     MCH 30.8      MCHC 27.6 (*)     RDW 15.0 (*)     Platelets 239      MPV 10.3      Immature Granulocytes 1.3 (*)     Gran # (ANC) 5.4      Immature Grans (Abs) 0.10 (*)     Lymph # 1.3      Mono # 0.7      Eos # 0.2      Baso # 0.12      nRBC 0      Gran % 69.2      Lymph % 16.7 (*)     Mono % 8.9      Eosinophil % 2.4      Basophil % 1.5      Differential Method Automated     COMPREHENSIVE METABOLIC PANEL - Abnormal    Sodium 147 (*)     Potassium 4.4      Chloride 119 (*)     CO2 21 (*)     Glucose 107      BUN 43 (*)     Creatinine 1.1      Calcium 8.5 (*)     Total Protein 6.0      Albumin 2.4 (*)     Total Bilirubin 0.2      Alkaline Phosphatase 129      AST 11      ALT 6 (*)     eGFR 51.4 (*)     Anion Gap 7 (*)    HEPATITIS C ANTIBODY    Hepatitis C Ab Non-reactive      Narrative:     Release to patient->Immediate   HIV 1 / 2 ANTIBODY    HIV 1/2 Ag/Ab Non-reactive      Narrative:     Release to patient->Immediate   TROPONIN I     Troponin I 0.024     B-TYPE NATRIURETIC PEPTIDE    BNP 80     TROPONIN I     EKG Readings: (Independently Interpreted)   NSR, left axis , HR 80, no q waves,  S waves in all leads except AVR and AVL, old peaked t waves on v4-v6     ECG Results              EKG 12-lead (Final result)        Collection Time Result Time QRS Duration OHS QTC Calculation    11/20/24 15:33:33 11/20/24 16:05:32 92 420                     Final result by Interface, Lab In [facility] (11/20/24 16:05:38)                   Narrative:    Test Reason : R07.9,    Vent. Rate :  72 BPM     Atrial Rate :  72 BPM     P-R Int : 150 ms          QRS Dur :  92 ms      QT Int : 384 ms       P-R-T Axes :  73 -30  64 degrees    QTcB Int : 420 ms    Normal sinus rhythm with sinus arrhythmia  Left axis deviation  Minimal voltage criteria for LVH, may be normal variant ( R in aVL )  Abnormal ECG  No previous ECGs available  Confirmed by Sanjeev Foster ([id number]) on 11/20/2024 4:05:30 PM    Referred By:            Confirmed By: Sanjeev Foster                                  Imaging Results    None          Medications - No data to display  Medical Decision Making  Ms. Whyte is a 79-year-old female with PMH of HTN, severe obesity s/p gastric bypass, lymphedema, EMMANUEL who is brought in to ED due to chest pain, At physical exam VSS, physical exam unremarkable for heart failure or MI. My Ddx includes but it is not limited to MI, arrythmia, heart failure, PE, dyspepsia. CBC, CMP, Trop, BNP, Mg, Phos unremarkable. Repeat trops at 3hrs. Monitor on telemetry. Admitting to Medicine for BLE wounds and NH placement.     Risk  Prescription drug management.               ED Course as of 11/20/24 2327 Wed Nov 20, 2024 2226 Discussed with pt's family member, her sister  She said wound care nurse said she needed to get to the hospital [GK]      ED Course User Index  [GK] Lory Krishna MD                           Clinical Impression:  Final diagnoses:  [R07.9] Chest pain                  Luiz Yap MD  Resident  11/21/24 0101

## 2024-11-21 NOTE — ED NOTES
Bed: [facility]4  Expected date:   Expected time:   Means of arrival:   Comments:  13

## 2024-11-21 NOTE — CONSULTS
[facility]  Podiatry  Consult Note    Patient Name: Rosetta Whyte  MRN: [id number]  Admission Date: 11/20/2024  Hospital Length of Stay: 0 days  Attending Physician: Martita Montesinos MD  Primary Care Provider: Georgie Ortega MD     Inpatient consult to Podiatry  Consult performed by: Daniel Quintana, GUERITA  Consult ordered by: Princess Patterson PA-C  Reason for consult: b/l toe contracture, left hallux subungaul hematoma.        Subjective:     History of Present Illness:  Rosetta Whyte is a 78 yo F with PMHx of HTN, morbid obesity, hx of gastric bypass, lymphedema, EMMANUEL, HFpEF who presented to ED for chest pain. Endorses sudden, midsternal, nonradiating chest pain that began after eating lunch and lasted for approximately 30 minutes. She had a similar experience of Friday, but it did not last as long.     Podiatry consulted for lymphedema, and plantar flexed bilateral hallux.  Patient able to communicate freely and articulate well.  States she has been bed-bound recently, she states she has not walked for 3 years.  States her legs have been slowly worsening with the lymphedema she is experiencing.  She states this past summer she was taken in the hospital an ambulance accidentally hit her toes and now they are contracted.  No open wounds on feet.  No signs of infection such as drainage or redness or purulence noted on bilateral feet.    Scheduled Meds:   amLODIPine  10 mg Oral Daily    apixaban  10 mg Oral BID    artificial tears  1 drop Left Eye TID    carvediloL  25 mg Oral BID    cefTRIAXone (Rocephin) IV (PEDS and ADULTS)  1 g Intravenous Q24H    famotidine  20 mg Oral BID    furosemide  40 mg Oral Daily    losartan-hydrochlorothiazide 100-25 mg  1 tablet Oral Daily     Continuous Infusions:  PRN Meds:  Current Facility-Administered Medications:     acetaminophen, 1,000 mg, Oral, Q8H PRN    acetaminophen, 650 mg, Oral, Q4H PRN    albuterol-ipratropium, 3 mL, Nebulization, Q4H PRN     aluminum-magnesium hydroxide-simethicone, 30 mL, Oral, QID PRN    melatonin, 6 mg, Oral, Nightly PRN    naloxone, 0.02 mg, Intravenous, PRN    ondansetron, 8 mg, Oral, Q8H PRN    polyethylene glycol, 17 g, Oral, BID PRN    prochlorperazine, 5 mg, Intravenous, Q6H PRN    simethicone, 1 tablet, Oral, QID PRN    sodium chloride 0.9%, 5 mL, Intravenous, PRN    Review of patient's allergies indicates:  No Known Allergies     Past Medical History:   Diagnosis Date    Arthritis     BMI 50.0-59.9, adult     Difficult intubation 11/06/2017    Poor neck extension, Grade 4 view with DL    Hypertension     Morbid obesity     EMMANUEL (obstructive sleep apnea)      Past Surgical History:   Procedure Laterality Date    APPENDECTOMY  1960s    HIP FRACTURE SURGERY  1949    LAPAROSCOPIC SLEEVE GASTRECTOMY  07/07/2016       Family History       Problem Relation (Age of Onset)    Cancer Mother    Hypertension Mother, Sister, Brother, Brother          Tobacco Use    Smoking status: Never    Smokeless tobacco: Never   Substance and Sexual Activity    Alcohol use: No    Drug use: No    Sexual activity: Not on file     Review of Systems   Constitutional:  Positive for activity change. Negative for chills and fever.   HENT:  Negative for trouble swallowing.    Eyes:  Negative for photophobia and visual disturbance.   Respiratory:  Negative for cough, chest tightness and shortness of breath.    Cardiovascular:  Positive for chest pain. Negative for palpitations and leg swelling.   Gastrointestinal:  Positive for abdominal distention. Negative for abdominal pain, constipation, diarrhea, nausea and vomiting.   Genitourinary:  Negative for dysuria, frequency and hematuria.   Musculoskeletal:  Positive for gait problem. Negative for back pain and neck pain.   Skin:  Negative for rash and wound.   Neurological:  Negative for dizziness, syncope, speech difficulty and light-headedness.   Psychiatric/Behavioral:  Negative for agitation and confusion.  "The patient is not nervous/anxious.      Objective:     Vital Signs (Most Recent):  Temp: 97.9 °F (36.6 °C) (11/21/24 1309)  Pulse: 60 (11/21/24 1309)  Resp: 20 (11/21/24 0710)  BP: (!) 90/54 (11/21/24 1309)  SpO2: 99 % (11/21/24 1309) Vital Signs (24h Range):  Temp:  [97.4 °F (36.3 °C)-98.2 °F (36.8 °C)] 97.9 °F (36.6 °C)  Pulse:  [60-85] 60  Resp:  [15-20] 20  SpO2:  [96 %-99 %] 99 %  BP: ()/(54-71) 90/54     Weight: (!) 181.9 kg (401 lb 0.3 oz)  Body mass index is 68.83 kg/m².    Foot Exam    Right Foot/Ankle     Inspection and Palpation  Hammertoes: second toe, fourth toe, fifth toe and third toe  Hallux limitus: yes  Skin Exam: tinea and skin changes; no drainage and no erythema     Comments  Extensive lymphedema noted throughout right lower extremity.  No ulceration noted on right posterior heel.  But smooth/soft skin noted.  Contracted right hallux with hallux rigidus.  No open wounds or sores.  No crepitus or fluctuance.  Swelling noted throughout.  No erythema noted.  No malodor noted.    Left Foot/Ankle      Inspection and Palpation  Hammertoes: second toe, third toe, fourth toe and fifth toe  Hallux limitus: yes  Skin Exam: tinea and skin changes; no drainage and no erythema     Comments  Extensive lymphedema noted throughout left lower extremity.  No ulceration noted on left posterior heel.  But smooth/soft skin noted.  Contracted left hallux with hallux rigidus.  No open wounds or sores.  No crepitus or fluctuance.  Swelling noted throughout.  No erythema noted.  No malodor noted.  Left hallux with subungual hematoma noted.    R heel above      L Heel Above                      Laboratory:  A1C: No results for input(s): "HGBA1C" in the last 4320 hours.  CBC:   Recent Labs   Lab 11/21/24  1045   WBC 8.39   RBC 3.78*   HGB 11.6*   HCT 38.2      *   MCH 30.7   MCHC 30.4*     CMP:   Recent Labs   Lab 11/20/24  1705 11/21/24  0315    126*   CALCIUM 8.5* 8.3*   ALBUMIN 2.4*  --  " "  PROT 6.0  --    * 144   K 4.4 4.0   CO2 21* 20*   * 119*   BUN 43* 41*   CREATININE 1.1 1.1   ALKPHOS 129  --    ALT 6*  --    AST 11  --    BILITOT 0.2  --      CRP: No results for input(s): "CRP" in the last 168 hours.  ESR: No results for input(s): "SEDRATE" in the last 168 hours.    Diagnostic Results:  I have reviewed all pertinent imaging results/findings within the past 24 hours.  Assessment/Plan:     Orthopedic  Pain in toes of both feet  Rosetta Whyte is a 79 y.o. female contracted bilateral big toes.  Patient has been experiencing pain for quite some time.  Hallux rigidus/decreased range of motion for the 1st MPJ noted in bilateral great toes.  No open wounds.  Left hallux with sublingual hematoma.  No concern for acute infection and bilateral feet.  No acute surgical intervention indicated at this time.    - left hallux with sublingual hematoma, we will debrided nail and assess for potential wound.  - recommend bilateral heel offloading boots.  Patient is susceptible to pressure ulcers of the bilateral heel.  - nothing to culture.  - bilateral x-rays ordered, to assess for potential trauma.  - pending no acute abnormalities and x-rays, podiatry will sign off.    Future discharge recommendations  - recommend ambulatory referral to Podiatry, for regular routine foot care.  - recommend outpatient referral to wound care, patient will if it from lymphedema clinic as well.  - while bed-bound, recommend heel offloading.    Other  Lymphedema of both lower extremities  Rosetta Whyte is a 79 y.o. female with lymphedema of both lower extremities.  Patient is bed-bound and unable to move.  Likely contributing to worsening of condition.     - to be managed by primary team.  - recommend inpatient consult to wound care team, patient may benefit from Unna boot or other wound care recs from Wound Care team.        Thank you for your consult. I will sign off. Please contact us if you have any additional " questions.    Dainel Quintana, DPM  Podiatry  [facility]

## 2024-11-21 NOTE — PROGRESS NOTES
Pharmacist Dose Adjustment Note    Rosetta Whyte is a 79 y.o. female being treated with the medication enoxaparin 40 mg every 12 hours for VTE ppx  Patient Data:    Vital Signs (Most Recent):  Temp: 98 °F (36.7 °C) (11/20/24 2300)  Pulse: 83 (11/20/24 2300)  Resp: 20 (11/20/24 2300)  BP: (!) 160/70 (11/20/24 2300)  SpO2: 99 % (11/20/24 2300) Vital Signs (72h Range):  Temp:  [97.8 °F (36.6 °C)-98.2 °F (36.8 °C)]   Pulse:  [81-85]   Resp:  [15-20]   BP: (131-160)/(58-70)   SpO2:  [96 %-99 %]      Recent Labs   Lab 11/20/24  1705   CREATININE 1.1     Serum creatinine: 1.1 mg/dL 11/20/24 1705  Estimated creatinine clearance: 60 mL/min    Adjusted to   Enoxaparin 60 mg every 12 hours based on obesity, BMI > 50 kg/m2 per pharmacy anticoagulation protocol.      Pharmacist's Name: Alan Bray  Pharmacist's Extension: [phone]

## 2024-11-21 NOTE — ASSESSMENT & PLAN NOTE
- has been bedbound for multiple years now. Lives alone, but family checks in on her  - she would like to proceed with NH placement. Explained that she would likely be discharged back home while placement is pending. Patient agrees to plan   - CM to assist with initiating process of NH placement

## 2024-11-21 NOTE — SUBJECTIVE & OBJECTIVE
Scheduled Meds:   amLODIPine  10 mg Oral Daily    apixaban  10 mg Oral BID    artificial tears  1 drop Left Eye TID    carvediloL  25 mg Oral BID    cefTRIAXone (Rocephin) IV (PEDS and ADULTS)  1 g Intravenous Q24H    famotidine  20 mg Oral BID    furosemide  40 mg Oral Daily    losartan-hydrochlorothiazide 100-25 mg  1 tablet Oral Daily     Continuous Infusions:  PRN Meds:  Current Facility-Administered Medications:     acetaminophen, 1,000 mg, Oral, Q8H PRN    acetaminophen, 650 mg, Oral, Q4H PRN    albuterol-ipratropium, 3 mL, Nebulization, Q4H PRN    aluminum-magnesium hydroxide-simethicone, 30 mL, Oral, QID PRN    melatonin, 6 mg, Oral, Nightly PRN    naloxone, 0.02 mg, Intravenous, PRN    ondansetron, 8 mg, Oral, Q8H PRN    polyethylene glycol, 17 g, Oral, BID PRN    prochlorperazine, 5 mg, Intravenous, Q6H PRN    simethicone, 1 tablet, Oral, QID PRN    sodium chloride 0.9%, 5 mL, Intravenous, PRN    Review of patient's allergies indicates:  No Known Allergies     Past Medical History:   Diagnosis Date    Arthritis     BMI 50.0-59.9, adult     Difficult intubation 11/06/2017    Poor neck extension, Grade 4 view with DL    Hypertension     Morbid obesity     EMMANUEL (obstructive sleep apnea)      Past Surgical History:   Procedure Laterality Date    APPENDECTOMY  1960s    HIP FRACTURE SURGERY  1949    LAPAROSCOPIC SLEEVE GASTRECTOMY  07/07/2016       Family History       Problem Relation (Age of Onset)    Cancer Mother    Hypertension Mother, Sister, Brother, Brother          Tobacco Use    Smoking status: Never    Smokeless tobacco: Never   Substance and Sexual Activity    Alcohol use: No    Drug use: No    Sexual activity: Not on file     Review of Systems   Constitutional:  Positive for activity change. Negative for chills and fever.   HENT:  Negative for trouble swallowing.    Eyes:  Negative for photophobia and visual disturbance.   Respiratory:  Negative for cough, chest tightness and shortness of breath.     Cardiovascular:  Positive for chest pain. Negative for palpitations and leg swelling.   Gastrointestinal:  Positive for abdominal distention. Negative for abdominal pain, constipation, diarrhea, nausea and vomiting.   Genitourinary:  Negative for dysuria, frequency and hematuria.   Musculoskeletal:  Positive for gait problem. Negative for back pain and neck pain.   Skin:  Negative for rash and wound.   Neurological:  Negative for dizziness, syncope, speech difficulty and light-headedness.   Psychiatric/Behavioral:  Negative for agitation and confusion. The patient is not nervous/anxious.      Objective:     Vital Signs (Most Recent):  Temp: 97.9 °F (36.6 °C) (11/21/24 1309)  Pulse: 60 (11/21/24 1309)  Resp: 20 (11/21/24 0710)  BP: (!) 90/54 (11/21/24 1309)  SpO2: 99 % (11/21/24 1309) Vital Signs (24h Range):  Temp:  [97.4 °F (36.3 °C)-98.2 °F (36.8 °C)] 97.9 °F (36.6 °C)  Pulse:  [60-85] 60  Resp:  [15-20] 20  SpO2:  [96 %-99 %] 99 %  BP: ()/(54-71) 90/54     Weight: (!) 181.9 kg (401 lb 0.3 oz)  Body mass index is 68.83 kg/m².    Foot Exam    Right Foot/Ankle     Inspection and Palpation  Hammertoes: second toe, fourth toe, fifth toe and third toe  Hallux limitus: yes  Skin Exam: tinea and skin changes; no drainage and no erythema     Comments  Extensive lymphedema noted throughout right lower extremity.  No ulceration noted on right posterior heel.  But smooth/soft skin noted.  Contracted right hallux with hallux rigidus.  No open wounds or sores.  No crepitus or fluctuance.  Swelling noted throughout.  No erythema noted.  No malodor noted.    Left Foot/Ankle      Inspection and Palpation  Hammertoes: second toe, third toe, fourth toe and fifth toe  Hallux limitus: yes  Skin Exam: tinea and skin changes; no drainage and no erythema     Comments  Extensive lymphedema noted throughout left lower extremity.  No ulceration noted on left posterior heel.  But smooth/soft skin noted.  Contracted left hallux with  "hallux rigidus.  No open wounds or sores.  No crepitus or fluctuance.  Swelling noted throughout.  No erythema noted.  No malodor noted.  Left hallux with subungual hematoma noted.    R heel above      L Heel Above                      Laboratory:  A1C: No results for input(s): "HGBA1C" in the last 4320 hours.  CBC:   Recent Labs   Lab 11/21/24  1045   WBC 8.39   RBC 3.78*   HGB 11.6*   HCT 38.2      *   MCH 30.7   MCHC 30.4*     CMP:   Recent Labs   Lab 11/20/24  1705 11/21/24  0315    126*   CALCIUM 8.5* 8.3*   ALBUMIN 2.4*  --    PROT 6.0  --    * 144   K 4.4 4.0   CO2 21* 20*   * 119*   BUN 43* 41*   CREATININE 1.1 1.1   ALKPHOS 129  --    ALT 6*  --    AST 11  --    BILITOT 0.2  --      CRP: No results for input(s): "CRP" in the last 168 hours.  ESR: No results for input(s): "SEDRATE" in the last 168 hours.    Diagnostic Results:  I have reviewed all pertinent imaging results/findings within the past 24 hours.  "

## 2024-11-21 NOTE — ASSESSMENT & PLAN NOTE
- last echo in 2020 with GII LV DD and preserved EF  - strict I/O, daily weights, and 1.5L fluid restriction   - BNP wnl (though likely falsely low 2/2 morbid obesity). CXR without pulmonary edema. Significant BLE edema, but likely from chronic lymphedema   - continue coreg and losartan  - restart lasix   - monitor tele   - echo pending

## 2024-11-21 NOTE — ASSESSMENT & PLAN NOTE
79 y.o. who presents with midsternal chest pain without radiation that lasted for 30 minutes and occurred after eating lunch. Pain resolved without intervention and has not recurred.     - EKG without acute ischemic changes  - trop 0.024 >>0.021  - high suspicion for gastric reflux. Start pepcid BID  - Echo below  - cardiac telemetry    Results for orders placed during the hospital encounter of 11/20/24    Echo    Interpretation Summary    Left Ventricle: The left ventricle is normal in size. Normal wall thickness. There is concentric remodeling. There is normal systolic function with a visually estimated ejection fraction of 60 - 65%. There is normal diastolic function.    Right Ventricle: Normal right ventricular cavity size. Wall thickness is normal. Systolic function is normal.    Left Atrium: Left atrium is mildly dilated.    IVC/SVC: Normal venous pressure at 3 mmHg.

## 2024-11-21 NOTE — PLAN OF CARE
WILMER spoke with Moraima from [facility] regarding pt referral in [facility].  As per Moraima the referral wasn't not showing that pt had PHN for insurance and only showing pt secondary insurance. As per Moraima she would follow-up on the referral and contact SW back.    WILMER contacted pt sister Nita [phone] and spoke with Nita about pt d/c with assistance from home health and [facility].  Per Nita she is amenable to the plan, however she is going on a cruise and requested SW contact pt daughter Baljinder [phone].      WILMER contacted daughter Baljinder and explained to the daughter the assistance that we could facilitate for the pt and family while they work on NH placement.  Daughter expressed understanding and stated she had to call her aunt Nita then hung up on SW.      SW attempted to contact daughter again to discuss further and no one answered the phone.     WILMER/NAE to follow up    [facility] unable to staff [facility].  WILMER to re-send referrals      Kary Roth CD, MSW, LMSW, RSW   Case Management  [facility]  Email: [email or web address]

## 2024-11-21 NOTE — PLAN OF CARE
11/21/24 1119   Post-Acute Status   Post-Acute Authorization Home Health   Home Health Status Referrals Sent   Discharge Delays None known at this time   Discharge Plan   Discharge Plan A Home Health;Home with family   Discharge Plan B Home Health;Home with family     SW spoke with sister Nita and daughter Lyle to review discharge recommendation of home health and is agreeable to plan    Patient/family provided list of facilities in-network with patient's payor plan. Providers that are owned, operated, or affiliated with [facility] are included on the list.     Notified that referral sent to below listed facilities from in-network list based on proximity to home/family support:   [facility]  2.[facility]   3.[facility]   4. [facility]  5. [facility]     Patient/family instructed to identify preference.    Preferred Facility: (if more than 1, listed in order of descending preference)  No preference    If an additional preferred facility not listed above is identified, additional referral to be sent. If above facilities unable to accept, will send additional referrals to in-network providers.     Discharge Plan A and Plan B have been determined by review of patient's clinical status, future medical and therapeutic needs, and coverage/benefits for post-acute care in coordination with multidisciplinary team members.    Kary Roth, SHERMAN, MSW, LMSW, RSW   Case Management  [facility]  Email: [email or web address]

## 2024-11-21 NOTE — HOSPITAL COURSE
Patient admitted to hospital medicine for chest pain and BLE edema. Troponin down trending. Echo with EF of 60-65% with no wall motion changes. BLE US with R femoral DVT, started on Eliquis. CTA without PE. Podiatry consulted, ordered B foot XR which are unremarkable. Recommend offloading boots. Wound care consulted for multiple areas of skin breakdown, appreciate recs. UA infectious, started on CTX. Cr worsened to 1.5 and is persistent s/p small IVF bolus. Holding diuretics and HTN meds. Renal US and urine lytes unremarkable. Cr improved to 1.2.  Patient and family interested in penitentiary placement which CMSW has discussed. Patient stable for discharge home with [facility]. Family working on penitentiary placement. Discussed care plan with patient, verbalized understanding. All questions answered. Return precautions given.

## 2024-11-21 NOTE — SUBJECTIVE & OBJECTIVE
Past Medical History:   Diagnosis Date    Arthritis     BMI 50.0-59.9, adult     Difficult intubation 11/06/2017    Poor neck extension, Grade 4 view with DL    Hypertension     Morbid obesity     EMMANUEL (obstructive sleep apnea)        Past Surgical History:   Procedure Laterality Date    APPENDECTOMY  1960s    HIP FRACTURE SURGERY  1949    LAPAROSCOPIC SLEEVE GASTRECTOMY  07/07/2016       Review of patient's allergies indicates:  No Known Allergies    No current facility-administered medications on file prior to encounter.     Current Outpatient Medications on File Prior to Encounter   Medication Sig    albuterol (PROAIR HFA) 90 mcg/actuation inhaler Inhale 1 puff into the lungs.    amLODIPine (NORVASC) 10 MG tablet Take 1 tablet (10 mg total) by mouth once daily.    aspirin (ECOTRIN) 81 MG EC tablet Take 81 mg by mouth once daily.    atorvastatin (LIPITOR) 40 MG tablet Take 40 mg by mouth once daily.    carvedilol (COREG) 25 MG tablet Take 25 mg by mouth 2 (two) times daily.     furosemide (LASIX) 40 MG tablet Take 1 tablet (40 mg total) by mouth once daily. HOLD until your doctor tells you to restart it.    gabapentin (NEURONTIN) 300 MG capsule Take 1 capsule (300 mg total) by mouth every evening.    hydrALAZINE (APRESOLINE) 100 MG tablet Take 100 mg by mouth 3 (three) times daily.     losartan-hydrochlorothiazide 100-25 mg (HYZAAR) 100-25 mg per tablet Take 1 tablet by mouth once daily.    [DISCONTINUED] amoxicillin (AMOXIL) 500 MG capsule     [DISCONTINUED] azithromycin (Z-HUE) 250 MG tablet Use as directed.  Take by oral route for 5 days.    [DISCONTINUED] bumetanide (BUMEX) 2 MG tablet Take 2 mg by mouth.    [DISCONTINUED] cephALEXin (KEFLEX) 500 MG capsule     [DISCONTINUED] clindamycin (CLEOCIN) 300 MG capsule Take 1 capsule 4 times a day by oral route for 10 days.    [DISCONTINUED] clobetasol (TEMOVATE) 0.05 % cream Apply topically 2 (two) times daily. Apply to legs    [DISCONTINUED] cloNIDine 0.3 mg/24 hr  td ptwk (CATAPRES) 0.3 mg/24 hr Place 1 patch onto the skin every 7 days.    [DISCONTINUED] clotrimazole-betamethasone 1-0.05% (LOTRISONE) cream Apply topically 2 (two) times daily.    [DISCONTINUED] ergocalciferol (ERGOCALCIFEROL) 50,000 unit Cap Take 50,000 Units by mouth every 7 days.    [DISCONTINUED] esomeprazole (NEXIUM) 40 MG capsule Take 1 capsule (40 mg total) by mouth before breakfast.    [DISCONTINUED] hydroCHLOROthiazide (HYDRODIURIL) 25 MG tablet Take 25 mg by mouth once daily.    [DISCONTINUED] hydrocodone-acetaminophen (HYCET) solution 7.5-325 mg/15mL Take 15 mLs by mouth.    [DISCONTINUED] losartan (COZAAR) 25 MG tablet     [DISCONTINUED] metOLazone (ZAROXOLYN) 2.5 MG tablet Take 1 tablet by mouth once daily.    [DISCONTINUED] metroNIDAZOLE (FLAGYL) 500 MG tablet     [DISCONTINUED] montelukast (SINGULAIR) 10 mg tablet Take 1 tablet every day by oral route for 30 days.    [DISCONTINUED] potassium chloride 10% (KAYCIEL) 20 mEq/15 mL oral solution     [DISCONTINUED] ursodioL (ACTIGALL) 500 MG tablet Take 1 tablet (500 mg total) by mouth once daily.     Family History       Problem Relation (Age of Onset)    Cancer Mother    Hypertension Mother, Sister, Brother, Brother          Tobacco Use    Smoking status: Never    Smokeless tobacco: Never   Substance and Sexual Activity    Alcohol use: No    Drug use: No    Sexual activity: Not on file     Review of Systems   Constitutional:  Positive for activity change. Negative for chills and fever.   HENT:  Negative for trouble swallowing.    Eyes:  Negative for photophobia and visual disturbance.   Respiratory:  Negative for cough, chest tightness and shortness of breath.    Cardiovascular:  Positive for chest pain. Negative for palpitations and leg swelling.   Gastrointestinal:  Positive for abdominal distention. Negative for abdominal pain, constipation, diarrhea, nausea and vomiting.   Genitourinary:  Negative for dysuria, frequency and hematuria.    Musculoskeletal:  Positive for gait problem. Negative for back pain and neck pain.   Skin:  Negative for rash and wound.   Neurological:  Negative for dizziness, syncope, speech difficulty and light-headedness.   Psychiatric/Behavioral:  Negative for agitation and confusion. The patient is not nervous/anxious.      Objective:     Vital Signs (Most Recent):  Temp: 98 °F (36.7 °C) (11/21/24 0047)  Pulse: 82 (11/21/24 0047)  Resp: 20 (11/20/24 2300)  BP: (!) 144/68 (11/21/24 0047)  SpO2: 98 % (11/21/24 0300) Vital Signs (24h Range):  Temp:  [97.8 °F (36.6 °C)-98.2 °F (36.8 °C)] 98 °F (36.7 °C)  Pulse:  [81-85] 82  Resp:  [15-20] 20  SpO2:  [96 %-99 %] 98 %  BP: (131-160)/(58-70) 144/68     Weight: (!) 143.3 kg (316 lb)  Body mass index is 54.24 kg/m².     Physical Exam  Vitals and nursing note reviewed.   Constitutional:       General: She is not in acute distress.     Appearance: She is well-developed. She is obese.      Comments: Morbid obesity with BMI 54.   HENT:      Head: Normocephalic and atraumatic.      Mouth/Throat:      Pharynx: No oropharyngeal exudate.   Eyes:      Conjunctiva/sclera: Conjunctivae normal.      Pupils: Pupils are equal, round, and reactive to light.   Cardiovascular:      Rate and Rhythm: Normal rate and regular rhythm.      Heart sounds: Normal heart sounds.   Pulmonary:      Effort: Pulmonary effort is normal.      Breath sounds: Normal breath sounds.      Comments: Limited 2/2 body habitus  Abdominal:      General: Bowel sounds are normal. There is no distension.      Palpations: Abdomen is soft.      Tenderness: There is no abdominal tenderness.   Musculoskeletal:         General: Tenderness (BLE) present. Normal range of motion.      Cervical back: Normal range of motion and neck supple.      Right lower leg: Edema present.      Left lower leg: Edema present.   Lymphadenopathy:      Cervical: No cervical adenopathy.   Skin:     General: Skin is warm and dry.      Capillary Refill:  Capillary refill takes less than 2 seconds.      Findings: No rash.      Comments: Significant lymphedema to BLE with skin changes including papillomas and hyperkeratosis. Small wound to L big toe   Neurological:      Mental Status: She is alert and oriented to person, place, and time.      Cranial Nerves: No cranial nerve deficit.      Sensory: No sensory deficit.      Coordination: Coordination normal.   Psychiatric:         Behavior: Behavior normal.         Thought Content: Thought content normal.         Judgment: Judgment normal.              CRANIAL NERVES     CN III, IV, VI   Pupils are equal, round, and reactive to light.       Significant Labs: All pertinent labs within the past 24 hours have been reviewed.  CBC:   Recent Labs   Lab 11/20/24 1705 11/21/24 0315   WBC 7.84 7.73   HGB 12.3 11.5*   HCT 44.5 38.9     --      CMP:   Recent Labs   Lab 11/20/24 1705 11/21/24 0315   * 144   K 4.4 4.0   * 119*   CO2 21* 20*    126*   BUN 43* 41*   CREATININE 1.1 1.1   CALCIUM 8.5* 8.3*   PROT 6.0  --    ALBUMIN 2.4*  --    BILITOT 0.2  --    ALKPHOS 129  --    AST 11  --    ALT 6*  --    ANIONGAP 7* 5*     Cardiac Markers:   Recent Labs   Lab 11/20/24 1705   BNP 80     Troponin:   Recent Labs   Lab 11/20/24 1705 11/20/24 1942   TROPONINI 0.024 0.021       Significant Imaging: I have reviewed all pertinent imaging results/findings within the past 24 hours.  Imaging Results              X-Ray Chest AP Portable (Final result)  Result time 11/20/24 19:29:56      Final result by Palomo Wilkerson MD (11/20/24 19:29:56)                   Impression:      1. No acute cardiopulmonary process.      Electronically signed by: Palomo Wilkerson MD  Date:    11/20/2024  Time:    19:29               Narrative:    EXAMINATION:  XR CHEST AP PORTABLE    CLINICAL HISTORY:  Chest Pain;    TECHNIQUE:  Single frontal view of the chest was performed.    COMPARISON:  05/24/2020    FINDINGS:  The  cardiomediastinal silhouette is prominent, magnified by technique, stable..  There is no pleural effusion.  The trachea is midline.  The lungs are symmetrically expanded bilaterally with mildly coarse interstitial attenuation accentuated by habitus..  No large focal consolidation seen.  There is no pneumothorax.  The osseous structures are remarkable for degenerative change..

## 2024-11-21 NOTE — PLAN OF CARE
[facility]  Initial Discharge Assessment       Primary Care Provider: Georgie Ortega MD    Admission Diagnosis: No admission diagnoses are documented for this encounter.    Admission Date: 11/20/2024  Expected Discharge Date: 11-  Sw met pt at bedside Pt stated she is bed bound and her daughter Ruma Larios   and pt's sister Nita Bobby  [phone] assist pt with her adl's. Pt stated she's working with her insurance provider to try to secure NH placement. Pt stated she is agreeable with home health services.       Janessa Rice LMSW  Case Management  Emergency Department  [phone]     Transition of Care Barriers: (P) None    Payor: MEDICAID / Plan: [place]ICA[place] / Product Type: Government /     Extended Emergency Contact Information  Primary Emergency Contact: Nita Bobby  Address: [address] [place] [address] [facility]  Home Phone: [phone]  Mobile Phone: [phone]  Relation: Sister  Secondary Emergency Contact: Emanuel Thorne   [facility]  Home Phone: [phone]  Relation: Relative    Discharge Plan A: (P) Home Health  Discharge Plan B: (P) Home with family, Home Health      [facility] [place] [address] [employer] [facility]  [address] [employer]  [address] [address]  Phone: [phone] Fax: [phone]      Initial Assessment (most recent)       Adult Discharge Assessment - 11/20/24 1938          Discharge Assessment    Assessment Type Discharge Planning Assessment (P)      Confirmed/corrected address, phone number and insurance Yes (P)      Confirmed Demographics Correct on Facesheet (P)      Source of Information patient (P)      Does patient/caregiver understand observation status Yes (P)      Communicated MC with patient/caregiver Date not available/Unable to determine (P)      People in Home alone (P)      Do you expect to return to your current living situation? Yes (P)       Do you have help at home or someone to help you manage your care at home? No (P)      Prior to hospitilization cognitive status: Alert/Oriented (P)      Current cognitive status: Alert/Oriented (P)      Walking or Climbing Stairs Difficulty yes (P)      Walking or Climbing Stairs -- (P)    pt is confined to the bed.    Dressing/Bathing Difficulty yes (P)      Dressing/Bathing bathing difficulty, assistance 1 person (P)      Home Accessibility wheelchair accessible (P)      Equipment Currently Used at Home hospital bed (P)      Readmission within 30 days? No (P)      Patient currently being followed by outpatient case management? No (P)      Do you currently have service(s) that help you manage your care at home? No (P)      Do you take prescription medications? Yes (P)      Do you have prescription coverage? Yes (P)      Do you have any problems affording any of your prescribed medications? No (P)      Is the patient taking medications as prescribed? yes (P)      How do you get to doctors appointments? family or friend will provide;other (see comments) (P)    pt  completes visits online    Are you on dialysis? No (P)      Do you take coumadin? No (P)      Discharge Plan A Home Health (P)      Discharge Plan B Home with family;Home Health (P)      DME Needed Upon Discharge  none (P)      Discharge Plan discussed with: Patient (P)      Transition of Care Barriers None (P)         Physical Activity    On average, how many days per week do you engage in moderate to strenuous exercise (like a brisk walk)? 0 days (P)      On average, how many minutes do you engage in exercise at this level? 0 min (P)         Financial Resource Strain    How hard is it for you to pay for the very basics like food, housing, medical care, and heating? Not hard at all (P)         Housing Stability    In the last 12 months, was there a time when you were not able to pay the mortgage or rent on time? No (P)      At any time in the past 12  months, were you homeless or living in a shelter (including now)? No (P)         Transportation Needs    Has the lack of transportation kept you from medical appointments, meetings, work or from getting things needed for daily living? No (P)         Food Insecurity    Within the past 12 months, you worried that your food would run out before you got the money to buy more. Never true (P)      Within the past 12 months, the food you bought just didn't last and you didn't have money to get more. Never true (P)         Stress    Do you feel stress - tense, restless, nervous, or anxious, or unable to sleep at night because your mind is troubled all the time - these days? Not at all (P)         Social Isolation    How often do you feel lonely or isolated from those around you?  Never (P)         Alcohol Use    Q1: How often do you have a drink containing alcohol? Never (P)      Q2: How many drinks containing alcohol do you have on a typical day when you are drinking? Patient does not drink (P)      Q3: How often do you have six or more drinks on one occasion? Never (P)         Utilities    In the past 12 months has the electric, gas, oil, or water company threatened to shut off services in your home? No (P)         Health Literacy    How often do you need to have someone help you when you read instructions, pamphlets, or other written material from your doctor or pharmacy? Never (P)

## 2024-11-21 NOTE — PROVIDER PROGRESS NOTES - EMERGENCY DEPT.
Encounter Date: 11/20/2024    ED Physician Progress Notes        [facility]      HOME HEALTH ORDERS  FACE TO FACE ENCOUNTER    Patient Name: Rosetta Whyte  YOB: 1945    PCP: Georgie Ortega MD   PCP Address: [address] [place] [address] [id number]  PCP Phone Number: [phone]  PCP Fax: [phone]    Encounter Date: 11/20/24    Admit to Home Health    Diagnoses:  There are no hospital problems to display for this patient.      Follow Up Appointments:  No future appointments.    Allergies:Review of patient's allergies indicates:  No Known Allergies    Medications: Review discharge medications with patient and family and provide education.    Current Facility-Administered Medications   Medication Dose Route Frequency Provider Last Rate Last Admin    [START ON 11/21/2024] pantoprazole EC tablet 40 mg  40 mg Oral Daily Luiz Yap MD         Current Outpatient Medications   Medication Sig Dispense Refill    amLODIPine (NORVASC) 10 MG tablet Take 1 tablet (10 mg total) by mouth once daily. 30 tablet 1    aspirin (ECOTRIN) 81 MG EC tablet Take 81 mg by mouth once daily.      atorvastatin (LIPITOR) 40 MG tablet Take 40 mg by mouth once daily.      carvedilol (COREG) 25 MG tablet Take 25 mg by mouth 2 (two) times daily.       clobetasol (TEMOVATE) 0.05 % cream Apply topically 2 (two) times daily. Apply to legs      clotrimazole-betamethasone 1-0.05% (LOTRISONE) cream Apply topically 2 (two) times daily.      esomeprazole (NEXIUM) 40 MG capsule Take 1 capsule (40 mg total) by mouth before breakfast. 30 capsule 1    furosemide (LASIX) 40 MG tablet Take 1 tablet (40 mg total) by mouth once daily. HOLD until your doctor tells you to restart it. 30 tablet 1    gabapentin (NEURONTIN) 300 MG capsule Take 1 capsule (300 mg total) by mouth every evening.      hydrALAZINE (APRESOLINE) 100 MG tablet Take 100 mg by mouth 3 (three) times daily.       ursodioL (ACTIGALL) 500 MG  tablet Take 1 tablet (500 mg total) by mouth once daily. 30 tablet 1        Medication List        ASK your doctor about these medications      amLODIPine 10 MG tablet  Commonly known as: NORVASC  Take 1 tablet (10 mg total) by mouth once daily.     aspirin 81 MG EC tablet  Commonly known as: ECOTRIN  Take 81 mg by mouth once daily.     atorvastatin 40 MG tablet  Commonly known as: LIPITOR  Take 40 mg by mouth once daily.     carvediloL 25 MG tablet  Commonly known as: COREG  Take 25 mg by mouth 2 (two) times daily.     clobetasoL 0.05 % cream  Commonly known as: TEMOVATE  Apply topically 2 (two) times daily. Apply to legs     clotrimazole-betamethasone 1-0.05% cream  Commonly known as: LOTRISONE  Apply topically 2 (two) times daily.     esomeprazole 40 MG capsule  Commonly known as: NEXIUM  Take 1 capsule (40 mg total) by mouth before breakfast.     furosemide 40 MG tablet  Commonly known as: LASIX  Take 1 tablet (40 mg total) by mouth once daily. HOLD until your doctor tells you to restart it.     gabapentin 300 MG capsule  Commonly known as: NEURONTIN  Take 1 capsule (300 mg total) by mouth every evening.     hydrALAZINE 100 MG tablet  Commonly known as: APRESOLINE  Take 100 mg by mouth 3 (three) times daily.     ursodioL 500 MG tablet  Commonly known as: ACTIGALL  Take 1 tablet (500 mg total) by mouth once daily.                I have seen and examined this patient within the last 30 days. My clinical findings that support the need for the home health skilled services and home bound status are the following:no   Weakness/numbness causing balance and gait disturbance due to Weakness/Debility making it taxing to leave home.  Medical restrictions requiring assistance of another human to leave home due to  Unstable ambulation, Frequent Falls, Decreased range of motions in extremities, Wound care needs, Morbid Obesity, and lymphedema.           Referrals/ Consults   to evaluate for community  resources/long-range planning.  Aide to provide assistance with personal care, ADLs, and vital signs.    Activities:   activity as tolerated    Nursing:   Agency to admit patient within 24 hours of hospital discharge unless specified on physician order or at patient request    SN to complete comprehensive assessment including routine vital signs. Instruct on disease process and s/s of complications to report to MD. Review/verify medication list sent home with the patient at time of discharge  and instruct patient/caregiver as needed. Frequency may be adjusted depending on start of care date.     Skilled nurse to perform up to 3 visits PRN for symptoms related to diagnosis    Notify MD if SBP > 160 or < 90; DBP > 90 or < 50; HR > 120 or < 50; Temp > 101; O2 < 88%;     Ok to schedule additional visits based on staff availability and patient request on consecutive days within the home health episode.    When multiple disciplines ordered:    Start of Care occurs on Sunday - Wednesday schedule remaining discipline evaluations as ordered on separate consecutive days following the start of care.    Thursday SOC -schedule subsequent evaluations Friday and Monday the following week.     Friday - Saturday SOC - schedule subsequent discipline evaluations on consecutive days starting Monday of the following week.    For all post-discharge communication and subsequent orders please contact patient's primary care physician.     Miscellaneous   Routine Skin for Bedridden Patients: Instruct patient/caregiver to apply moisture barrier cream to all skin folds and wet areas in perineal area daily and after baths and all bowel movements.    Home Health Aide:  Medical Social Work Three times weekly and Home Health Aide Three times weekly    Wound Care Orders  yes:  Pressure Ulcer(s) Stage I :   Location: Right leg  Apply Miconzazole:  2% cream and Barrier ointment                       Frequency:  Twice Daily                              If incontinent of stool or urine, apply thin layer Barrier cream                   twice daily and PRN to wound         Pressure relief measure:  for pressure redistribution and A/C Boots              I certify that this patient is confined to her home and needs intermittent skilled nursing care.

## 2024-11-21 NOTE — ASSESSMENT & PLAN NOTE
- significant lymphedema to BLE  - afebrile and without leukocytosis   - BLE US pending   - wound care consulted  - would benefit from referral to lymphedema clinic on discharge

## 2024-11-21 NOTE — HPI
Rosetta Whyte is a 80 yo F with PMHx of HTN, morbid obesity, hx of gastric bypass, lymphedema, EMMANUEL, HFpEF who presented to ED for chest pain. Endorses sudden, midsternal, nonradiating chest pain that began after eating lunch and lasted for approximately 30 minutes. She had a similar experience of Friday, but it did not last as long.     Podiatry consulted for lymphedema, and plantar flexed bilateral hallux.  Patient able to communicate freely and articulate well.  States she has been bed-bound recently, she states she has not walked for 3 years.  States her legs have been slowly worsening with the lymphedema she is experiencing.  She states this past summer she was taken in the hospital an ambulance accidentally hit her toes and now they are contracted.  No open wounds on feet.  No signs of infection such as drainage or redness or purulence noted on bilateral feet.

## 2024-11-21 NOTE — ASSESSMENT & PLAN NOTE
Rosetta Whyte is a 79 y.o. female with lymphedema of both lower extremities.  Patient is bed-bound and unable to move.  Likely contributing to worsening of condition.     - to be managed by primary team.  - recommend inpatient consult to wound care team, patient may benefit from Unna boot or other wound care recs from Wound Care team.

## 2024-11-21 NOTE — ASSESSMENT & PLAN NOTE
Patients blood pressure range in the last 24 hours was: BP  Min: 131/58  Max: 160/70.The patient's inpatient anti-hypertensive regimen is listed below:  Current Antihypertensives  , Daily, Oral  amLODIPine tablet 10 mg, Daily, Oral  carvediloL tablet 25 mg, 2 times daily, Oral  furosemide tablet 40 mg, Daily, Oral  losartan-hydrochlorothiazide 100-25 mg per tablet 1 tablet, Daily, Oral    Plan  - BP is controlled, no changes needed to their regimen  - patient reports compliance with home meds and has them at bedside, but on review of dispense report she has not filled amlodipine, lasix, or coreg in many months. Encourage compliance with home regimen

## 2024-11-21 NOTE — DISCHARGE INSTRUCTIONS
Schedule an appointment with PCP and Cardiology.       [facility]  [address]  [place] [address]  [phone]    [facility] for transportation to wound care, please call [phone]

## 2024-11-21 NOTE — ASSESSMENT & PLAN NOTE
- wound noted below L big toe nail  - podiatry consulted, Recommend:  - left hallux with sublingual hematoma, we will debrided nail and assess for potential wound.  - recommend bilateral heel offloading boots.  Patient is susceptible to pressure ulcers of the bilateral heel.  - nothing to culture.  - bilateral x-rays ordered, to assess for potential trauma.  - pending no acute abnormalities and x-rays, podiatry will sign off and follow up outpatient

## 2024-11-21 NOTE — ED TRIAGE NOTES
78 y/o F presents to ER with c/c c/p since early this afternoon. States that it was midsternal, nonradiating, felt like it was sitting in her chest, 6/10 c/p. States she got it after eating lunch. Arrives denying c/p, SOB, N/V/D, fevers and chills.

## 2024-11-22 LAB
ANION GAP SERPL CALC-SCNC: 6 MMOL/L (ref 8–16)
BUN SERPL-MCNC: 45 MG/DL (ref 8–23)
CALCIUM SERPL-MCNC: 8.3 MG/DL (ref 8.7–10.5)
CHLORIDE SERPL-SCNC: 117 MMOL/L (ref 95–110)
CO2 SERPL-SCNC: 20 MMOL/L (ref 23–29)
CREAT SERPL-MCNC: 1.2 MG/DL (ref 0.5–1.4)
ERYTHROCYTE [DISTWIDTH] IN BLOOD BY AUTOMATED COUNT: 15 % (ref 11.5–14.5)
EST. GFR  (NO RACE VARIABLE): 46.3 ML/MIN/1.73 M^2
GLUCOSE SERPL-MCNC: 103 MG/DL (ref 70–110)
HCT VFR BLD AUTO: 37 % (ref 37–48.5)
HGB BLD-MCNC: 11.3 G/DL (ref 12–16)
MAGNESIUM SERPL-MCNC: 2.3 MG/DL (ref 1.6–2.6)
MCH RBC QN AUTO: 31 PG (ref 27–31)
MCHC RBC AUTO-ENTMCNC: 30.5 G/DL (ref 32–36)
MCV RBC AUTO: 101 FL (ref 82–98)
PLATELET # BLD AUTO: 207 K/UL (ref 150–450)
PMV BLD AUTO: 10.3 FL (ref 9.2–12.9)
POTASSIUM SERPL-SCNC: 3.9 MMOL/L (ref 3.5–5.1)
RBC # BLD AUTO: 3.65 M/UL (ref 4–5.4)
SODIUM SERPL-SCNC: 143 MMOL/L (ref 136–145)
WBC # BLD AUTO: 8.16 K/UL (ref 3.9–12.7)

## 2024-11-22 PROCEDURE — 25000003 PHARM REV CODE 250: Performed by: PHYSICIAN ASSISTANT

## 2024-11-22 PROCEDURE — G0378 HOSPITAL OBSERVATION PER HR: HCPCS

## 2024-11-22 PROCEDURE — 25000003 PHARM REV CODE 250

## 2024-11-22 PROCEDURE — 80048 BASIC METABOLIC PNL TOTAL CA: CPT

## 2024-11-22 PROCEDURE — 83735 ASSAY OF MAGNESIUM: CPT

## 2024-11-22 PROCEDURE — 36415 COLL VENOUS BLD VENIPUNCTURE: CPT

## 2024-11-22 PROCEDURE — 85027 COMPLETE CBC AUTOMATED: CPT

## 2024-11-22 PROCEDURE — 63600175 PHARM REV CODE 636 W HCPCS: Performed by: PHYSICIAN ASSISTANT

## 2024-11-22 RX ORDER — LOSARTAN POTASSIUM 50 MG/1
100 TABLET ORAL DAILY
Status: DISCONTINUED | OUTPATIENT
Start: 2024-11-23 | End: 2024-11-23

## 2024-11-22 RX ADMIN — APIXABAN 10 MG: 5 TABLET, FILM COATED ORAL at 09:11

## 2024-11-22 RX ADMIN — HYPROMELLOSE 2910 1 DROP: 5 SOLUTION/ DROPS OPHTHALMIC at 04:11

## 2024-11-22 RX ADMIN — HYPROMELLOSE 2910 1 DROP: 5 SOLUTION/ DROPS OPHTHALMIC at 08:11

## 2024-11-22 RX ADMIN — FAMOTIDINE 20 MG: 20 TABLET ORAL at 09:11

## 2024-11-22 RX ADMIN — HYPROMELLOSE 2910 1 DROP: 5 SOLUTION/ DROPS OPHTHALMIC at 09:11

## 2024-11-22 RX ADMIN — CEFTRIAXONE SODIUM 1 G: 1 INJECTION, POWDER, FOR SOLUTION INTRAMUSCULAR; INTRAVENOUS at 09:11

## 2024-11-22 RX ADMIN — FAMOTIDINE 20 MG: 20 TABLET ORAL at 08:11

## 2024-11-22 RX ADMIN — APIXABAN 10 MG: 5 TABLET, FILM COATED ORAL at 08:11

## 2024-11-22 RX ADMIN — CARVEDILOL 25 MG: 12.5 TABLET, FILM COATED ORAL at 08:11

## 2024-11-22 NOTE — PROGRESS NOTES
[facility] [address] [place] [address] Medicine  Progress Note    Patient Name: Rosetta Whyte  MRN: [id number]  Patient Class: OP- Observation   Admission Date: 11/20/2024  Length of Stay: 0 days  Attending Physician: Martita Montesinos MD  Primary Care Provider: Georgie Ortega MD        Subjective:     Principal Problem:Impaired functional mobility and activity tolerance        HPI:  Rosetta Whyte is a 80 yo F with PMHx of HTN, morbid obesity, hx of gastric bypass, lymphedema, EMMANUEL, HFpEF who presented to ED for chest pain. Endorses sudden, midsternal, nonradiating chest pain that began after eating lunch and lasted for approximately 30 minutes. She had a similar experience of Friday, but it did not last as long. Chest pain has not recurred since being in the ED. She also endorses pain to her bilateral big toes as she states she recently hit them at her home. She has been mostly bedbound for the past couple of years. She lives alone, but her daughter and sister both live down her street and come by to see her daily, keep her clean, and provide her food. She would like to start working on NH placement. Also reports L eye irritation for the past few weeks. Denies fever, chills, palpitations, SOB, cough, n/v/d, dysuria, headaches.    In ED:  Afebrile. HDS. No leukocytosis. CMP notable for Na 147, serum bicarb  21, BUN 43. BNP 80. Trop 0.024 >> 0.021. CXR without acute process. Given po pantoprazole 40 mg. Placed in observation with HM.     Overview/Hospital Course:  Patient admitted to hospital medicine for chest pain and BLE edema. Troponin down trending. Echo with EF of 60-65% with no wall motion changes. BLE US with R femoral DVT, started on Eliquis. CTA without PE. Podiatry consulted, ordered B foot XR which are unremarkable. Recommend offloading boots. Wound care consulted for multiple areas of skin breakdown, appreciate recs. UA infectious, started on CTX. Patient and family interested in [facility] placement  which [facility] has discussed. Plan for discharge home when stable.    Interval History: NAEON. VSS. Patient seen and examined at bedside today. Patient reports with no complaints at this time. Denies CP. CTA negative for PE. Ucx with gram negative rods, continue CTX. Podiatry signed off given unremarkable BLE XR. Wound care consulted for multiple areas of skin breakdown, appreciate recs. HCTZ discontinued given patient was restarted on lasix.     Review of Systems   Constitutional:  Positive for activity change. Negative for chills and fever.   HENT:  Negative for trouble swallowing.    Eyes:  Negative for photophobia and visual disturbance.   Respiratory:  Negative for cough, chest tightness and shortness of breath.    Cardiovascular:  Positive for chest pain. Negative for palpitations and leg swelling.   Gastrointestinal:  Positive for abdominal distention. Negative for abdominal pain, constipation, diarrhea, nausea and vomiting.   Genitourinary:  Negative for dysuria, frequency and hematuria.   Musculoskeletal:  Positive for gait problem. Negative for back pain and neck pain.   Skin:  Positive for wound. Negative for rash.   Neurological:  Negative for dizziness, syncope, speech difficulty and light-headedness.   Psychiatric/Behavioral:  Negative for agitation and confusion. The patient is not nervous/anxious.      Objective:     Vital Signs (Most Recent):  Temp: 97.6 °F (36.4 °C) (11/22/24 1544)  Pulse: 65 (11/22/24 1544)  Resp: 20 (11/22/24 1544)  BP: (!) 125/59 (11/22/24 1544)  SpO2: 96 % (11/22/24 1544) Vital Signs (24h Range):  Temp:  [97.6 °F (36.4 °C)-98.4 °F (36.9 °C)] 97.6 °F (36.4 °C)  Pulse:  [62-77] 65  Resp:  [18-20] 20  SpO2:  [92 %-100 %] 96 %  BP: (107-134)/(50-63) 125/59     Weight: (!) 168.8 kg (372 lb 2.2 oz)  Body mass index is 63.88 kg/m².  No intake or output data in the 24 hours ending 11/22/24 1556        Physical Exam  Vitals and nursing note reviewed.   Constitutional:       General: She is  not in acute distress.     Appearance: She is well-developed. She is obese.      Comments: Morbid obesity with BMI 54.   HENT:      Head: Normocephalic and atraumatic.      Mouth/Throat:      Pharynx: No oropharyngeal exudate.   Eyes:      Conjunctiva/sclera: Conjunctivae normal.      Pupils: Pupils are equal, round, and reactive to light.   Cardiovascular:      Rate and Rhythm: Normal rate and regular rhythm.      Heart sounds: Normal heart sounds.   Pulmonary:      Effort: Pulmonary effort is normal.      Breath sounds: Normal breath sounds.      Comments: Limited 2/2 body habitus  Abdominal:      General: Bowel sounds are normal. There is no distension.      Palpations: Abdomen is soft.      Tenderness: There is no abdominal tenderness.   Musculoskeletal:         General: Tenderness (BLE) present. Normal range of motion.      Cervical back: Normal range of motion and neck supple.      Right lower leg: Edema present.      Left lower leg: Edema present.   Lymphadenopathy:      Cervical: No cervical adenopathy.   Skin:     General: Skin is warm and dry.      Capillary Refill: Capillary refill takes less than 2 seconds.      Findings: No rash.      Comments: Significant lymphedema to BLE with skin changes including papillomas and hyperkeratosis. Small wound to L big toe   Neurological:      Mental Status: She is alert and oriented to person, place, and time.      Cranial Nerves: No cranial nerve deficit.      Sensory: No sensory deficit.      Coordination: Coordination normal.   Psychiatric:         Behavior: Behavior normal.         Thought Content: Thought content normal.         Judgment: Judgment normal.                           Significant Labs: All pertinent labs within the past 24 hours have been reviewed.  BMP:   Recent Labs   Lab 11/22/24  0514         K 3.9   *   CO2 20*   BUN 45*   CREATININE 1.2   CALCIUM 8.3*   MG 2.3     CBC:   Recent Labs   Lab 11/21/24  0315 11/21/24  1045  11/22/24  0514   WBC 7.73 8.39 8.16   HGB 11.5* 11.6* 11.3*   HCT 38.9 38.2 37.0   PLT SEE COMMENT 230 207     CMP:   Recent Labs   Lab 11/20/24  1705 11/21/24  0315 11/22/24  0514   * 144 143   K 4.4 4.0 3.9   * 119* 117*   CO2 21* 20* 20*    126* 103   BUN 43* 41* 45*   CREATININE 1.1 1.1 1.2   CALCIUM 8.5* 8.3* 8.3*   PROT 6.0  --   --    ALBUMIN 2.4*  --   --    BILITOT 0.2  --   --    ALKPHOS 129  --   --    AST 11  --   --    ALT 6*  --   --    ANIONGAP 7* 5* 6*     Urine Studies:   Recent Labs   Lab 11/21/24  0546   COLORU Yellow   APPEARANCEUA Hazy*   PHUR 5.0   SPECGRAV 1.015   PROTEINUA Negative   GLUCUA Negative   KETONESU Negative   BILIRUBINUA Negative   OCCULTUA Trace*   NITRITE Positive*   LEUKOCYTESUR 2+*   RBCUA 6*   WBCUA 19*   BACTERIA Many*   SQUAMEPITHEL 5       Significant Imaging: I have reviewed all pertinent imaging results/findings within the past 24 hours.    Assessment/Plan:      * Impaired functional mobility and activity tolerance  - has been bedbound for multiple years now. Lives alone, but family checks in on her  - she would like to proceed with NH placement. Explained that she would likely be discharged back home while placement is pending. Patient agrees to plan   - CM to assist with initiating process of NH placement     Right femoral vein DVT  - BLE US with R femoral DVT  - started on Eliquis 10mg PO BID x 7 days then 5mg BID   - CTA chest without evidence of PE      Acute cystitis with hematuria  - UA infectious  - started on CTX  - follow up UCx      Pain in toes of both feet  - wound noted below L big toe nail  - podiatry consulted, Recommend:  - left hallux with sublingual hematoma, we will debrided nail and assess for potential wound.  - recommend bilateral heel offloading boots.  Patient is susceptible to pressure ulcers of the bilateral heel.  - nothing to culture.  - bilateral x-rays unremarkable.  - podiatry will sign off and follow up  outpatient    Chest pain  79 y.o. who presents with midsternal chest pain without radiation that lasted for 30 minutes and occurred after eating lunch. Pain resolved without intervention and has not recurred.     - EKG without acute ischemic changes  - trop 0.024 >>0.021  - high suspicion for gastric reflux. Start pepcid BID  - Echo below  - cardiac telemetry    Results for orders placed during the hospital encounter of 11/20/24    Echo    Interpretation Summary    Left Ventricle: The left ventricle is normal in size. Normal wall thickness. There is concentric remodeling. There is normal systolic function with a visually estimated ejection fraction of 60 - 65%. There is normal diastolic function.    Right Ventricle: Normal right ventricular cavity size. Wall thickness is normal. Systolic function is normal.    Left Atrium: Left atrium is mildly dilated.    IVC/SVC: Normal venous pressure at 3 mmHg.    Essential (primary) hypertension  Patients blood pressure range in the last 24 hours was: BP  Min: 107/53  Max: 134/63.The patient's inpatient anti-hypertensive regimen is listed below:  Current Antihypertensives  , Daily, Oral  amLODIPine tablet 10 mg, Daily, Oral  carvediloL tablet 25 mg, 2 times daily, Oral  furosemide tablet 40 mg, Daily, Oral  losartan tablet 100 mg, Daily, Oral    Plan  - BP is controlled, no changes needed to their regimen  - patient reports compliance with home meds and has them at bedside, but on review of dispense report she has not filled amlodipine, lasix, or coreg in many months. Encourage compliance with home regimen     Morbid (severe) obesity due to excess calories  Body mass index is 54.24 kg/m². Morbid obesity complicates all aspects of disease management from diagnostic modalities to treatment. Weight loss encouraged and health benefits explained to patient.     Diastolic dysfunction, left ventricle  - last echo in 2020 with GII LV DD and preserved EF  - strict I/O, daily weights,  and 1.5L fluid restriction   - BNP wnl (though likely falsely low 2/2 morbid obesity). CXR without pulmonary edema. Significant BLE edema, but likely from chronic lymphedema   - continue coreg and losartan; discontinued HCTZ   - restart lasix   - monitor tele   - echo - see CHEST PAIN     Lymphedema of both lower extremities  - significant lymphedema to BLE  - afebrile and without leukocytosis   - BLE US with R femoral DVT  - wound care consulted, appreciate recs  - would benefit from referral to lymphedema clinic on discharge     S/P laparoscopic sleeve gastrectomy  - noted       VTE Risk Mitigation (From admission, onward)           Ordered     apixaban tablet 10 mg  2 times daily         11/21/24 0919     IP VTE HIGH RISK PATIENT  Once         11/21/24 0014                    Discharge Planning   MC: 11/23/2024     Code Status: Full Code   Is the patient medically ready for discharge?:     Reason for patient still in hospital (select all that apply): Consult recommendations  Discharge Plan A: Home Health, Home with family   Discharge Delays: None known at this time              Peri La PA-C  Department of Hospital Medicine   [facility]

## 2024-11-22 NOTE — PLAN OF CARE
Problem: Skin Injury Risk Increased  Goal: Skin Health and Integrity  Outcome: Progressing     Problem: Adult Inpatient Plan of Care  Goal: Plan of Care Review  Outcome: Progressing  Goal: Patient-Specific Goal (Individualized)  Outcome: Progressing  Goal: Absence of Hospital-Acquired Illness or Injury  Outcome: Progressing  Goal: Optimal Comfort and Wellbeing  Outcome: Progressing     Problem: Bariatric Environmental Safety  Goal: Safety Maintained with Care  Outcome: Progressing     Problem: Infection  Goal: Absence of Infection Signs and Symptoms  Outcome: Progressing     Problem: Wound  Goal: Optimal Coping  Outcome: Progressing  Goal: Optimal Functional Ability  Outcome: Progressing  Goal: Absence of Infection Signs and Symptoms  Outcome: Progressing  Goal: Improved Oral Intake  Outcome: Progressing  Goal: Skin Health and Integrity  Outcome: Progressing  Goal: Optimal Wound Healing  Outcome: Progressing

## 2024-11-22 NOTE — ED NOTES
Telemetry Verification   Patient placed on Telemetry Box  Verified with War Room  Box # [id number]   Rate 86   Rhythm Normal sinus

## 2024-11-22 NOTE — ASSESSMENT & PLAN NOTE
Patients blood pressure range in the last 24 hours was: BP  Min: 107/53  Max: 134/63.The patient's inpatient anti-hypertensive regimen is listed below:  Current Antihypertensives  , Daily, Oral  amLODIPine tablet 10 mg, Daily, Oral  carvediloL tablet 25 mg, 2 times daily, Oral  furosemide tablet 40 mg, Daily, Oral  losartan tablet 100 mg, Daily, Oral    Plan  - BP is controlled, no changes needed to their regimen  - patient reports compliance with home meds and has them at bedside, but on review of dispense report she has not filled amlodipine, lasix, or coreg in many months. Encourage compliance with home regimen

## 2024-11-22 NOTE — ASSESSMENT & PLAN NOTE
- BLE US with R femoral DVT  - started on Eliquis 10mg PO BID x 7 days then 5mg BID   - CTA chest without evidence of PE

## 2024-11-22 NOTE — ASSESSMENT & PLAN NOTE
- last echo in 2020 with GII LV DD and preserved EF  - strict I/O, daily weights, and 1.5L fluid restriction   - BNP wnl (though likely falsely low 2/2 morbid obesity). CXR without pulmonary edema. Significant BLE edema, but likely from chronic lymphedema   - continue coreg and losartan; discontinued HCTZ   - restart lasix   - monitor tele   - echo - see CHEST PAIN

## 2024-11-22 NOTE — ASSESSMENT & PLAN NOTE
- significant lymphedema to BLE  - afebrile and without leukocytosis   - BLE US with R femoral DVT  - wound care consulted, appreciate recs  - would benefit from referral to lymphedema clinic on discharge

## 2024-11-22 NOTE — PLAN OF CARE
Problem: Skin Injury Risk Increased  Goal: Skin Health and Integrity  Outcome: Progressing     Problem: Adult Inpatient Plan of Care  Goal: Plan of Care Review  Outcome: Progressing  Flowsheets (Taken 11/22/2024 9426)  Plan of Care Reviewed With: patient  Goal: Patient-Specific Goal (Individualized)  Outcome: Progressing  Goal: Absence of Hospital-Acquired Illness or Injury  Outcome: Progressing  Goal: Optimal Comfort and Wellbeing  Outcome: Progressing  Goal: Readiness for Transition of Care  Outcome: Progressing     Problem: Bariatric Environmental Safety  Goal: Safety Maintained with Care  Outcome: Progressing     Problem: Infection  Goal: Absence of Infection Signs and Symptoms  Outcome: Progressing     Problem: Acute Kidney Injury/Impairment  Goal: Fluid and Electrolyte Balance  Outcome: Progressing  Goal: Improved Oral Intake  Outcome: Progressing  Goal: Effective Renal Function  Outcome: Progressing     Problem: Wound  Goal: Optimal Coping  Outcome: Progressing  Goal: Optimal Functional Ability  Outcome: Progressing  Goal: Absence of Infection Signs and Symptoms  Outcome: Progressing  Goal: Improved Oral Intake  Outcome: Progressing  Goal: Optimal Pain Control and Function  Outcome: Progressing  Goal: Skin Health and Integrity  Outcome: Progressing  Goal: Optimal Wound Healing  Outcome: Progressing

## 2024-11-22 NOTE — ASSESSMENT & PLAN NOTE
- wound noted below L big toe nail  - podiatry consulted, Recommend:  - left hallux with sublingual hematoma, we will debrided nail and assess for potential wound.  - recommend bilateral heel offloading boots.  Patient is susceptible to pressure ulcers of the bilateral heel.  - nothing to culture.  - bilateral x-rays unremarkable.  - podiatry will sign off and follow up outpatient

## 2024-11-22 NOTE — SUBJECTIVE & OBJECTIVE
Interval History: NAEON. VSS. Patient seen and examined at bedside today. Patient reports with no complaints at this time. Denies CP. CTA negative for PE. Ucx with gram negative rods, continue CTX. Podiatry signed off given unremarkable BLE XR. Wound care consulted for multiple areas of skin breakdown, appreciate recs. HCTZ discontinued given patient was restarted on lasix.     Review of Systems   Constitutional:  Positive for activity change. Negative for chills and fever.   HENT:  Negative for trouble swallowing.    Eyes:  Negative for photophobia and visual disturbance.   Respiratory:  Negative for cough, chest tightness and shortness of breath.    Cardiovascular:  Positive for chest pain. Negative for palpitations and leg swelling.   Gastrointestinal:  Positive for abdominal distention. Negative for abdominal pain, constipation, diarrhea, nausea and vomiting.   Genitourinary:  Negative for dysuria, frequency and hematuria.   Musculoskeletal:  Positive for gait problem. Negative for back pain and neck pain.   Skin:  Positive for wound. Negative for rash.   Neurological:  Negative for dizziness, syncope, speech difficulty and light-headedness.   Psychiatric/Behavioral:  Negative for agitation and confusion. The patient is not nervous/anxious.      Objective:     Vital Signs (Most Recent):  Temp: 97.6 °F (36.4 °C) (11/22/24 1544)  Pulse: 65 (11/22/24 1544)  Resp: 20 (11/22/24 1544)  BP: (!) 125/59 (11/22/24 1544)  SpO2: 96 % (11/22/24 1544) Vital Signs (24h Range):  Temp:  [97.6 °F (36.4 °C)-98.4 °F (36.9 °C)] 97.6 °F (36.4 °C)  Pulse:  [62-77] 65  Resp:  [18-20] 20  SpO2:  [92 %-100 %] 96 %  BP: (107-134)/(50-63) 125/59     Weight: (!) 168.8 kg (372 lb 2.2 oz)  Body mass index is 63.88 kg/m².  No intake or output data in the 24 hours ending 11/22/24 1556        Physical Exam  Vitals and nursing note reviewed.   Constitutional:       General: She is not in acute distress.     Appearance: She is well-developed. She  is obese.      Comments: Morbid obesity with BMI 54.   HENT:      Head: Normocephalic and atraumatic.      Mouth/Throat:      Pharynx: No oropharyngeal exudate.   Eyes:      Conjunctiva/sclera: Conjunctivae normal.      Pupils: Pupils are equal, round, and reactive to light.   Cardiovascular:      Rate and Rhythm: Normal rate and regular rhythm.      Heart sounds: Normal heart sounds.   Pulmonary:      Effort: Pulmonary effort is normal.      Breath sounds: Normal breath sounds.      Comments: Limited 2/2 body habitus  Abdominal:      General: Bowel sounds are normal. There is no distension.      Palpations: Abdomen is soft.      Tenderness: There is no abdominal tenderness.   Musculoskeletal:         General: Tenderness (BLE) present. Normal range of motion.      Cervical back: Normal range of motion and neck supple.      Right lower leg: Edema present.      Left lower leg: Edema present.   Lymphadenopathy:      Cervical: No cervical adenopathy.   Skin:     General: Skin is warm and dry.      Capillary Refill: Capillary refill takes less than 2 seconds.      Findings: No rash.      Comments: Significant lymphedema to BLE with skin changes including papillomas and hyperkeratosis. Small wound to L big toe   Neurological:      Mental Status: She is alert and oriented to person, place, and time.      Cranial Nerves: No cranial nerve deficit.      Sensory: No sensory deficit.      Coordination: Coordination normal.   Psychiatric:         Behavior: Behavior normal.         Thought Content: Thought content normal.         Judgment: Judgment normal.                           Significant Labs: All pertinent labs within the past 24 hours have been reviewed.  BMP:   Recent Labs   Lab 11/22/24  0514         K 3.9   *   CO2 20*   BUN 45*   CREATININE 1.2   CALCIUM 8.3*   MG 2.3     CBC:   Recent Labs   Lab 11/21/24  0315 11/21/24  1045 11/22/24  0514   WBC 7.73 8.39 8.16   HGB 11.5* 11.6* 11.3*   HCT 38.9  38.2 37.0   PLT SEE COMMENT 230 834     CMP:   Recent Labs   Lab 11/20/24  1705 11/21/24  0315 11/22/24  0514   * 144 143   K 4.4 4.0 3.9   * 119* 117*   CO2 21* 20* 20*    126* 103   BUN 43* 41* 45*   CREATININE 1.1 1.1 1.2   CALCIUM 8.5* 8.3* 8.3*   PROT 6.0  --   --    ALBUMIN 2.4*  --   --    BILITOT 0.2  --   --    ALKPHOS 129  --   --    AST 11  --   --    ALT 6*  --   --    ANIONGAP 7* 5* 6*     Urine Studies:   Recent Labs   Lab 11/21/24  0546   COLORU Yellow   APPEARANCEUA Hazy*   PHUR 5.0   SPECGRAV 1.015   PROTEINUA Negative   GLUCUA Negative   KETONESU Negative   BILIRUBINUA Negative   OCCULTUA Trace*   NITRITE Positive*   LEUKOCYTESUR 2+*   RBCUA 6*   WBCUA 19*   BACTERIA Many*   SQUAMEPITHEL 5       Significant Imaging: I have reviewed all pertinent imaging results/findings within the past 24 hours.

## 2024-11-22 NOTE — PLAN OF CARE
SW completed outpatient case management referral due to patient seeking halfway nursing home placement post discharge from the hospital.    11:30 AM  SW spoke with patient about halfway nursing home placement and patient is agreeable. Patient had no preference on which facility she wants to go to and instructed SW to call her sister Nita. SW called both numbers on file for patients sister and called patients daughter Lyle and also no answer.     SW completed a PASRR and called in a LOCET on patients behalf.    11:44 AM  No accepting home health agencies at this time. However, patient has sitters through her medicaid waiver program.

## 2024-11-23 LAB
ANION GAP SERPL CALC-SCNC: 5 MMOL/L (ref 8–16)
BUN SERPL-MCNC: 46 MG/DL (ref 8–23)
CALCIUM SERPL-MCNC: 8.2 MG/DL (ref 8.7–10.5)
CHLORIDE SERPL-SCNC: 118 MMOL/L (ref 95–110)
CO2 SERPL-SCNC: 19 MMOL/L (ref 23–29)
CREAT SERPL-MCNC: 1.5 MG/DL (ref 0.5–1.4)
ERYTHROCYTE [DISTWIDTH] IN BLOOD BY AUTOMATED COUNT: 15 % (ref 11.5–14.5)
EST. GFR  (NO RACE VARIABLE): 35.4 ML/MIN/1.73 M^2
GLUCOSE SERPL-MCNC: 102 MG/DL (ref 70–110)
HCT VFR BLD AUTO: 35.2 % (ref 37–48.5)
HGB BLD-MCNC: 10.6 G/DL (ref 12–16)
MAGNESIUM SERPL-MCNC: 2.3 MG/DL (ref 1.6–2.6)
MCH RBC QN AUTO: 30.7 PG (ref 27–31)
MCHC RBC AUTO-ENTMCNC: 30.1 G/DL (ref 32–36)
MCV RBC AUTO: 102 FL (ref 82–98)
PLATELET # BLD AUTO: 208 K/UL (ref 150–450)
PMV BLD AUTO: 10.1 FL (ref 9.2–12.9)
POTASSIUM SERPL-SCNC: 3.8 MMOL/L (ref 3.5–5.1)
RBC # BLD AUTO: 3.45 M/UL (ref 4–5.4)
SODIUM SERPL-SCNC: 142 MMOL/L (ref 136–145)
WBC # BLD AUTO: 6.75 K/UL (ref 3.9–12.7)

## 2024-11-23 PROCEDURE — 25000003 PHARM REV CODE 250: Performed by: HOSPITALIST

## 2024-11-23 PROCEDURE — 21400001 HC TELEMETRY ROOM

## 2024-11-23 PROCEDURE — 63600175 PHARM REV CODE 636 W HCPCS: Performed by: PHYSICIAN ASSISTANT

## 2024-11-23 PROCEDURE — 83735 ASSAY OF MAGNESIUM: CPT

## 2024-11-23 PROCEDURE — 36415 COLL VENOUS BLD VENIPUNCTURE: CPT

## 2024-11-23 PROCEDURE — 25000003 PHARM REV CODE 250

## 2024-11-23 PROCEDURE — 85027 COMPLETE CBC AUTOMATED: CPT

## 2024-11-23 PROCEDURE — 27000207 HC ISOLATION

## 2024-11-23 PROCEDURE — 25000003 PHARM REV CODE 250: Performed by: PHYSICIAN ASSISTANT

## 2024-11-23 PROCEDURE — 80048 BASIC METABOLIC PNL TOTAL CA: CPT

## 2024-11-23 RX ORDER — FAMOTIDINE 20 MG/1
20 TABLET, FILM COATED ORAL DAILY
Status: DISCONTINUED | OUTPATIENT
Start: 2024-11-24 | End: 2024-11-28 | Stop reason: HOSPADM

## 2024-11-23 RX ORDER — DOCUSATE SODIUM 100 MG
400 CAPSULE ORAL
Status: DISCONTINUED | OUTPATIENT
Start: 2024-11-23 | End: 2024-11-28 | Stop reason: HOSPADM

## 2024-11-23 RX ORDER — CARVEDILOL 12.5 MG/1
12.5 TABLET ORAL 2 TIMES DAILY
Status: DISCONTINUED | OUTPATIENT
Start: 2024-11-23 | End: 2024-11-25

## 2024-11-23 RX ORDER — MICONAZOLE NITRATE 2 G/100G
POWDER TOPICAL 2 TIMES DAILY
Status: DISCONTINUED | OUTPATIENT
Start: 2024-11-23 | End: 2024-11-28 | Stop reason: HOSPADM

## 2024-11-23 RX ORDER — AMMONIUM LACTATE 12 G/100G
LOTION TOPICAL 2 TIMES DAILY
Status: DISCONTINUED | OUTPATIENT
Start: 2024-11-23 | End: 2024-11-28 | Stop reason: HOSPADM

## 2024-11-23 RX ADMIN — FAMOTIDINE 20 MG: 20 TABLET ORAL at 08:11

## 2024-11-23 RX ADMIN — Medication 400 ML: at 04:11

## 2024-11-23 RX ADMIN — HYPROMELLOSE 2910 1 DROP: 5 SOLUTION/ DROPS OPHTHALMIC at 08:11

## 2024-11-23 RX ADMIN — Medication: at 02:11

## 2024-11-23 RX ADMIN — MICONAZOLE NITRATE 2 % TOPICAL POWDER: at 08:11

## 2024-11-23 RX ADMIN — CEFTRIAXONE SODIUM 1 G: 1 INJECTION, POWDER, FOR SOLUTION INTRAMUSCULAR; INTRAVENOUS at 08:11

## 2024-11-23 RX ADMIN — Medication 400 ML: at 02:11

## 2024-11-23 RX ADMIN — Medication 400 ML: at 08:11

## 2024-11-23 RX ADMIN — APIXABAN 10 MG: 5 TABLET, FILM COATED ORAL at 08:11

## 2024-11-23 RX ADMIN — CARVEDILOL 12.5 MG: 12.5 TABLET, FILM COATED ORAL at 08:11

## 2024-11-23 RX ADMIN — Medication: at 08:11

## 2024-11-23 RX ADMIN — HYPROMELLOSE 2910 1 DROP: 5 SOLUTION/ DROPS OPHTHALMIC at 02:11

## 2024-11-23 RX ADMIN — MICONAZOLE NITRATE 2 % TOPICAL POWDER: at 02:11

## 2024-11-23 NOTE — CONSULTS
[facility]H  Wound Care    Patient Name:  Rosetta Whyte   MRN:  [id number]  Date: 11/23/2024  Diagnosis: Impaired functional mobility and activity tolerance    Pt seen for wound consult- BLE. Pt lying in bed, AAOx3 and agreeable to skin assessment of BLE. Pt reports being bedbound and living alone as well as having difficulty with caring for her skin due to inability to reach, bend over, etc. Pt did say her daughter and a sister live nearby, who can assist. Pt reports having compression stockings or wraps in the past but had issues with managing them - relied on home health or outpatient wound care. Pt was seen by podiatry during this hospital stay- see note; heel offloading recommended as well as outpatient f/u with podiatry and lymphedema clinic. Pt noted to have a left femoral DVT- on Eliquis and chronic lymphedema.    Upon WOC assessment, pt has heel lift boots in place, xerosis to legs with hypergranulation raised areas, thickened skin especially around ankles and folds in leg- photos in chart-, very edematous from feet to upper thighs. As pt has no noticeable open wounds, would suggest keeping legs clean, moisturized, and elevated until pt can be evaluated by lymphedema specialist.  Heels are clear, dry ulcer noted to dorsal great left toe and thickened, long toenails. Pt skin is moist, and likely perpetually so due to bedbound status, inability to move much while in bed, and incontinence. Unable to turn pt without assistance; unclear if pt stays in the same position all day and night at home or if she has a fernando lift. Also unclear the extent that family is present and available to help/assist pt with ADL's  including skin care and incontinence care. Pt has small areas of ulcerated skin to thighs and hips, likely moisture and incontinent related dermatitis-best option is to use a barrier or wound cream.    Recs:  BLE: Lac Hydrin lotion, apply BID and prn to BLE. Legs and feet should be washed daily  with a moisturizing soap, or no rinse while in hospital. Dry well and apply lotion, avoiding spaces between toes.     Pt should follow up outpatient with a lymphedema specialist once DVT has resolved. Wraps may be a challenge to place on pt and keep dry/free from soilage. Pt may benefit more from Lymphedema pump that would be easier for family or home health to apply. Due to size of pt's legs, 2-3 people may be needed to assist with wrapping. Pt should have heels offloaded, either with heel lift boots or pillows. Pt should also elevate legs.     Ulcerations on hips/upper thighs: clean with no rinse or mild soap/water; dry well. Apply Triad wound cream to all open areas daily and prn. Pt should not wear diapers and should avoid sitting in wet/soiled surfaces.     As pt cannot get out of bed per her own statement, this may present a challenge if she is home alone or even with 1 person available. Multiple people (2-3 or more) would be best to assist pt in turning and with ADL's/pericare.  Pt should have a bariatric air mattress for pressure injury prevention; pt stated she has an adjustable bed but expressed concerns about eating/drinking and not sitting up enough. Thus, pt is aspiration risk.     TAINA Garduno, RN, [facility] Wound/Ostomy  11/23/24 11/23/24 1250   WOCN Assessment   WOCN Total Time (mins) 60   Visit Date 11/23/24   Visit Time 1250   Consult Type New   WOCN Speciality Wound   Wound moisture;other;At risk for pressure Injury  (lymphedema)   Number of Wounds 2   Intervention assessed;chart review;coordination of care   Teaching on-going       History:     Past Medical History:   Diagnosis Date    Arthritis     BMI 50.0-59.9, adult     Difficult intubation 11/06/2017    Poor neck extension, Grade 4 view with DL    Hypertension     Morbid obesity     EMMANUEL (obstructive sleep apnea)     Right femoral vein DVT 11/21/2024       Social History     Socioeconomic History    Marital status:     Tobacco Use    Smoking status: Never    Smokeless tobacco: Never   Substance and Sexual Activity    Alcohol use: No    Drug use: No     Social Drivers of Health     Financial Resource Strain: Medium Risk (11/21/2024)    Overall Financial Resource Strain (CARDIA)     Difficulty of Paying Living Expenses: Somewhat hard   Food Insecurity: No Food Insecurity (11/21/2024)    Hunger Vital Sign     Worried About Running Out of Food in the Last Year: Never true     Ran Out of Food in the Last Year: Never true   Transportation Needs: No Transportation Needs (11/21/2024)    TRANSPORTATION NEEDS     Transportation : No   Physical Activity: Inactive (11/20/2024)    Exercise Vital Sign     Days of Exercise per Week: 0 days     Minutes of Exercise per Session: 0 min   Stress: No Stress Concern Present (11/21/2024)    [employer] [place] [employer] - Occupational Stress Questionnaire     Feeling of Stress : Only a little   Housing Stability: Low Risk  (11/21/2024)    Housing Stability Vital Sign     Unable to Pay for Housing in the Last Year: No     Homeless in the Last Year: No       Precautions:     Allergies as of 11/20/2024    (No Known Allergies)       WOC Assessment Details/Treatment   See above

## 2024-11-23 NOTE — PROGRESS NOTES
Pharmacist Renal Dose Adjustment Note    Rosetta Whyte is a 79 y.o. female being treated with the medication famotidine    Patient Data:    Vital Signs (Most Recent):  Temp: 97.8 °F (36.6 °C) (11/23/24 0721)  Pulse: 67 (11/23/24 0721)  Resp: 16 (11/23/24 0721)  BP: (!) 117/56 (11/23/24 0721)  SpO2: 97 % (11/23/24 0721) Vital Signs (72h Range):  Temp:  [97.4 °F (36.3 °C)-98.6 °F (37 °C)]   Pulse:  [50-85]   Resp:  [15-20]   BP: ()/(49-71)   SpO2:  [92 %-100 %]      Recent Labs   Lab 11/21/24  0315 11/22/24  0514 11/23/24  0554   CREATININE 1.1 1.2 1.5*     Serum creatinine: 1.5 mg/dL (H) 11/23/24 0554  Estimated creatinine clearance: 49 mL/min (A)    Famotidine 20 mg PO BID will be changed to famotidine 20 mg PO QD    Pharmacist's Name: Celeste Vogt, Rosa Isela  Pharmacist's Extension: [phone]

## 2024-11-23 NOTE — SUBJECTIVE & OBJECTIVE
Interval History: NAEON. VSS. Patient seen and examined at bedside today. Patient reports with no complaints at this time. BP continues to be low. Discontinued Norvasc, lowered coreg, and lowered losartan. KELSEY today with Cr 1.5. Holding Lasix. Patient reports that she was taken off lasix along time ago. Wound care consulted and recs placed. Plan for discharge home tomorrow pending resolution of KELSEY.    Review of Systems   Constitutional:  Positive for activity change. Negative for chills and fever.   HENT:  Negative for trouble swallowing.    Eyes:  Negative for photophobia and visual disturbance.   Respiratory:  Negative for cough, chest tightness and shortness of breath.    Cardiovascular:  Positive for chest pain. Negative for palpitations and leg swelling.   Gastrointestinal:  Positive for abdominal distention. Negative for abdominal pain, constipation, diarrhea, nausea and vomiting.   Genitourinary:  Negative for dysuria, frequency and hematuria.   Musculoskeletal:  Positive for gait problem. Negative for back pain and neck pain.   Skin:  Positive for wound. Negative for rash.   Neurological:  Negative for dizziness, syncope, speech difficulty and light-headedness.   Psychiatric/Behavioral:  Negative for agitation and confusion. The patient is not nervous/anxious.      Objective:     Vital Signs (Most Recent):  Temp: 97.9 °F (36.6 °C) (11/23/24 1147)  Pulse: (!) 54 (11/23/24 1503)  Resp: 20 (11/23/24 1147)  BP: (!) 116/58 (11/23/24 1147)  SpO2: 99 % (11/23/24 1147) Vital Signs (24h Range):  Temp:  [97.6 °F (36.4 °C)-98.6 °F (37 °C)] 97.9 °F (36.6 °C)  Pulse:  [50-69] 54  Resp:  [16-20] 20  SpO2:  [96 %-100 %] 99 %  BP: (102-130)/(49-59) 116/58     Weight: (!) 169.1 kg (372 lb 12.8 oz)  Body mass index is 63.99 kg/m².    Intake/Output Summary (Last 24 hours) at 11/23/2024 1509  Last data filed at 11/23/2024 1408  Gross per 24 hour   Intake 1100 ml   Output 1000 ml   Net 100 ml           Physical Exam  Vitals and  nursing note reviewed.   Constitutional:       General: She is not in acute distress.     Appearance: She is well-developed. She is obese.      Comments: Morbid obesity with BMI 54.   HENT:      Head: Normocephalic and atraumatic.      Mouth/Throat:      Pharynx: No oropharyngeal exudate.   Eyes:      Conjunctiva/sclera: Conjunctivae normal.      Pupils: Pupils are equal, round, and reactive to light.   Cardiovascular:      Rate and Rhythm: Normal rate and regular rhythm.      Heart sounds: Normal heart sounds.   Pulmonary:      Effort: Pulmonary effort is normal.      Breath sounds: Normal breath sounds.      Comments: Limited 2/2 body habitus  Abdominal:      General: Bowel sounds are normal. There is no distension.      Palpations: Abdomen is soft.      Tenderness: There is no abdominal tenderness.   Musculoskeletal:         General: Tenderness (BLE) present. Normal range of motion.      Cervical back: Normal range of motion and neck supple.      Right lower leg: Edema present.      Left lower leg: Edema present.   Lymphadenopathy:      Cervical: No cervical adenopathy.   Skin:     General: Skin is warm and dry.      Capillary Refill: Capillary refill takes less than 2 seconds.      Findings: No rash.      Comments: Significant lymphedema to BLE with skin changes including papillomas and hyperkeratosis. Small wound to L big toe   Neurological:      Mental Status: She is alert and oriented to person, place, and time.      Cranial Nerves: No cranial nerve deficit.      Sensory: No sensory deficit.      Coordination: Coordination normal.   Psychiatric:         Behavior: Behavior normal.         Thought Content: Thought content normal.         Judgment: Judgment normal.                           Significant Labs: All pertinent labs within the past 24 hours have been reviewed.  BMP:   Recent Labs   Lab 11/23/24  0554         K 3.8   *   CO2 19*   BUN 46*   CREATININE 1.5*   CALCIUM 8.2*   MG 2.3  "    CBC:   Recent Labs   Lab 11/22/24  0514 11/23/24  0554   WBC 8.16 6.75   HGB 11.3* 10.6*   HCT 37.0 35.2*    208     CMP:   Recent Labs   Lab 11/22/24  0514 11/23/24  0554    142   K 3.9 3.8   * 118*   CO2 20* 19*    102   BUN 45* 46*   CREATININE 1.2 1.5*   CALCIUM 8.3* 8.2*   ANIONGAP 6* 5*     Urine Studies:   No results for input(s): "COLORU", "APPEARANCEUA", "PHUR", "SPECGRAV", "PROTEINUA", "GLUCUA", "KETONESU", "BILIRUBINUA", "OCCULTUA", "NITRITE", "UROBILINOGEN", "LEUKOCYTESUR", "RBCUA", "WBCUA", "BACTERIA", "SQUAMEPITHEL", "HYALINECASTS" in the last 48 hours.    Invalid input(s): "WRIGHTSUR"      Significant Imaging: I have reviewed all pertinent imaging results/findings within the past 24 hours.  "

## 2024-11-23 NOTE — PLAN OF CARE
Problem: Skin Injury Risk Increased  Goal: Skin Health and Integrity  Outcome: Progressing     Problem: Adult Inpatient Plan of Care  Goal: Plan of Care Review  Outcome: Progressing  Goal: Patient-Specific Goal (Individualized)  Outcome: Progressing  Goal: Absence of Hospital-Acquired Illness or Injury  Outcome: Progressing  Goal: Optimal Comfort and Wellbeing  Outcome: Progressing  Goal: Readiness for Transition of Care  Outcome: Progressing     Problem: Bariatric Environmental Safety  Goal: Safety Maintained with Care  Outcome: Progressing     Problem: Infection  Goal: Absence of Infection Signs and Symptoms  Outcome: Progressing     Problem: Acute Kidney Injury/Impairment  Goal: Fluid and Electrolyte Balance  Outcome: Progressing  Goal: Improved Oral Intake  Outcome: Progressing  Goal: Effective Renal Function  Outcome: Progressing     Problem: Wound  Goal: Optimal Coping  Outcome: Progressing  Goal: Optimal Functional Ability  Outcome: Progressing  Goal: Absence of Infection Signs and Symptoms  Outcome: Progressing  Goal: Improved Oral Intake  Outcome: Progressing  Goal: Optimal Pain Control and Function  Outcome: Progressing  Goal: Skin Health and Integrity  Outcome: Progressing  Goal: Optimal Wound Healing  Outcome: Progressing     Problem: Fall Injury Risk  Goal: Absence of Fall and Fall-Related Injury  Outcome: Progressing     Problem: Comorbidity Management  Goal: Maintenance of Heart Failure Symptom Control  Outcome: Progressing  Goal: Blood Pressure in Desired Range  Outcome: Progressing     Problem: Mobility Impairment  Goal: Optimal Mobility  Outcome: Progressing     Problem: Chest Pain  Goal: Resolution of Chest Pain Symptoms  Outcome: Progressing     Problem: UTI (Urinary Tract Infection)  Goal: Improved Infection Symptoms  Outcome: Progressing     Problem: VTE (Venous Thromboembolism)  Goal: Tissue Perfusion  Outcome: Progressing  Goal: Right Ventricular Function  Outcome: Progressing

## 2024-11-23 NOTE — PROGRESS NOTES
[facility] [address] [place] [address] Medicine  Progress Note    Patient Name: Rosetta Whyte  MRN: [id number]  Patient Class: IP- Inpatient   Admission Date: 11/20/2024  Length of Stay: 0 days  Attending Physician: Martita Montesinos MD  Primary Care Provider: Georgie Ortega MD        Subjective:     Principal Problem:Impaired functional mobility and activity tolerance        HPI:  Rosetta Whyte is a 80 yo F with PMHx of HTN, morbid obesity, hx of gastric bypass, lymphedema, EMMANUEL, HFpEF who presented to ED for chest pain. Endorses sudden, midsternal, nonradiating chest pain that began after eating lunch and lasted for approximately 30 minutes. She had a similar experience of Friday, but it did not last as long. Chest pain has not recurred since being in the ED. She also endorses pain to her bilateral big toes as she states she recently hit them at her home. She has been mostly bedbound for the past couple of years. She lives alone, but her daughter and sister both live down her street and come by to see her daily, keep her clean, and provide her food. She would like to start working on NH placement. Also reports L eye irritation for the past few weeks. Denies fever, chills, palpitations, SOB, cough, n/v/d, dysuria, headaches.    In ED:  Afebrile. HDS. No leukocytosis. CMP notable for Na 147, serum bicarb  21, BUN 43. BNP 80. Trop 0.024 >> 0.021. CXR without acute process. Given po pantoprazole 40 mg. Placed in observation with HM.     Overview/Hospital Course:  Patient admitted to hospital medicine for chest pain and BLE edema. Troponin down trending. Echo with EF of 60-65% with no wall motion changes. BLE US with R femoral DVT, started on Eliquis. CTA without PE. Podiatry consulted, ordered B foot XR which are unremarkable. Recommend offloading boots. Wound care consulted for multiple areas of skin breakdown, appreciate recs. UA infectious, started on CTX. Cr worsening to 1.5 today. Holding diuretics and HTN meds.  Patient and family interested in assisted placement which [facility] has discussed. Plan for discharge home when stable.    Interval History: NAEON. VSS. Patient seen and examined at bedside today. Patient reports with no complaints at this time. BP continues to be low. Discontinued Norvasc, lowered coreg, and lowered losartan. KELSEY today with Cr 1.5. Holding Lasix. Patient reports that she was taken off lasix along time ago. Wound care consulted and recs placed. Plan for discharge home tomorrow pending resolution of KELSEY.    Review of Systems   Constitutional:  Positive for activity change. Negative for chills and fever.   HENT:  Negative for trouble swallowing.    Eyes:  Negative for photophobia and visual disturbance.   Respiratory:  Negative for cough, chest tightness and shortness of breath.    Cardiovascular:  Positive for chest pain. Negative for palpitations and leg swelling.   Gastrointestinal:  Positive for abdominal distention. Negative for abdominal pain, constipation, diarrhea, nausea and vomiting.   Genitourinary:  Negative for dysuria, frequency and hematuria.   Musculoskeletal:  Positive for gait problem. Negative for back pain and neck pain.   Skin:  Positive for wound. Negative for rash.   Neurological:  Negative for dizziness, syncope, speech difficulty and light-headedness.   Psychiatric/Behavioral:  Negative for agitation and confusion. The patient is not nervous/anxious.      Objective:     Vital Signs (Most Recent):  Temp: 97.9 °F (36.6 °C) (11/23/24 1147)  Pulse: (!) 54 (11/23/24 1503)  Resp: 20 (11/23/24 1147)  BP: (!) 116/58 (11/23/24 1147)  SpO2: 99 % (11/23/24 1147) Vital Signs (24h Range):  Temp:  [97.6 °F (36.4 °C)-98.6 °F (37 °C)] 97.9 °F (36.6 °C)  Pulse:  [50-69] 54  Resp:  [16-20] 20  SpO2:  [96 %-100 %] 99 %  BP: (102-130)/(49-59) 116/58     Weight: (!) 169.1 kg (372 lb 12.8 oz)  Body mass index is 63.99 kg/m².    Intake/Output Summary (Last 24 hours) at 11/23/2024 1509  Last data filed  at 11/23/2024 1408  Gross per 24 hour   Intake 1100 ml   Output 1000 ml   Net 100 ml           Physical Exam  Vitals and nursing note reviewed.   Constitutional:       General: She is not in acute distress.     Appearance: She is well-developed. She is obese.      Comments: Morbid obesity with BMI 54.   HENT:      Head: Normocephalic and atraumatic.      Mouth/Throat:      Pharynx: No oropharyngeal exudate.   Eyes:      Conjunctiva/sclera: Conjunctivae normal.      Pupils: Pupils are equal, round, and reactive to light.   Cardiovascular:      Rate and Rhythm: Normal rate and regular rhythm.      Heart sounds: Normal heart sounds.   Pulmonary:      Effort: Pulmonary effort is normal.      Breath sounds: Normal breath sounds.      Comments: Limited 2/2 body habitus  Abdominal:      General: Bowel sounds are normal. There is no distension.      Palpations: Abdomen is soft.      Tenderness: There is no abdominal tenderness.   Musculoskeletal:         General: Tenderness (BLE) present. Normal range of motion.      Cervical back: Normal range of motion and neck supple.      Right lower leg: Edema present.      Left lower leg: Edema present.   Lymphadenopathy:      Cervical: No cervical adenopathy.   Skin:     General: Skin is warm and dry.      Capillary Refill: Capillary refill takes less than 2 seconds.      Findings: No rash.      Comments: Significant lymphedema to BLE with skin changes including papillomas and hyperkeratosis. Small wound to L big toe   Neurological:      Mental Status: She is alert and oriented to person, place, and time.      Cranial Nerves: No cranial nerve deficit.      Sensory: No sensory deficit.      Coordination: Coordination normal.   Psychiatric:         Behavior: Behavior normal.         Thought Content: Thought content normal.         Judgment: Judgment normal.                           Significant Labs: All pertinent labs within the past 24 hours have been reviewed.  BMP:   Recent Labs  "  Lab 11/23/24  0554         K 3.8   *   CO2 19*   BUN 46*   CREATININE 1.5*   CALCIUM 8.2*   MG 2.3     CBC:   Recent Labs   Lab 11/22/24  0514 11/23/24  0554   WBC 8.16 6.75   HGB 11.3* 10.6*   HCT 37.0 35.2*    208     CMP:   Recent Labs   Lab 11/22/24  0514 11/23/24  0554    142   K 3.9 3.8   * 118*   CO2 20* 19*    102   BUN 45* 46*   CREATININE 1.2 1.5*   CALCIUM 8.3* 8.2*   ANIONGAP 6* 5*     Urine Studies:   No results for input(s): "COLORU", "APPEARANCEUA", "PHUR", "SPECGRAV", "PROTEINUA", "GLUCUA", "KETONESU", "BILIRUBINUA", "OCCULTUA", "NITRITE", "UROBILINOGEN", "LEUKOCYTESUR", "RBCUA", "WBCUA", "BACTERIA", "SQUAMEPITHEL", "HYALINECASTS" in the last 48 hours.    Invalid input(s): "WRIGHTSUR"      Significant Imaging: I have reviewed all pertinent imaging results/findings within the past 24 hours.    Assessment/Plan:      * Impaired functional mobility and activity tolerance  - has been bedbound for multiple years now. Lives alone, but family checks in on her  - she would like to proceed with NH placement. Explained that she would likely be discharged back home while placement is pending. Patient agrees to plan   - CM to assist with initiating process of NH placement     Right femoral vein DVT  - BLE US with R femoral DVT  - started on Eliquis 10mg PO BID x 7 days then 5mg BID   - CTA chest without evidence of PE      Acute cystitis with hematuria  - UA infectious  - started on CTX  - follow up UCx      Pain in toes of both feet  - wound noted below L big toe nail  - podiatry consulted, Recommend:  - left hallux with sublingual hematoma, we will debrided nail and assess for potential wound.  - recommend bilateral heel offloading boots.  Patient is susceptible to pressure ulcers of the bilateral heel.  - nothing to culture.  - bilateral x-rays unremarkable.  - podiatry will sign off and follow up outpatient    Chest pain  79 y.o. who presents with midsternal " chest pain without radiation that lasted for 30 minutes and occurred after eating lunch. Pain resolved without intervention and has not recurred.     - EKG without acute ischemic changes  - trop 0.024 >>0.021  - high suspicion for gastric reflux. Start pepcid BID  - Echo below  - cardiac telemetry    Results for orders placed during the hospital encounter of 11/20/24    Echo    Interpretation Summary    Left Ventricle: The left ventricle is normal in size. Normal wall thickness. There is concentric remodeling. There is normal systolic function with a visually estimated ejection fraction of 60 - 65%. There is normal diastolic function.    Right Ventricle: Normal right ventricular cavity size. Wall thickness is normal. Systolic function is normal.    Left Atrium: Left atrium is mildly dilated.    IVC/SVC: Normal venous pressure at 3 mmHg.    Essential (primary) hypertension  Patients blood pressure range in the last 24 hours was: BP  Min: 107/53  Max: 134/63.The patient's inpatient anti-hypertensive regimen is listed below:  Current Antihypertensives  , Daily, Oral  amLODIPine tablet 10 mg, Daily, Oral  carvediloL tablet 25 mg, 2 times daily, Oral  furosemide tablet 40 mg, Daily, Oral  losartan tablet 100 mg, Daily, Oral    Plan  - BP is controlled, no changes needed to their regimen  - patient reports compliance with home meds and has them at bedside, but on review of dispense report she has not filled amlodipine, lasix, or coreg in many months. Encourage compliance with home regimen     Morbid (severe) obesity due to excess calories  Body mass index is 54.24 kg/m². Morbid obesity complicates all aspects of disease management from diagnostic modalities to treatment. Weight loss encouraged and health benefits explained to patient.     KELSEY (acute kidney injury)  KELSEY is likely due to pre-renal azotemia due to intravascular volume depletion. Baseline creatinine is  1.1 . Most recent creatinine and eGFR are listed  below.  Recent Labs     11/21/24  0315 11/22/24  0514 11/23/24  0554   CREATININE 1.1 1.2 1.5*   EGFRNORACEVR 51.4* 46.3* 35.4*      Plan  - KELSEY is stable  - Avoid nephrotoxins and renally dose meds for GFR listed above  - Monitor urine output, serial BMP, and adjust therapy as needed  - hold lasix  - pedialyte    Diastolic dysfunction, left ventricle  - last echo in 2020 with GII LV DD and preserved EF  - strict I/O, daily weights, and 1.5L fluid restriction   - BNP wnl (though likely falsely low 2/2 morbid obesity). CXR without pulmonary edema. Significant BLE edema, but likely from chronic lymphedema   - continue coreg and losartan; discontinued HCTZ   - restart lasix   - monitor tele   - echo - see CHEST PAIN     Lymphedema of both lower extremities  - significant lymphedema to BLE  - afebrile and without leukocytosis   - BLE US with R femoral DVT  - wound care consulted, appreciate recs  - would benefit from referral to lymphedema clinic on discharge     S/P laparoscopic sleeve gastrectomy  - noted       VTE Risk Mitigation (From admission, onward)           Ordered     apixaban tablet 10 mg  2 times daily         11/21/24 0919     IP VTE HIGH RISK PATIENT  Once         11/21/24 0014                    Discharge Planning   MC: 11/23/2024     Code Status: Full Code   Is the patient medically ready for discharge?:     Reason for patient still in hospital (select all that apply): Patient new problem and Treatment  Discharge Plan A: Home Health, Home with family   Discharge Delays: None known at this time              Peri La PA-C  Department of Hospital Medicine   [facility]

## 2024-11-23 NOTE — PLAN OF CARE
Rosetta Whyte has warranted treatment spanning two or more midnights of hospital level care for the management of  Acute cystitis, DVT, Impaired functional mobility . She continues to require IV antibiotics, daily labs, and monitoring of vital signs. Her condition is also complicated by the following comorbidities:  HTN, morbid obesity, hx of gastric bypass, lymphedema, EMMANUEL, HFpEF .

## 2024-11-23 NOTE — ASSESSMENT & PLAN NOTE
KELSEY is likely due to pre-renal azotemia due to intravascular volume depletion. Baseline creatinine is  1.1 . Most recent creatinine and eGFR are listed below.  Recent Labs     11/21/24  0315 11/22/24  0514 11/23/24  0554   CREATININE 1.1 1.2 1.5*   EGFRNORACEVR 51.4* 46.3* 35.4*      Plan  - KELSEY is stable  - Avoid nephrotoxins and renally dose meds for GFR listed above  - Monitor urine output, serial BMP, and adjust therapy as needed  - hold lasix  - pedialyte

## 2024-11-23 NOTE — PLAN OF CARE
CM assigned to patient for the weekend. Still no accepting home health agency, however, patient has sitters through medicaid waiver program. Patient plans to discharge home and pursue detention nursing home placement. CM staff following.      Constanza Morelos RN  Weekend  - [facility] [place] [facility]

## 2024-11-24 LAB
ALBUMIN SERPL BCP-MCNC: 2 G/DL (ref 3.5–5.2)
ALP SERPL-CCNC: 98 U/L (ref 40–150)
ALT SERPL W/O P-5'-P-CCNC: 6 U/L (ref 10–44)
ANION GAP SERPL CALC-SCNC: 10 MMOL/L (ref 8–16)
ANION GAP SERPL CALC-SCNC: 5 MMOL/L (ref 8–16)
AST SERPL-CCNC: 7 U/L (ref 10–40)
BILIRUB SERPL-MCNC: 0.2 MG/DL (ref 0.1–1)
BUN SERPL-MCNC: 47 MG/DL (ref 8–23)
BUN SERPL-MCNC: 48 MG/DL (ref 8–23)
C DIFF GDH STL QL: NEGATIVE
C DIFF TOX A+B STL QL IA: NEGATIVE
CALCIUM SERPL-MCNC: 8 MG/DL (ref 8.7–10.5)
CALCIUM SERPL-MCNC: 8 MG/DL (ref 8.7–10.5)
CHLORIDE SERPL-SCNC: 115 MMOL/L (ref 95–110)
CHLORIDE SERPL-SCNC: 115 MMOL/L (ref 95–110)
CO2 SERPL-SCNC: 18 MMOL/L (ref 23–29)
CO2 SERPL-SCNC: 19 MMOL/L (ref 23–29)
CREAT SERPL-MCNC: 1.5 MG/DL (ref 0.5–1.4)
CREAT SERPL-MCNC: 1.5 MG/DL (ref 0.5–1.4)
EST. GFR  (NO RACE VARIABLE): 35.4 ML/MIN/1.73 M^2
EST. GFR  (NO RACE VARIABLE): 35.4 ML/MIN/1.73 M^2
GLUCOSE SERPL-MCNC: 109 MG/DL (ref 70–110)
GLUCOSE SERPL-MCNC: 112 MG/DL (ref 70–110)
POTASSIUM SERPL-SCNC: 4 MMOL/L (ref 3.5–5.1)
POTASSIUM SERPL-SCNC: 4.3 MMOL/L (ref 3.5–5.1)
PROT SERPL-MCNC: 4.6 G/DL (ref 6–8.4)
SODIUM SERPL-SCNC: 139 MMOL/L (ref 136–145)
SODIUM SERPL-SCNC: 143 MMOL/L (ref 136–145)

## 2024-11-24 PROCEDURE — 25000003 PHARM REV CODE 250: Performed by: HOSPITALIST

## 2024-11-24 PROCEDURE — 25000003 PHARM REV CODE 250: Performed by: PHYSICIAN ASSISTANT

## 2024-11-24 PROCEDURE — 87449 NOS EACH ORGANISM AG IA: CPT | Performed by: SURGERY

## 2024-11-24 PROCEDURE — 21400001 HC TELEMETRY ROOM

## 2024-11-24 PROCEDURE — 80053 COMPREHEN METABOLIC PANEL: CPT | Performed by: PHYSICIAN ASSISTANT

## 2024-11-24 PROCEDURE — 36415 COLL VENOUS BLD VENIPUNCTURE: CPT | Performed by: PHYSICIAN ASSISTANT

## 2024-11-24 PROCEDURE — 80048 BASIC METABOLIC PNL TOTAL CA: CPT | Performed by: PHYSICIAN ASSISTANT

## 2024-11-24 RX ADMIN — APIXABAN 10 MG: 5 TABLET, FILM COATED ORAL at 09:11

## 2024-11-24 RX ADMIN — HYPROMELLOSE 2910 1 DROP: 5 SOLUTION/ DROPS OPHTHALMIC at 09:11

## 2024-11-24 RX ADMIN — Medication: at 09:11

## 2024-11-24 RX ADMIN — Medication 400 ML: at 09:11

## 2024-11-24 RX ADMIN — HYPROMELLOSE 2910 1 DROP: 5 SOLUTION/ DROPS OPHTHALMIC at 03:11

## 2024-11-24 RX ADMIN — MICONAZOLE NITRATE 2 % TOPICAL POWDER: at 09:11

## 2024-11-24 RX ADMIN — Medication 400 ML: at 05:11

## 2024-11-24 RX ADMIN — Medication 400 ML: at 02:11

## 2024-11-24 RX ADMIN — FAMOTIDINE 20 MG: 20 TABLET ORAL at 09:11

## 2024-11-24 RX ADMIN — Medication 400 ML: at 01:11

## 2024-11-24 RX ADMIN — SODIUM CHLORIDE 250 ML: 9 INJECTION, SOLUTION INTRAVENOUS at 03:11

## 2024-11-24 RX ADMIN — CARVEDILOL 12.5 MG: 12.5 TABLET, FILM COATED ORAL at 09:11

## 2024-11-24 NOTE — SUBJECTIVE & OBJECTIVE
Interval History: NAEON. VSS. Patient seen and examined at bedside today. Patient reports with no complaints at this time. BP continues to be low; held morning losartan and coreg. KELSEY persists with Cr 1.5, will give 250cc bolus. Plan for discharge home tomorrow pending resolution of KELSEY.    Review of Systems   Constitutional:  Positive for activity change. Negative for chills and fever.   HENT:  Negative for trouble swallowing.    Eyes:  Negative for photophobia and visual disturbance.   Respiratory:  Negative for cough, chest tightness and shortness of breath.    Cardiovascular:  Positive for chest pain. Negative for palpitations and leg swelling.   Gastrointestinal:  Positive for abdominal distention. Negative for abdominal pain, constipation, diarrhea, nausea and vomiting.   Genitourinary:  Negative for dysuria, frequency and hematuria.   Musculoskeletal:  Positive for gait problem. Negative for back pain and neck pain.   Skin:  Positive for wound. Negative for rash.   Neurological:  Negative for dizziness, syncope, speech difficulty and light-headedness.   Psychiatric/Behavioral:  Negative for agitation and confusion. The patient is not nervous/anxious.      Objective:     Vital Signs (Most Recent):  Temp: 97.8 °F (36.6 °C) (11/24/24 1140)  Pulse: 72 (11/24/24 1140)  Resp: 16 (11/24/24 1140)  BP: 129/60 (11/24/24 1140)  SpO2: 99 % (11/24/24 1140) Vital Signs (24h Range):  Temp:  [97.6 °F (36.4 °C)-98.1 °F (36.7 °C)] 97.8 °F (36.6 °C)  Pulse:  [54-75] 72  Resp:  [16-20] 16  SpO2:  [94 %-99 %] 99 %  BP: (102-129)/(51-63) 129/60     Weight: (!) 167.4 kg (369 lb 0.8 oz)  Body mass index is 63.35 kg/m².    Intake/Output Summary (Last 24 hours) at 11/24/2024 1455  Last data filed at 11/24/2024 1325  Gross per 24 hour   Intake 2000 ml   Output 300 ml   Net 1700 ml           Physical Exam  Vitals and nursing note reviewed.   Constitutional:       General: She is not in acute distress.     Appearance: She is  well-developed. She is obese.      Comments: Morbid obesity with BMI 54.   HENT:      Head: Normocephalic and atraumatic.      Mouth/Throat:      Pharynx: No oropharyngeal exudate.   Eyes:      Conjunctiva/sclera: Conjunctivae normal.      Pupils: Pupils are equal, round, and reactive to light.   Cardiovascular:      Rate and Rhythm: Normal rate and regular rhythm.      Heart sounds: Normal heart sounds.   Pulmonary:      Effort: Pulmonary effort is normal.      Breath sounds: Normal breath sounds.      Comments: Limited 2/2 body habitus  Abdominal:      General: Bowel sounds are normal. There is no distension.      Palpations: Abdomen is soft.      Tenderness: There is no abdominal tenderness.   Musculoskeletal:         General: Tenderness (BLE) present. Normal range of motion.      Cervical back: Normal range of motion and neck supple.      Right lower leg: Edema present.      Left lower leg: Edema present.   Lymphadenopathy:      Cervical: No cervical adenopathy.   Skin:     General: Skin is warm and dry.      Capillary Refill: Capillary refill takes less than 2 seconds.      Findings: No rash.      Comments: Significant lymphedema to BLE with skin changes including papillomas and hyperkeratosis. Small wound to L big toe   Neurological:      Mental Status: She is alert and oriented to person, place, and time.      Cranial Nerves: No cranial nerve deficit.      Sensory: No sensory deficit.      Coordination: Coordination normal.   Psychiatric:         Behavior: Behavior normal.         Thought Content: Thought content normal.         Judgment: Judgment normal.                           Significant Labs: All pertinent labs within the past 24 hours have been reviewed.  BMP:   Recent Labs   Lab 11/23/24  0554 11/24/24  0701 11/24/24  1101      < > 109      < > 143   K 3.8   < > 4.3   *   < > 115*   CO2 19*   < > 18*   BUN 46*   < > 48*   CREATININE 1.5*   < > 1.5*   CALCIUM 8.2*   < > 8.0*   MG 2.3   --   --     < > = values in this interval not displayed.     CBC:   Recent Labs   Lab 11/23/24  0554   WBC 6.75   HGB 10.6*   HCT 35.2*        CMP:   Recent Labs   Lab 11/23/24  0554 11/24/24  0701 11/24/24  1101    139 143   K 3.8 4.0 4.3   * 115* 115*   CO2 19* 19* 18*    112* 109   BUN 46* 47* 48*   CREATININE 1.5* 1.5* 1.5*   CALCIUM 8.2* 8.0* 8.0*   PROT  --  4.6*  --    ALBUMIN  --  2.0*  --    BILITOT  --  0.2  --    ALKPHOS  --  98  --    AST  --  7*  --    ALT  --  6*  --    ANIONGAP 5* 5* 10         Significant Imaging: I have reviewed all pertinent imaging results/findings within the past 24 hours.

## 2024-11-24 NOTE — PLAN OF CARE
"Update - 10:50 AM  Received call from [facility] ambulance, patient meets criteria for stretcher transportation. Request in "will-call" until ready to discharge transportation.    Update - 10:00 AM  Contacted Ann with [facility], left voice message requesting return call.      CM discussed discharge plans with attending team. Reviewed chart and weekday handoff for updates. No accepting HH agency at this time. Will need assist with transportation to outpatient wound care and lymphedema clinics. Contacted [facility], requested evaluation to see if patient meets stretcher requirements (payor purpose). NAE will contact on-call PH[facility] nurse for HH assignment assistance.       Constanza Morelos RN  Weekend  - [facility] [place] [facility]  Phone: [phone]   "

## 2024-11-24 NOTE — PROGRESS NOTES
[facility] [address] [place] [address] Medicine  Progress Note    Patient Name: Rosetta Whyte  MRN: [id number]  Patient Class: IP- Inpatient   Admission Date: 11/20/2024  Length of Stay: 1 days  Attending Physician: Martita Montesinos MD  Primary Care Provider: Georgie Ortega MD        Subjective:     Principal Problem:Impaired functional mobility and activity tolerance        HPI:  Rosetta Whyte is a 78 yo F with PMHx of HTN, morbid obesity, hx of gastric bypass, lymphedema, EMMANUEL, HFpEF who presented to ED for chest pain. Endorses sudden, midsternal, nonradiating chest pain that began after eating lunch and lasted for approximately 30 minutes. She had a similar experience of Friday, but it did not last as long. Chest pain has not recurred since being in the ED. She also endorses pain to her bilateral big toes as she states she recently hit them at her home. She has been mostly bedbound for the past couple of years. She lives alone, but her daughter and sister both live down her street and come by to see her daily, keep her clean, and provide her food. She would like to start working on NH placement. Also reports L eye irritation for the past few weeks. Denies fever, chills, palpitations, SOB, cough, n/v/d, dysuria, headaches.    In ED:  Afebrile. HDS. No leukocytosis. CMP notable for Na 147, serum bicarb  21, BUN 43. BNP 80. Trop 0.024 >> 0.021. CXR without acute process. Given po pantoprazole 40 mg. Placed in observation with HM.     Overview/Hospital Course:  Patient admitted to hospital medicine for chest pain and BLE edema. Troponin down trending. Echo with EF of 60-65% with no wall motion changes. BLE US with R femoral DVT, started on Eliquis. CTA without PE. Podiatry consulted, ordered B foot XR which are unremarkable. Recommend offloading boots. Wound care consulted for multiple areas of skin breakdown, appreciate recs. UA infectious, started on CTX. Cr worsening to 1.5 today. Holding diuretics and HTN meds.  Patient and family interested in group home placement which [facility] has discussed. Plan for discharge home when stable.    Interval History: NAEON. VSS. Patient seen and examined at bedside today. Patient reports with no complaints at this time. BP continues to be low; held morning losartan and coreg. KELSEY persists with Cr 1.5, will give 250cc bolus. Plan for discharge home tomorrow pending resolution of KELSEY.    Review of Systems   Constitutional:  Positive for activity change. Negative for chills and fever.   HENT:  Negative for trouble swallowing.    Eyes:  Negative for photophobia and visual disturbance.   Respiratory:  Negative for cough, chest tightness and shortness of breath.    Cardiovascular:  Positive for chest pain. Negative for palpitations and leg swelling.   Gastrointestinal:  Positive for abdominal distention. Negative for abdominal pain, constipation, diarrhea, nausea and vomiting.   Genitourinary:  Negative for dysuria, frequency and hematuria.   Musculoskeletal:  Positive for gait problem. Negative for back pain and neck pain.   Skin:  Positive for wound. Negative for rash.   Neurological:  Negative for dizziness, syncope, speech difficulty and light-headedness.   Psychiatric/Behavioral:  Negative for agitation and confusion. The patient is not nervous/anxious.      Objective:     Vital Signs (Most Recent):  Temp: 97.8 °F (36.6 °C) (11/24/24 1140)  Pulse: 72 (11/24/24 1140)  Resp: 16 (11/24/24 1140)  BP: 129/60 (11/24/24 1140)  SpO2: 99 % (11/24/24 1140) Vital Signs (24h Range):  Temp:  [97.6 °F (36.4 °C)-98.1 °F (36.7 °C)] 97.8 °F (36.6 °C)  Pulse:  [54-75] 72  Resp:  [16-20] 16  SpO2:  [94 %-99 %] 99 %  BP: (102-129)/(51-63) 129/60     Weight: (!) 167.4 kg (369 lb 0.8 oz)  Body mass index is 63.35 kg/m².    Intake/Output Summary (Last 24 hours) at 11/24/2024 1455  Last data filed at 11/24/2024 1325  Gross per 24 hour   Intake 2000 ml   Output 300 ml   Net 1700 ml           Physical Exam  Vitals and  nursing note reviewed.   Constitutional:       General: She is not in acute distress.     Appearance: She is well-developed. She is obese.      Comments: Morbid obesity with BMI 54.   HENT:      Head: Normocephalic and atraumatic.      Mouth/Throat:      Pharynx: No oropharyngeal exudate.   Eyes:      Conjunctiva/sclera: Conjunctivae normal.      Pupils: Pupils are equal, round, and reactive to light.   Cardiovascular:      Rate and Rhythm: Normal rate and regular rhythm.      Heart sounds: Normal heart sounds.   Pulmonary:      Effort: Pulmonary effort is normal.      Breath sounds: Normal breath sounds.      Comments: Limited 2/2 body habitus  Abdominal:      General: Bowel sounds are normal. There is no distension.      Palpations: Abdomen is soft.      Tenderness: There is no abdominal tenderness.   Musculoskeletal:         General: Tenderness (BLE) present. Normal range of motion.      Cervical back: Normal range of motion and neck supple.      Right lower leg: Edema present.      Left lower leg: Edema present.   Lymphadenopathy:      Cervical: No cervical adenopathy.   Skin:     General: Skin is warm and dry.      Capillary Refill: Capillary refill takes less than 2 seconds.      Findings: No rash.      Comments: Significant lymphedema to BLE with skin changes including papillomas and hyperkeratosis. Small wound to L big toe   Neurological:      Mental Status: She is alert and oriented to person, place, and time.      Cranial Nerves: No cranial nerve deficit.      Sensory: No sensory deficit.      Coordination: Coordination normal.   Psychiatric:         Behavior: Behavior normal.         Thought Content: Thought content normal.         Judgment: Judgment normal.                           Significant Labs: All pertinent labs within the past 24 hours have been reviewed.  BMP:   Recent Labs   Lab 11/23/24  0554 11/24/24  0701 11/24/24  1101      < > 109      < > 143   K 3.8   < > 4.3   *   < >  115*   CO2 19*   < > 18*   BUN 46*   < > 48*   CREATININE 1.5*   < > 1.5*   CALCIUM 8.2*   < > 8.0*   MG 2.3  --   --     < > = values in this interval not displayed.     CBC:   Recent Labs   Lab 11/23/24  0554   WBC 6.75   HGB 10.6*   HCT 35.2*        CMP:   Recent Labs   Lab 11/23/24  0554 11/24/24  0701 11/24/24  1101    139 143   K 3.8 4.0 4.3   * 115* 115*   CO2 19* 19* 18*    112* 109   BUN 46* 47* 48*   CREATININE 1.5* 1.5* 1.5*   CALCIUM 8.2* 8.0* 8.0*   PROT  --  4.6*  --    ALBUMIN  --  2.0*  --    BILITOT  --  0.2  --    ALKPHOS  --  98  --    AST  --  7*  --    ALT  --  6*  --    ANIONGAP 5* 5* 10         Significant Imaging: I have reviewed all pertinent imaging results/findings within the past 24 hours.    Assessment/Plan:      * Impaired functional mobility and activity tolerance  - has been bedbound for multiple years now. Lives alone, but family checks in on her  - she would like to proceed with NH placement. Explained that she would likely be discharged back home while placement is pending. Patient agrees to plan   - CM to assist with initiating process of NH placement     Right femoral vein DVT  - BLE US with R femoral DVT  - started on Eliquis 10mg PO BID x 7 days then 5mg BID   - CTA chest without evidence of PE      Acute cystitis with hematuria  - UA infectious  - started on CTX> completed 3 day course  - Ucx with E.coli      Pain in toes of both feet  - wound noted below L big toe nail  - podiatry consulted, Recommend:  - left hallux with sublingual hematoma, we will debrided nail and assess for potential wound.  - recommend bilateral heel offloading boots.  Patient is susceptible to pressure ulcers of the bilateral heel.  - nothing to culture.  - bilateral x-rays unremarkable.  - podiatry will sign off and follow up outpatient    Chest pain  79 y.o. who presents with midsternal chest pain without radiation that lasted for 30 minutes and occurred after eating lunch.  Pain resolved without intervention and has not recurred.     - EKG without acute ischemic changes  - trop 0.024 >>0.021  - high suspicion for gastric reflux. Start pepcid BID  - Echo below  - cardiac telemetry    Results for orders placed during the hospital encounter of 11/20/24    Echo    Interpretation Summary    Left Ventricle: The left ventricle is normal in size. Normal wall thickness. There is concentric remodeling. There is normal systolic function with a visually estimated ejection fraction of 60 - 65%. There is normal diastolic function.    Right Ventricle: Normal right ventricular cavity size. Wall thickness is normal. Systolic function is normal.    Left Atrium: Left atrium is mildly dilated.    IVC/SVC: Normal venous pressure at 3 mmHg.    Essential (primary) hypertension  Patients blood pressure range in the last 24 hours was: BP  Min: 107/53  Max: 134/63.The patient's inpatient anti-hypertensive regimen is listed below:  Current Antihypertensives  , Daily, Oral  amLODIPine tablet 10 mg, Daily, Oral  carvediloL tablet 25 mg, 2 times daily, Oral  furosemide tablet 40 mg, Daily, Oral  losartan tablet 100 mg, Daily, Oral    Plan  - BP is controlled, no changes needed to their regimen  - patient reports compliance with home meds and has them at bedside, but on review of dispense report she has not filled amlodipine, lasix, or coreg in many months. Encourage compliance with home regimen     Morbid (severe) obesity due to excess calories  Body mass index is 54.24 kg/m². Morbid obesity complicates all aspects of disease management from diagnostic modalities to treatment. Weight loss encouraged and health benefits explained to patient.     KELSEY (acute kidney injury)  KELSEY is likely due to pre-renal azotemia due to intravascular volume depletion. Baseline creatinine is  1.1 . Most recent creatinine and eGFR are listed below.  Recent Labs     11/23/24  0554 11/24/24  0701 11/24/24  1101   CREATININE 1.5* 1.5* 1.5*    EGFRNORACEVR 35.4* 35.4* 35.4*        Plan  - KELSEY is stable  - Avoid nephrotoxins and renally dose meds for GFR listed above  - Monitor urine output, serial BMP, and adjust therapy as needed  - d/c lasix  - pedialyte, increase fluid restriction  - 250 cc bolus    Diastolic dysfunction, left ventricle  - last echo in 2020 with GII LV DD and preserved EF  - strict I/O, daily weights, and 1.5L fluid restriction   - BNP wnl (though likely falsely low 2/2 morbid obesity). CXR without pulmonary edema. Significant BLE edema, but likely from chronic lymphedema   - continue coreg and losartan; discontinued HCTZ   - restart lasix   - monitor tele   - echo - see CHEST PAIN     Lymphedema of both lower extremities  - significant lymphedema to BLE  - afebrile and without leukocytosis   - BLE US with R femoral DVT  - wound care consulted, appreciate recs  - would benefit from referral to lymphedema clinic on discharge     S/P laparoscopic sleeve gastrectomy  - noted       VTE Risk Mitigation (From admission, onward)           Ordered     apixaban tablet 10 mg  2 times daily         11/21/24 0919     IP VTE HIGH RISK PATIENT  Once         11/21/24 0014                    Discharge Planning   MC: 11/24/2024     Code Status: Full Code   Is the patient medically ready for discharge?:     Reason for patient still in hospital (select all that apply): Laboratory test, Treatment, and Pending disposition  Discharge Plan A: Home Health, Home with family   Discharge Delays: None known at this time              Peri La PA-C  Department of Hospital Medicine   [facility]

## 2024-11-24 NOTE — ASSESSMENT & PLAN NOTE
KELSEY is likely due to pre-renal azotemia due to intravascular volume depletion. Baseline creatinine is  1.1 . Most recent creatinine and eGFR are listed below.  Recent Labs     11/23/24  0554 11/24/24  0701 11/24/24  1101   CREATININE 1.5* 1.5* 1.5*   EGFRNORACEVR 35.4* 35.4* 35.4*        Plan  - KELSEY is stable  - Avoid nephrotoxins and renally dose meds for GFR listed above  - Monitor urine output, serial BMP, and adjust therapy as needed  - d/c lasix  - pedialyte, increase fluid restriction  - 250 cc bolus

## 2024-11-25 LAB
ANION GAP SERPL CALC-SCNC: 7 MMOL/L (ref 8–16)
BACTERIA UR CULT: ABNORMAL
BASOPHILS # BLD AUTO: 0.09 K/UL (ref 0–0.2)
BASOPHILS NFR BLD: 1.8 % (ref 0–1.9)
BUN SERPL-MCNC: 50 MG/DL (ref 8–23)
CALCIUM SERPL-MCNC: 7.9 MG/DL (ref 8.7–10.5)
CHLORIDE SERPL-SCNC: 116 MMOL/L (ref 95–110)
CO2 SERPL-SCNC: 18 MMOL/L (ref 23–29)
CREAT SERPL-MCNC: 1.5 MG/DL (ref 0.5–1.4)
CREAT UR-MCNC: 124 MG/DL (ref 15–325)
DIFFERENTIAL METHOD BLD: ABNORMAL
EOSINOPHIL # BLD AUTO: 0.3 K/UL (ref 0–0.5)
EOSINOPHIL NFR BLD: 6 % (ref 0–8)
ERYTHROCYTE [DISTWIDTH] IN BLOOD BY AUTOMATED COUNT: 14.7 % (ref 11.5–14.5)
EST. GFR  (NO RACE VARIABLE): 35.4 ML/MIN/1.73 M^2
GLUCOSE SERPL-MCNC: 118 MG/DL (ref 70–110)
HCT VFR BLD AUTO: 35.9 % (ref 37–48.5)
HGB BLD-MCNC: 10.4 G/DL (ref 12–16)
IMM GRANULOCYTES # BLD AUTO: 0.08 K/UL (ref 0–0.04)
IMM GRANULOCYTES NFR BLD AUTO: 1.6 % (ref 0–0.5)
LYMPHOCYTES # BLD AUTO: 1.3 K/UL (ref 1–4.8)
LYMPHOCYTES NFR BLD: 26.4 % (ref 18–48)
MCH RBC QN AUTO: 30.5 PG (ref 27–31)
MCHC RBC AUTO-ENTMCNC: 29 G/DL (ref 32–36)
MCV RBC AUTO: 105 FL (ref 82–98)
MONOCYTES # BLD AUTO: 0.6 K/UL (ref 0.3–1)
MONOCYTES NFR BLD: 11.5 % (ref 4–15)
NEUTROPHILS # BLD AUTO: 2.7 K/UL (ref 1.8–7.7)
NEUTROPHILS NFR BLD: 52.7 % (ref 38–73)
NRBC BLD-RTO: 0 /100 WBC
OSMOLALITY UR: 443 MOSM/KG (ref 50–1200)
PLATELET # BLD AUTO: 208 K/UL (ref 150–450)
PMV BLD AUTO: 10.2 FL (ref 9.2–12.9)
POTASSIUM SERPL-SCNC: 4 MMOL/L (ref 3.5–5.1)
RBC # BLD AUTO: 3.41 M/UL (ref 4–5.4)
SARS-COV-2 RNA RESP QL NAA+PROBE: NOT DETECTED
SODIUM SERPL-SCNC: 141 MMOL/L (ref 136–145)
SODIUM UR-SCNC: 16 MMOL/L (ref 20–250)
WBC # BLD AUTO: 5.04 K/UL (ref 3.9–12.7)

## 2024-11-25 PROCEDURE — 25000003 PHARM REV CODE 250: Performed by: HOSPITALIST

## 2024-11-25 PROCEDURE — 82570 ASSAY OF URINE CREATININE: CPT | Performed by: PHYSICIAN ASSISTANT

## 2024-11-25 PROCEDURE — 86580 TB INTRADERMAL TEST: CPT | Performed by: PHYSICIAN ASSISTANT

## 2024-11-25 PROCEDURE — 87635 SARS-COV-2 COVID-19 AMP PRB: CPT | Performed by: PHYSICIAN ASSISTANT

## 2024-11-25 PROCEDURE — 85025 COMPLETE CBC W/AUTO DIFF WBC: CPT | Performed by: PHYSICIAN ASSISTANT

## 2024-11-25 PROCEDURE — 80048 BASIC METABOLIC PNL TOTAL CA: CPT | Performed by: PHYSICIAN ASSISTANT

## 2024-11-25 PROCEDURE — 25000003 PHARM REV CODE 250: Performed by: PHYSICIAN ASSISTANT

## 2024-11-25 PROCEDURE — 83935 ASSAY OF URINE OSMOLALITY: CPT | Performed by: PHYSICIAN ASSISTANT

## 2024-11-25 PROCEDURE — 30200315 PPD INTRADERMAL TEST REV CODE 302: Performed by: PHYSICIAN ASSISTANT

## 2024-11-25 PROCEDURE — 21400001 HC TELEMETRY ROOM

## 2024-11-25 PROCEDURE — 84300 ASSAY OF URINE SODIUM: CPT | Performed by: PHYSICIAN ASSISTANT

## 2024-11-25 PROCEDURE — 36415 COLL VENOUS BLD VENIPUNCTURE: CPT | Performed by: PHYSICIAN ASSISTANT

## 2024-11-25 RX ORDER — MICONAZOLE NITRATE 2 G/100G
POWDER TOPICAL 2 TIMES DAILY
Qty: 85 G | Refills: 0 | Status: SHIPPED | OUTPATIENT
Start: 2024-11-25 | End: 2024-12-26

## 2024-11-25 RX ORDER — METOPROLOL TARTRATE 25 MG/1
25 TABLET, FILM COATED ORAL 2 TIMES DAILY
Status: DISCONTINUED | OUTPATIENT
Start: 2024-11-25 | End: 2024-11-28 | Stop reason: HOSPADM

## 2024-11-25 RX ORDER — AMMONIUM LACTATE 12 G/100G
LOTION TOPICAL 2 TIMES DAILY
Qty: 396 G | Refills: 0 | Status: SHIPPED | OUTPATIENT
Start: 2024-11-25 | End: 2025-01-24

## 2024-11-25 RX ORDER — METOPROLOL TARTRATE 25 MG/1
25 TABLET, FILM COATED ORAL 2 TIMES DAILY
Status: DISCONTINUED | OUTPATIENT
Start: 2024-11-25 | End: 2024-11-25

## 2024-11-25 RX ORDER — LOSARTAN POTASSIUM AND HYDROCHLOROTHIAZIDE 12.5; 5 MG/1; MG/1
1 TABLET ORAL DAILY
Qty: 90 TABLET | Refills: 3 | Status: SHIPPED | OUTPATIENT
Start: 2024-11-25 | End: 2025-11-25

## 2024-11-25 RX ORDER — CARVEDILOL 12.5 MG/1
12.5 TABLET ORAL 2 TIMES DAILY
Qty: 180 TABLET | Refills: 3 | Status: SHIPPED | OUTPATIENT
Start: 2024-11-25 | End: 2024-11-26 | Stop reason: HOSPADM

## 2024-11-25 RX ADMIN — Medication 400 ML: at 01:11

## 2024-11-25 RX ADMIN — HYPROMELLOSE 2910 1 DROP: 5 SOLUTION/ DROPS OPHTHALMIC at 09:11

## 2024-11-25 RX ADMIN — MICONAZOLE NITRATE 2 % TOPICAL POWDER: at 08:11

## 2024-11-25 RX ADMIN — FAMOTIDINE 20 MG: 20 TABLET ORAL at 08:11

## 2024-11-25 RX ADMIN — HYPROMELLOSE 2910 1 DROP: 5 SOLUTION/ DROPS OPHTHALMIC at 08:11

## 2024-11-25 RX ADMIN — Medication 400 ML: at 09:11

## 2024-11-25 RX ADMIN — HYPROMELLOSE 2910 1 DROP: 5 SOLUTION/ DROPS OPHTHALMIC at 04:11

## 2024-11-25 RX ADMIN — APIXABAN 10 MG: 5 TABLET, FILM COATED ORAL at 08:11

## 2024-11-25 RX ADMIN — Medication: at 09:11

## 2024-11-25 RX ADMIN — Medication 400 ML: at 08:11

## 2024-11-25 RX ADMIN — Medication: at 08:11

## 2024-11-25 RX ADMIN — Medication 400 ML: at 05:11

## 2024-11-25 RX ADMIN — APIXABAN 10 MG: 5 TABLET, FILM COATED ORAL at 09:11

## 2024-11-25 RX ADMIN — TUBERCULIN PURIFIED PROTEIN DERIVATIVE 5 UNITS: 5 INJECTION, SOLUTION INTRADERMAL at 04:11

## 2024-11-25 RX ADMIN — Medication 400 ML: at 04:11

## 2024-11-25 RX ADMIN — MICONAZOLE NITRATE 2 % TOPICAL POWDER: at 09:11

## 2024-11-25 RX ADMIN — METOPROLOL TARTRATE 25 MG: 25 TABLET, FILM COATED ORAL at 09:11

## 2024-11-25 NOTE — PLAN OF CARE
CHW met with patient/family at bedside. Patient experience rounding completed and reviewed the following.     Do you know your discharge plan? Yes,Home    Have you discussed your needs and preferences with your SW/CM? Yes     If you are discharging home, do you have help at home? Yes     Do you think you will need help additional at home at discharge? Yes     Do you currently have difficulty keeping up with bills, affording medicine or buying food?  No    Assigned SW/CM notified of any patient/family needs or concerns. Appropriate resources provided to address patient's needs.  Constanza Hawthorne [facility]   Case Management

## 2024-11-25 NOTE — PROGRESS NOTES
[facility] [address] [place] [address] Medicine  Progress Note    Patient Name: Rosetta Whyte  MRN: [id number]  Patient Class: IP- Inpatient   Admission Date: 11/20/2024  Length of Stay: 2 days  Attending Physician: Angus Rice MD  Primary Care Provider: Georgie Ortega MD        Subjective:     Principal Problem:Impaired functional mobility and activity tolerance        HPI:  Rosetta Whyte is a 80 yo F with PMHx of HTN, morbid obesity, hx of gastric bypass, lymphedema, EMMANUEL, HFpEF who presented to ED for chest pain. Endorses sudden, midsternal, nonradiating chest pain that began after eating lunch and lasted for approximately 30 minutes. She had a similar experience of Friday, but it did not last as long. Chest pain has not recurred since being in the ED. She also endorses pain to her bilateral big toes as she states she recently hit them at her home. She has been mostly bedbound for the past couple of years. She lives alone, but her daughter and sister both live down her street and come by to see her daily, keep her clean, and provide her food. She would like to start working on NH placement. Also reports L eye irritation for the past few weeks. Denies fever, chills, palpitations, SOB, cough, n/v/d, dysuria, headaches.    In ED:  Afebrile. HDS. No leukocytosis. CMP notable for Na 147, serum bicarb  21, BUN 43. BNP 80. Trop 0.024 >> 0.021. CXR without acute process. Given po pantoprazole 40 mg. Placed in observation with HM.     Overview/Hospital Course:  Patient admitted to hospital medicine for chest pain and BLE edema. Troponin down trending. Echo with EF of 60-65% with no wall motion changes. BLE US with R femoral DVT, started on Eliquis. CTA without PE. Podiatry consulted, ordered B foot XR which are unremarkable. Recommend offloading boots. Wound care consulted for multiple areas of skin breakdown, appreciate recs. UA infectious, started on CTX. Cr worsened to 1.5 and is persistent s/p small IVF bolus.  Holding diuretics and HTN meds. Renal US and urine lytes pending. Patient and family interested in [facility] placement which [facility] discussed. Plan for discharge home when stable.    Interval History: NAEON. VSS. Patient seen and examined at bedside today. Patient reports with no complaints at this time. BP continues to be low; switched Coreg to MTP. KELSEY persists with Cr 1.5 s/p small bolus of IVF . Renal US and urine lytes pending. CMSW reports family working on NH placement.     Review of Systems   Constitutional:  Positive for activity change. Negative for chills and fever.   HENT:  Negative for trouble swallowing.    Eyes:  Negative for photophobia and visual disturbance.   Respiratory:  Negative for cough, chest tightness and shortness of breath.    Cardiovascular:  Positive for chest pain. Negative for palpitations and leg swelling.   Gastrointestinal:  Positive for abdominal distention. Negative for abdominal pain, constipation, diarrhea, nausea and vomiting.   Genitourinary:  Negative for dysuria, frequency and hematuria.   Musculoskeletal:  Positive for gait problem. Negative for back pain and neck pain.   Skin:  Positive for wound. Negative for rash.   Neurological:  Negative for dizziness, syncope, speech difficulty and light-headedness.   Psychiatric/Behavioral:  Negative for agitation and confusion. The patient is not nervous/anxious.      Objective:     Vital Signs (Most Recent):  Temp: 98.2 °F (36.8 °C) (11/25/24 1158)  Pulse: 76 (11/25/24 1158)  Resp: 18 (11/25/24 1158)  BP: (!) 128/58 (11/25/24 1158)  SpO2: 98 % (11/25/24 1158) Vital Signs (24h Range):  Temp:  [97.6 °F (36.4 °C)-99 °F (37.2 °C)] 98.2 °F (36.8 °C)  Pulse:  [66-76] 76  Resp:  [16-20] 18  SpO2:  [94 %-98 %] 98 %  BP: (107-130)/(49-60) 128/58     Weight: (!) 167.4 kg (369 lb 0.8 oz)  Body mass index is 63.35 kg/m².    Intake/Output Summary (Last 24 hours) at 11/25/2024 1308  Last data filed at 11/25/2024 0903  Gross per 24 hour    Intake 2000 ml   Output 200 ml   Net 1800 ml           Physical Exam  Vitals and nursing note reviewed.   Constitutional:       General: She is not in acute distress.     Appearance: She is well-developed. She is obese.      Comments: Morbid obesity with BMI 54.   HENT:      Head: Normocephalic and atraumatic.      Mouth/Throat:      Pharynx: No oropharyngeal exudate.   Eyes:      Conjunctiva/sclera: Conjunctivae normal.      Pupils: Pupils are equal, round, and reactive to light.   Cardiovascular:      Rate and Rhythm: Normal rate and regular rhythm.      Heart sounds: Normal heart sounds.   Pulmonary:      Effort: Pulmonary effort is normal.      Breath sounds: Normal breath sounds.      Comments: Limited 2/2 body habitus  Abdominal:      General: Bowel sounds are normal. There is no distension.      Palpations: Abdomen is soft.      Tenderness: There is no abdominal tenderness.   Musculoskeletal:         General: Tenderness (BLE) present. Normal range of motion.      Cervical back: Normal range of motion and neck supple.      Right lower leg: Edema present.      Left lower leg: Edema present.   Lymphadenopathy:      Cervical: No cervical adenopathy.   Skin:     General: Skin is warm and dry.      Capillary Refill: Capillary refill takes less than 2 seconds.      Findings: No rash.      Comments: Significant lymphedema to BLE with skin changes including papillomas and hyperkeratosis. Small wound to L big toe   Neurological:      Mental Status: She is alert and oriented to person, place, and time.      Cranial Nerves: No cranial nerve deficit.      Sensory: No sensory deficit.      Coordination: Coordination normal.   Psychiatric:         Behavior: Behavior normal.         Thought Content: Thought content normal.         Judgment: Judgment normal.                           Significant Labs: All pertinent labs within the past 24 hours have been reviewed.  BMP:   Recent Labs   Lab 11/25/24  0828   *       K 4.0   *   CO2 18*   BUN 50*   CREATININE 1.5*   CALCIUM 7.9*     CBC:   Recent Labs   Lab 11/25/24  0828   WBC 5.04   HGB 10.4*   HCT 35.9*        CMP:   Recent Labs   Lab 11/24/24  0701 11/24/24  1101 11/25/24  0828    143 141   K 4.0 4.3 4.0   * 115* 116*   CO2 19* 18* 18*   * 109 118*   BUN 47* 48* 50*   CREATININE 1.5* 1.5* 1.5*   CALCIUM 8.0* 8.0* 7.9*   PROT 4.6*  --   --    ALBUMIN 2.0*  --   --    BILITOT 0.2  --   --    ALKPHOS 98  --   --    AST 7*  --   --    ALT 6*  --   --    ANIONGAP 5* 10 7*         Significant Imaging: I have reviewed all pertinent imaging results/findings within the past 24 hours.    Assessment/Plan:      * Impaired functional mobility and activity tolerance  - has been bedbound for multiple years now. Lives alone, but family checks in on her  - she would like to proceed with NH placement. Explained that she would likely be discharged back home while placement is pending. Patient agrees to plan   - CM to assist with initiating process of NH placement     Right femoral vein DVT  - BLE US with R femoral DVT  - started on Eliquis 10mg PO BID x 7 days then 5mg BID   - CTA chest without evidence of PE      Acute cystitis with hematuria  - UA infectious  - started on CTX> completed 3 day course  - Ucx with E.coli      Pain in toes of both feet  - wound noted below L big toe nail  - podiatry consulted, Recommend:  - left hallux with sublingual hematoma, we will debrided nail and assess for potential wound.  - recommend bilateral heel offloading boots.  Patient is susceptible to pressure ulcers of the bilateral heel.  - nothing to culture.  - bilateral x-rays unremarkable.  - podiatry will sign off and follow up outpatient    Chest pain  79 y.o. who presents with midsternal chest pain without radiation that lasted for 30 minutes and occurred after eating lunch. Pain resolved without intervention and has not recurred.     - EKG without acute ischemic  changes  - trop 0.024 >>0.021  - high suspicion for gastric reflux. Start pepcid BID  - Echo below  - cardiac telemetry    Results for orders placed during the hospital encounter of 11/20/24    Echo    Interpretation Summary    Left Ventricle: The left ventricle is normal in size. Normal wall thickness. There is concentric remodeling. There is normal systolic function with a visually estimated ejection fraction of 60 - 65%. There is normal diastolic function.    Right Ventricle: Normal right ventricular cavity size. Wall thickness is normal. Systolic function is normal.    Left Atrium: Left atrium is mildly dilated.    IVC/SVC: Normal venous pressure at 3 mmHg.    Essential (primary) hypertension  Patients blood pressure range in the last 24 hours was: BP  Min: 107/49  Max: 130/60.The patient's inpatient anti-hypertensive regimen is listed below:  Current Antihypertensives  carvedilol (COREG) tablet, 2 times daily, Oral  , Daily, Oral  metoprolol tartrate (LOPRESSOR) tablet 25 mg, 2 times daily, Oral    Plan  - BP is controlled, no changes needed to their regimen  - patient reports compliance with home meds and has them at bedside, but on review of dispense report she has not filled amlodipine, lasix, or coreg in many months. Encourage compliance with home regimen   - d/c amlodipine, lasix; switched coreg to MTP    Morbid (severe) obesity due to excess calories  Body mass index is 54.24 kg/m². Morbid obesity complicates all aspects of disease management from diagnostic modalities to treatment. Weight loss encouraged and health benefits explained to patient.     KELSEY (acute kidney injury)  KELSEY is likely due to pre-renal azotemia due to intravascular volume depletion. Baseline creatinine is  1.1 . Most recent creatinine and eGFR are listed below.  Recent Labs     11/24/24  0701 11/24/24  1101 11/25/24  0828   CREATININE 1.5* 1.5* 1.5*   EGFRNORACEVR 35.4* 35.4* 35.4*        Plan  - KELSEY is stable  - Avoid nephrotoxins  and renally dose meds for GFR listed above  - Monitor urine output, serial BMP, and adjust therapy as needed  - d/c lasix  - pedialyte, increase fluid restriction  - s/p 250 cc bolus  - renal US and urine lytes pending    Diastolic dysfunction, left ventricle  - last echo in 2020 with GII LV DD and preserved EF  - strict I/O, daily weights, and 1.5L fluid restriction   - BNP wnl (though likely falsely low 2/2 morbid obesity). CXR without pulmonary edema. Significant BLE edema, but likely from chronic lymphedema   - continue coreg and losartan; discontinued HCTZ   - restart lasix > discontinued  - monitor tele   - echo - see CHEST PAIN     Lymphedema of both lower extremities  - significant lymphedema to BLE  - afebrile and without leukocytosis   - BLE US with R femoral DVT  - wound care consulted, appreciate recs  - would benefit from referral to lymphedema clinic on discharge     S/P laparoscopic sleeve gastrectomy  - noted       VTE Risk Mitigation (From admission, onward)           Ordered     apixaban tablet 10 mg  2 times daily         11/21/24 0919     IP VTE HIGH RISK PATIENT  Once         11/21/24 0014                    Discharge Planning   MC: 11/26/2024     Code Status: Full Code   Is the patient medically ready for discharge?:     Reason for patient still in hospital (select all that apply): Patient trending condition, Laboratory test, and Imaging  Discharge Plan A: New Nursing Home placement - [facility] care facility   Discharge Delays: None known at this time              Peri La PA-C  Department of Hospital Medicine   [facility]

## 2024-11-25 NOTE — ASSESSMENT & PLAN NOTE
Patients blood pressure range in the last 24 hours was: BP  Min: 107/49  Max: 130/60.The patient's inpatient anti-hypertensive regimen is listed below:  Current Antihypertensives  carvedilol (COREG) tablet, 2 times daily, Oral  , Daily, Oral  metoprolol tartrate (LOPRESSOR) tablet 25 mg, 2 times daily, Oral    Plan  - BP is controlled, no changes needed to their regimen  - patient reports compliance with home meds and has them at bedside, but on review of dispense report she has not filled amlodipine, lasix, or coreg in many months. Encourage compliance with home regimen   - d/c amlodipine, lasix; switched coreg to MTP

## 2024-11-25 NOTE — ASSESSMENT & PLAN NOTE
- last echo in 2020 with GII LV DD and preserved EF  - strict I/O, daily weights, and 1.5L fluid restriction   - BNP wnl (though likely falsely low 2/2 morbid obesity). CXR without pulmonary edema. Significant BLE edema, but likely from chronic lymphedema   - continue coreg and losartan; discontinued HCTZ   - restart lasix > discontinued  - monitor tele   - echo - see CHEST PAIN

## 2024-11-25 NOTE — ASSESSMENT & PLAN NOTE
KELSEY is likely due to pre-renal azotemia due to intravascular volume depletion. Baseline creatinine is  1.1 . Most recent creatinine and eGFR are listed below.  Recent Labs     11/24/24  0701 11/24/24  1101 11/25/24  0828   CREATININE 1.5* 1.5* 1.5*   EGFRNORACEVR 35.4* 35.4* 35.4*        Plan  - KELSEY is stable  - Avoid nephrotoxins and renally dose meds for GFR listed above  - Monitor urine output, serial BMP, and adjust therapy as needed  - d/c lasix  - pedialyte, increase fluid restriction  - s/p 250 cc bolus  - renal US and urine lytes pending

## 2024-11-25 NOTE — PLAN OF CARE
[facility]      HOME HEALTH ORDERS  FACE TO FACE ENCOUNTER    Patient Name: Rosetta Whyte  YOB: 1945    PCP: Georgie Ortega MD   PCP Address: [address] [place] [address] [id number]  PCP Phone Number: [phone]  PCP Fax: [phone]    Encounter Date: 11/20/24    Admit to Home Health    Diagnoses:  Active Hospital Problems    Diagnosis  POA    *Impaired functional mobility and activity tolerance [Z74.09]  Yes    Chest pain [R07.9]  Yes    Pain in toes of both feet [M79.674, M79.675]  Yes    Acute cystitis with hematuria [N30.01]  Yes    Right femoral vein DVT [I82.411]  Yes    KELSEY (acute kidney injury) [N17.9]  Yes    Diastolic dysfunction, left ventricle [I51.9]  Yes     Chronic    Lymphedema of both lower extremities [I89.0]  Yes     Chronic    Morbid (severe) obesity due to excess calories [E66.01]  Yes    Essential (primary) hypertension [I10]  Yes    S/P laparoscopic sleeve gastrectomy [Z98.84]  Not Applicable     Chronic     7/7/2016        Resolved Hospital Problems   No resolved problems to display.       Follow Up Appointments:  Future Appointments   Date Time Provider Department Center   12/2/2024  2:00 PM Kiersten Aguirre MD [facility]       Allergies:Review of patient's allergies indicates:  No Known Allergies    Medications: Review discharge medications with patient and family and provide education.    Current Facility-Administered Medications   Medication Dose Route Frequency Provider Last Rate Last Admin    acetaminophen tablet 1,000 mg  1,000 mg Oral Q8H PRN Princess Patterson PA-C        acetaminophen tablet 650 mg  650 mg Oral Q4H PRN Princess Patterson PA-C        albuterol-ipratropium 2.5 mg-0.5 mg/3 mL nebulizer solution 3 mL  3 mL Nebulization Q4H PRN Princess Patterson PA-C        aluminum-magnesium hydroxide-simethicone 200-200-20 mg/5 mL suspension 30 mL  30 mL Oral QID PRN Princess Patterson PA-C        ammonium lactate 12 % lotion    Topical (Top) BID Peri La PA-C   Given at 11/25/24 0848    apixaban tablet 10 mg  10 mg Oral BID Peri La PA-C   10 mg at 11/25/24 0843    artificial tears 0.5 % ophthalmic solution 1 drop  1 drop Left Eye TID Princess Patterson PA-C   1 drop at 11/25/24 0844    carvediloL tablet 12.5 mg  12.5 mg Oral BID Martita Montesinos MD   12.5 mg at 11/24/24 2156    electrolytes-dextrose (Pedialyte) oral solution 400 mL  400 mL Oral Q4H Peri La PA-C   400 mL at 11/25/24 0844    famotidine tablet 20 mg  20 mg Oral Daily Martita Montesinos MD   20 mg at 11/25/24 0843    melatonin tablet 6 mg  6 mg Oral Nightly PRN Princess Patterson PA-C        miconazole NITRATE 2 % top powder   Topical (Top) BID Peri La PA-C   Given at 11/25/24 0848    naloxone 0.4 mg/mL injection 0.02 mg  0.02 mg Intravenous PRN Princess Patterson PA-C        ondansetron disintegrating tablet 8 mg  8 mg Oral Q8H PRN Princess Patterson PA-C        polyethylene glycol packet 17 g  17 g Oral BID PRN Princess Patterson PA-C        prochlorperazine injection Soln 5 mg  5 mg Intravenous Q6H PRN Princess Patterson PA-C        simethicone chewable tablet 80 mg  1 tablet Oral QID PRN Princess Patterson PA-C        sodium chloride 0.9% flush 5 mL  5 mL Intravenous PRN Princess Patterson PA-C            Medication List        START taking these medications      ammonium lactate 12 % lotion  Commonly known as: LAC-HYDRIN  Apply topically 2 (two) times daily. Apply to bilateral lower extremities     * apixaban 5 mg (74 tabs) Dspk  Commonly known as: ELIQUIS  From 11/21 to 11/27 take 2 tablets (10 mg) by mouth twice daily; starting 11/28 take 1 tablet (5 mg) by mouth twice daily     * apixaban 5 mg Tab  Commonly known as: ELIQUIS  Take 1 tablet (5 mg total) by mouth 2 (two) times daily. Begin after completion of apixaban starter pack  Start taking on: December 21, 2024     losartan-hydrochlorothiazide 50-12.5 mg 50-12.5 mg per tablet  Commonly  known as: HYZAAR  Take 1 tablet by mouth once daily.  Replaces: losartan-hydrochlorothiazide 100-25 mg 100-25 mg per tablet     miconazole NITRATE 2 % 2 % top powder  Commonly known as: MICOTIN  Apply topically 2 (two) times daily. Apply to groin           * This list has 2 medication(s) that are the same as other medications prescribed for you. Read the directions carefully, and ask your doctor or other care provider to review them with you.                CHANGE how you take these medications      carvediloL 12.5 MG tablet  Commonly known as: COREG  Take 1 tablet (12.5 mg total) by mouth 2 (two) times daily.  What changed:   medication strength  how much to take            CONTINUE taking these medications      aspirin 81 MG EC tablet  Commonly known as: ECOTRIN  Take 81 mg by mouth once daily.     atorvastatin 40 MG tablet  Commonly known as: LIPITOR  Take 40 mg by mouth once daily.     gabapentin 300 MG capsule  Commonly known as: NEURONTIN  Take 1 capsule (300 mg total) by mouth every evening.     PROAIR HFA 90 mcg/actuation inhaler  Generic drug: albuterol  Inhale 1 puff into the lungs.            STOP taking these medications      amLODIPine 10 MG tablet  Commonly known as: NORVASC     furosemide 40 MG tablet  Commonly known as: LASIX     hydrALAZINE 100 MG tablet  Commonly known as: APRESOLINE     losartan-hydrochlorothiazide 100-25 mg 100-25 mg per tablet  Commonly known as: HYZAAR  Replaced by: losartan-hydrochlorothiazide 50-12.5 mg 50-12.5 mg per tablet                I have seen and examined this patient within the last 30 days. My clinical findings that support the need for the home health skilled services and home bound status are the following:no   Weakness/numbness causing balance and gait disturbance due to Weakness/Debility making it taxing to leave home.     Diet:   cardiac diet    Labs:  SN to perform labs:  CBC: Weekly; 2 week(s) and BMP: Weekly; 2 week(s)    Referrals/ Consults  Physical  Therapy to evaluate and treat. Evaluate for home safety and equipment needs; Establish/upgrade home exercise program. Perform / instruct on therapeutic exercises, gait training, transfer training, and Range of Motion.  Occupational Therapy to evaluate and treat. Evaluate home environment for safety and equipment needs. Perform/Instruct on transfers, ADL training, ROM, and therapeutic exercises.  Aide to provide assistance with personal care, ADLs, and vital signs.    Activities:   activity as tolerated    Nursing:   Agency to admit patient within 24 hours of hospital discharge unless specified on physician order or at patient request    SN to complete comprehensive assessment including routine vital signs. Instruct on disease process and s/s of complications to report to MD. Review/verify medication list sent home with the patient at time of discharge  and instruct patient/caregiver as needed. Frequency may be adjusted depending on start of care date.     Skilled nurse to perform up to 3 visits PRN for symptoms related to diagnosis    Notify MD if SBP > 160 or < 90; DBP > 90 or < 50; HR > 120 or < 50; Temp > 101; O2 < 88%;     Ok to schedule additional visits based on staff availability and patient request on consecutive days within the home health episode.    When multiple disciplines ordered:    Start of Care occurs on Sunday - Wednesday schedule remaining discipline evaluations as ordered on separate consecutive days following the start of care.    Thursday SOC -schedule subsequent evaluations Friday and Monday the following week.     Friday - Saturday SOC - schedule subsequent discipline evaluations on consecutive days starting Monday of the following week.    For all post-discharge communication and subsequent orders please contact patient's primary care physician. If unable to reach primary care physician or do not receive response within 30 minutes, please contact PCP for clinical staff order  clarification    Miscellaneous   Routine Skin for Bedridden Patients: Instruct patient/caregiver to apply moisture barrier cream to all skin folds and wet areas in perineal area daily and after baths and all bowel movements.  Heart Failure:      SN to instruct on the following:    Instruct on the definition of CHF.   Instruct on the signs/sympoms of CHF to be reported.   Instruct on and monitor daily weights.   Instruct on factors that cause exacerbation.   Instruct on action, dose, schedule, and side effects of medications.   Instruct on diet as prescribed.   Instruct on activity allowed.   Instruct on life-style modifications for life long management of CHF   SN to assess compliance with daily weights, diet, medications, fluid retention,    safety precautions, activities permitted and life-style modifications.   Additional 1-2 SN visits per week as needed for signs and symptoms     of CHF exacerbation.      For Weight Gain > 2-3 lbs in 1 day or 4-6 lbs over 1 week notify PCP:    Home Health Aide:  Nursing Three times weekly, Physical Therapy Three times weekly, Occupational Therapy Three times weekly, Medical Social Work Three times weekly, and Home Health Aide Three times weekly    Wound Care Orders  Recs:  BLE: Lac Hydrin lotion, apply BID and prn to BLE. Legs and feet should be washed daily with a moisturizing soap, or no rinse while in hospital. Dry well and apply lotion, avoiding spaces between toes.      Pt should follow up outpatient with a lymphedema specialist once DVT has resolved. Wraps may be a challenge to place on pt and keep dry/free from soilage. Pt may benefit more from Lymphedema pump that would be easier for family or home health to apply. Due to size of pt's legs, 2-3 people may be needed to assist with wrapping. Pt should have heels offloaded, either with heel lift boots or pillows. Pt should also elevate legs.      Ulcerations on hips/upper thighs: clean with no rinse or mild soap/water; dry  well. Apply Triad wound cream to all open areas daily and prn. Pt should not wear diapers and should avoid sitting in wet/soiled surfaces.    I certify that this patient is confined to her home and needs intermittent skilled nursing care, physical therapy, and occupational therapy.

## 2024-11-25 NOTE — PLAN OF CARE
CM along with Mony Martines spoke with pt at bedside regarding 3 NH acceptance ([facility], [facility] and [facility] ). She is agreeable to be placed not rather than later for safety. She stated that her sister is the contact to call and visit the above places. CM is verifying 142 is complete at this time. Will cont to follow until discharged.    Update @5072: Spoke with sister (Nita Bobby @ [phone]) regarding the above. Pt's sister stated she will go see the above named facilities in the morning. Medical team updated at this time, will cont to follow until discharged.    Update @ 2507- 424 obtained and uploaded to [facility].   11/25/24 1138   Post-Acute Status   Post-Acute Authorization Placement;Home Health   Post-Acute Placement Status Referrals Sent   Home Health Status Referrals Sent   Patient choice form signed by patient/caregiver List with quality metrics by geographic area provided   Discharge Delays None known at this time   Discharge Plan   Discharge Plan A New Nursing Home placement - assisted care facility   Discharge Plan B Home;Home with family;Home Health     Olga Louis RN  [facility]  , [phone]

## 2024-11-25 NOTE — SUBJECTIVE & OBJECTIVE
Interval History: NAEON. VSS. Patient seen and examined at bedside today. Patient reports with no complaints at this time. BP continues to be low; switched Coreg to MTP. KELSEY persists with Cr 1.5 s/p small bolus of IVF . Renal US and urine lytes pending. CMS reports family working on NH placement.     Review of Systems   Constitutional:  Positive for activity change. Negative for chills and fever.   HENT:  Negative for trouble swallowing.    Eyes:  Negative for photophobia and visual disturbance.   Respiratory:  Negative for cough, chest tightness and shortness of breath.    Cardiovascular:  Positive for chest pain. Negative for palpitations and leg swelling.   Gastrointestinal:  Positive for abdominal distention. Negative for abdominal pain, constipation, diarrhea, nausea and vomiting.   Genitourinary:  Negative for dysuria, frequency and hematuria.   Musculoskeletal:  Positive for gait problem. Negative for back pain and neck pain.   Skin:  Positive for wound. Negative for rash.   Neurological:  Negative for dizziness, syncope, speech difficulty and light-headedness.   Psychiatric/Behavioral:  Negative for agitation and confusion. The patient is not nervous/anxious.      Objective:     Vital Signs (Most Recent):  Temp: 98.2 °F (36.8 °C) (11/25/24 1158)  Pulse: 76 (11/25/24 1158)  Resp: 18 (11/25/24 1158)  BP: (!) 128/58 (11/25/24 1158)  SpO2: 98 % (11/25/24 1158) Vital Signs (24h Range):  Temp:  [97.6 °F (36.4 °C)-99 °F (37.2 °C)] 98.2 °F (36.8 °C)  Pulse:  [66-76] 76  Resp:  [16-20] 18  SpO2:  [94 %-98 %] 98 %  BP: (107-130)/(49-60) 128/58     Weight: (!) 167.4 kg (369 lb 0.8 oz)  Body mass index is 63.35 kg/m².    Intake/Output Summary (Last 24 hours) at 11/25/2024 1308  Last data filed at 11/25/2024 0903  Gross per 24 hour   Intake 2000 ml   Output 200 ml   Net 1800 ml           Physical Exam  Vitals and nursing note reviewed.   Constitutional:       General: She is not in acute distress.     Appearance: She is  well-developed. She is obese.      Comments: Morbid obesity with BMI 54.   HENT:      Head: Normocephalic and atraumatic.      Mouth/Throat:      Pharynx: No oropharyngeal exudate.   Eyes:      Conjunctiva/sclera: Conjunctivae normal.      Pupils: Pupils are equal, round, and reactive to light.   Cardiovascular:      Rate and Rhythm: Normal rate and regular rhythm.      Heart sounds: Normal heart sounds.   Pulmonary:      Effort: Pulmonary effort is normal.      Breath sounds: Normal breath sounds.      Comments: Limited 2/2 body habitus  Abdominal:      General: Bowel sounds are normal. There is no distension.      Palpations: Abdomen is soft.      Tenderness: There is no abdominal tenderness.   Musculoskeletal:         General: Tenderness (BLE) present. Normal range of motion.      Cervical back: Normal range of motion and neck supple.      Right lower leg: Edema present.      Left lower leg: Edema present.   Lymphadenopathy:      Cervical: No cervical adenopathy.   Skin:     General: Skin is warm and dry.      Capillary Refill: Capillary refill takes less than 2 seconds.      Findings: No rash.      Comments: Significant lymphedema to BLE with skin changes including papillomas and hyperkeratosis. Small wound to L big toe   Neurological:      Mental Status: She is alert and oriented to person, place, and time.      Cranial Nerves: No cranial nerve deficit.      Sensory: No sensory deficit.      Coordination: Coordination normal.   Psychiatric:         Behavior: Behavior normal.         Thought Content: Thought content normal.         Judgment: Judgment normal.                           Significant Labs: All pertinent labs within the past 24 hours have been reviewed.  BMP:   Recent Labs   Lab 11/25/24  0828   *      K 4.0   *   CO2 18*   BUN 50*   CREATININE 1.5*   CALCIUM 7.9*     CBC:   Recent Labs   Lab 11/25/24  0828   WBC 5.04   HGB 10.4*   HCT 35.9*        CMP:   Recent Labs   Lab  11/24/24  0701 11/24/24  1101 11/25/24  0828    143 141   K 4.0 4.3 4.0   * 115* 116*   CO2 19* 18* 18*   * 109 118*   BUN 47* 48* 50*   CREATININE 1.5* 1.5* 1.5*   CALCIUM 8.0* 8.0* 7.9*   PROT 4.6*  --   --    ALBUMIN 2.0*  --   --    BILITOT 0.2  --   --    ALKPHOS 98  --   --    AST 7*  --   --    ALT 6*  --   --    ANIONGAP 5* 10 7*         Significant Imaging: I have reviewed all pertinent imaging results/findings within the past 24 hours.

## 2024-11-25 NOTE — PLAN OF CARE
11/25/24 1157   Discharge Reassessment   Assessment Type Discharge Planning Reassessment   Did the patient's condition or plan change since previous assessment? No   Discharge Plan discussed with: Patient;Sibling   Name(s) and Number(s) Nita Bobby [phone]   Communicated MC with patient/caregiver Yes   Discharge Plan A New Nursing Home placement - half-way care facility   Discharge Plan B Home;Home with family;Home Health   Transition of Care Barriers None   Why the patient remains in the hospital Requires continued medical care     Pt is agreeable to NH placement  (see post acute note). Will cont to follow until discharged.    Olga Louis,LARRY  [facility]  [phone]

## 2024-11-26 LAB
ANION GAP SERPL CALC-SCNC: 6 MMOL/L (ref 8–16)
BUN SERPL-MCNC: 47 MG/DL (ref 8–23)
CALCIUM SERPL-MCNC: 8.2 MG/DL (ref 8.7–10.5)
CHLORIDE SERPL-SCNC: 115 MMOL/L (ref 95–110)
CO2 SERPL-SCNC: 18 MMOL/L (ref 23–29)
CREAT SERPL-MCNC: 1.2 MG/DL (ref 0.5–1.4)
EST. GFR  (NO RACE VARIABLE): 46.3 ML/MIN/1.73 M^2
GLUCOSE SERPL-MCNC: 111 MG/DL (ref 70–110)
POTASSIUM SERPL-SCNC: 3.9 MMOL/L (ref 3.5–5.1)
SODIUM SERPL-SCNC: 139 MMOL/L (ref 136–145)

## 2024-11-26 PROCEDURE — 25000003 PHARM REV CODE 250: Performed by: PHYSICIAN ASSISTANT

## 2024-11-26 PROCEDURE — 80048 BASIC METABOLIC PNL TOTAL CA: CPT | Performed by: PHYSICIAN ASSISTANT

## 2024-11-26 PROCEDURE — 21400001 HC TELEMETRY ROOM

## 2024-11-26 PROCEDURE — 36415 COLL VENOUS BLD VENIPUNCTURE: CPT | Performed by: PHYSICIAN ASSISTANT

## 2024-11-26 PROCEDURE — 25000003 PHARM REV CODE 250: Performed by: HOSPITALIST

## 2024-11-26 RX ORDER — METOPROLOL TARTRATE 25 MG/1
25 TABLET, FILM COATED ORAL 2 TIMES DAILY
Start: 2024-11-26 | End: 2025-11-26

## 2024-11-26 RX ADMIN — MICONAZOLE NITRATE 2 % TOPICAL POWDER: at 08:11

## 2024-11-26 RX ADMIN — HYPROMELLOSE 2910 1 DROP: 5 SOLUTION/ DROPS OPHTHALMIC at 08:11

## 2024-11-26 RX ADMIN — APIXABAN 10 MG: 5 TABLET, FILM COATED ORAL at 09:11

## 2024-11-26 RX ADMIN — Medication 400 ML: at 05:11

## 2024-11-26 RX ADMIN — METOPROLOL TARTRATE 25 MG: 25 TABLET, FILM COATED ORAL at 09:11

## 2024-11-26 RX ADMIN — MICONAZOLE NITRATE 2 % TOPICAL POWDER: at 09:11

## 2024-11-26 RX ADMIN — Medication: at 08:11

## 2024-11-26 RX ADMIN — Medication 400 ML: at 09:11

## 2024-11-26 RX ADMIN — FAMOTIDINE 20 MG: 20 TABLET ORAL at 08:11

## 2024-11-26 RX ADMIN — Medication 400 ML: at 01:11

## 2024-11-26 RX ADMIN — APIXABAN 10 MG: 5 TABLET, FILM COATED ORAL at 08:11

## 2024-11-26 RX ADMIN — Medication: at 09:11

## 2024-11-26 RX ADMIN — HYPROMELLOSE 2910 1 DROP: 5 SOLUTION/ DROPS OPHTHALMIC at 09:11

## 2024-11-26 RX ADMIN — METOPROLOL TARTRATE 25 MG: 25 TABLET, FILM COATED ORAL at 08:11

## 2024-11-26 NOTE — SUBJECTIVE & OBJECTIVE
Interval History: NAEON. VSS. Patient seen and examined at bedside today. Patient reports with no complaints at this time. BP improved with medication changes. KELSEY resolved to 1.2 . Renal US and urine lytes unremarkable. CMSW reports family working on NH placement vs  as we are unable to find an accepting  company and patient is not safe to go home alone.     Review of Systems   Constitutional:  Positive for activity change. Negative for chills and fever.   HENT:  Negative for trouble swallowing.    Eyes:  Negative for photophobia and visual disturbance.   Respiratory:  Negative for cough, chest tightness and shortness of breath.    Cardiovascular:  Positive for chest pain. Negative for palpitations and leg swelling.   Gastrointestinal:  Positive for abdominal distention. Negative for abdominal pain, constipation, diarrhea, nausea and vomiting.   Genitourinary:  Negative for dysuria, frequency and hematuria.   Musculoskeletal:  Positive for gait problem. Negative for back pain and neck pain.   Skin:  Positive for wound. Negative for rash.   Neurological:  Negative for dizziness, syncope, speech difficulty and light-headedness.   Psychiatric/Behavioral:  Negative for agitation and confusion. The patient is not nervous/anxious.      Objective:     Vital Signs (Most Recent):  Temp: 98.4 °F (36.9 °C) (11/26/24 1632)  Pulse: 65 (11/26/24 1632)  Resp: 18 (11/26/24 1632)  BP: (!) 127/59 (11/26/24 1632)  SpO2: 96 % (11/26/24 1632) Vital Signs (24h Range):  Temp:  [97.6 °F (36.4 °C)-98.4 °F (36.9 °C)] 98.4 °F (36.9 °C)  Pulse:  [63-67] 65  Resp:  [18-20] 18  SpO2:  [96 %-98 %] 96 %  BP: (118-127)/(56-59) 127/59     Weight: (!) 167.4 kg (369 lb 0.8 oz)  Body mass index is 63.35 kg/m².    Intake/Output Summary (Last 24 hours) at 11/26/2024 1647  Last data filed at 11/26/2024 0447  Gross per 24 hour   Intake 1600 ml   Output 300 ml   Net 1300 ml           Physical Exam  Vitals and nursing note reviewed.   Constitutional:        General: She is not in acute distress.     Appearance: She is well-developed. She is obese.      Comments: Morbid obesity with BMI 54.   HENT:      Head: Normocephalic and atraumatic.      Mouth/Throat:      Pharynx: No oropharyngeal exudate.   Eyes:      Conjunctiva/sclera: Conjunctivae normal.      Pupils: Pupils are equal, round, and reactive to light.   Cardiovascular:      Rate and Rhythm: Normal rate and regular rhythm.      Heart sounds: Normal heart sounds.   Pulmonary:      Effort: Pulmonary effort is normal.      Breath sounds: Normal breath sounds.      Comments: Limited 2/2 body habitus  Abdominal:      General: Bowel sounds are normal. There is no distension.      Palpations: Abdomen is soft.      Tenderness: There is no abdominal tenderness.   Musculoskeletal:         General: Tenderness (BLE) present. Normal range of motion.      Cervical back: Normal range of motion and neck supple.      Right lower leg: Edema present.      Left lower leg: Edema present.   Lymphadenopathy:      Cervical: No cervical adenopathy.   Skin:     General: Skin is warm and dry.      Capillary Refill: Capillary refill takes less than 2 seconds.      Findings: No rash.      Comments: Significant lymphedema to BLE with skin changes including papillomas and hyperkeratosis. Small wound to L big toe   Neurological:      Mental Status: She is alert and oriented to person, place, and time.      Cranial Nerves: No cranial nerve deficit.      Sensory: No sensory deficit.      Coordination: Coordination normal.   Psychiatric:         Behavior: Behavior normal.         Thought Content: Thought content normal.         Judgment: Judgment normal.                           Significant Labs: All pertinent labs within the past 24 hours have been reviewed.  BMP:   Recent Labs   Lab 11/26/24  0833   *      K 3.9   *   CO2 18*   BUN 47*   CREATININE 1.2   CALCIUM 8.2*     CBC:   Recent Labs   Lab 11/25/24  0828   WBC 5.04    HGB 10.4*   HCT 35.9*        CMP:   Recent Labs   Lab 11/25/24  0828 11/26/24  0833    139   K 4.0 3.9   * 115*   CO2 18* 18*   * 111*   BUN 50* 47*   CREATININE 1.5* 1.2   CALCIUM 7.9* 8.2*   ANIONGAP 7* 6*         Significant Imaging: I have reviewed all pertinent imaging results/findings within the past 24 hours.

## 2024-11-26 NOTE — PLAN OF CARE
Spoke with Ravi at [facility] regarding transportation to wound care awaiting dates for wound care appointments so pt can call [phone] when discharged.    Olga Louis RN  [phone]

## 2024-11-26 NOTE — ASSESSMENT & PLAN NOTE
KELSEY is likely due to pre-renal azotemia due to intravascular volume depletion. Baseline creatinine is  1.1 . Most recent creatinine and eGFR are listed below.  Recent Labs     11/24/24  1101 11/25/24  0828 11/26/24  0833   CREATININE 1.5* 1.5* 1.2   EGFRNORACEVR 35.4* 35.4* 46.3*        Plan  - KELSEY is stable  - Avoid nephrotoxins and renally dose meds for GFR listed above  - Monitor urine output, serial BMP, and adjust therapy as needed  - d/c lasix  - pedialyte, increase fluid restriction  - s/p 250 cc bolus  - renal US and urine lytes unremarkable  - resolved

## 2024-11-26 NOTE — PLAN OF CARE
"   11/26/24 1223   Post-Acute Status   Post-Acute Authorization Placement   Post-Acute Placement Status Referrals Sent   Home Health Status Referrals Sent   Patient choice form signed by patient/caregiver List with quality metrics by geographic area provided   Discharge Delays (!) Post-Acute Set-up   Discharge Plan   Discharge Plan A New Nursing Home placement - longterm care facility   Discharge Plan B Home;Home with family;Home Health     CM spoke with pt this am, pt stated "I didn't realized I would stay there forever". She stated, "I might decide to go home at this point". Informed to pt that referrals were sent to 17 agencies and all returned with denials. CM escalated to leadership via email for assistance. Medical team made aware as well via secure chat and responded.   Spoke with sister (Nita) as well today and the has an appointment tomorrow for 1pm. I encouraged Ms Moya to keep that appointment as scheduled, she agreed.  Referral faxed to [facility]([phone]) for wound care as a plan if pt goes home [facility].  Will continue to follow until discharge    Olga Louis RN  [phone]  "

## 2024-11-26 NOTE — PROGRESS NOTES
[facility] [address] [place] [address] Medicine  Progress Note    Patient Name: Rosetta Whyte  MRN: [id number]  Patient Class: IP- Inpatient   Admission Date: 11/20/2024  Length of Stay: 3 days  Attending Physician: Angus Rice MD  Primary Care Provider: Georgie Ortega MD        Subjective:     Principal Problem:Impaired functional mobility and activity tolerance        HPI:  Rosetta Whyte is a 80 yo F with PMHx of HTN, morbid obesity, hx of gastric bypass, lymphedema, EMMANUEL, HFpEF who presented to ED for chest pain. Endorses sudden, midsternal, nonradiating chest pain that began after eating lunch and lasted for approximately 30 minutes. She had a similar experience of Friday, but it did not last as long. Chest pain has not recurred since being in the ED. She also endorses pain to her bilateral big toes as she states she recently hit them at her home. She has been mostly bedbound for the past couple of years. She lives alone, but her daughter and sister both live down her street and come by to see her daily, keep her clean, and provide her food. She would like to start working on NH placement. Also reports L eye irritation for the past few weeks. Denies fever, chills, palpitations, SOB, cough, n/v/d, dysuria, headaches.    In ED:  Afebrile. HDS. No leukocytosis. CMP notable for Na 147, serum bicarb  21, BUN 43. BNP 80. Trop 0.024 >> 0.021. CXR without acute process. Given po pantoprazole 40 mg. Placed in observation with HM.     Overview/Hospital Course:  Patient admitted to hospital medicine for chest pain and BLE edema. Troponin down trending. Echo with EF of 60-65% with no wall motion changes. BLE US with R femoral DVT, started on Eliquis. CTA without PE. Podiatry consulted, ordered B foot XR which are unremarkable. Recommend offloading boots. Wound care consulted for multiple areas of skin breakdown, appreciate recs. UA infectious, started on CTX. Cr worsened to 1.5 and is persistent s/p small IVF bolus.  Holding diuretics and HTN meds. Renal US and urine lytes unremarkable. Cr improved to 1.2.  Patient and family interested in skilled nursing placement which [facility] has discussed. Plan for discharge home when stable.    Interval History: NAEON. VSS. Patient seen and examined at bedside today. Patient reports with no complaints at this time. BP improved with medication changes. KELSEY resolved to 1.2 . Renal US and urine lytes unremarkable. [facility] reports family working on NH placement vs  as we are unable to find an accepting  company and patient is not safe to go home alone.     Review of Systems   Constitutional:  Positive for activity change. Negative for chills and fever.   HENT:  Negative for trouble swallowing.    Eyes:  Negative for photophobia and visual disturbance.   Respiratory:  Negative for cough, chest tightness and shortness of breath.    Cardiovascular:  Positive for chest pain. Negative for palpitations and leg swelling.   Gastrointestinal:  Positive for abdominal distention. Negative for abdominal pain, constipation, diarrhea, nausea and vomiting.   Genitourinary:  Negative for dysuria, frequency and hematuria.   Musculoskeletal:  Positive for gait problem. Negative for back pain and neck pain.   Skin:  Positive for wound. Negative for rash.   Neurological:  Negative for dizziness, syncope, speech difficulty and light-headedness.   Psychiatric/Behavioral:  Negative for agitation and confusion. The patient is not nervous/anxious.      Objective:     Vital Signs (Most Recent):  Temp: 98.4 °F (36.9 °C) (11/26/24 1632)  Pulse: 65 (11/26/24 1632)  Resp: 18 (11/26/24 1632)  BP: (!) 127/59 (11/26/24 1632)  SpO2: 96 % (11/26/24 1632) Vital Signs (24h Range):  Temp:  [97.6 °F (36.4 °C)-98.4 °F (36.9 °C)] 98.4 °F (36.9 °C)  Pulse:  [63-67] 65  Resp:  [18-20] 18  SpO2:  [96 %-98 %] 96 %  BP: (118-127)/(56-59) 127/59     Weight: (!) 167.4 kg (369 lb 0.8 oz)  Body mass index is 63.35 kg/m².    Intake/Output Summary (Last  24 hours) at 11/26/2024 1647  Last data filed at 11/26/2024 0447  Gross per 24 hour   Intake 1600 ml   Output 300 ml   Net 1300 ml           Physical Exam  Vitals and nursing note reviewed.   Constitutional:       General: She is not in acute distress.     Appearance: She is well-developed. She is obese.      Comments: Morbid obesity with BMI 54.   HENT:      Head: Normocephalic and atraumatic.      Mouth/Throat:      Pharynx: No oropharyngeal exudate.   Eyes:      Conjunctiva/sclera: Conjunctivae normal.      Pupils: Pupils are equal, round, and reactive to light.   Cardiovascular:      Rate and Rhythm: Normal rate and regular rhythm.      Heart sounds: Normal heart sounds.   Pulmonary:      Effort: Pulmonary effort is normal.      Breath sounds: Normal breath sounds.      Comments: Limited 2/2 body habitus  Abdominal:      General: Bowel sounds are normal. There is no distension.      Palpations: Abdomen is soft.      Tenderness: There is no abdominal tenderness.   Musculoskeletal:         General: Tenderness (BLE) present. Normal range of motion.      Cervical back: Normal range of motion and neck supple.      Right lower leg: Edema present.      Left lower leg: Edema present.   Lymphadenopathy:      Cervical: No cervical adenopathy.   Skin:     General: Skin is warm and dry.      Capillary Refill: Capillary refill takes less than 2 seconds.      Findings: No rash.      Comments: Significant lymphedema to BLE with skin changes including papillomas and hyperkeratosis. Small wound to L big toe   Neurological:      Mental Status: She is alert and oriented to person, place, and time.      Cranial Nerves: No cranial nerve deficit.      Sensory: No sensory deficit.      Coordination: Coordination normal.   Psychiatric:         Behavior: Behavior normal.         Thought Content: Thought content normal.         Judgment: Judgment normal.                           Significant Labs: All pertinent labs within the past 24  hours have been reviewed.  BMP:   Recent Labs   Lab 11/26/24  0833   *      K 3.9   *   CO2 18*   BUN 47*   CREATININE 1.2   CALCIUM 8.2*     CBC:   Recent Labs   Lab 11/25/24  0828   WBC 5.04   HGB 10.4*   HCT 35.9*        CMP:   Recent Labs   Lab 11/25/24  0828 11/26/24  0833    139   K 4.0 3.9   * 115*   CO2 18* 18*   * 111*   BUN 50* 47*   CREATININE 1.5* 1.2   CALCIUM 7.9* 8.2*   ANIONGAP 7* 6*         Significant Imaging: I have reviewed all pertinent imaging results/findings within the past 24 hours.    Assessment/Plan:      * Impaired functional mobility and activity tolerance  - has been bedbound for multiple years now. Lives alone, but family checks in on her  - she would like to proceed with NH placement. Explained that she would likely be discharged back home while placement is pending. Patient agrees to plan   - CM to assist with initiating process of NH placement     Right femoral vein DVT  - BLE US with R femoral DVT  - started on Eliquis 10mg PO BID x 7 days then 5mg BID   - CTA chest without evidence of PE      Acute cystitis with hematuria  - UA infectious  - started on CTX> completed 3 day course  - Ucx with E.coli      Pain in toes of both feet  - wound noted below L big toe nail  - podiatry consulted, Recommend:  - left hallux with sublingual hematoma, we will debrided nail and assess for potential wound.  - recommend bilateral heel offloading boots.  Patient is susceptible to pressure ulcers of the bilateral heel.  - nothing to culture.  - bilateral x-rays unremarkable.  - podiatry will sign off and follow up outpatient    Chest pain  79 y.o. who presents with midsternal chest pain without radiation that lasted for 30 minutes and occurred after eating lunch. Pain resolved without intervention and has not recurred.     - EKG without acute ischemic changes  - trop 0.024 >>0.021  - high suspicion for gastric reflux. Start pepcid BID  - Echo below  -  cardiac telemetry    Results for orders placed during the hospital encounter of 11/20/24    Echo    Interpretation Summary    Left Ventricle: The left ventricle is normal in size. Normal wall thickness. There is concentric remodeling. There is normal systolic function with a visually estimated ejection fraction of 60 - 65%. There is normal diastolic function.    Right Ventricle: Normal right ventricular cavity size. Wall thickness is normal. Systolic function is normal.    Left Atrium: Left atrium is mildly dilated.    IVC/SVC: Normal venous pressure at 3 mmHg.    Essential (primary) hypertension  Patients blood pressure range in the last 24 hours was: BP  Min: 107/49  Max: 130/60.The patient's inpatient anti-hypertensive regimen is listed below:  Current Antihypertensives  carvedilol (COREG) tablet, 2 times daily, Oral  , Daily, Oral  metoprolol tartrate (LOPRESSOR) tablet 25 mg, 2 times daily, Oral    Plan  - BP is controlled, no changes needed to their regimen  - patient reports compliance with home meds and has them at bedside, but on review of dispense report she has not filled amlodipine, lasix, or coreg in many months. Encourage compliance with home regimen   - d/c amlodipine, lasix; switched coreg to MTP    Morbid (severe) obesity due to excess calories  Body mass index is 54.24 kg/m². Morbid obesity complicates all aspects of disease management from diagnostic modalities to treatment. Weight loss encouraged and health benefits explained to patient.     KELSEY (acute kidney injury)  KELSEY is likely due to pre-renal azotemia due to intravascular volume depletion. Baseline creatinine is  1.1 . Most recent creatinine and eGFR are listed below.  Recent Labs     11/24/24  1101 11/25/24  0828 11/26/24  0833   CREATININE 1.5* 1.5* 1.2   EGFRNORACEVR 35.4* 35.4* 46.3*        Plan  - KELSEY is stable  - Avoid nephrotoxins and renally dose meds for GFR listed above  - Monitor urine output, serial BMP, and adjust therapy as  needed  - d/c lasix  - pedialyte, increase fluid restriction  - s/p 250 cc bolus  - renal US and urine lytes unremarkable  - resolved    Diastolic dysfunction, left ventricle  - last echo in 2020 with GII LV DD and preserved EF  - strict I/O, daily weights, and 1.5L fluid restriction   - BNP wnl (though likely falsely low 2/2 morbid obesity). CXR without pulmonary edema. Significant BLE edema, but likely from chronic lymphedema   - continue coreg and losartan; discontinued HCTZ   - restart lasix > discontinued  - monitor tele   - echo - see CHEST PAIN     Lymphedema of both lower extremities  - significant lymphedema to BLE  - afebrile and without leukocytosis   - BLE US with R femoral DVT  - wound care consulted, appreciate recs  - would benefit from referral to lymphedema clinic on discharge     S/P laparoscopic sleeve gastrectomy  - noted       VTE Risk Mitigation (From admission, onward)           Ordered     apixaban tablet 10 mg  2 times daily         11/21/24 0919     IP VTE HIGH RISK PATIENT  Once         11/21/24 0014                    Discharge Planning   MC: 11/26/2024     Code Status: Full Code   Is the patient medically ready for discharge?:     Reason for patient still in hospital (select all that apply): Pending disposition  Discharge Plan A: New Nursing Home placement - [facility] care facility   Discharge Delays: (!) Post-Acute Set-up              Peri La PA-C  Department of Hospital Medicine   [facility]

## 2024-11-26 NOTE — PLAN OF CARE
NURSING HOME ORDERS    11/26/2024  [facility]  [facility] - OBSERVATION 11H  [address]  [facility] [address]  Dept: [phone]  Loc: [phone]     Admit to Nursing Home:  Home or Self Care    Diagnoses:  Active Hospital Problems    Diagnosis  POA    *Impaired functional mobility and activity tolerance [Z74.09]  Yes    Chest pain [R07.9]  Yes    Pain in toes of both feet [M79.674, M79.675]  Yes    Acute cystitis with hematuria [N30.01]  Yes    Right femoral vein DVT [I82.411]  Yes    KELSEY (acute kidney injury) [N17.9]  Yes    Diastolic dysfunction, left ventricle [I51.9]  Yes     Chronic    Lymphedema of both lower extremities [I89.0]  Yes     Chronic    Morbid (severe) obesity due to excess calories [E66.01]  Yes    Essential (primary) hypertension [I10]  Yes    S/P laparoscopic sleeve gastrectomy [Z98.84]  Not Applicable     Chronic     7/7/2016        Resolved Hospital Problems   No resolved problems to display.       Patient is homebound due to:  Impaired functional mobility and activity tolerance    Allergies:Review of patient's allergies indicates:  No Known Allergies    Vitals:  Routine    Diet: cardiac diet    Activities:   Activity as tolerated    Goals of Care Treatment Preferences:  Code Status: Full Code      Labs:  Per Facility protocol    Nursing Precautions:  Aspiration  and Pressure ulcer prevention    Consults:   PT to evaluate and treat- 3 times a week, OT to evaluate and treat- 3 times a week, and Wound Care     Miscellaneous Care: Routine Skin for Bedridden Patients:  Apply moisture barrier cream to all                   Diabetes Care:  SN to perform and educate Diabetic management with blood glucose monitoring:, Fingerstick blood sugar AC and HS, and Report CBG < 60 or > 350 to physician.      Medications: Discontinue all previous medication orders, if any. See new list below.     Medication List        START taking these medications      ammonium lactate 12 %  lotion  Commonly known as: LAC-HYDRIN  Apply topically 2 (two) times daily. Apply to bilateral lower extremities     * apixaban 5 mg (74 tabs) Dspk  Commonly known as: ELIQUIS  From 11/21 to 11/27 take 2 tablets (10 mg) by mouth twice daily; starting 11/28 take 1 tablet (5 mg) by mouth twice daily     * apixaban 5 mg Tab  Commonly known as: ELIQUIS  Take 1 tablet (5 mg total) by mouth 2 (two) times daily. Begin after completion of apixaban starter pack  Start taking on: December 21, 2024     losartan-hydrochlorothiazide 50-12.5 mg 50-12.5 mg per tablet  Commonly known as: HYZAAR  Take 1 tablet by mouth once daily.  Replaces: losartan-hydrochlorothiazide 100-25 mg 100-25 mg per tablet     metoprolol tartrate 25 MG tablet  Commonly known as: LOPRESSOR  Take 1 tablet (25 mg total) by mouth 2 (two) times daily.     miconazole NITRATE 2 % 2 % top powder  Commonly known as: MICOTIN  Apply topically 2 (two) times daily. Apply to groin           * This list has 2 medication(s) that are the same as other medications prescribed for you. Read the directions carefully, and ask your doctor or other care provider to review them with you.                CONTINUE taking these medications      aspirin 81 MG EC tablet  Commonly known as: ECOTRIN  Take 81 mg by mouth once daily.     atorvastatin 40 MG tablet  Commonly known as: LIPITOR  Take 40 mg by mouth once daily.     gabapentin 300 MG capsule  Commonly known as: NEURONTIN  Take 1 capsule (300 mg total) by mouth every evening.     PROAIR HFA 90 mcg/actuation inhaler  Generic drug: albuterol  Inhale 1 puff into the lungs.            STOP taking these medications      amLODIPine 10 MG tablet  Commonly known as: NORVASC     carvediloL 25 MG tablet  Commonly known as: COREG     furosemide 40 MG tablet  Commonly known as: LASIX     hydrALAZINE 100 MG tablet  Commonly known as: APRESOLINE     losartan-hydrochlorothiazide 100-25 mg 100-25 mg per tablet  Commonly known as:  HYZAAR  Replaced by: losartan-hydrochlorothiazide 50-12.5 mg 50-12.5 mg per tablet                Some patients may experience side effects after vaccination.  These may include fever, headache, muscle or joint aches.  Most symptoms resolve with 24-48 hours and do not require urgent medical evaluation unless they persist for more than 72 hours or symptoms are concerning for an unrelated medical condition.          _________________________________  Peri La PA-C  11/26/2024

## 2024-11-27 VITALS
RESPIRATION RATE: 18 BRPM | DIASTOLIC BLOOD PRESSURE: 58 MMHG | HEART RATE: 67 BPM | HEIGHT: 64 IN | TEMPERATURE: 98 F | BODY MASS INDEX: 50.02 KG/M2 | WEIGHT: 293 LBS | OXYGEN SATURATION: 95 % | SYSTOLIC BLOOD PRESSURE: 127 MMHG

## 2024-11-27 LAB
ANION GAP SERPL CALC-SCNC: 6 MMOL/L (ref 8–16)
ANION GAP SERPL CALC-SCNC: 9 MMOL/L (ref 8–16)
BUN SERPL-MCNC: 49 MG/DL (ref 8–23)
BUN SERPL-MCNC: 52 MG/DL (ref 8–23)
CALCIUM SERPL-MCNC: 7.9 MG/DL (ref 8.7–10.5)
CALCIUM SERPL-MCNC: 8.2 MG/DL (ref 8.7–10.5)
CHLORIDE SERPL-SCNC: 113 MMOL/L (ref 95–110)
CHLORIDE SERPL-SCNC: 114 MMOL/L (ref 95–110)
CO2 SERPL-SCNC: 16 MMOL/L (ref 23–29)
CO2 SERPL-SCNC: 19 MMOL/L (ref 23–29)
CREAT SERPL-MCNC: 1.5 MG/DL (ref 0.5–1.4)
CREAT SERPL-MCNC: 1.7 MG/DL (ref 0.5–1.4)
ERYTHROCYTE [DISTWIDTH] IN BLOOD BY AUTOMATED COUNT: 14.5 % (ref 11.5–14.5)
EST. GFR  (NO RACE VARIABLE): 30.5 ML/MIN/1.73 M^2
EST. GFR  (NO RACE VARIABLE): 35.4 ML/MIN/1.73 M^2
GLUCOSE SERPL-MCNC: 100 MG/DL (ref 70–110)
GLUCOSE SERPL-MCNC: 98 MG/DL (ref 70–110)
HCT VFR BLD AUTO: 35.5 % (ref 37–48.5)
HGB BLD-MCNC: 11 G/DL (ref 12–16)
MAGNESIUM SERPL-MCNC: 2.3 MG/DL (ref 1.6–2.6)
MCH RBC QN AUTO: 31.8 PG (ref 27–31)
MCHC RBC AUTO-ENTMCNC: 31 G/DL (ref 32–36)
MCV RBC AUTO: 103 FL (ref 82–98)
PHOSPHATE SERPL-MCNC: 3.6 MG/DL (ref 2.7–4.5)
PLATELET # BLD AUTO: 215 K/UL (ref 150–450)
PMV BLD AUTO: 10 FL (ref 9.2–12.9)
POTASSIUM SERPL-SCNC: 4.3 MMOL/L (ref 3.5–5.1)
POTASSIUM SERPL-SCNC: 4.4 MMOL/L (ref 3.5–5.1)
RBC # BLD AUTO: 3.46 M/UL (ref 4–5.4)
SODIUM SERPL-SCNC: 138 MMOL/L (ref 136–145)
SODIUM SERPL-SCNC: 139 MMOL/L (ref 136–145)
WBC # BLD AUTO: 5.65 K/UL (ref 3.9–12.7)

## 2024-11-27 PROCEDURE — 85027 COMPLETE CBC AUTOMATED: CPT | Performed by: STUDENT IN AN ORGANIZED HEALTH CARE EDUCATION/TRAINING PROGRAM

## 2024-11-27 PROCEDURE — 83735 ASSAY OF MAGNESIUM: CPT | Performed by: STUDENT IN AN ORGANIZED HEALTH CARE EDUCATION/TRAINING PROGRAM

## 2024-11-27 PROCEDURE — 25000003 PHARM REV CODE 250: Performed by: PHYSICIAN ASSISTANT

## 2024-11-27 PROCEDURE — 25000003 PHARM REV CODE 250: Performed by: HOSPITALIST

## 2024-11-27 PROCEDURE — 36415 COLL VENOUS BLD VENIPUNCTURE: CPT | Performed by: STUDENT IN AN ORGANIZED HEALTH CARE EDUCATION/TRAINING PROGRAM

## 2024-11-27 PROCEDURE — 21400001 HC TELEMETRY ROOM

## 2024-11-27 PROCEDURE — 80048 BASIC METABOLIC PNL TOTAL CA: CPT | Performed by: STUDENT IN AN ORGANIZED HEALTH CARE EDUCATION/TRAINING PROGRAM

## 2024-11-27 PROCEDURE — 80048 BASIC METABOLIC PNL TOTAL CA: CPT | Mod: 91 | Performed by: PHYSICIAN ASSISTANT

## 2024-11-27 PROCEDURE — 36415 COLL VENOUS BLD VENIPUNCTURE: CPT | Performed by: PHYSICIAN ASSISTANT

## 2024-11-27 PROCEDURE — 84100 ASSAY OF PHOSPHORUS: CPT | Performed by: STUDENT IN AN ORGANIZED HEALTH CARE EDUCATION/TRAINING PROGRAM

## 2024-11-27 RX ADMIN — METOPROLOL TARTRATE 25 MG: 25 TABLET, FILM COATED ORAL at 08:11

## 2024-11-27 RX ADMIN — MICONAZOLE NITRATE 2 % TOPICAL POWDER: at 09:11

## 2024-11-27 RX ADMIN — APIXABAN 10 MG: 5 TABLET, FILM COATED ORAL at 10:11

## 2024-11-27 RX ADMIN — FAMOTIDINE 20 MG: 20 TABLET ORAL at 08:11

## 2024-11-27 RX ADMIN — Medication: at 09:11

## 2024-11-27 RX ADMIN — APIXABAN 10 MG: 5 TABLET, FILM COATED ORAL at 08:11

## 2024-11-27 RX ADMIN — METOPROLOL TARTRATE 25 MG: 25 TABLET, FILM COATED ORAL at 10:11

## 2024-11-27 RX ADMIN — SODIUM CHLORIDE 250 ML: 9 INJECTION, SOLUTION INTRAVENOUS at 12:11

## 2024-11-27 RX ADMIN — Medication 400 ML: at 01:11

## 2024-11-27 RX ADMIN — Medication 400 ML: at 05:11

## 2024-11-27 NOTE — PLAN OF CARE
Pt is to discharge home with family today. Sister (Nita ) did not have all required documents to admit to [facility] as previously discussed prior to NH tour at 1 pm.  Discharge medications at bedside, [employer] set up for wound care, contact info is [phone] located  at [address] [place] [address]. Birdis will call patient/sister with appointment dates. Both patient and sister (Nita) educated on the process. F/U appointments complete and on AVS.  Transportation set up via [employer] .       11/27/24 1507   Post-Acute Status   Post-Acute Placement Status Set-up Complete/Auth obtained   Discharge Delays None known at this time   Discharge Plan   Discharge Plan A Home with family   Discharge Plan B Home with family;Home       Future Appointments   Date Time Provider Department Center   12/2/2024  2:00 PM Kiersten Aguirre MD [facility] IM [facility]   1/9/2025  8:00 AM Lissy Jackson MD [facility] NEPHRO [facility]   1/10/2025  3:20 PM Gillies, Connor M, DO [facility] CARDIO [facility], LARRY  - [phone]

## 2024-11-27 NOTE — PLAN OF CARE
11/27/24 0918   Post-Acute Status   Post-Acute Authorization Placement   Post-Acute Placement Status Pending post-acute provider review/more information requested   Discharge Delays (!) Post-Acute Set-up   Discharge Plan   Discharge Plan A New Nursing Home placement - [facility] care facility   Discharge Plan B Home;Home with family     CM spoke with Pt/sister (Nita)  regarding [facility] as patient choice for NH placement. CM called and spoke to Gabrielle at [facility] [phone], Rolando verified that Nita (pt's sister) is scheduled for a tour today for 1pm. Tour appt verified with Ms Moya and stressed the importance of bringing all necessary documents to appointment to expedite admission today. The pt and Ms Nita has full understanding that if the NH set up cannot be completed that she would be discharged home as plan B. Both parties agreed and understand at this time. Will cont to follow until discharged.    Olga Louis,RN  Owzdxtn-[phone]

## 2024-11-28 NOTE — DISCHARGE SUMMARY
[facility] - Observation [address] [place] [address] Medicine  Discharge Summary      Patient Name: Rosetta Whyte  MRN: [id number]  KARY: [id number]  Patient Class: IP- Inpatient  Admission Date: 11/20/2024  Hospital Length of Stay: 5 days  Discharge Date and Time: 11/28/2024  1:01 AM  Attending Physician: No att. providers found   Discharging Provider: Peri La PA-C  Primary Care Provider: Georgie Ortega MD  [facility] Medicine Team: [facility] HOSP MED E Peri La PA-C  Primary Care Team: [facility] HOSP MED E    HPI:   Rosetta Whyte is a 78 yo F with PMHx of HTN, morbid obesity, hx of gastric bypass, lymphedema, EMMANUEL, HFpEF who presented to ED for chest pain. Endorses sudden, midsternal, nonradiating chest pain that began after eating lunch and lasted for approximately 30 minutes. She had a similar experience of Friday, but it did not last as long. Chest pain has not recurred since being in the ED. She also endorses pain to her bilateral big toes as she states she recently hit them at her home. She has been mostly bedbound for the past couple of years. She lives alone, but her daughter and sister both live down her street and come by to see her daily, keep her clean, and provide her food. She would like to start working on NH placement. Also reports L eye irritation for the past few weeks. Denies fever, chills, palpitations, SOB, cough, n/v/d, dysuria, headaches.    In ED:  Afebrile. HDS. No leukocytosis. CMP notable for Na 147, serum bicarb  21, BUN 43. BNP 80. Trop 0.024 >> 0.021. CXR without acute process. Given po pantoprazole 40 mg. Placed in observation with HM.     * No surgery found *      Hospital Course:   Patient admitted to hospital medicine for chest pain and BLE edema. Troponin down trending. Echo with EF of 60-65% with no wall motion changes. BLE US with R femoral DVT, started on Eliquis. CTA without PE. Podiatry consulted, ordered B foot XR which are unremarkable. Recommend offloading boots. Wound care consulted  for multiple areas of skin breakdown, appreciate recs. UA infectious, started on CTX. Cr worsened to 1.5 and is persistent s/p small IVF bolus. Holding diuretics and HTN meds. Renal US and urine lytes unremarkable. Cr improved to 1.2.  Patient and family interested in long term placement which [facility] has discussed. Patient stable for discharge home with [facility]. Family working on long term placement. Discussed care plan with patient, verbalized understanding. All questions answered. Return precautions given.         Goals of Care Treatment Preferences:  Code Status: Full Code      SDOH Screening:  The patient was screened for utility difficulties, food insecurity, transport difficulties, housing insecurity, and interpersonal safety and there were no concerns identified this admission.     Consults:   Consults (From admission, onward)          Status Ordering Provider     Hospital Medicine PharmD Consult  Once        Provider:  (Not yet assigned)    Completed CHRISTEL PINTO     Inpatient consult to Podiatry  Once        Provider:  (Not yet assigned)    Completed PRATEEK BRAMBILA            No new Assessment & Plan notes have been filed under this hospital service since the last note was generated.  Service: Hospital Medicine    Final Active Diagnoses:    Diagnosis Date Noted POA    PRINCIPAL PROBLEM:  Impaired functional mobility and activity tolerance [Z74.09] 06/07/2019 Yes    Chest pain [R07.9] 11/21/2024 Yes    Pain in toes of both feet [M79.674, M79.675] 11/21/2024 Yes    Acute cystitis with hematuria [N30.01] 11/21/2024 Yes    Right femoral vein DVT [I82.411] 11/21/2024 Yes    KELSEY (acute kidney injury) [N17.9] 05/25/2020 Yes    Diastolic dysfunction, left ventricle [I51.9] 05/24/2020 Yes     Chronic    Lymphedema of both lower extremities [I89.0] 04/10/2018 Yes     Chronic    Morbid (severe) obesity due to excess calories [E66.01] 11/17/2017 Yes    Essential (primary) hypertension [I10] 11/17/2017 Yes     S/P laparoscopic sleeve gastrectomy [Z98.84] 08/07/2016 Not Applicable     Chronic      Problems Resolved During this Admission:       Discharged Condition: stable    Disposition: Home or Self Care    Follow Up:   Follow-up Information       Georgie Ortega MD. Schedule an appointment as soon as possible for a visit in 1 week.    Specialty: Internal Medicine  Contact information:  [address]  [address]  [place] [address]  [phone]               [facility] - Emergency Dept.    Specialty: Emergency Medicine  Why: If symptoms worsen  Contact information:  [address] [facility] [address]  [phone]             [facility] - Cardiology - 3rd Fl. Schedule an appointment as soon as possible for a visit in 1 week.    Specialty: Cardiology  Contact information:  [address] [facility] [id number] [phone]  Additional information:  Cardiology Services Clinics - 3rd floor                         Patient Instructions:      Ambulatory referral/consult to Outpatient Case Management   Referral Priority: Routine Referral Type: Consultation   Referral Reason: Specialty Services Required   Number of Visits Requested: 1     Ambulatory referral/consult to Wound Clinic   Standing Status: Future   Referral Priority: Routine Referral Type: Consultation   Referral Reason: Specialty Services Required   Requested Specialty: Wound Care   Number of Visits Requested: 1     Ambulatory referral/consult to Physical/Occupational Therapy   Standing Status: Future   Referral Priority: Routine Referral Type: Physical Medicine   Referral Reason: Specialty Services Required   Number of Visits Requested: 1     Ambulatory referral/consult to Nephrology   Standing Status: Future   Referral Priority: Routine Referral Type: Consultation   Referral Reason: Specialty Services Required   Requested Specialty: Nephrology   Number of Visits Requested: 1     Diet Cardiac     Notify your health care provider if  you experience any of the following:  severe uncontrolled pain     Notify your health care provider if you experience any of the following:  persistent nausea and vomiting or diarrhea     Notify your health care provider if you experience any of the following:  redness, tenderness, or signs of infection (pain, swelling, redness, odor or green/yellow discharge around incision site)     Notify your health care provider if you experience any of the following:  difficulty breathing or increased cough     Notify your health care provider if you experience any of the following:  worsening rash     Activity as tolerated       Significant Diagnostic Studies: Labs: All labs within the past 24 hours have been reviewed    Pending Diagnostic Studies:       None           Medications:  Reconciled Home Medications:      Medication List        START taking these medications      ammonium lactate 12 % lotion  Commonly known as: LAC-HYDRIN  Apply topically 2 (two) times daily. Apply to bilateral lower extremities     * ELIQUIS DVT-PE TREAT 30D START 5 mg (74 tabs) Dspk  Generic drug: apixaban  From 11/21 to 11/27 take 2 tablets (10 mg) by mouth twice daily; starting 11/28 take 1 tablet (5 mg) by mouth twice daily     * ELIQUIS 5 mg Tab  Generic drug: apixaban  Take 1 tablet (5 mg total) by mouth 2 (two) times daily. Begin after completion of apixaban starter pack  Start taking on: December 21, 2024     losartan-hydrochlorothiazide 50-12.5 mg 50-12.5 mg per tablet  Commonly known as: HYZAAR  Take 1 tablet by mouth once daily.  Replaces: losartan-hydrochlorothiazide 100-25 mg 100-25 mg per tablet     metoprolol tartrate 25 MG tablet  Commonly known as: LOPRESSOR  Take 1 tablet (25 mg total) by mouth 2 (two) times daily.     Remedy Phytoplex AntifungaL 2 % top powder  Generic drug: miconazole NITRATE 2 %  Apply topically 2 (two) times daily. Apply to groin           * This list has 2 medication(s) that are the same as other medications  prescribed for you. Read the directions carefully, and ask your doctor or other care provider to review them with you.                CONTINUE taking these medications      aspirin 81 MG EC tablet  Commonly known as: ECOTRIN  Take 81 mg by mouth once daily.     atorvastatin 40 MG tablet  Commonly known as: LIPITOR  Take 40 mg by mouth once daily.     gabapentin 300 MG capsule  Commonly known as: NEURONTIN  Take 1 capsule (300 mg total) by mouth every evening.     PROAIR HFA 90 mcg/actuation inhaler  Generic drug: albuterol  Inhale 1 puff into the lungs.            STOP taking these medications      amLODIPine 10 MG tablet  Commonly known as: NORVASC     carvediloL 25 MG tablet  Commonly known as: COREG     furosemide 40 MG tablet  Commonly known as: LASIX     hydrALAZINE 100 MG tablet  Commonly known as: APRESOLINE     losartan-hydrochlorothiazide 100-25 mg 100-25 mg per tablet  Commonly known as: HYZAAR  Replaced by: losartan-hydrochlorothiazide 50-12.5 mg 50-12.5 mg per tablet              Indwelling Lines/Drains at time of discharge:   Lines/Drains/Airways       None                   Time spent on the discharge of patient: 35 minutes of the time sent on discharge, including examining the patient, providing discharge instructions, arranging follow up, and documentation           Peri La PA-C  Department of Hospital Medicine  [facility] 11[facility]

## 2024-11-29 NOTE — PLAN OF CARE
[facility]H  Discharge Final Note    Primary Care Provider: Georgie Ortega MD    Expected Discharge Date: 11/27/2024    Final Discharge Note (most recent)       Final Note - 11/29/24 1135          Final Note    Assessment Type Final Discharge Note (P)      Anticipated Discharge Disposition Home or Self Care (P)      Hospital Resources/Appts/Education Provided Appointments scheduled and added to AVS (P)         Post-Acute Status    Post-Acute Authorization Other (P)      Other Status Community Services (P)    [facility] nursing aide    Discharge Delays None known at this time (P)                      Important Message from Medicare  Important Message from Medicare regarding Discharge Appeal Rights: Given to patient/caregiver, Explained to patient/caregiver, Signed/date by patient/caregiver     Date IMM was signed: 11/25/24  Time IMM was signed: 1054    Contact Info       Georgie Ortega MD   Specialty: Internal Medicine   Relationship: PCP - [address]  [address]  [facility] [address]   Phone: [phone]       Next Steps: Schedule an appointment as soon as possible for a visit in 1 week(s)    [facility] - Emergency Dept   Specialty: Emergency Medicine    [address] [facility] [address]   Phone: [phone]       Next Steps: Follow up    Instructions: If symptoms worsen    [facility] - Cardiology - 3rd Fl   Specialty: Cardiology    [address]  [place] [address]   Phone: [phone]       Next Steps: Schedule an appointment as soon as possible for a visit in 1 week(s)          Pt is to discharge home with family today. Sister (Nita ) did not have all required documents to admit to [facility] as previously discussed prior to NH tour at 1 pm.  Discharge medications at bedside, [employer] set up for wound care, contact info is [phone] located  at [facility] [address] [place] [address]. [employer]  will call patient/sister with appointment dates. Both patient and sister (Nita) educated on the process. F/U appointments complete and on AVS.  Transportation set up via [facility] .    Olga Louis RN Case Manager  [phone]

## 2024-12-03 ENCOUNTER — OUTPATIENT CASE MANAGEMENT (OUTPATIENT)
Dept: ADMINISTRATIVE | Facility: OTHER | Age: 79
End: 2024-12-03
Payer: MEDICARE

## 2024-12-03 NOTE — LETTER
Rosetta Whyte  [address] [facility] [address]      Dear Rosetta Whyte,     I am writing from the Outpatient Care Management Department at [facility]. I received a referral from an [facility] Worker to contact you regarding nursing home placement assistance. I was unable to reach you by phone. Please contact me for assistance.     I can be reached at [phone] Monday thru Friday from 8:00am to 4:30pm.      Additionally, [facility] also has a program with a nurse available 24/7 to answer questions or provide medical advice. [facility] can be reached at [phone].      Sincerely,        Madina Don LCSW  Outpatient Care Management

## 2024-12-03 NOTE — PROGRESS NOTES
12/17/2024  3rd attempt to complete Initial Assessment for Outpatient Care Management, left messages for patient, patient's sister (Nita), and patient's daughter (Lyle). Received a return call from Nita who states she will try calling patient now.    12/10/2024  2nd attempt to contact patient and patient's daughter, Lyle, and left voicemails. Briefly spoke with patient's sister, Nita, who states she will call patient's neighbor to get a message to patient to return call to .    12/3/2024  1st attempt to complete Initial Assessment for Outpatient Care Management, left messages for patient, patient's sister (Nita), and patient's daughter (Lyle). Attempt letter sent.

## 2024-12-17 NOTE — PROGRESS NOTES
Outpatient Care Management   - Patient Assessment    Patient: Rosetta Whyte  MRN:  [id number]  Date of Service:  12/17/2024  Completed by:  Madina Don LCSW  Referral Date: 11/22/2024    Reason for Visit   Patient presents with    Other     12/17/2024  3rd attempt to complete Initial Assessment for Outpatient Care Management, left messages for patient, patient's sister (Nita), and patient's daughter (Lyle). 12/10/2024  2nd attempt to contact patient and patient's daughter, Lyle, and left voicemails. Briefly spoke with patient's sister, Nita, who states she will call patient's neighbor to get a message to patient to return call to SW.  12/3/2024  1st attempt to complete Initial Assessment for Outpatient Care Managemen    Social Work Assessment     12/17/2024       Brief Summary:  received a referral from CHW escalation for the following Low/Mod SW psychosocial needs: jail nursing home placement. Received return call from patient who confirms she needs help with nursing home placement. Patient reports she does not have a preference for a particular nursing home and prefers for her sister or daughter to decide on the facility. Patient provided consent for SW to speak with her sister, Nita, and daughter, Lyle. Patient states she has been bed bound for the past 10 years and requires help for all ADLs and IADLs. Patient is unable to sit up in a W/C. Patient has a hospital bed and fernando lift at home. Patient reports she lives alone, however, her sister and daughter visit daily to help her with ADLs/IADLs, such as bathing, changing depends, and providing meals. Patient states she has questions about her medications but is unsure who to call; patient states she was going to be set up with Dr. Ortega but has never visited that office; patient is unsure who her PCP is. SW spoke with patient's sister, Nita, who reports patient has not seen a MD in an outpatient setting in quite a while due  to not being able to sit up. Patient requires ambulance transportation. WILMER collaborated with [facility] SW Supervisor and will reach out to [facility] NP to inquire if they are able to assist. WILMER educated both patient and Nita of the NH placement process in detail, including the LOCET/PASRR, chest xray & TB skin test within 30 days, and Admit to NH orders from her PCP. WILMER reviewed local nursing homes with Nita who selected 5 NHs to begin sending referrals to. WILMER explained that if patient is denied from local facilities, the search area will need to be expanded. Also informed that NH placement can sometimes take several weeks. Patient and Nita verbalized understanding.    Nita agrees for SW to send referrals to:  [facility] (patient's brother lives at this facility)  [facility]  [facility]      Care plan was created in collaboration with patient/caregiver input.

## 2024-12-19 NOTE — PROGRESS NOTES
12/19/2024    9:38 AM: SW made calls to the following to check status of the referral:    [facility] ([phone])- spoke with Lolly and resent referral as requested    [facility] ([phone])- spoke with  who will forward message to Moraima in admissions to return call. Received return call from Rosario ([phone]) in admissions who requested for SW to resend the referral    [facility] ([phone])- left voicemail for Gabrielle ([phone]) in admissions.    [facility] ([phone])- spoke with Reg in admissions who reports they are reviewing the referral and should have a decision later today        Per chart review, a LOCET and PASRR were completed on 11/22/24. An updated LOCET/PASRR will need to be completed after 12/22/24.    Collaborated with [facility] NP Yaa Morfin for assistance with documentation needed for NH placement. [facility] NP will schedule patient for 1/2/24 for a home visit.        2:38 PM: SW followed up on NH referrals:    [facility]- per Moraima in admissions, patient has been denied by due to no longterm female beds available.    [facility]- per Lolly in admissions, patient has been denied, no reason provided.    [facility]- left voicemail for Gabrielle in admissions. Spoke with Shivani in admissions who states she will check the status of the referral and call SW back. Received return call from Shivani who reports that [facility] has attempted to accept/admit patient in the past, however, patient and her sister did not provide the financial information needed to move forward. Shivani states that they may still be willing to accept patient if she provides the requested information. Gabri[facility] has reportedly left a voicemail for patient's sister, Nita, this week.    [facility]- left voicemail for Reg in admissions requesting a return call.    [facility] spoke with Owen in admissions who states they if patient is over their bariatric weight  limit of 350 lbs, patient is denied. Per chart, last weight on 11/24/24 is 369 lbs.      Attempted to contact patient and patient's sister, Nita, left voicemails.

## 2024-12-19 NOTE — PROGRESS NOTES
12/19/2024    2:38 PM: SW called the following NHs:    [facility]- per Moraima in admissions, patient has been denied by due to no [facility] female beds available.    [facility]- per Lolly in admissions, patient has been denied, no reason provided.    [facility]- left voicemail for Gabrielle ([phone]) in admissions. Spoke with Shivani ([phone]) in admissions who states she will check the status of the referral and call SW back.    Myrna Alan-

## 2024-12-23 ENCOUNTER — OUTPATIENT CASE MANAGEMENT (OUTPATIENT)
Dept: ADMINISTRATIVE | Facility: OTHER | Age: 79
End: 2024-12-23
Payer: MEDICARE

## 2024-12-23 NOTE — PROGRESS NOTES
Contacted Gabrielle with [facility] who states she left a message for pt re: need for financial however Gabrielle hasn't heard back from pt.  Per Gabrielle, in Nov. 2024, pt was medically approved for [facility] pending receipt/review of financial info however the pt/family didn't provide. Gabrielle states financials can be emailed to her at [email or web address]. Left message with pt re: same.

## 2024-12-30 ENCOUNTER — OUTPATIENT CASE MANAGEMENT (OUTPATIENT)
Dept: ADMINISTRATIVE | Facility: OTHER | Age: 79
End: 2024-12-30
Payer: MEDICARE

## 2024-12-30 NOTE — LETTER
[place]te  [address] [address]      Dear Rosetta Whyte,     I am writing from the Outpatient Care Management Department at [facility]. I have been unsuccessful at reaching you since we spoke on Tuesday 12/17/2024.  I hope you found the assistance previously provided to you helpful.     Please contact me at [phone] from 8:00AM to 4:30 PM on Monday thru Friday.     As you know, [facility] also has a program with a nurse available 24/7 to answer questions or provide medical advice.  [facility] can be reached at [phone].    Sincerely,       Madina Don LCSW  Outpatient Care Management

## 2024-12-30 NOTE — PROGRESS NOTES
12/30/2024  1st attempt to complete Follow-Up for Outpatient Care Management, left message. Attempt letter sent.

## 2025-01-06 NOTE — PROGRESS NOTES
Outpatient Care Management   - Care Plan Follow Up    Patient: Rosetta Whyte  MRN:  [id number]  Date of Service:  1/6/2025  Completed by:  Madina Don LCSW  Referral Date: 11/22/2024    Reason for Visit   Patient presents with    Other     12/30/2024  1st attempt to complete Follow-Up for Outpatient Care Management, left message. Attempt letter sent.    hospitals SW Follow Up Call     01/06/2025       Brief Summary: Spoke with patient and updated on NH referral status, including denials. Informed patient she is tentatively accepted to [facility] who will need her financial information before making a final decision. Patient states she is agreeable for placement at [facility]. Patient reports her sister, Nita, will be able to access her financial to give to the NH. Spoke with Nita and updated on all above. Nita states she and her daughter will begin gathering the necessary documents ASAP, and that she remembers speaking with Gabrielle from [facility] a few months ago. SW requested for Nita to update SW on her progress, Nita is agreeable. SW will follow up in 1 week or as updates occur.    Complex Care Plan     Care plan was discussed and completed today with input from patient and/or caregiver.    Patient Instructions     No follow-ups on file.

## 2025-01-08 ENCOUNTER — TELEPHONE (OUTPATIENT)
Dept: CARDIOLOGY | Facility: CLINIC | Age: 80
End: 2025-01-08
Payer: MEDICARE

## 2025-01-08 DIAGNOSIS — R06.02 SOB (SHORTNESS OF BREATH): Primary | ICD-10-CM

## 2025-01-13 ENCOUNTER — OUTPATIENT CASE MANAGEMENT (OUTPATIENT)
Dept: ADMINISTRATIVE | Facility: OTHER | Age: 80
End: 2025-01-13
Payer: MEDICARE

## 2025-01-13 NOTE — PROGRESS NOTES
1/13/2025  1st attempt to complete Follow-Up for Outpatient Care Management, left messages for patient and sister, Nita. Attempt letter sent.

## 2025-01-13 NOTE — LETTER
Rosetta Whyte  [address] [facility] [address]      Dear Rosetta Whyte,     I am writing from the Outpatient Care Management Department at [facility]. I have been unsuccessful at reaching you since we spoke on ***.  I hope you found the assistance previously provided to you helpful.     Please contact me at *** from 8:00AM to 4:30 PM on Monday thru Friday.     As you know, [facility] also has a program with a nurse available 24/7 to answer questions or provide medical advice.  [facility] can be reached at [phone].    Sincerely,       Madina Don, [facility]  Outpatient Care Management

## 2025-01-13 NOTE — LETTER
[place]te  [address] [address]      Dear Rosetta Whyte,     I am writing from the Outpatient Care Management Department at [facility]. I have been unsuccessful at reaching you since we spoke on Monday 1/6/2025. I hope you found the assistance previously provided to you helpful.     Please contact me at [phone] from 8:00AM to 4:30 PM on Monday thru Friday.     As you know, [facility] also has a program with a nurse available 24/7 to answer questions or provide medical advice. [facility] can be reached at [phone].    Sincerely,       Madina Don LCSW  Outpatient Care Management

## 2025-01-22 NOTE — PROGRESS NOTES
Outpatient Care Management   - Care Plan Follow Up    Patient: Rosetta Whyte  MRN:  [id number]  Date of Service:  1/22/2025  Completed by:  Madina Don LCSW  Referral Date: 11/22/2024    Reason for Visit   Patient presents with    Other     1/13/2025  1st attempt to complete Follow-Up for Outpatient Care Management, left messages for patient and sister, Ntia. Attempt letter sent.    [facility] SW Follow Up Call     01/22/2025       Brief Summary: Spoke with patient's sister, Nita, who states she and patient's daughter, Lyle, are working on obtaining the financial documents needed. Nita states they have secured some documents but are working on the other bank statements.  requested for Nita to notify SW once she and Lyle turn in the financial information so  can further coordinate with [facility]. Nita verbalized understanding and agreement. SW will follow up in 1-2 weeks.    Complex Care Plan     Care plan was discussed and completed today with input from patient and/or caregiver.    Patient Instructions     No follow-ups on file.

## 2025-01-29 ENCOUNTER — OUTPATIENT CASE MANAGEMENT (OUTPATIENT)
Dept: ADMINISTRATIVE | Facility: OTHER | Age: 80
End: 2025-01-29
Payer: MEDICARE

## 2025-01-29 NOTE — PROGRESS NOTES
Outpatient Care Management   - Care Plan Follow Up    Patient: Rosetta Whyte  MRN:  [id number]  Date of Service:  1/29/2025  Completed by:  Madina Don LCSW  Referral Date: 11/22/2024    Reason for Visit   Patient presents with    OPCM SW Follow Up Call     01/29/2025       Brief Summary: Spoke with patient's sister, Nita, who states she is still ill but has been in communication with the NH and patient's daughter, Lyle. Nita reports she is trying to figure out how to provide the financial information requested, due to Nita and patient's SSI goes into the same bank account. SW encouraged Nita to call Gabrielle in admissions at [facility] to discuss this. Nita verbalized agreement and confirms she has Gabrielle's ph#. Discussed the need for a chest xray and will try to utilize [facility] for the xray and possibly  for the TB skin test. SW will follow up in 1 week.    Complex Care Plan     Care plan was discussed and completed today with input from patient and/or caregiver.    Patient Instructions     No follow-ups on file.

## 2025-02-05 ENCOUNTER — OUTPATIENT CASE MANAGEMENT (OUTPATIENT)
Dept: ADMINISTRATIVE | Facility: OTHER | Age: 80
End: 2025-02-05
Payer: MEDICARE

## 2025-02-05 NOTE — PROGRESS NOTES
Outpatient Care Management   - Care Plan Follow Up    Patient: Rosetta Whyte  MRN:  [id number]  Date of Service:  2/5/2025  Completed by:  Madina Don LCSW  Referral Date: 11/22/2024    Reason for Visit   Patient presents with    OPCM SW Follow Up Call     02/05/2025       Brief Summary: Spoke with patient's sister, Nita, who states she had a long talk with patient yesterday. Patient reportedly would like to try getting accepted to [facility] or [facility] prior to moving forward with [facility] due to their closer proximity to her family. SW sent referrals to the requested facilities and will follow for their decisions. Nita agrees to call SW should either of those NHs call her first.    Complex Care Plan     Care plan was discussed and completed today with input from patient and/or caregiver.    Patient Instructions     No follow-ups on file.

## 2025-02-11 ENCOUNTER — OUTPATIENT CASE MANAGEMENT (OUTPATIENT)
Dept: ADMINISTRATIVE | Facility: OTHER | Age: 80
End: 2025-02-11
Payer: MEDICARE

## 2025-02-11 NOTE — LETTER
Rosetta Whyte  [address] [address]      Dear Rosetta Whyte,     I am writing from the Outpatient Care Management Department at [facility]. I have been unsuccessful at reaching you since we spoke on Wednesday 2/5/2025.  I hope you found the assistance previously provided to you helpful.     Please contact me at [phone] from 8:00AM to 4:30 PM on Monday thru Friday.     As you know, [facility] also has a program with a nurse available 24/7 to answer questions or provide medical advice.  [facility] can be reached at [phone].    Sincerely,       Madina Don LCSW  Outpatient Care Management

## 2025-02-12 NOTE — PROGRESS NOTES
2/12/2025  1st attempt to complete Follow-Up for Outpatient Care Management, left message. Attempt letter sent.    Spoke with Dianna in admissions at [facility] who states they will not consider or review a referral that has notes from 11/2024 (most recent notes available due to patient's bed bound status). WILMER will discuss [facility] with patient again.    Update 3:52 PM: Received a VM from patient. SW attempted to return call and left VM.    2/11/2025  [facility] is denied by [facility] due to no beds available.  WILMER was advised by central admissions to call [facility] directly regarding the jail referral.

## 2025-02-20 NOTE — PROGRESS NOTES
Outpatient Care Management   - Care Plan Follow Up    Patient: Rosetta Whyte  MRN:  [id number]  Date of Service:  2/20/2025  Completed by:  Madina Don [facility] [place]  Referral Date: 11/22/2024    Reason for Visit   Patient presents with    Other     2/12/2025  1st attempt to complete Follow-Up for Outpatient Care Management, left message. Attempt letter sent.    [facility] Follow Up Call     02/19/2025       Brief Summary: Attempted to contact patient and patient's sister, Nita, and left voicemails. Spoke with patient's daughter, Lyle, and updated on NH referral status-- Pending sale to [facility] denied due to no beds available and [facility] will not consider looking at the referral until there are more updated notes from MD. SW discussed the possibility of [facility] to visit patient at home for updated clinicals and chest xray, Lyle is agreeable to help coordinate the visit as patient is difficult to get in touch with over the phone. SW collaborated with [facility]. Patient is scheduled for Monday 2/24/25 at 8:00 AM for [facility] visit.    Per chart review, new care everywhere notes were available. Patient is being seen by [facility]. Dr. Fenton from [facility] was listed on the notes as PCP. SW contacted Dr. Fenton's office and was informed patient has not been seen by him since 2023.    Complex Care Plan     Care plan was discussed and completed today with input from patient and/or caregiver.    Patient Instructions     No follow-ups on file.

## 2025-02-24 ENCOUNTER — CARE AT HOME (OUTPATIENT)
Dept: HOME HEALTH SERVICES | Facility: CLINIC | Age: 80
End: 2025-02-24
Payer: MEDICARE

## 2025-02-24 VITALS
SYSTOLIC BLOOD PRESSURE: 149 MMHG | TEMPERATURE: 98 F | OXYGEN SATURATION: 98 % | DIASTOLIC BLOOD PRESSURE: 69 MMHG | HEART RATE: 78 BPM | RESPIRATION RATE: 20 BRPM

## 2025-02-24 DIAGNOSIS — L97.909 VENOUS ULCER: ICD-10-CM

## 2025-02-24 DIAGNOSIS — I82.411 DEEP VEIN THROMBOSIS (DVT) OF FEMORAL VEIN OF RIGHT LOWER EXTREMITY, UNSPECIFIED CHRONICITY: ICD-10-CM

## 2025-02-24 DIAGNOSIS — I89.0 LYMPHEDEMA OF BOTH LOWER EXTREMITIES: Chronic | ICD-10-CM

## 2025-02-24 DIAGNOSIS — I10 ESSENTIAL (PRIMARY) HYPERTENSION: ICD-10-CM

## 2025-02-24 DIAGNOSIS — G47.33 OBSTRUCTIVE SLEEP APNEA: ICD-10-CM

## 2025-02-24 DIAGNOSIS — I51.9 DIASTOLIC DYSFUNCTION, LEFT VENTRICLE: Chronic | ICD-10-CM

## 2025-02-24 DIAGNOSIS — I21.4 NSTEMI (NON-ST ELEVATED MYOCARDIAL INFARCTION): ICD-10-CM

## 2025-02-24 DIAGNOSIS — L03.115 CELLULITIS OF RIGHT LEG: ICD-10-CM

## 2025-02-24 DIAGNOSIS — R07.81 PLEURODYNIA: Primary | ICD-10-CM

## 2025-02-24 DIAGNOSIS — E66.01 MORBID (SEVERE) OBESITY DUE TO EXCESS CALORIES: ICD-10-CM

## 2025-02-24 DIAGNOSIS — Z78.9 SOCIAL WORKER INVOLVED IN PATIENT'S CARE: ICD-10-CM

## 2025-02-24 DIAGNOSIS — I83.009 VENOUS ULCER: ICD-10-CM

## 2025-02-24 PROCEDURE — 99350 HOME/RES VST EST HIGH MDM 60: CPT | Mod: S$GLB,,, | Performed by: NURSE PRACTITIONER

## 2025-02-24 PROCEDURE — 3288F FALL RISK ASSESSMENT DOCD: CPT | Mod: CPTII,S$GLB,, | Performed by: NURSE PRACTITIONER

## 2025-02-24 PROCEDURE — 3078F DIAST BP <80 MM HG: CPT | Mod: CPTII,S$GLB,, | Performed by: NURSE PRACTITIONER

## 2025-02-24 PROCEDURE — 1159F MED LIST DOCD IN RCRD: CPT | Mod: CPTII,S$GLB,, | Performed by: NURSE PRACTITIONER

## 2025-02-24 PROCEDURE — 1101F PT FALLS ASSESS-DOCD LE1/YR: CPT | Mod: CPTII,S$GLB,, | Performed by: NURSE PRACTITIONER

## 2025-02-24 PROCEDURE — 1160F RVW MEDS BY RX/DR IN RCRD: CPT | Mod: CPTII,S$GLB,, | Performed by: NURSE PRACTITIONER

## 2025-02-24 PROCEDURE — 3077F SYST BP >= 140 MM HG: CPT | Mod: CPTII,S$GLB,, | Performed by: NURSE PRACTITIONER

## 2025-02-24 RX ORDER — ATORVASTATIN CALCIUM 40 MG/1
40 TABLET, FILM COATED ORAL DAILY
Qty: 90 TABLET | Refills: 3 | Status: SHIPPED | OUTPATIENT
Start: 2025-02-24

## 2025-02-24 RX ORDER — CLINDAMYCIN HYDROCHLORIDE 300 MG/1
300 CAPSULE ORAL EVERY 8 HOURS
Qty: 30 CAPSULE | Refills: 0 | Status: SHIPPED | OUTPATIENT
Start: 2025-02-24 | End: 2025-03-06

## 2025-02-24 RX ORDER — LOSARTAN POTASSIUM AND HYDROCHLOROTHIAZIDE 25; 100 MG/1; MG/1
1 TABLET ORAL DAILY
Qty: 90 TABLET | Refills: 3 | Status: SHIPPED | OUTPATIENT
Start: 2025-02-24

## 2025-02-24 RX ORDER — CARVEDILOL 6.25 MG/1
6.25 TABLET ORAL 2 TIMES DAILY WITH MEALS
Qty: 180 TABLET | Refills: 3 | Status: SHIPPED | OUTPATIENT
Start: 2025-02-24

## 2025-02-24 RX ORDER — AMLODIPINE BESYLATE 10 MG/1
10 TABLET ORAL DAILY
Qty: 90 TABLET | Refills: 3 | Status: SHIPPED | OUTPATIENT
Start: 2025-02-24

## 2025-02-25 ENCOUNTER — TELEPHONE (OUTPATIENT)
Dept: HOME HEALTH SERVICES | Facility: CLINIC | Age: 80
End: 2025-02-25
Payer: MEDICARE

## 2025-02-25 ENCOUNTER — OUTPATIENT CASE MANAGEMENT (OUTPATIENT)
Dept: ADMINISTRATIVE | Facility: OTHER | Age: 80
End: 2025-02-25
Payer: MEDICARE

## 2025-02-25 DIAGNOSIS — R05.9 COUGH, UNSPECIFIED TYPE: Primary | ICD-10-CM

## 2025-02-25 NOTE — TELEPHONE ENCOUNTER
Orders placed per NP request for Mobile Chest Xray.  Demographics, orders, and notes faxed to [facility]. Fax confirmation received.

## 2025-02-25 NOTE — ASSESSMENT & PLAN NOTE
2/2 for need for nursing home placement  Will order in home chest xray and labs to assist with placement  Will also order home health until patient is placed

## 2025-02-25 NOTE — PROGRESS NOTES
2/25/2025  Collaborated with [facility] NP and LPN regarding NH placement. Patient was visited and evaluated by BRYCE NP yesterday. WILMER sent the updated note in a referral to [facility] for review. Attempted to call in LOCET and was informed that since patient is receiving Community Cho[employer] Waiver, a new LOCET does not need to be completed.    Update 12:38 PM: Received form 142 from the Office of Aging. Uploaded form 142 and PASRR into Epic under media.    Spoke with Dianna in admissions at [facility] who requested wound care notes and wound measurements. [facility] who agrees to fax their documentation to [facility]. Faxed 142/PASRR to [facility]. Spoke with Dianna and updated that [facility] will be faxing the documentation, including their height/weight on file; WILMER informed Dianna of [facility] last recorded weight in the chart.

## 2025-02-25 NOTE — ASSESSMENT & PLAN NOTE
Right lower leg erythema concerning for cellulitis  Order clindamycin  Instructed to report worsening symptoms

## 2025-02-25 NOTE — ASSESSMENT & PLAN NOTE
Noted on exam  Continue in home wound care provider lymphedema care  Ordered hh  Elevate foot of bed and heels off-loaded  Pressure prevention measures- wedge/pillows for turning q 2 hours, heel protector, moisture control pads.

## 2025-02-25 NOTE — PROGRESS NOTES
[facility]  Medical Chronic Care Home Visit    Encounter Provider: Yaa Morfin   PCP: Georgie Ortega MD  Consult Requested By: No ref. provider found    HISTORY OF PRESENT ILLNESS      Patient ID: Rosetta Whyte is a 79 y.o. female is being seen in the home due to physical debility that presents a taxing effort to leave the home, to mitigate high risk of hospital readmission and/or due to the limited availability of reliable or safe options for transportation to the point of access to the provider. Prior to treatment on this visit the chart was reviewed and patient verbal consent was obtained.    HPI:   78 yo F with PMHx of HTN, morbid obesity, hx of gastric bypass, lymphedema, EMMANUEL, HFpEF.  She is bed bound, requires max assist.  She lives with daughter who is currently providing 24 hour assistance.  She is being seen to assist with nursing home placement.      Today:  She is found lying in bed, no acute distress.  Denies chest pain, shortness of breath, fevers, cough, congestion, abdominal pain, palpitations, change in bladder habits, change in bowel habits.  She reports taking meds as prescribed.  Reports good bm pattern, sleep, appetite.  She is a max assist, reports declining functioning, requiring more assistance with ADLS.   is currently working on nursing home placement.  Will order home health PT/OT and nursing until nursing home placement is secured.  She has lymphedema and venous ulcers to lower extremities.  She is current with [facility] home wound care providers.  Erythema noted to right lower extremity concerning for cellulitis.     DECISION MAKING TODAY       Assessment & Plan:  1. Pleurodynia  -     apixaban (ELIQUIS) 5 mg Tab; Take 1 tablet (5 mg total) by mouth 2 (two) times daily. Begin after completion of apixaban starter pack  Dispense: 60 tablet; Refill: 5    2. Essential (primary) hypertension  Assessment & Plan:  Chronic  Continue meds as prescribed  Low Na diet      Orders:  -     Ambulatory referral/consult to Home Health; Future; Expected date: 02/25/2025    3. Diastolic dysfunction, left ventricle  Assessment & Plan:  Peripheral edema noted  Denies chest pain, SOB  Continue medication as prescribed  Keep scheduled follow up appts  Activity and Diet restrictions:   Recommend 2-3 gram sodium restriction and 1500cc- 2000cc fluid restriction.  Encourage physical activity with graded exercise program.  Requested patient to weigh themselves daily, and to notify us if their weight increases by more than 3 lbs in 1 day or 5 lbs in 3 days.  Elevated lower extremities while sitting/lying, compression stockings      Orders:  -     Ambulatory referral/consult to Home Health; Future; Expected date: 02/25/2025    4. Morbid (severe) obesity due to excess calories  Assessment & Plan:  She has been educated on the negative effects of obesity to one's health and encouraged to consider lifestyle diet modifications and exercise.      Orders:  -     Ambulatory referral/consult to Home Health; Future; Expected date: 02/25/2025    5. Lymphedema of both lower extremities  Assessment & Plan:  Noted on exam  Continue in home wound care provider lymphedema care  Ordered hh  Elevate foot of bed and heels off-loaded  Pressure prevention measures- wedge/pillows for turning q 2 hours, heel protector, moisture control pads.         6. Deep vein thrombosis (DVT) of femoral vein of right lower extremity, unspecified chronicity  Assessment & Plan:  Chronic, stable  Continue eliquis       7.  involved in patient's care  Assessment & Plan:  2/2 for need for nursing home placement  Will order in home chest xray and labs to assist with placement  Will also order home health until patient is placed       8. NSTEMI (non-ST elevated myocardial infarction)  Assessment & Plan:  Denies chest pain  Continue meds as prescribed       9. Venous ulcer  Assessment & Plan:  Bilateral lower  extremities  Continue in home wound care provider      10. Cellulitis of right leg  Assessment & Plan:  Right lower leg erythema concerning for cellulitis  Order clindamycin  Instructed to report worsening symptoms       11. Obstructive sleep apnea  Assessment & Plan:  Reports non-compliance with CPAP machine       Other orders  -     amLODIPine (NORVASC) 10 MG tablet; Take 1 tablet (10 mg total) by mouth once daily.  Dispense: 90 tablet; Refill: 3  -     losartan-hydrochlorothiazide 100-25 mg (HYZAAR) 100-25 mg per tablet; Take 1 tablet by mouth once daily.  Dispense: 90 tablet; Refill: 3  -     carvediloL (COREG) 6.25 MG tablet; Take 1 tablet (6.25 mg total) by mouth 2 (two) times daily with meals.  Dispense: 180 tablet; Refill: 3  -     atorvastatin (LIPITOR) 40 MG tablet; Take 1 tablet (40 mg total) by mouth once daily.  Dispense: 90 tablet; Refill: 3  -     clindamycin (CLEOCIN) 300 MG capsule; Take 1 capsule (300 mg total) by mouth every 8 (eight) hours. for 10 days  Dispense: 30 capsule; Refill: 0           ENVIRONMENT OF CARE      Family and/or Caregiver present at visit?  Yes  Name of Caregiver: daughter   History provided by: patient and caregiver    4Ms for Medical Decision-Making in Older Adults    Last Completed EAWV:  None    MEDICATIONS:  High Risk Medications:  Total Active Medications: 0  This patient does not have an active medication from one of the medication groupers.    MOBILITY:  Activities of Daily Living:       No data to display              Fall Risk:      2/24/2025     8:00 AM 5/22/2020     4:00 PM 4/9/2019     2:30 PM   Fall Risk Assessment - Outpatient   Mobility Status Stretcher Ambulatory w/ assistance    Number of falls 0 1 with injury 1   Identified as fall risk True True      Disability Status:      11/9/2017     7:00 AM   Disability Status   Are you deaf or do you have serious difficulty hearing? N   Are you blind or do you have serious difficulty seeing, even when wearing  glasses? N   Because of a physical, mental, or emotional condition, do you have serious difficulty concentrating, remembering, or making decisions? N   Do you have serious difficulty walking or climbing stairs? N   Do you have difficulty dressing or bathing? Y   Because of a physical, mental, or emotional condition, do you have difficulty doing errands alone such as visiting a doctor's office or shopping? N     Nutrition Screening:       No data to display             Screening Score: 0-7 Malnourished, 8-11 At Risk, 12-14 Normal  Get Up and Go:       No data to display              Whisper Test:       No data to display              MENTATION:   Has Dementia Dx: No  Has Anxiety Dx: No    Depression Patient Health Questionnaire:      6/22/2016    10:14 AM   Depression Patient Health Questionnaire   Over the last two weeks how often have you been bothered by little interest or pleasure in doing things Not at all    Over the last two weeks how often have you been bothered by feeling down, depressed or hopeless Not at all    PHQ-2 Total Score 0       Data saved with a previous flowsheet row definition     Cognitive Function Screening:       No data to display                WHAT MATTERS MOST:  Advance Care Planning   ACP Status:   Patient has had an ACP conversation  Living Will: No  Power of : No  La[place]: No    What is most important right now is to focus on avoiding the hospital    Accordingly, we have decided that the best plan to meet the patient's goals includes continuing with treatment      Is patient hospice appropriate: No  (If needed, use PPS <30 or FAST score >7)  Was referral to hospice placed: No    Impression upon entering the home:  Physical Dwelling: single family home   Appearance of home environment: cleaniness: clean  Functional Status: max assistance  Mobility: bedbound  Nutritional access: adequate intake and access  Home Health: No, and will be referred today   DME/Supplies: hospital bed      Diagnostic tests reviewed/disposition: No diagnosic tests pending after this hospitalization.  Disease/illness education:  signs and symptoms to report   Establishment or re-establishment of referral orders for community resources: No other necessary community resources.   Discussion with other health care providers: No discussion with other health care providers necessary.   Does patient have a PCP at [facility]? No   Repatriation plan with PCP? Care at Home reason: mobility   Does patient have an ostomy (ileostomy, colostomy, suprapubic catheter, nephrostomy tube, tracheostomy, PEG tube, pleurex catheter, cholecystostomy, etc)? No  Were BPAs reviewed? No    Social History     Socioeconomic History    Marital status:    Tobacco Use    Smoking status: Never    Smokeless tobacco: Never   Substance and Sexual Activity    Alcohol use: No    Drug use: No     Social Drivers of Health     Financial Resource Strain: Medium Risk (11/21/2024)    Overall Financial Resource Strain (CARDIA)     Difficulty of Paying Living Expenses: Somewhat hard   Food Insecurity: No Food Insecurity (11/21/2024)    Hunger Vital Sign     Worried About Running Out of Food in the Last Year: Never true     Ran Out of Food in the Last Year: Never true   Transportation Needs: No Transportation Needs (11/21/2024)    TRANSPORTATION NEEDS     Transportation : No   Physical Activity: Inactive (11/20/2024)    Exercise Vital Sign     Days of Exercise per Week: 0 days     Minutes of Exercise per Session: 0 min   Stress: No Stress Concern Present (11/21/2024)    [employer] [place] [employer] - Occupational Stress Questionnaire     Feeling of Stress : Only a little   Housing Stability: Unknown (11/21/2024)    Housing Stability Vital Sign     Unable to Pay for Housing in the Last Year: No     Homeless in the Last Year: No         OBJECTIVE:     Vital Signs:  Vitals:    02/24/25 1700   BP: (!) 149/69   Pulse: 78   Resp: 20   Temp: 98.2 °F (36.8  °C)       Review of Systems   Constitutional:  Negative for chills and fever.   HENT:  Negative for congestion, postnasal drip and rhinorrhea.    Eyes:  Negative for visual disturbance.   Respiratory:  Negative for chest tightness and shortness of breath.    Cardiovascular:  Negative for chest pain and palpitations.   Gastrointestinal:  Negative for abdominal pain, blood in stool, constipation, diarrhea, nausea and vomiting.   Genitourinary:  Negative for difficulty urinating.   Musculoskeletal:  Positive for gait problem. Negative for arthralgias.   Skin:  Positive for wound.   Neurological:  Negative for dizziness, seizures, light-headedness and headaches.   Hematological:  Does not bruise/bleed easily.   Psychiatric/Behavioral:  Negative for agitation and confusion.        Physical Exam  Constitutional:       Appearance: She is obese.   HENT:      Head: Normocephalic and atraumatic.      Nose: No congestion or rhinorrhea.      Mouth/Throat:      Pharynx: No oropharyngeal exudate.   Cardiovascular:      Rate and Rhythm: Normal rate.      Pulses: Normal pulses.   Pulmonary:      Effort: No respiratory distress.      Breath sounds: Normal breath sounds.   Abdominal:      General: Bowel sounds are normal.      Palpations: Abdomen is soft.      Tenderness: There is no abdominal tenderness.   Musculoskeletal:         General: No deformity.      Cervical back: Normal range of motion and neck supple.   Skin:     General: Skin is warm and dry.      Comments: Multiple venous ulcers to lower extremities  Right leg erythema noted    Neurological:      Mental Status: She is alert and oriented to person, place, and time. Mental status is at baseline.   Psychiatric:         Mood and Affect: Mood normal.       INSTRUCTIONS FOR PATIENT:     Scheduled Follow-up, Appts Reviewed with Modifications if Needed: Yes  No future appointments.    Current Medications[1]    Medication Reconciliation:  Were medications changed during this  appointment? Yes  Were medications in the home? Yes  Is the patient taking the medications as directed? Yes  Does the patient/caregiver understand the medications and changes? Yes  Does updated med list accurately reflects meds patient is currently taking? Yes    Signature: Yaa Morfin NP     Total face-to-face time was 60 min, >50% of this was spent on counseling and coordination of care. The following issues were discussed: primary and secondary diagnoses, co-morbidities, prescribed medications, treatment modalities, importance of compliance with medical advice and directives for follow-up care.           [1]   Current Outpatient Medications:     amLODIPine (NORVASC) 10 MG tablet, Take 1 tablet (10 mg total) by mouth once daily., Disp: 90 tablet, Rfl: 3    apixaban (ELIQUIS) 5 mg Tab, Take 1 tablet (5 mg total) by mouth 2 (two) times daily. Begin after completion of apixaban starter pack, Disp: 60 tablet, Rfl: 5    atorvastatin (LIPITOR) 40 MG tablet, Take 1 tablet (40 mg total) by mouth once daily., Disp: 90 tablet, Rfl: 3    carvediloL (COREG) 6.25 MG tablet, Take 1 tablet (6.25 mg total) by mouth 2 (two) times daily with meals., Disp: 180 tablet, Rfl: 3    clindamycin (CLEOCIN) 300 MG capsule, Take 1 capsule (300 mg total) by mouth every 8 (eight) hours. for 10 days, Disp: 30 capsule, Rfl: 0    losartan-hydrochlorothiazide 100-25 mg (HYZAAR) 100-25 mg per tablet, Take 1 tablet by mouth once daily., Disp: 90 tablet, Rfl: 3    miconazole NITRATE 2 % (MICOTIN) 2 % top powder, Apply topically 2 (two) times daily. Apply to groin, Disp: 85 g, Rfl: 0

## 2025-02-25 NOTE — ASSESSMENT & PLAN NOTE
Peripheral edema noted  Denies chest pain, SOB  Continue medication as prescribed  Keep scheduled follow up appts  Activity and Diet restrictions:   Recommend 2-3 gram sodium restriction and 1500cc- 2000cc fluid restriction.  Encourage physical activity with graded exercise program.  Requested patient to weigh themselves daily, and to notify us if their weight increases by more than 3 lbs in 1 day or 5 lbs in 3 days.  Elevated lower extremities while sitting/lying, compression stockings

## 2025-02-26 NOTE — PROGRESS NOTES
2/26/2025  Spoke with Dianna in admissions at [facility] who confirms receipt of information sent yesterday. WILMER provided patient's family ph#s to Dianna who states she will call today to discuss what financial information is needed. Dianna reports [facility] will accept just a chest xray, no need to proceed with TB lab. Updated [facility].    Received message from Dianna stating she spoke with Lyle, but patient stated she is now unsure about permanent placement. Lyle reportedly will speak with patient's sister, Nita, as well. 1st attempt to complete Follow-Up for Outpatient Care Management, left messages for patient and daughter.

## 2025-03-05 NOTE — PROGRESS NOTES
"Outpatient Care Management   - Care Plan Follow Up    Patient: Rosetta Whyte  MRN:  [id number]  Date of Service:  3/5/2025  Completed by:  Madina Don LCSW  Referral Date: 11/22/2024    Reason for Visit   Patient presents with    Other     2/26/2025  1st attempt to complete Follow-Up for Outpatient Care Management, left messages for patient and daughter.    OPCM  Follow Up Call     03/05/2025    Case Closure     03/05/2025       Brief Summary: Spoke with patient's sister, Nita. Nita states patient reports she does not want to live in a nursing home "forever" but does want to stay for "some months". SW explained that a nursing home may be reluctant to accept patients who do not plan to live there long term, but strongly encouraged Nita and patient to call Dianna at [facility] to discuss patient's preference and goals. SW explained that long term medicaid may take time to get approved and there may be out of pocket costs if patient does not stay at the nursing home long term. Nita verbalized understanding and reports she will call [facility] after her MD appt. Nita requests SW to call back later today around 3:00 PM to continue the discussion.    Update 3:30 PM: Spoke with patient's sister, Nita, who states she had a talk with patient. Patient is now declining placement, stating she only wanted to live at [facility] for 5-6 months before returning home. Patient states she has "no intention of staying at a nursing home permanently". Discussed the possibly of respite care or speaking with [facility] about a short stay, Nita declined stating patient is refusing placement now. WILMER discussed case closure with Nita who verbalized understanding. Updated [facility] NP and LPN. Updated [facility] via [facility].    Complex Care Plan     Care plan was discussed and completed today with input from patient and/or caregiver.    Patient Instructions     No " follow-ups on file.

## 2025-04-02 ENCOUNTER — TELEPHONE (OUTPATIENT)
Dept: HOME HEALTH SERVICES | Facility: CLINIC | Age: 80
End: 2025-04-02

## 2025-04-02 NOTE — TELEPHONE ENCOUNTER
I seen patient in February to assist with nursing home placement.  Patient declined nursing home placement after being accepted.  Daughter plans on getting her established with a PCP.  Patient is established in [facility] system and daughter plans on continuing in [facility] system.  Will cancel all future appointments with care at home program.

## 2025-04-21 ENCOUNTER — OUTPATIENT CASE MANAGEMENT (OUTPATIENT)
Dept: ADMINISTRATIVE | Facility: OTHER | Age: 80
End: 2025-04-21
Payer: MEDICARE

## 2025-04-22 ENCOUNTER — TELEPHONE (OUTPATIENT)
Dept: PRIMARY CARE CLINIC | Facility: CLINIC | Age: 80
End: 2025-04-22
Payer: MEDICARE

## 2025-04-22 NOTE — PROGRESS NOTES
Outpatient Care Management   - Care Plan Follow Up    Patient: Rosetta Whyte  MRN:  [id number]  Date of Service:  4/22/2025  Completed by:  Madina Don LCSW  Referral Date: 11/22/2024    Reason for Visit   Patient presents with    Social Work Assessment     4/21/2025       Brief Summary: [facility] ([phone])- left voicemail for admissions requesting return call    [facility] ([phone])- left voicemail for the home department requesting return call    Dr. Abelino Fenton's office ([phone])- spoke with  who states that patient had an appt in 2024 that was a no show; states that patient was last seen by Dr. Fenton in 2023; reports that while Dr. Fenton does not offer virtual visits, his NP does and offered to schedule patient for a virtual visit to discuss rehab and have general medical follow up. Scheduled patient for a virtual appt Thursday 4/24/2025 at 10:30 AM with NP.    Attempted to contact patient, Lyle, and Nita; left voicemails for patient and Lyle notifying of above and virtual appt time/date and reason why the appt is needed (rehab orders would need to be written by an active PCP). Unable to leave voicemail for Nita due to a full mailbox.    Update 10:45 AM: Received return call from patient's sister, Nita, and explained all above. Educated regarding the level of care differences between IP Rehab and SNF, Nita verbalized understanding. Explained the importance of follow up with PCP, and that PCP office would be the provider that would need to place orders for HH, IP Rehab, or SNF. Nita verbalized understanding that patient would need updated clinical notes for rehab referral, would also likely need updated PT/OT notes. Discussed that patient would need a medical need that would need to be closely monitored by a MD at rehab as well as being able to tolerate 3 hours of therapy a day. Nita reports that is sounds like patient might be more appropriate  for SNF, but will ensure patient follows up with her PCP office. Nita expressed frustration that patient is unable to be seen by a MD at her current PCP office, and is only seen by the NP; states she believes patient needs to speak with a MD and would prefer for patient to be within the [facility] system. SW will explore options for patient to be seen virtually by [facility] PCP. Inquired if [facility] advised for patient to go to the hospital for wound, Nita is unsure; SW will update Nita/family once [facility] returns call. Nita states she is having gastric bypass surgery Friday 4/25/25.    Complex Care Plan     Care plan was discussed and completed today with input from patient and/or caregiver.    Patient Instructions     No follow-ups on file.

## 2025-04-22 NOTE — TELEPHONE ENCOUNTER
----- Message from Madina Don sent at 4/22/2025 11:19 AM CDT -----  Regarding: Outpatient Care Management  Good morning,Ms. Sargent's sister, Nita, is requesting help with getting patient established with a virtual PCP at [facility]. Patient is bed bound and unable to leave the home. Ms. Archibald was last seen by her MD/PCP at [facility] in 2023. Since then, she was scheduled with that doctor's NP for a virtual visit in 2024 which the office says patient did not attend. Nita feels Ms. Archibald very much needs to be seen by a doctor and to get established with [facility]. I think patient would greatly benefit from [facility] as well, to which my understanding patient will need an [facility] PCP. Ms. Archibald did try to get SNF placement a few months ago and was seen briefly by [facility]; patient ultimately backed out, but is now interested in rehab again. She is also being seen by [facility], although I am not sure the extent of her wounds. Is Dr. Orozco accepting new virtual patients? Would Ms. Moya just need to call to schedule if so?Thank you,Madina Don LCSW

## 2025-04-22 NOTE — PROGRESS NOTES
"Outpatient Care Management   - Patient Assessment    Patient: Rosetta Whyte  MRN:  [id number]  Date of Service:  4/22/2025  Completed by:  Madina Don LCSW  Referral Date: 11/22/2024    Reason for Visit   Patient presents with    Social Work Assessment     4/21/2025       Brief Summary:  received a referral from self-referral for the following Low/Mod SW psychosocial needs: help with inpatient rehab placement. Care plan was created in collaboration with patient/caregiver input. Received a call from patient's sister, Nita, who states she found a rehab for patient. SW informed that patient must confirm her interest before SW can assist. Spoke with patient who states [facility]. SW inquired if patient is currently seen by PT/OT HH or any type of PT/OT recently; patient states she has had HH in the past; Lyle montilla, in the background participated in the conversation. Lyle states patient was seen by [facility] in the beginning of the month; states they advised that patient may want to think about "going to the hospital" due to her leg; daughter unable to describe what is wrong with the patient's leg or why it was advised she go to the ER, states patient has a rash on her leg. Patient denies fever. Patient states she has been bed bound for 2 years; has a fernando lift and hospital bed, but has not been out of bed to her recliner chair in at least a month. Patient lives alone but has waiver program sitter services 5 days a week; patient's sister and daughter visit daily to also provide care. Patient and family appear to have unrealistic expectations for rehab. SW attempted to educated regarding inpatient rehab and the need for medical reason and recent PT/OT notes, daughter became frustrated and states [facility] informed her they take patient's insurance. Lyle also reports patient has virtual visits with Dr. Abelino Fenton. Per chart review, SW spoke with Dr. Fenton's " "office in 2/2025 and was informed patient had not been seen by their office since 2023. Lyle states patient was seen "a few months ago". SW will make contact with [facility], confirm if patient is active with Dr. Fenton, and will reach out to [facility].  "

## 2025-04-23 NOTE — PROGRESS NOTES
Outpatient Care Management   - Care Plan Follow Up    Patient: Rosetta Whyte  MRN:  [id number]  Date of Service:  4/23/2025  Completed by:  Madina Don LCSW  Referral Date: 11/22/2024    Reason for Visit   Patient presents with    Social Work Assessment     4/21/2025    [facility] SW Follow Up Call     4/22/2025 & 4/23/2025       Brief Summary: Received message back from Dr. Orozco, willing to accept patient if she is able to set up her MyChart for virtual visits. Spoke with Shelly JUAREZ, patient will also need [facility] ordered to see patient before a visit with Dr. Orozco to have a physical evaluation. Spoke with patient's sister, Nita, and updated on above. Nita requested for the MyChart to be sent to patient's phone, and she will visit patient later today and will help her set it up. SW will continue to follow for PCP and [facility] appt set ups.    Complex Care Plan     Care plan was discussed and completed today with input from patient and/or caregiver.    Patient Instructions     No follow-ups on file.

## 2025-04-25 NOTE — PROGRESS NOTES
Outpatient Care Management   - Care Plan Follow Up    Patient: Rosetta Whyte  MRN:  [id number]  Date of Service:  4/25/2025  Completed by:  Madina Don LCSW  Referral Date: 11/22/2024    Reason for Visit   Patient presents with    Social Work Assessment     4/21/2025    [facility] SW Follow Up Call     4/22/2025 & 4/23/2025 & 4/25/2025       Brief Summary: Patient's [facility] MyChart was set up, updated Shelly JUAREZ with potential new PCP office, Dr. Orozco. Received call from patient's daughter, Lyle, who had questions to clarify the process and propose of setting up a new PCP and [facility]. SW explained in detail, including patient and sister's request to get set up with virtual [facility] PCP, and the importance of medical documentation that would be needed should patient want to pursue rehab or SNF. Explained the differences in level of care between IP Rehab and SNF, including admit criteria; Lyle became somewhat frustrated stating the rehab told her all she would need is a referral from the MD; WILMER explained what a typical rehab referral would consist of, including updated medical documentation from PCP, HH PT/OT/or wound care, etc. Lyle states her aunt Nita is now in the hospital and she is overwhelmed with patient's care at the moment and is worried about how long this process will take. Lyle explained she is patient's caregiver through the [employer]. WILMER inquired if patient has sitters, Lyle reports no and that she is patient's caregiver. Inquired if patient has tried applying for LTP for additional in-home support; Lyle wrote down the [facility] ph# to call and check patient's eligibility. WILMER also provided [employer] information to potentially add free of cost in-home services. Lyle then indicated she may bring patient to the ER, but did not elaborate on patient's current condition; WILMER requested for Lyle to keep WILMER updated, Lyle is agreeable.    Complex Care Plan     Care plan was discussed and completed today  with input from patient and/or caregiver.    Patient Instructions     No follow-ups on file.

## 2025-04-28 ENCOUNTER — OUTPATIENT CASE MANAGEMENT (OUTPATIENT)
Dept: ADMINISTRATIVE | Facility: OTHER | Age: 80
End: 2025-04-28
Payer: MEDICARE

## 2025-04-28 NOTE — PROGRESS NOTES
Outpatient Care Management   - Care Plan Follow Up    Patient: Rosetta Whyte  MRN:  [id number]  Date of Service:  4/28/2025  Completed by:  Madina Don LCSW  Referral Date: 11/22/2024    Reason for Visit   Patient presents with    [facility] SW Follow Up Call     4/28/2025       Brief Summary: Spoke with patient's daughter, Lyle, who states she asked patient's current PCP for an order to be sent to [facility] for rehab; states she/patient are still interested in being set up with [facility] and [facility] PCP ASAP, and was just worried about patient being able to get to rehab ASAP. SW did explain the typical rehab process in detail and documentation needed in addition to an order; daughter verbalized understanding but remains hopeful. Lyle reports she is waiting to call [facility] to check patient's eligibility for additional services until [facility] makes a decision. SW informed that either [facility] or [facility] PCP office will likely attempt to make contact to set up appts soon, Lyle verbalized understanding to be on the look out for their calls. Lyle reports patient's sister, Nita, is now back home from the hospital. Lyle is agreeable for a follow up call in 1 week.    Complex Care Plan     Care plan was discussed and completed today with input from patient and/or caregiver.    Patient Instructions     No follow-ups on file.

## 2025-05-06 ENCOUNTER — PATIENT MESSAGE (OUTPATIENT)
Dept: ADMINISTRATIVE | Facility: OTHER | Age: 80
End: 2025-05-06
Payer: MEDICARE

## 2025-05-06 ENCOUNTER — OUTPATIENT CASE MANAGEMENT (OUTPATIENT)
Dept: ADMINISTRATIVE | Facility: OTHER | Age: 80
End: 2025-05-06
Payer: MEDICARE

## 2025-05-06 NOTE — LETTER
[address] Whyte  [address] [address]      Dear Rosetta Whyte,     I am writing from the Outpatient Care Management Department at [facility]. I have been unsuccessful at reaching you since we spoke on Monday 4/28/2025. I hope you found the assistance previously provided to you helpful.     Please contact me at [phone] from 8:00AM to 4:30 PM on Monday through Friday.     As you know, [facility] also has a program with a nurse available 24/7 to answer questions or provide medical advice. [facility] can be reached at [phone].    Sincerely,       Madina Don LCSW  Outpatient Care Management

## 2025-05-06 NOTE — PROGRESS NOTES
5/13/2025  2nd attempt to complete Follow-Up for O[facility], left messages for patient and daughter.    5/6/2025  1st attempt to complete Follow-Up for [facility], left message. Attempt letter sent through portal.

## 2025-05-13 NOTE — PROGRESS NOTES
Outpatient Care Management   - Care Plan Follow Up    Patient: Rosetta Whyte  MRN:  [id number]  Date of Service:  5/13/2025  Completed by:  LEX Rico  Referral Date: 11/22/2024    Reason for Visit   Patient presents with    Other     5/13/2025  2nd attempt to complete Follow-Up for [facility], left messages for patient and daughter.     5/6/2025  1st attempt to complete Follow-Up for [facility], left message. Attempt letter sent through portal.    [facility] SW Follow Up Call     5/13/2025       Brief Summary: Received return call from patient's daughter, Lyle. Lyle reports someone came out and visited/examined patient, states she believes it may have been a NP but is unsure. Lyle reports the representative is supposed to call her today or tomorrow, and Lyle will inform SW of what company or organization she is from ([employer] or someone from [facility]). WILMER informed Lyle of the virtual appt to establish care with Dr. Orozco 5/21/25 at 1:00 PM; Lyle wrote down the appt date/time. SW will follow up around 5/22/25.    Complex Care Plan     Care plan was discussed and completed today with input from patient and/or caregiver.    Patient Instructions     No follow-ups on file.

## 2025-05-22 ENCOUNTER — OUTPATIENT CASE MANAGEMENT (OUTPATIENT)
Dept: ADMINISTRATIVE | Facility: OTHER | Age: 80
End: 2025-05-22
Payer: MEDICARE

## 2025-05-22 ENCOUNTER — PATIENT MESSAGE (OUTPATIENT)
Dept: ADMINISTRATIVE | Facility: OTHER | Age: 80
End: 2025-05-22
Payer: MEDICARE

## 2025-05-22 NOTE — LETTER
[place] Whyte  [address] [address]      Dear Rosetta Whyte,     I am writing from the Outpatient Care Management Department at [facility]. I have been unsuccessful at reaching you since we spoke on Tuesday 5/13/20025. I hope you found the assistance previously provided to you helpful.     Please contact me at [phone] from 8:00AM to 4:30 PM on Monday through Friday.     As you know, [facility] also has a program with a nurse available 24/7 to answer questions or provide medical advice. [facility] can be reached at [phone].    Sincerely,       Madina Don LCSW  Outpatient Care Management

## 2025-05-22 NOTE — PROGRESS NOTES
5/22/2025  1st attempt to complete Follow-Up for O[facility], left message. Attempt letter sent through portal.

## 2025-05-22 NOTE — PROGRESS NOTES
Outpatient Care Management   - Care Plan Follow Up    Patient: Rosetta Whyte  MRN:  [id number]  Date of Service:  5/22/2025  Completed by:  Madina Don LCSW  Referral Date: 11/22/2024    Reason for Visit   Patient presents with    Other     5/22/2025  1st attempt to complete Follow-Up for [facility], left message. Attempt letter sent through portal.    [facility] SW Follow Up Call     5/22/2025       Brief Summary: Spoke with patient's daughter, Lyle, who states she did not realize they needed to logon to [facility] for the virtual appt. SW advised for Lyle to check patient's phone for the [facility] Portal and/or consult with patient's sister, Nita, who helped patient set up her portal. Lyle reports she will speak with patient and Nita merino and sort out the portal confusion. Lyle agrees to notify SW once she is able to help patient logon to the portal, SW will assist with rescheduling the appt as patient still wants to pursue having an established PCP with [facility]. SW will follow up in 1 week.    Complex Care Plan     Care plan was discussed and completed today with input from patient and/or caregiver.    Patient Instructions     No follow-ups on file.

## 2025-05-29 ENCOUNTER — OUTPATIENT CASE MANAGEMENT (OUTPATIENT)
Dept: ADMINISTRATIVE | Facility: OTHER | Age: 80
End: 2025-05-29
Payer: MEDICARE

## 2025-06-18 ENCOUNTER — OUTPATIENT CASE MANAGEMENT (OUTPATIENT)
Dept: ADMINISTRATIVE | Facility: OTHER | Age: 80
End: 2025-06-18
Payer: MEDICARE

## 2025-06-18 NOTE — PROGRESS NOTES
Outpatient Care Management   - Care Plan Follow Up    Patient: Rosetta Whyte  MRN:  [id number]  Date of Service:  6/18/2025  Completed by:  Madina Don LCSW  Referral Date: 11/22/2024    Reason for Visit   Patient presents with    [facility] SW Follow Up Call     6/18/2025    Case Closure     6/18/2025       Brief Summary: Spoke with patient's sister, Nita, who states she thinks patient's daughter might have rescheduled the virtual appt but is unsure; states at this point it is not neccessary. Nita states they have been advised to bring patient to the hospital to have her placed. This SW has been working with patient/family since 12/2024 and explained the process of NH/SNF several times, including steps to be placed from home. Nita states it takes too long and hopes to have patient placed from the hospital. SW will close but requested for [employer] to notify SW if they have issues with this route.    Complex Care Plan     Care plan was discussed and completed today with input from patient and/or caregiver.    Patient Instructions     No follow-ups on file.

## (undated) DEVICE — KIT ANTIFOG

## (undated) DEVICE — BLADE SURG CARBON STEEL SZ11

## (undated) DEVICE — TRAY MINOR GEN SURG

## (undated) DEVICE — TROCAR ENDOPATH EXCEL

## (undated) DEVICE — SCISSOR 5MMX35CM DIRECT DRIVE

## (undated) DEVICE — TUBING HF INSUFFLATION W/ FLTR

## (undated) DEVICE — DRAPE ABDOMINAL TIBURON 14X11

## (undated) DEVICE — DRESSING TRANS 4X4 TEGADERM

## (undated) DEVICE — TROCAR ENDOPATH XCEL 5X75MM

## (undated) DEVICE — CLIP HEMO-LOK ML

## (undated) DEVICE — DRESSING ADH ISLAND 3.6 X 14

## (undated) DEVICE — WARMER DRAPE STERILE LF

## (undated) DEVICE — SUT MCRYL PLUS 4-0 PS2 27IN

## (undated) DEVICE — ELECTRODE REM PLYHSV RETURN 9

## (undated) DEVICE — SEE MEDLINE ITEM 152622

## (undated) DEVICE — DRESSING TELFA STRL 4X3 LF

## (undated) DEVICE — IRRIGATOR ENDOSCOPY DISP.

## (undated) DEVICE — BANDAGE ADHESIVE

## (undated) DEVICE — SOL NS 1000CC

## (undated) DEVICE — TROCAR ENDOPATH EXCEL DILATING

## (undated) DEVICE — TROCAR SPACEMAKER BLUNT 10MM